# Patient Record
Sex: MALE | Race: WHITE | Employment: UNEMPLOYED | URBAN - METROPOLITAN AREA
[De-identification: names, ages, dates, MRNs, and addresses within clinical notes are randomized per-mention and may not be internally consistent; named-entity substitution may affect disease eponyms.]

---

## 2022-04-24 ENCOUNTER — HOSPITAL ENCOUNTER (EMERGENCY)
Age: 42
Discharge: HOME | End: 2022-04-25
Attending: STUDENT IN AN ORGANIZED HEALTH CARE EDUCATION/TRAINING PROGRAM
Payer: MEDICAID

## 2022-04-24 DIAGNOSIS — F32.A DEPRESSION WITH SUICIDAL IDEATION: Primary | ICD-10-CM

## 2022-04-24 DIAGNOSIS — R45.851 DEPRESSION WITH SUICIDAL IDEATION: Primary | ICD-10-CM

## 2022-04-24 PROCEDURE — 99285 EMERGENCY DEPT VISIT HI MDM: CPT

## 2022-04-24 RX ORDER — BUPROPION HYDROCHLORIDE 150 MG/1
150 TABLET, EXTENDED RELEASE ORAL 2 TIMES DAILY
COMMUNITY
End: 2022-04-25

## 2022-04-24 RX ORDER — HYDROXYZINE PAMOATE 25 MG/1
25 CAPSULE ORAL 3 TIMES DAILY PRN
COMMUNITY
End: 2022-04-25

## 2022-04-24 RX ORDER — MIRTAZAPINE 15 MG/1
15 TABLET, FILM COATED ORAL NIGHTLY
COMMUNITY
End: 2022-04-25

## 2022-04-24 ASSESSMENT — PAIN - FUNCTIONAL ASSESSMENT: PAIN_FUNCTIONAL_ASSESSMENT: NONE - DENIES PAIN

## 2022-04-24 ASSESSMENT — LIFESTYLE VARIABLES
HOW MANY STANDARD DRINKS CONTAINING ALCOHOL DO YOU HAVE ON A TYPICAL DAY: 5 OR 6
HOW OFTEN DO YOU HAVE A DRINK CONTAINING ALCOHOL: 4 OR MORE TIMES A WEEK

## 2022-04-25 VITALS
RESPIRATION RATE: 16 BRPM | SYSTOLIC BLOOD PRESSURE: 98 MMHG | BODY MASS INDEX: 25.34 KG/M2 | HEIGHT: 71 IN | DIASTOLIC BLOOD PRESSURE: 57 MMHG | OXYGEN SATURATION: 96 % | HEART RATE: 75 BPM | WEIGHT: 181 LBS | TEMPERATURE: 97.6 F

## 2022-04-25 LAB
ABSOLUTE EOS #: 0.2 K/UL (ref 0–0.4)
ABSOLUTE LYMPH #: 1.9 K/UL (ref 1–4.8)
ABSOLUTE MONO #: 0.8 K/UL (ref 0.1–1.3)
ALBUMIN SERPL-MCNC: 4.7 G/DL (ref 3.5–5.2)
ALP BLD-CCNC: 72 U/L (ref 40–129)
ALT SERPL-CCNC: 26 U/L (ref 5–41)
AMPHETAMINE SCREEN URINE: NEGATIVE
ANION GAP SERPL CALCULATED.3IONS-SCNC: 14 MMOL/L (ref 9–17)
AST SERPL-CCNC: 24 U/L
BARBITURATE SCREEN URINE: NEGATIVE
BASOPHILS # BLD: 1 % (ref 0–2)
BASOPHILS ABSOLUTE: 0.1 K/UL (ref 0–0.2)
BENZODIAZEPINE SCREEN, URINE: NEGATIVE
BILIRUB SERPL-MCNC: 0.81 MG/DL (ref 0.3–1.2)
BUN BLDV-MCNC: 14 MG/DL (ref 6–20)
CALCIUM SERPL-MCNC: 9.5 MG/DL (ref 8.6–10.4)
CANNABINOID SCREEN URINE: POSITIVE
CHLORIDE BLD-SCNC: 96 MMOL/L (ref 98–107)
CO2: 24 MMOL/L (ref 20–31)
COCAINE METABOLITE, URINE: POSITIVE
CREAT SERPL-MCNC: 0.77 MG/DL (ref 0.7–1.2)
EOSINOPHILS RELATIVE PERCENT: 2 % (ref 0–4)
ETHANOL PERCENT: <0.01 %
ETHANOL: <10 MG/DL
GFR AFRICAN AMERICAN: >60 ML/MIN
GFR NON-AFRICAN AMERICAN: >60 ML/MIN
GFR SERPL CREATININE-BSD FRML MDRD: ABNORMAL ML/MIN/{1.73_M2}
GLUCOSE BLD-MCNC: 118 MG/DL (ref 70–99)
HCT VFR BLD CALC: 42.2 % (ref 41–53)
HEMOGLOBIN: 14.6 G/DL (ref 13.5–17.5)
LYMPHOCYTES # BLD: 17 % (ref 24–44)
MCH RBC QN AUTO: 31.7 PG (ref 26–34)
MCHC RBC AUTO-ENTMCNC: 34.6 G/DL (ref 31–37)
MCV RBC AUTO: 91.9 FL (ref 80–100)
METHADONE SCREEN, URINE: NEGATIVE
MONOCYTES # BLD: 7 % (ref 1–7)
OPIATES, URINE: NEGATIVE
OXYCODONE SCREEN URINE: NEGATIVE
PDW BLD-RTO: 12.8 % (ref 11.5–14.9)
PHENCYCLIDINE, URINE: NEGATIVE
PLATELET # BLD: 204 K/UL (ref 150–450)
PMV BLD AUTO: 8 FL (ref 6–12)
POTASSIUM SERPL-SCNC: 3.7 MMOL/L (ref 3.7–5.3)
RBC # BLD: 4.6 M/UL (ref 4.5–5.9)
SARS-COV-2, RAPID: NOT DETECTED
SEG NEUTROPHILS: 73 % (ref 36–66)
SEGMENTED NEUTROPHILS ABSOLUTE COUNT: 8.1 K/UL (ref 1.3–9.1)
SODIUM BLD-SCNC: 134 MMOL/L (ref 135–144)
SPECIMEN DESCRIPTION: NORMAL
TEST INFORMATION: ABNORMAL
TOTAL PROTEIN: 7.7 G/DL (ref 6.4–8.3)
WBC # BLD: 11 K/UL (ref 3.5–11)

## 2022-04-25 PROCEDURE — 87635 SARS-COV-2 COVID-19 AMP PRB: CPT

## 2022-04-25 PROCEDURE — 85025 COMPLETE CBC W/AUTO DIFF WBC: CPT

## 2022-04-25 PROCEDURE — 80307 DRUG TEST PRSMV CHEM ANLYZR: CPT

## 2022-04-25 PROCEDURE — 36415 COLL VENOUS BLD VENIPUNCTURE: CPT

## 2022-04-25 PROCEDURE — 80053 COMPREHEN METABOLIC PANEL: CPT

## 2022-04-25 PROCEDURE — G0480 DRUG TEST DEF 1-7 CLASSES: HCPCS

## 2022-04-25 PROCEDURE — 99285 EMERGENCY DEPT VISIT HI MDM: CPT | Performed by: PSYCHIATRY & NEUROLOGY

## 2022-04-25 ASSESSMENT — ENCOUNTER SYMPTOMS
RHINORRHEA: 0
VOMITING: 0
ABDOMINAL PAIN: 0
COUGH: 0
SHORTNESS OF BREATH: 0
NAUSEA: 0

## 2022-04-25 NOTE — ED NOTES
Spoke with Access center, these are the current statuses:    · At [facility] of [address]  [address]      · [facility]     I asked access to also reach out to [address] MANUEL Marie RN  04/25/22 8390

## 2022-04-25 NOTE — ED NOTES
Spoke with access center, these are the current statues:     At Capacity:   [facility]   [address] [address] [address]   [address]  [facility] will continue to attempt placement and/or waitlist.             Froy Toro, RN  04/25/22 1112

## 2022-04-25 NOTE — ED NOTES
[facility] called and states they are declining pt per Dr. Kortney Bello.      Katy Can, RN  04/25/22 5637

## 2022-04-25 NOTE — ED NOTES
Patient discharged by Dr Fadia Edwards and Dr Tova Lockett due to not meeting criteria for inpatient hospitalization. Patient also denied admission to [facility] and [facility]. Patient agreeable to be cabbed to a shelter that is not in [place]. This writer was able to find emergency shelter services in [place] and called for a cab to their facility. ETA for transportation is \"roughly 1 hour\".

## 2022-04-25 NOTE — ED NOTES
Dr. Carissa Hoskins spoke with this RN and Dr. Winston Sousa about in person consult. Patient doesn't meet admission criteria. Patient will speak with  about shelter placement.       Candi Schumacher RN  04/25/22 3642

## 2022-04-25 NOTE — ED PROVIDER NOTES
[facility]  Emergency Department Encounter  Emergency Medicine Physician     Pt Name: Chandrika Cheng  MRN: [id number]  [place] 1980  Date of evaluation: 4/25/22  PCP:  No primary care provider on file. CHIEF COMPLAINT       Chief Complaint   Patient presents with    Suicidal       HISTORY OF PRESENT ILLNESS  (Location/Symptom, Timing/Onset, Context/Setting, Quality, Duration, Modifying Factors, Severity.)    Chandrika Cheng is a 39 y.o. male who presents brought in by EMS. Patient states that he was going from [place] and traveling down to [place]. States that he came to [place] to bury his grandmother. He states that his friend left him at a gas station and he tried to find his way to a shelter. My patient was walking and he states that he became more depressed, more anxious, and eventually suicidal.  Patient states that he had a sudden thought that he should walk into traffic. After he had that thought, he called 911. On my examination, patient is calm and cooperative. He states that he still feels suicidal and that if he were released he would simply walk into traffic to kill himself. He denies any homicidal thoughts. States that he has been out of his Wellbutrin, Vistaril, and Remeron for at least a month. PAST MEDICAL / SURGICAL / SOCIAL / FAMILY HISTORY    has a past medical history of Anxiety, Depression, and Insomnia. has no past surgical history on file.     Social History     Socioeconomic History    Marital status: Single     Spouse name: Not on file    Number of children: Not on file    Years of education: Not on file    Highest education level: Not on file   Occupational History    Not on file   Tobacco Use    Smoking status: Current Every Day Smoker    Smokeless tobacco: Never Used   Substance and Sexual Activity    Alcohol use: Yes     Comment: daily drinker >6 beers    Drug use: Yes     Types: Cocaine     Comment: last used 1:30 pm on 4/24/22    Sexual activity: Not on file   Other Topics Concern    Not on file   Social History Narrative    Not on file     Social Determinants of Health     Financial Resource Strain:     Difficulty of Paying Living Expenses: Not on file   Food Insecurity:     Worried About Running Out of Food in the Last Year: Not on file    Ai of Food in the Last Year: Not on file   Transportation Needs:     Lack of Transportation (Medical): Not on file    Lack of Transportation (Non-Medical): Not on file   Physical Activity:     Days of Exercise per Week: Not on file    Minutes of Exercise per Session: Not on file   Stress:     Feeling of Stress : Not on file   Social Connections:     Frequency of Communication with Friends and Family: Not on file    Frequency of Social Gatherings with Friends and Family: Not on file    Attends Jainism Services: Not on file    Active Member of [address] [place] [address] [employer] or Organizations: Not on file    Attends Club or Organization Meetings: Not on file    Marital Status: Not on file   Intimate Partner Violence:     Fear of Current or Ex-Partner: Not on file    Emotionally Abused: Not on file    Physically Abused: Not on file    Sexually Abused: Not on file   Housing Stability:     Unable to Pay for Housing in the Last Year: Not on file    Number of [place] in the Last Year: Not on file    Unstable Housing in the Last Year: Not on file       History reviewed. No pertinent family history. Allergies:    Patient has no known allergies. Home Medications:  Prior to Admission medications    Medication Sig Start Date End Date Taking?  Authorizing Provider   buPROPion (WELLBUTRIN SR) 150 MG extended release tablet Take 150 mg by mouth 2 times daily   Yes Historical Provider, MD   mirtazapine (REMERON) 15 MG tablet Take 15 mg by mouth nightly   Yes Historical Provider, MD   hydrOXYzine (VISTARIL) 25 MG capsule Take 25 mg by mouth 3 times daily as needed for Anxiety   Yes Historical Provider, MD REVIEW OF SYSTEMS    (2-9 systems for level 4, 10 or more for level 5)    Review of Systems   Constitutional: Negative for chills, fatigue and fever. HENT: Negative for congestion and rhinorrhea. Respiratory: Negative for cough and shortness of breath. Cardiovascular: Negative for chest pain. Gastrointestinal: Negative for abdominal pain, nausea and vomiting. Neurological: Negative for light-headedness and headaches. Psychiatric/Behavioral: Positive for suicidal ideas. Negative for confusion, hallucinations, self-injury and sleep disturbance. The patient is nervous/anxious. All other systems reviewed and are negative. PHYSICAL EXAM   (up to 7 for level 4, 8 or more for level 5)    INITIAL VITALS:   ED Triage Vitals [04/24/22 5017]   BP Temp Temp Source Pulse Resp SpO2 Height Weight   108/73 98.3 °F (36.8 °C) Oral 89 16 96 % 5' 11\" (1.803 m) 181 lb (82.1 kg)       Physical Exam  Vitals and nursing note reviewed. Constitutional:       General: He is not in acute distress. Appearance: Normal appearance. He is not ill-appearing. Cardiovascular:      Rate and Rhythm: Normal rate and regular rhythm. Pulses: Normal pulses. Radial pulses are 2+ on the right side and 2+ on the left side. Heart sounds: Normal heart sounds. No murmur heard. Pulmonary:      Effort: Pulmonary effort is normal. No respiratory distress. Breath sounds: Normal breath sounds. No decreased breath sounds. Abdominal:      Palpations: Abdomen is soft. Tenderness: There is no abdominal tenderness. Skin:     General: Skin is warm and dry. Capillary Refill: Capillary refill takes less than 2 seconds. Neurological:      General: No focal deficit present. Mental Status: He is alert and oriented to person, place, and time. GCS: GCS eye subscore is 4. GCS verbal subscore is 5. GCS motor subscore is 6.    Psychiatric:         Attention and Perception: Attention and perception normal.         Mood and Affect: Mood is anxious and depressed. Affect is flat. Speech: Speech normal.         Behavior: Behavior is not aggressive, withdrawn, hyperactive or combative. Behavior is cooperative. Thought Content: Thought content includes suicidal ideation. Thought content does not include homicidal ideation. Thought content includes suicidal plan. Thought content does not include homicidal plan. DIFFERENTIAL  DIAGNOSIS   PLAN (LABS / IMAGING / EKG):  Orders Placed This Encounter   Procedures    COVID-19, Rapid    Comprehensive Metabolic Panel    CBC with Auto Differential    Urine Drug Screen    Ethanol    Suicide precautions       MEDICATIONS ORDERED:  No orders of the defined types were placed in this encounter. DIAGNOSTIC RESULTS / EMERGENCYDEPARTMENT COURSE / MDM   LABS:  Labs Reviewed   COMPREHENSIVE METABOLIC PANEL - Abnormal; Notable for the following components:       Result Value    Glucose 118 (*)     Sodium 134 (*)     Chloride 96 (*)     All other components within normal limits   CBC WITH AUTO DIFFERENTIAL - Abnormal; Notable for the following components:    Seg Neutrophils 73 (*)     Lymphocytes 17 (*)     All other components within normal limits   URINE DRUG SCREEN - Abnormal; Notable for the following components:    Cocaine Metabolite, Urine POSITIVE (*)     Cannabinoid Scrn, Ur POSITIVE (*)     All other components within normal limits   COVID-19, RAPID   ETHANOL       RADIOLOGY:  No results found. Impression:  Patient acutely suicidal.  Has a plan to hurt himself and kill himself. Has been out of his medications for at least a month. Patient's abandonment by his friend plus the lack of medications are all likely contributory. For now we will plan on [facility] labs, COVID swab, and admission.       EMERGENCY DEPARTMENT COURSE:  ED Course as of 04/25/22 0341   Mon Apr 25, 2022   1277 Patient is medically cleared for admission to the [facility] [AP]   5169 SARS-CoV-2, Rapid: Not Detected [AP]      ED Course User Index  [AP] Roland Gonzalez DO         PROCEDURES:  none    CONSULTS:  None    CRITICAL CARE:  There was a high probability of clinically significant/life threatening deterioration in this patient's condition which required my urgent intervention. Total critical care time was 5 minutes. This excludes any time for separately reportable procedures. FINAL IMPRESSION     1. Depression with suicidal ideation          DISPOSITION / PLAN   DISPOSITION Decision To Admit 04/25/2022 02:31:55 AM      PATIENT REFERRED TO:  No follow-up provider specified.     DISCHARGE MEDICATIONS:  New Prescriptions    No medications on file       Roland Gonzalez DO  Emergency Medicine Attending    (Please note that portions of this note were completed with a voice recognition program.  Efforts were made to edit the dictations but occasionally words are mis-transcribed.)         Roland Gonzalez DO  04/25/22 7073

## 2022-04-25 NOTE — ED NOTES
Mode of arrival (squad #, walk in, police, etc) : ems        Chief complaint(s): Suicidal        Arrival Note (brief scenario, treatment PTA, etc). : States he is depressed and suicidal, plan is to walk into traffic. Patient is homeless. Hx of alcohol abuse, drinks > 6 beers a day, last drink was on Friday. Hx of drug abuse and last smoked Cocaine at 0130 am. Patient is not connected to any mental health center. A/O X 3         C= \"Have you ever felt that you should Cut down on your drinking? \"  Yes  A= \"Have people Annoyed you by criticizing your drinking? \"  No  G= \"Have you ever felt bad or Guilty about your drinking? \"  Yes  E= \"Have you ever had a drink as an Eye-opener first thing in the morning to steady your nerves or to help a hangover? \"  No      Deferred []      Reason for deferring: N/A    *If yes to two or more: probable alcohol abuse. Matt Neil RN  04/25/22 0326

## 2022-04-25 NOTE — ED NOTES
This writer spoke to employee at [facility] and discussed situation with her, she indicated she will \"do some research\" and get back with us in regards to a potential admission. Informed them to call ED Charge for updates.

## 2022-04-25 NOTE — CONSULTS
Department of Psychiatry   Psychiatric Assessment      Thank you very much for allowing us to participate in the care of this patient. Reason for Consult: Depression with suicidal ideation    HISTORY OF PRESENT ILLNESS:          The patient is a 39 y.o. male with no significant medical history who presented to the ER with chief complaint of depression and suicidal ideation. Patient reports to this Brooke Núñez that he was on his way to [place] and somebody abandoned him at a [employer]. He states he was walking to find a shelter and when it started to rain he states he came to the hospital saying he was suicidal.  He is really denying any problem with severe depression over the past several weeks. He clearly states this is a situational episode. States very clearly if he had a bus ticket to [place] that he would not be suicidal.  States that really all he needs is a shelter, but he is hesitant to go to any shelter in [facility]. He reports a bad incident at [employer] but will not elaborate, but clearly states he does not feel safe in [place] [facility]. He is denying auditory or visual hallucinations now or in the past.  He denies manic episodes now or in the past though he states he has a diagnosis of milo. Upon further elaboration it appears that he may have manic-like symptoms when he uses crack cocaine but no true episodes of milo. Patient does endorse episodes of major depression, where he has been down or depressed nearly all day every day for weeks on and. He states that he has been hospitalized approximately 10 times in the [place] area over his lifetime for such episodes, denies any other hospitalizations in any other state. Denies any past suicide attempts to this author. Explored with the patient more carefully his options.   The patient is indicating that he is not imminently suicidal, that he felt that way in the moment because it started to rain when he was trying to walk to a shelter. He again indicates that if he had a way to [place] he would like to get there to be with his cousin. In that regard he is forward-looking and constructive. He also states that if he had a safe shelter to go to that he would be willing to work. He waffled somewhat on his desire to go to [place] facilities. At one point he stated that going to [place] was \"going the wrong way\" and stated he did not want to go to any [place] facility. However towards the end of our interview when I talked with him about other shelters he then stated he wanted to go to inpatient substance use rehab in [place]. Patient appears to be malingering somewhat for secondary gain of finding stability of housing and short-term shelter while he tries to figure out his plan to get to [place]. He states he has no money for a bus ticket and if he did he would just get a bus ticket and go to [place]. As such it appears he is choosing his disposition options based on his current limited resources and where he thinks he would have the best chance to get to [place]. When exploring medications he states that Remeron is the only thing that is ever helped him which was largely due to his sleep. When I asked him if he would like to get some Remeron if he were discharged to a shelter he states \"Remeron is not the issue man, I need to get to [facility]". He declines pursuit of any medication at present time    PSYCHIATRIC HISTORY:      · Outpatient psychiatric provider: States he was following up with [facility] services in [place]  · Suicide attempts: Denies  · Inpatient psychiatric admissions: Greater than 10    Past psychiatric medications includes:     He reports \"I been on them all\". I listed fluoxetine, Lexapro, Zoloft, Paxil, Effexor, Remeron, Wellbutrin and patient states that he has been on them all.   He does report that Remeron is the only one that helped him and largely because it helped with his sleep. Adverse reactions from psychotropic medications:    Denies      Lifetime Psychiatric Review of Systems completed    ·    Obsessions and Compulsions: Denies    ·    Yumiko or Hypomania: Denies in the absence of crack use  ·    Hallucinations: Denies  ·    Panic Attacks:  Denies  ·    Delusions:  Denies  ·    Phobias:  Denies  ·    Trauma: Denies    Prior to Admission medications    Not on File        Medications:    No current facility-administered medications for this encounter. Past Medical History:        Diagnosis Date    Anxiety     Depression     Insomnia        Past Surgical History:    History reviewed. No pertinent surgical history. Allergies: Patient has no known allergies. Social History:      Patient is homeless, unemployed and transient. Traveling from [place] to [place] to live with his cousin    SUBSTANCE USE HISTORY: Crack cocaine and alcohol, denies all else          Family Medical and Psychiatric History:     Noncontributory    History reviewed. No pertinent family history.       Physical  BP (!) 98/57   Pulse 75   Temp 97.6 °F (36.4 °C) (Oral)   Resp 16   Ht 5' 11\" (1.803 m)   Wt 181 lb (82.1 kg)   SpO2 96%   BMI 25.24 kg/m²         Mental Status Examination:  Level of consciousness:  Within normal limits  Appearance: hospital attire, lying in bed, fair grooming  Behavior/Motor:  no abnormalities noted  Attitude toward examiner:  cooperative, attentive and good eye contact  Speech:  Spontaneous, normal rate and volume  Mood: \"Frustrated\"  Affect: Fair  Thought processes:  Linear, goal directed, coherent  Thought content: Denied suicidal ideations at present time   Denies homicidal ideations    Denies hallucinations   Denies delusions  Cognition:  Oriented to self, situation, location, date  Concentration clinically adequate  Memory age appropriate  Insight & Judgment: Limited but likely at or near baseline    DSM-5 DIAGNOSIS:      Acute stress reaction  Polysubstance use disorder (crack cocaine and alcohol)  Major depressive disorder recurrent moderate    Stressors     Severity of stressors is prominent with being abandoned in route to [place]. Source of stressors include: Other psychosocial and environmental stressors    PLAN:      Patient appears to be manipulating for housing in trying to obtain transportation to [place]. He is able to contract for safety but is clear that he does not want to go to a [facility] secondary to some encounter that he had at [facility]. At the end he seems ambivalent about substance use rehab in [place] versus shelter outside of the [facility] area but it is clear that he can be discharged safely and does not require hospitalization for mental health nor do I think he would likely benefit from hospitalization for mental health at present time secondary to his entire motivation being to get to [place]. Patient is not interested in medication at present time    We will sign off of the patient's care for now      Thank you very much for allowing us to participate in the care of this patient.       Electronically signed by Armaan Sims MD on 4/25/22 at 3:08 PM EDT

## 2022-04-25 NOTE — PROGRESS NOTES
Medication History completed:    New medications: none    Medications discontinued: bupropion, hydroxyzine, mirtazapine    Changes to dosing: none    Stated allergies: NKDA    Other pertinent information: Medications confirmed with patient. He states that he been out of medications for a month and does not use any OTC medications. Patient endorses use of marijuana in the past week and use of crack cocaine yesterday.      Thank you,   Anderson Montero  PharmD Candidate 2022

## 2022-04-25 NOTE — ED PROVIDER NOTES
ADDENDUM:        Care of this patient was assumed from Dr. Bhavani Ocampo. The patient was seen for Suicidal  .  The patient's initial evaluation and plan have been discussed with the prior provider who initially evaluated the patient. Nursing Notes, Past Medical Hx, Past Surgical Hx, Social Hx, Allergies, and Family Hx were all reviewed. Patient was reportedly brought in because he was suicidal.  Work-up was negative. Plan: Attempt to get into a substance use program    ED Course     The patient was given the following medications:  No orders of the defined types were placed in this encounter. RECENT VITALS:  BP: (!) 98/57, Temp: 97.6 °F (36.4 °C), Pulse: 75, Resp: 16     RADIOLOGY:All plain film, CT, MRI, and formal ultrasound images (except ED bedside ultrasound) are read by the radiologist and the interpretations are directly viewed by the emergency physician. LABS: All lab results were reviewed by myself, and all abnormals are listed below. Labs Reviewed   COMPREHENSIVE METABOLIC PANEL - Abnormal; Notable for the following components:       Result Value    Glucose 118 (*)     Sodium 134 (*)     Chloride 96 (*)     All other components within normal limits   CBC WITH AUTO DIFFERENTIAL - Abnormal; Notable for the following components:    Seg Neutrophils 73 (*)     Lymphocytes 17 (*)     All other components within normal limits   URINE DRUG SCREEN - Abnormal; Notable for the following components:    Cocaine Metabolite, Urine POSITIVE (*)     Cannabinoid Scrn, Ur POSITIVE (*)     All other components within normal limits   COVID-19, RAPID   ETHANOL         2:45 PM EDT  Patient was denied at the substance abuse program and was unable to find a place in [place] where his insurance is covered.   Consult was made with our psychiatrist Dr. Miesha Alejandra who personally evaluated the patient in the ER stated that he is not suicidal and does not meet admission criteria anywhere and we will have the social worker try to find him a shelter or somewhere else that we can send him. Disposition     DISPOSITION:    DISPOSITION Decision To Admit 04/25/2022 02:31:55 AM      CLINICAL IMPRESSION:  1. Depression with suicidal ideation        PATIENT REFERRED TO:  No follow-up provider specified.     DISCHARGE MEDICATIONS:  New Prescriptions    No medications on file             (Please note that portions of this note were completed with a voice recognition program.  Efforts were made to edit the dictations but occasionally words are mis-transcribed.)       Iris Thomas MD  04/25/22 2573

## 2022-04-25 NOTE — ED NOTES
Second page out to [facility] RN to speak with psychiatrist for consult for admission.      Yudelka Diggs RN  04/25/22 5253

## 2022-04-25 NOTE — ED NOTES
Provisional Diagnosis:     Patient presented to ED via squad for a mental health evaluation. Psychosocial and Contextual Factors:     Patient not from [place] and has [place]. C-SSRS Summary:    Patient initially denies SI, but after some time has passed and he had time to think about his current SI, he reports SI due to \"being stuck\". Patient: X  Family:   Agency: X ([facility])    Substance Abuse:  Patient reports regular alcohol and cocaine use. Patient reports he last used cocaine earlier today around 1pm.    Present Suicidal Behavior:    Patient initially denies SI, but after some time has passed and he had time to think about his current SI, he reports SI due to \"being stuck\". Patient reports SI with a plan to walk into traffic. Verbal: X    Attempt:    Past Suicidal Behavior:   Patient reports 2 past suicide attempts. One in 2015 by \"drinking myself to death\" and again in 2018 by \"taking a bunch of pills\". Verbal: X    Attempt: X    Self-Injurious/Self-Mutilation:  Patient denies    Violence Current or Past:   None evident    Trauma Identified:   Patient is attempting to get to [place] from [place]. His cousin assisted him in obtaining transportation, but his ride \"left him\" at a [employer], stranding him. Protective Factors:    Patient has insurance. Patient has familial support in [place]. Risk Factors:    Patient homeless. Patient has [place]. Patient not from this area. Patient reports he has not had his anxiety medications in \"2 weeks\". Clinical Summary:    Patient is a 39year old  male who presented to ED via squad because police found him \"walking down the road\". Patient reports he came to [place] to \"bury his grandmother\" and afterwards there was a [place] blowout\" with the family so he called his cousin in [place] to see if he could stay there.   Patient stated his cousin then attempted to assist with transportation with a friend to [place]. Per patient, this person then left him at [employer], stranding him. Patient was given directions towards \"south\" so he started walking, which is when the police found him and he was transported to ED by EMS. Patient does report wanting help getting into a [facility] program.  Patient reports daily alcohol and cocaine use. Patient reports he drinks anywhere from \"4-5 pints to a couple fifths\" when he has the money to purchase alcohol. Patient also reports daily cocaine use by snorting. Patient reports he has been in a treatment facility in [place] and did well, but reports he recently relapsed. Patient is currently homeless. Patient does not have any supports locally. He had been staying at the [facility] until he was able to get a ride to [place]. Patient reports that is where he relapsed, so he \"don't want to go back there\". This writer did attempt to phone several [facility] treatment facilities. [place] and [facility] do not accept [address] [place] [address] does not have inpatient treatment, [facility] did not answer and [facility] is able to accept and assist patient on switching to [place], but the patient has to identify \"wanting to stay\" in [place]. Patient did indicate he \"can stay for a while, if someone is able to help me\". Initially upon arrival to [facility], patient denies SI, however after realizing that there are not many options locally for him in regards to [facility] treatment, patient then identifies suicidal ideation. Patient stated \"the only way I know how is to walk into traffic\". Patient does identify 2 past suicide attempts by overdosing and drinking self \"to death\". Patient does have past hospitalizations from overdosing. Patient denies HI. Patient denies hallucinations. Level of Care Disposition:     This writer consulted with Dr Baudilio Islas who recommended the patient be considered for inpatient treatment at a substance use treatment facility. This writer printed intake packet for [facility] and faxed information to them for review. If patient is denied by [facility], he can be considered for admission to [facility].

## 2022-05-10 ENCOUNTER — HOSPITAL ENCOUNTER (EMERGENCY)
Age: 42
Discharge: HOME | End: 2022-05-11
Attending: EMERGENCY MEDICINE
Payer: MEDICAID

## 2022-05-10 VITALS
OXYGEN SATURATION: 99 % | SYSTOLIC BLOOD PRESSURE: 153 MMHG | TEMPERATURE: 97.9 F | HEART RATE: 91 BPM | RESPIRATION RATE: 18 BRPM | DIASTOLIC BLOOD PRESSURE: 97 MMHG

## 2022-05-10 DIAGNOSIS — F32.A DEPRESSION, UNSPECIFIED DEPRESSION TYPE: Primary | ICD-10-CM

## 2022-05-10 PROCEDURE — 99282 EMERGENCY DEPT VISIT SF MDM: CPT

## 2022-05-11 ASSESSMENT — ENCOUNTER SYMPTOMS
SHORTNESS OF BREATH: 0
RHINORRHEA: 0
SORE THROAT: 0
NAUSEA: 0
COUGH: 0
DIARRHEA: 0
BLOOD IN STOOL: 0
VOMITING: 0
ABDOMINAL PAIN: 0
CONSTIPATION: 0

## 2022-05-11 NOTE — ED PROVIDER NOTES
[facility]  ED  Emergency Department Encounter  EmergencyMedicine Resident     Pt Mary Pierson  MRN: [id number]  Carlosgfperla 1980  Date of evaluation: 5/10/22  PCP:  No primary care provider on file. This patient was evaluated in the Emergency Department for symptoms described in the history of present illness. The patient was evaluated in the context of the global COVID-19 pandemic, which necessitated consideration that the patient might be at risk for infection with the SARS-CoV-2 virus that causes COVID-19. Institutional protocols and algorithms that pertain to the evaluation of patients at risk for COVID-19 are in a state of rapid change based on information released by regulatory bodies including the CDC and federal and state organizations. These policies and algorithms were followed during the patient's care in the ED. CHIEF COMPLAINT       Chief Complaint   Patient presents with    Other     Pt reports he is new to [place] and is currently trying to get into a shelter. Pt reports he is depressed, but currently denies HI, SI or delusions. HISTORY OF PRESENT ILLNESS  (Location/Symptom, Timing/Onset, Context/Setting, Quality, Duration, Modifying Factors, Severity.)      Annelle Baumgarten is a 39 y.o. male who presents with depression. Patient reports that he was instructed to come to the emergency department for evaluation because he was depressed. Patient has been depressed for a while, is denying any suicidal ideation, homicidal ideation. Patient denies any fevers, chills, headache, vision changes, chest pain, shortness of breath, abdominal pain, nausea, vomiting, diarrhea. Patient has history of depression, he has not taking his medications as prescribed, is homeless, cannot go to homeless shelter tonight. Patient is plugged into the [facility] center, does have an appointment tomorrow for evaluation. Patient would like to talk to a  regarding resources.     PAST MEDICAL / SURGICAL / SOCIAL / FAMILY HISTORY      has a past medical history of Anxiety, Depression, and Insomnia. has no past surgical history on file. Social History     Socioeconomic History    Marital status: Single     Spouse name: Not on file    Number of children: Not on file    Years of education: Not on file    Highest education level: Not on file   Occupational History    Not on file   Tobacco Use    Smoking status: Current Every Day Smoker    Smokeless tobacco: Never Used   Substance and Sexual Activity    Alcohol use: Yes     Comment: daily drinker >6 beers    Drug use: Yes     Types: Cocaine     Comment: last used 1:30 pm on 4/24/22    Sexual activity: Not on file   Other Topics Concern    Not on file   Social History Narrative    Not on file     Social Determinants of Health     Financial Resource Strain:     Difficulty of Paying Living Expenses: Not on file   Food Insecurity:     Worried About Running Out of Food in the Last Year: Not on file    Ai of Food in the Last Year: Not on file   Transportation Needs:     Lack of Transportation (Medical): Not on file    Lack of Transportation (Non-Medical):  Not on file   Physical Activity:     Days of Exercise per Week: Not on file    Minutes of Exercise per Session: Not on file   Stress:     Feeling of Stress : Not on file   Social Connections:     Frequency of Communication with Friends and Family: Not on file    Frequency of Social Gatherings with Friends and Family: Not on file    Attends Latter-day Services: Not on file    Active Member of Clubs or Organizations: Not on file    Attends Club or Organization Meetings: Not on file    Marital Status: Not on file   Intimate Partner Violence:     Fear of Current or Ex-Partner: Not on file    Emotionally Abused: Not on file    Physically Abused: Not on file    Sexually Abused: Not on file   Housing Stability:     Unable to Pay for Housing in the Last Year: Not on file    Number of Places Lived in the Last Year: Not on file    Unstable Housing in the Last Year: Not on file       History reviewed. No pertinent family history. Allergies:  Patient has no known allergies. Home Medications:  Prior to Admission medications    Not on File       REVIEW OF SYSTEMS    (2-9 systems for level 4, 10 or more for level 5)      Review of Systems   Constitutional: Negative for chills and fever. HENT: Negative for congestion, rhinorrhea and sore throat. Respiratory: Negative for cough and shortness of breath. Cardiovascular: Negative for chest pain, palpitations and leg swelling. Gastrointestinal: Negative for abdominal pain, blood in stool, constipation, diarrhea, nausea and vomiting. Musculoskeletal: Negative for neck pain and neck stiffness. Neurological: Negative for dizziness, syncope, weakness, light-headedness and headaches. Psychiatric/Behavioral: Positive for decreased concentration, dysphoric mood and sleep disturbance. Negative for agitation, confusion, hallucinations, self-injury and suicidal ideas. The patient is nervous/anxious. PHYSICAL EXAM   (up to 7 for level 4, 8 or more for level 5)      INITIAL VITALS:   BP (!) 153/97   Pulse 91   Temp 97.9 °F (36.6 °C) (Oral)   Resp 18   SpO2 99%     Physical Exam  Constitutional:       General: He is not in acute distress. Appearance: He is well-developed. He is not ill-appearing, toxic-appearing or diaphoretic. Comments: Patient is alert and oriented, openly communicative, no acute distress   HENT:      Head: Normocephalic and atraumatic. Right Ear: External ear normal.      Left Ear: External ear normal.   Eyes:      General:         Right eye: No discharge. Left eye: No discharge. Extraocular Movements: Extraocular movements intact. Neck:      Vascular: No JVD. Trachea: No tracheal deviation. Cardiovascular:      Rate and Rhythm: Normal rate and regular rhythm. Heart sounds: Normal heart sounds. No murmur heard. No friction rub. No gallop. Pulmonary:      Effort: Pulmonary effort is normal. No respiratory distress. Breath sounds: Normal breath sounds. No stridor. No wheezing, rhonchi or rales. Chest:      Chest wall: No tenderness. Abdominal:      General: There is no distension. Palpations: Abdomen is soft. There is no mass. Tenderness: There is no abdominal tenderness. There is no right CVA tenderness, left CVA tenderness or guarding. Musculoskeletal:         General: Normal range of motion. Cervical back: Normal range of motion and neck supple. Skin:     General: Skin is warm. Capillary Refill: Capillary refill takes less than 2 seconds. Neurological:      Mental Status: He is alert and oriented to person, place, and time. Sensory: No sensory deficit. Motor: No weakness. Psychiatric:      Comments: Patient has no psychomotor retardation or excitation, no flight of ideas, not responding to internal stimuli         DIFFERENTIAL  DIAGNOSIS     PLAN (LABS / IMAGING / EKG):  No orders of the defined types were placed in this encounter. MEDICATIONS ORDERED:  No orders of the defined types were placed in this encounter. DDX:     DIAGNOSTIC RESULTS / EMERGENCY DEPARTMENT COURSE / MDM   LAB RESULTS:  No results found for this visit on 05/10/22. IMPRESSION: 55-year-old male who presents emergency department with depression, no suicidal ideation, no homicidal ideation. Patient has no other complaints at this time. Patient is homeless, reports that he cannot check into his homeless shelter until tomorrow, can follow-up with the [facility] for further psychiatric care. Will have social work meet with patient.     RADIOLOGY:      EKG      All EKG's are interpreted by the Emergency Department Physician who either signs or Co-signs this chart in the absence of a cardiologist.    EMERGENCY DEPARTMENT COURSE:  Patient

## 2022-05-11 NOTE — ED NOTES
Patient is a 39year old male that came to the ED with an initial compliant of suicidal thoughts. SW met with the patient who states that he is not suicidal, \"I just feel alittle depressed. \" Patient explained that he is homeless and has exhausted all shelter resources in the area. Patient reports that he has been working with [address] for emergency shelters but every place is full. It does appear that the patient has tried every shelter. All available resources this SW has has been exhausted for emergency shelters. Patient states that he tried to get [facility] [place] detox today but they told him to come in for a walk in assessment on 5/11. Patient asking for a voluntary admission for psychiatry. SW explained to the patient that he currently does not meet inpatient criteria.

## 2022-05-11 NOTE — ED NOTES
Pt changed into paper scrubs and personal belongings checked/locked up per [id number] [facility] PD.       Thomas Enriquez, CHAVO  05/10/22 [id number]

## 2022-05-11 NOTE — ED PROVIDER NOTES
[facility]     Emergency Department     Faculty Attestation    I performed a history and physical examination of the patient and discussed management with the resident. I have reviewed and agree with the residents findings including all diagnostic interpretations, and treatment plans as written. Any areas of disagreement are noted on the chart. I was personally present for the key portions of any procedures. I have documented in the chart those procedures where I was not present during the key portions. I have reviewed the emergency nurses triage note. I agree with the chief complaint, past medical history, past surgical history, allergies, medications, social and family history as documented unless otherwise noted below. Documentation of the HPI, Physical Exam and Medical Decision Making performed by janethibfe is based on my personal performance of the HPI, PE and MDM. For Physician Assistant/ Nurse Practitioner cases/documentation I have personally evaluated this patient and have completed at least one if not all key elements of the E/M (history, physical exam, and MDM). Additional findings are as noted. 38 yo M c/o being depressed, denies suicidal or homicidal ideation, no injury, no fever, no cp, no sob,   pe vss gcs 15   No pressured speech, no finding of acute psychosis,     I feel pt stable for out pt tx,   social work saw pt & cleared pt for discharge home    EKG Interpretation    Interpreted by me      CRITICAL CARE: There was a high probability of clinically significant/life threatening deterioration in this patient's condition which required my urgent intervention. Total critical care time was 0 minutes. This excludes any time for separately reportable procedures.        Kevin 24, DO  05/10/22 [address], DO  05/11/22 0001

## 2023-02-06 ENCOUNTER — HOSPITAL ENCOUNTER (EMERGENCY)
Age: 43
Discharge: ANOTHER HOSPITAL | End: 2023-02-07
Attending: EMERGENCY MEDICINE
Payer: MEDICAID

## 2023-02-06 DIAGNOSIS — R45.851 SUICIDAL IDEATION: Primary | ICD-10-CM

## 2023-02-06 LAB
ABSOLUTE EOS #: 0.13 K/UL (ref 0–0.44)
ABSOLUTE IMMATURE GRANULOCYTE: 0.03 K/UL (ref 0–0.3)
ABSOLUTE LYMPH #: 1.49 K/UL (ref 1.1–3.7)
ABSOLUTE MONO #: 0.4 K/UL (ref 0.1–1.2)
ACETAMINOPHEN LEVEL: <5 UG/ML (ref 10–30)
ALBUMIN SERPL-MCNC: 4.5 G/DL (ref 3.5–5.2)
ALBUMIN/GLOBULIN RATIO: 1.4 (ref 1–2.5)
ALP SERPL-CCNC: 84 U/L (ref 40–129)
ALT SERPL-CCNC: 66 U/L (ref 5–41)
AMPHETAMINE SCREEN URINE: NEGATIVE
ANION GAP SERPL CALCULATED.3IONS-SCNC: 12 MMOL/L (ref 9–17)
AST SERPL-CCNC: 35 U/L
BARBITURATE SCREEN URINE: NEGATIVE
BASOPHILS # BLD: 1 % (ref 0–2)
BASOPHILS ABSOLUTE: 0.05 K/UL (ref 0–0.2)
BENZODIAZEPINE SCREEN, URINE: NEGATIVE
BILIRUB SERPL-MCNC: 0.4 MG/DL (ref 0.3–1.2)
BUN SERPL-MCNC: 9 MG/DL (ref 6–20)
CALCIUM SERPL-MCNC: 9.5 MG/DL (ref 8.6–10.4)
CANNABINOID SCREEN URINE: POSITIVE
CHLORIDE SERPL-SCNC: 100 MMOL/L (ref 98–107)
CO2 SERPL-SCNC: 25 MMOL/L (ref 20–31)
COCAINE METABOLITE, URINE: POSITIVE
CREAT SERPL-MCNC: 0.6 MG/DL (ref 0.7–1.2)
EOSINOPHILS RELATIVE PERCENT: 2 % (ref 1–4)
ETHANOL PERCENT: <0.01 %
ETHANOL: <10 MG/DL
FENTANYL URINE: NEGATIVE
GFR SERPL CREATININE-BSD FRML MDRD: >60 ML/MIN/1.73M2
GLUCOSE SERPL-MCNC: 83 MG/DL (ref 70–99)
HCT VFR BLD AUTO: 45.9 % (ref 40.7–50.3)
HGB BLD-MCNC: 15.6 G/DL (ref 13–17)
IMMATURE GRANULOCYTES: 0 %
LYMPHOCYTES # BLD: 18 % (ref 24–43)
MCH RBC QN AUTO: 31.8 PG (ref 25.2–33.5)
MCHC RBC AUTO-ENTMCNC: 34 G/DL (ref 28.4–34.8)
MCV RBC AUTO: 93.7 FL (ref 82.6–102.9)
METHADONE SCREEN, URINE: NEGATIVE
MONOCYTES # BLD: 5 % (ref 3–12)
NRBC AUTOMATED: 0 PER 100 WBC
OPIATES, URINE: NEGATIVE
OXYCODONE SCREEN URINE: NEGATIVE
PDW BLD-RTO: 12.8 % (ref 11.8–14.4)
PHENCYCLIDINE, URINE: NEGATIVE
PLATELET # BLD AUTO: 211 K/UL (ref 138–453)
PMV BLD AUTO: 10.7 FL (ref 8.1–13.5)
POTASSIUM SERPL-SCNC: 3.9 MMOL/L (ref 3.7–5.3)
PROT SERPL-MCNC: 7.8 G/DL (ref 6.4–8.3)
RBC # BLD: 4.9 M/UL (ref 4.21–5.77)
SALICYLATE LEVEL: <1 MG/DL (ref 3–10)
SEG NEUTROPHILS: 74 % (ref 36–65)
SEGMENTED NEUTROPHILS ABSOLUTE COUNT: 6.13 K/UL (ref 1.5–8.1)
SODIUM SERPL-SCNC: 137 MMOL/L (ref 135–144)
TEST INFORMATION: ABNORMAL
TOXIC TRICYCLIC SC,BLOOD: NEGATIVE
WBC # BLD AUTO: 8.2 K/UL (ref 3.5–11.3)

## 2023-02-06 PROCEDURE — 80307 DRUG TEST PRSMV CHEM ANLYZR: CPT

## 2023-02-06 PROCEDURE — G0480 DRUG TEST DEF 1-7 CLASSES: HCPCS

## 2023-02-06 PROCEDURE — 99285 EMERGENCY DEPT VISIT HI MDM: CPT

## 2023-02-06 PROCEDURE — 80143 DRUG ASSAY ACETAMINOPHEN: CPT

## 2023-02-06 PROCEDURE — 85025 COMPLETE CBC W/AUTO DIFF WBC: CPT

## 2023-02-06 PROCEDURE — 80053 COMPREHEN METABOLIC PANEL: CPT

## 2023-02-06 PROCEDURE — 80179 DRUG ASSAY SALICYLATE: CPT

## 2023-02-06 ASSESSMENT — ENCOUNTER SYMPTOMS
SHORTNESS OF BREATH: 0
BACK PAIN: 0
ABDOMINAL PAIN: 0
SORE THROAT: 0
COUGH: 0
VOMITING: 0

## 2023-02-06 ASSESSMENT — LIFESTYLE VARIABLES
HOW MANY STANDARD DRINKS CONTAINING ALCOHOL DO YOU HAVE ON A TYPICAL DAY: 10 OR MORE
HOW OFTEN DO YOU HAVE A DRINK CONTAINING ALCOHOL: 4 OR MORE TIMES A WEEK

## 2023-02-06 NOTE — ED TRIAGE NOTES
Pt arrives to [facility] from triage. Pt complaining of suicidal ideations and has plan. Pt states he has a history of depression and recently feels hopeless and feels there is no way around his thoughts. Pt got kicked out of [facility] for drinking alcohol and has no where to go. Pt reports not taking his depression meds for 7 months due to incarceration and no PCP. PT states his plan for suicide is to walk in front of traffic or overdose. Pt admits to using alcohol, crack and smoking weed today.

## 2023-02-06 NOTE — ED NOTES
Pt is a 43year old male presenting to the ED with suicidal ideations. Pt is currently feeling \"stuck and lonely. \" Pt stated he doesn't feel safe in [place] and is wanting to make his way back to [place]. He is presenting with suicidal ideations with a plan to walk in front of traffic or intentionally overdosing. He is currently homeless and staying wherever he can. Pt is unable to go to [facility] due to his continued drinking and tries to avoid [facility] and [address] due to issues he has had in the past. Pt has a history of inpatient admissions but doesn't appear to ever follow up anywhere outpatient for medication management. He said that he has not been on his mental health medications for 7-8 months due to being incarcerated in [facility]. He was released on December 20th. Pt is a daily drinker and usually drinks around a fifth of whiskey daily. Last use was 02/06/23. Pt said he only drank around 3 beers today. He reports that he experiences withdrawal symptoms when he doesn't drink. Pt also reports using Crack and Marijuana daily if he can. Pt last used Crack and Marijuana on 02/06/23. He used $5 worth of Crack and smoked 1 blunt. Pt is wanting to work on sobriety and his mental health. Pt is open to discharging to [address]. SW will fax over paperwork to [address] when it is available.       ROSE MARY Schofield  02/06/23 [id number]

## 2023-02-06 NOTE — ED PROVIDER NOTES
[facility]     Emergency Department     Faculty Attestation    I performed a history and physical examination of the patient and discussed management with the resident. I have reviewed and agree with the residents findings including all diagnostic interpretations, and treatment plans as written at the time of my review. Any areas of disagreement are noted on the chart. I was personally present for the key portions of any procedures. I have documented in the chart those procedures where I was not present during the key portions. For Physician Assistant/ Nurse Practitioner cases/documentation I have personally evaluated this patient and have completed at least one if not all key elements of the E/M (history, physical exam, and MDM). Additional findings are as noted. Primary Care Physician: No primary care provider on file. Note Started: 8:13 PM EST    History: This is a 43 y.o. male who presents to the Emergency Department with complaint of suicidal ideation. The patient states he has been off of his psychiatric medication for the last 6 months. He denies any auditory or visual hallucinations appear    Physical:   oral temperature is 97.5 °F (36.4 °C). His blood pressure is 113/68 and his pulse is 88. His respiration is 16 and oxygen saturation is 97%. Awake alert nontoxic-appearing no acute distress moving all extremities. No slurring of words. The patient does not appear to be clinically intoxicated. Impression: Suicidal ideation    Plan: The patient is medically stable at this time      Heart Score:         Score 0 - 3 = 2.5%  MACE over next 6 wks = Discharge home  Score 4 - 6 = 20.3%  MACE over next 6 wks = Obs admit  Score 7 - 10 = 72.7%  MACE over next 6 wks = Early invasive Rx       Medical Decision Making  Amount and/or Complexity of Data Reviewed  Labs: ordered. Decision-making details documented in ED Course.           (Please note that portions of this note were completed with a voice recognition program.  Efforts were made to edit the dictations but occasionally words are mis-transcribed.)    Marta Rich.  Chinyere Daley MD, [facility]  Attending Emergency Medicine Physician        Janusz Aguirre MD  02/06/23 [id number]

## 2023-02-06 NOTE — ED PROVIDER NOTES
[facility] ED  Emergency Department Encounter  Emergency Medicine Resident     Pt Sherine Galvan  MRN: [id number]  [place] 1980  Date of evaluation: 2/6/23  PCP:  No primary care provider on file. Note Started: 5:20 PM EST      CHIEF COMPLAINT       Chief Complaint   Patient presents with    Suicidal     Pt states he has been feeling suicidal the last few weeks, feels hopeless       HISTORY OF PRESENT ILLNESS  (Location/Symptom, Timing/Onset, Context/Setting, Quality, Duration, Modifying Factors, Severity.)      Karthikeyan Newell is a 43 y.o. male who presents with suicidal ideations for the last multiple weeks. Patient states that he just got out of long term, states he does not have the same camaraderie and support system as he does out here. Patient states that he has nowhere to go and has been feeling very low. Reports use of alcohol, marijuana and crack cocaine. Plans to walk out in front of car and OD. No homicidal ideations. No hallucinations. Patient states that he has been off his psych medications for the last 8 months due to the fact that he was in long term. No physical symptoms at this time. PAST MEDICAL / SURGICAL / SOCIAL / FAMILY HISTORY      has a past medical history of Anxiety, Depression, and Insomnia. has no past surgical history on file.     Social History     Socioeconomic History    Marital status: Single     Spouse name: Not on file    Number of children: Not on file    Years of education: Not on file    Highest education level: Not on file   Occupational History    Not on file   Tobacco Use    Smoking status: Every Day    Smokeless tobacco: Never   Substance and Sexual Activity    Alcohol use: Yes     Comment: daily drinker >6 beers    Drug use: Yes     Types: Cocaine     Comment: last used today 2/6/23    Sexual activity: Not on file   Other Topics Concern    Not on file   Social History Narrative    Not on file     Social Determinants of Health     Financial Resource Strain: Not on file   Food Insecurity: Not on file   Transportation Needs: Not on file   Physical Activity: Not on file   Stress: Not on file   Social Connections: Not on file   Intimate Partner Violence: Not on file   Housing Stability: Not on file       History reviewed. No pertinent family history. Allergies:  Patient has no known allergies. Home Medications:  Prior to Admission medications    Not on File     REVIEW OF SYSTEMS       Review of Systems   Constitutional:  Negative for chills and fever. HENT:  Negative for sore throat. Eyes:  Negative for visual disturbance. Respiratory:  Negative for cough and shortness of breath. Cardiovascular:  Negative for chest pain and palpitations. Gastrointestinal:  Negative for abdominal pain and vomiting. Endocrine: Negative for polyuria. Genitourinary:  Negative for dysuria and hematuria. Musculoskeletal:  Negative for back pain. Skin:  Negative for rash. Neurological:  Negative for light-headedness and headaches. Psychiatric/Behavioral:  Negative for confusion. PHYSICAL EXAM      INITIAL VITALS:   /68   Pulse 88   Temp 97.5 °F (36.4 °C) (Oral)   Resp 16   SpO2 97%     Physical Exam  Constitutional:       Appearance: Normal appearance. HENT:      Head: Normocephalic. Nose: Nose normal.      Mouth/Throat:      Mouth: Mucous membranes are moist.      Pharynx: Oropharynx is clear. Eyes:      Extraocular Movements: Extraocular movements intact. Pupils: Pupils are equal, round, and reactive to light. Cardiovascular:      Rate and Rhythm: Normal rate and regular rhythm. Pulses: Normal pulses. Heart sounds: Normal heart sounds. Pulmonary:      Effort: Pulmonary effort is normal.      Breath sounds: Normal breath sounds. Abdominal:      Palpations: Abdomen is soft. Tenderness: There is no abdominal tenderness. There is no right CVA tenderness or left CVA tenderness.    Musculoskeletal: Right lower leg: No edema. Left lower leg: No edema. Skin:     General: Skin is warm. Capillary Refill: Capillary refill takes less than 2 seconds. Neurological:      General: No focal deficit present. Mental Status: He is alert and oriented to person, place, and time. Mental status is at baseline. DDX/DIAGNOSTIC RESULTS / EMERGENCY DEPARTMENT COURSE / MDM     Medical Decision Making  Suicidal ideation, homicidal ideation, drug use, alcohol use, homelessness    49-year-old gentleman presents to the emergency department with multiple week history of suicidal ideation and plans to jump out in traffic to get hit by bus or OD. No homicidal ideations. Patient states that he has been off his psych meds for the last 8 months due to shelter time. Vital signs stable, physical exam grossly unremarkable. Social work notified. Patient medically cleared for transport to [facility]. Amount and/or Complexity of Data Reviewed  Labs: ordered. EMERGENCY DEPARTMENT COURSE:       PROCEDURES:  None    CONSULTS:  None    FINAL IMPRESSION      1.  Suicidal ideation          DISPOSITION / PLAN     DISPOSITION Decision To Transfer 02/06/2023 06:03:49 PM      PATIENT REFERRED TO:  [address] [id number] [phone]  Schedule an appointment as soon as possible for a visit   For follow up    [facility] ED  [address]  Go to   As needed    DISCHARGE MEDICATIONS:  New Prescriptions    No medications on file       Otilia Rey MD  Emergency Medicine Resident    (Please note that portions of thisnote were completed with a voice recognition program.  Efforts were made to edit the dictations but occasionally words are mis-transcribed.)       Otilia Rey MD  Resident  02/06/23 8144

## 2023-02-07 VITALS
OXYGEN SATURATION: 94 % | RESPIRATION RATE: 16 BRPM | TEMPERATURE: 97.2 F | HEART RATE: 74 BPM | SYSTOLIC BLOOD PRESSURE: 102 MMHG | DIASTOLIC BLOOD PRESSURE: 69 MMHG

## 2023-02-07 NOTE — ED NOTES
SW informed by RN that pt is refusing to go due to not being able to take his belongings in the ambulance. SW informed pt that his belongings would be coming behind him, but pt would not agree. SW called transport and they would not agree to take pt's belongings stating they only take two bags instead of four. Dr. Marla Warren informed and talked to pt reassuring pt that his belongings would come behind him. Pt now agreeable for transport. [facility] crew on sale to assist with transport.       Chacorta Hathaway, ROSE MARY  02/07/23 0144

## 2023-02-07 NOTE — ED NOTES
[facility] informed of new ETA  and that pt is on his way. RN to RN phone number given to Yenni .       ROSE MARY Mclaughlin  02/07/23 0119

## 2023-02-07 NOTE — ED PROVIDER NOTES
[address] ED  Emergency Department  Emergency Medicine Resident [address] [facility] [address] of Brigette Marley was assumed from Dr. Viviana Urbina and is being seen for Suicidal (Pt states he has been feeling suicidal the last few weeks, feels hopeless)  . The patient's initial evaluation and plan have been discussed with the prior provider who initially evaluated the patient. EMERGENCY DEPARTMENT COURSE / MEDICAL DECISION MAKING:       MEDICATIONS GIVEN:  No orders of the defined types were placed in this encounter. LABS / RADIOLOGY:     Labs Reviewed   CBC WITH AUTO DIFFERENTIAL - Abnormal; Notable for the following components:       Result Value    Seg Neutrophils 74 (*)     Lymphocytes 18 (*)     All other components within normal limits   COMPREHENSIVE METABOLIC PANEL - Abnormal; Notable for the following components:    Creatinine 0.60 (*)     ALT 66 (*)     All other components within normal limits   TOX SCR, BLD, ED - Abnormal; Notable for the following components:    Acetaminophen Level <5 (*)     Salicylate Lvl <1 (*)     All other components within normal limits   URINE DRUG SCREEN - Abnormal; Notable for the following components:    Cocaine Metabolite, Urine POSITIVE (*)     Cannabinoid Scrn, Ur POSITIVE (*)     All other components within normal limits       No results found. RECENT VITALS:     Temp: 97.2 °F (36.2 °C),  Heart Rate: 74, Resp: 16, BP: 102/69, SpO2: 94 %    This patient is a 43 y.o. Male with suicidal ideation with plan to walk in front of a car. Noncompliant with psychiatric medications. Medically cleared. Excepted at [facility]. ED Course as of 02/07/23 0151   Tue Feb 07, 2023   0105 Suicidal ideation with a plan excepted at [facility] awaiting transportation. Medically cleared nontoxic VSS [ZE]      ED Course User Index  [ZE] Ranjit Lerma DO       OUTSTANDING TASKS / RECOMMENDATIONS:    Follow-up transportation     FINAL IMPRESSION:     1.  Suicidal ideation DISPOSITION:         DISPOSITION:  []  Discharge   [x]  Transfer -    []  Admission -     []  Against Medical Advice   []  Eloped   FOLLOW-UP: [address]  [address] [facility] [id number] [phone]  Schedule an appointment as soon as possible for a visit   For follow up    [facility] ED  [address]  Go to   As needed     DISCHARGE MEDICATIONS: There are no discharge medications for this patient.           Danii Lane DO  Emergency Medicine Resident  [facility], [address] [place] [address]  Resident  02/07/23 4605

## 2023-02-07 NOTE — ED NOTES
Patient was accepted at [facility]. Patient is voluntary for admission. [facility] paperwork faxed.      ROSE MARY Victor  02/06/23 2021 patient, RN, anesthesia provider

## 2023-02-07 NOTE — ED PROVIDER NOTES
Faculty Sign-Out Attestation  Handoff taken on the following patient from prior Attending Physician: Veronica Hughes    I was available and discussed any additional care issues that arose and coordinated the management plans with the resident(s) caring for the patient during my duty period. Any areas of disagreement with residents documentation of care or procedures are noted on the chart. I was personally present for the key portions of any/all procedures during my duty period. I have documented in the chart those procedures where I was not present during the key portions.     Suicidal, // planning to send to Sheila skelton DO  Attending Physician       Sheila Reyes DO  02/06/23 8042

## 2023-02-07 NOTE — ED NOTES
Referral sent to [facility].  Awaiting determination of acceptance/denial.     Nohelia Jean, ROSE MARY  02/06/23 1928

## 2023-02-07 NOTE — ED NOTES
SW faxed transport paperwork to [address].  ETA [address] [facility] notified of [address], MAGDA  02/06/23 2019       Lory Sung, [facility]  02/07/23 4156

## 2023-09-18 ENCOUNTER — HOSPITAL ENCOUNTER (INPATIENT)
Age: 43
LOS: 7 days | Discharge: HOME | DRG: 751 | End: 2023-09-25
Attending: PSYCHIATRY & NEUROLOGY | Admitting: PSYCHIATRY & NEUROLOGY
Payer: MEDICAID

## 2023-09-18 ENCOUNTER — HOSPITAL ENCOUNTER (EMERGENCY)
Age: 43
Discharge: ANOTHER HOSPITAL | DRG: 751 | End: 2023-09-18
Attending: EMERGENCY MEDICINE
Payer: MEDICAID

## 2023-09-18 VITALS
TEMPERATURE: 97.1 F | WEIGHT: 171 LBS | BODY MASS INDEX: 23.94 KG/M2 | HEIGHT: 71 IN | DIASTOLIC BLOOD PRESSURE: 71 MMHG | HEART RATE: 75 BPM | OXYGEN SATURATION: 99 % | RESPIRATION RATE: 16 BRPM | SYSTOLIC BLOOD PRESSURE: 106 MMHG

## 2023-09-18 DIAGNOSIS — R45.851 SUICIDAL IDEATION: Primary | ICD-10-CM

## 2023-09-18 PROBLEM — F32.A DEPRESSION WITH SUICIDAL IDEATION: Status: ACTIVE | Noted: 2023-09-18

## 2023-09-18 LAB
ALBUMIN SERPL-MCNC: 4.6 G/DL (ref 3.5–5.2)
ALBUMIN/GLOB SERPL: 1.4 {RATIO} (ref 1–2.5)
ALP SERPL-CCNC: 88 U/L (ref 40–129)
ALT SERPL-CCNC: 96 U/L (ref 5–41)
AMPHET UR QL SCN: NEGATIVE
ANION GAP SERPL CALCULATED.3IONS-SCNC: 11 MMOL/L (ref 9–17)
APAP SERPL-MCNC: <5 UG/ML (ref 10–30)
AST SERPL-CCNC: 42 U/L
BARBITURATES UR QL SCN: NEGATIVE
BASOPHILS # BLD: 0.11 K/UL (ref 0–0.2)
BASOPHILS NFR BLD: 1 % (ref 0–2)
BENZODIAZ UR QL: NEGATIVE
BILIRUB SERPL-MCNC: 0.3 MG/DL (ref 0.3–1.2)
BUN SERPL-MCNC: 7 MG/DL (ref 6–20)
CALCIUM SERPL-MCNC: 9.2 MG/DL (ref 8.6–10.4)
CANNABINOIDS UR QL SCN: POSITIVE
CHLORIDE SERPL-SCNC: 96 MMOL/L (ref 98–107)
CO2 SERPL-SCNC: 28 MMOL/L (ref 20–31)
COCAINE UR QL SCN: POSITIVE
CREAT SERPL-MCNC: 0.6 MG/DL (ref 0.7–1.2)
EOSINOPHIL # BLD: 0.14 K/UL (ref 0–0.44)
EOSINOPHILS RELATIVE PERCENT: 1 % (ref 1–4)
ERYTHROCYTE [DISTWIDTH] IN BLOOD BY AUTOMATED COUNT: 12.8 % (ref 11.8–14.4)
ETHANOL PERCENT: 0.04 %
ETHANOLAMINE SERPL-MCNC: 40 MG/DL
FENTANYL UR QL: NEGATIVE
GFR SERPL CREATININE-BSD FRML MDRD: >60 ML/MIN/1.73M2
GLUCOSE SERPL-MCNC: 88 MG/DL (ref 70–99)
HCT VFR BLD AUTO: 44.2 % (ref 40.7–50.3)
HGB BLD-MCNC: 14.9 G/DL (ref 13–17)
IMM GRANULOCYTES # BLD AUTO: 0.06 K/UL (ref 0–0.3)
IMM GRANULOCYTES NFR BLD: 1 %
LYMPHOCYTES NFR BLD: 2.61 K/UL (ref 1.1–3.7)
LYMPHOCYTES RELATIVE PERCENT: 21 % (ref 24–43)
MCH RBC QN AUTO: 32.1 PG (ref 25.2–33.5)
MCHC RBC AUTO-ENTMCNC: 33.7 G/DL (ref 28.4–34.8)
MCV RBC AUTO: 95.3 FL (ref 82.6–102.9)
METHADONE UR QL: NEGATIVE
MONOCYTES NFR BLD: 0.77 K/UL (ref 0.1–1.2)
MONOCYTES NFR BLD: 6 % (ref 3–12)
NEUTROPHILS NFR BLD: 70 % (ref 36–65)
NEUTS SEG NFR BLD: 8.73 K/UL (ref 1.5–8.1)
NRBC BLD-RTO: 0 PER 100 WBC
OPIATES UR QL SCN: NEGATIVE
OXYCODONE UR QL SCN: NEGATIVE
PCP UR QL SCN: NEGATIVE
PLATELET # BLD AUTO: 254 K/UL (ref 138–453)
PMV BLD AUTO: 10.1 FL (ref 8.1–13.5)
POTASSIUM SERPL-SCNC: 3.8 MMOL/L (ref 3.7–5.3)
PROT SERPL-MCNC: 7.9 G/DL (ref 6.4–8.3)
RBC # BLD AUTO: 4.64 M/UL (ref 4.21–5.77)
SALICYLATES SERPL-MCNC: <1 MG/DL (ref 3–10)
SODIUM SERPL-SCNC: 135 MMOL/L (ref 135–144)
TEST INFORMATION: ABNORMAL
TOXIC TRICYCLIC SC,BLOOD: NEGATIVE
WBC OTHER # BLD: 12.4 K/UL (ref 3.5–11.3)

## 2023-09-18 PROCEDURE — 1240000000 HC EMOTIONAL WELLNESS R&B

## 2023-09-18 PROCEDURE — 85025 COMPLETE CBC W/AUTO DIFF WBC: CPT

## 2023-09-18 PROCEDURE — 80053 COMPREHEN METABOLIC PANEL: CPT

## 2023-09-18 PROCEDURE — 80307 DRUG TEST PRSMV CHEM ANLYZR: CPT

## 2023-09-18 PROCEDURE — G0480 DRUG TEST DEF 1-7 CLASSES: HCPCS

## 2023-09-18 PROCEDURE — 99285 EMERGENCY DEPT VISIT HI MDM: CPT | Performed by: EMERGENCY MEDICINE

## 2023-09-18 PROCEDURE — 80143 DRUG ASSAY ACETAMINOPHEN: CPT

## 2023-09-18 PROCEDURE — 80179 DRUG ASSAY SALICYLATE: CPT

## 2023-09-18 RX ORDER — HALOPERIDOL 5 MG/ML
5 INJECTION INTRAMUSCULAR EVERY 6 HOURS PRN
Status: DISCONTINUED | OUTPATIENT
Start: 2023-09-18 | End: 2023-09-25 | Stop reason: HOSPADM

## 2023-09-18 RX ORDER — IBUPROFEN 400 MG/1
400 TABLET ORAL EVERY 6 HOURS PRN
Status: DISCONTINUED | OUTPATIENT
Start: 2023-09-18 | End: 2023-09-25 | Stop reason: HOSPADM

## 2023-09-18 RX ORDER — POLYETHYLENE GLYCOL 3350 17 G
2 POWDER IN PACKET (EA) ORAL
Status: DISCONTINUED | OUTPATIENT
Start: 2023-09-18 | End: 2023-09-24

## 2023-09-18 RX ORDER — DIPHENHYDRAMINE HYDROCHLORIDE 50 MG/ML
50 INJECTION INTRAMUSCULAR; INTRAVENOUS EVERY 6 HOURS PRN
Status: DISCONTINUED | OUTPATIENT
Start: 2023-09-18 | End: 2023-09-25 | Stop reason: HOSPADM

## 2023-09-18 RX ORDER — HALOPERIDOL 5 MG/1
5 TABLET ORAL EVERY 6 HOURS PRN
Status: DISCONTINUED | OUTPATIENT
Start: 2023-09-18 | End: 2023-09-25 | Stop reason: HOSPADM

## 2023-09-18 RX ORDER — POLYETHYLENE GLYCOL 3350 17 G/17G
17 POWDER, FOR SOLUTION ORAL DAILY PRN
Status: DISCONTINUED | OUTPATIENT
Start: 2023-09-18 | End: 2023-09-25 | Stop reason: HOSPADM

## 2023-09-18 RX ORDER — TRAZODONE HYDROCHLORIDE 50 MG/1
50 TABLET ORAL NIGHTLY PRN
Status: DISCONTINUED | OUTPATIENT
Start: 2023-09-19 | End: 2023-09-23

## 2023-09-18 RX ORDER — ACETAMINOPHEN 325 MG/1
650 TABLET ORAL EVERY 6 HOURS PRN
Status: DISCONTINUED | OUTPATIENT
Start: 2023-09-18 | End: 2023-09-25 | Stop reason: HOSPADM

## 2023-09-18 RX ORDER — HYDROXYZINE 50 MG/1
50 TABLET, FILM COATED ORAL 3 TIMES DAILY PRN
Status: DISCONTINUED | OUTPATIENT
Start: 2023-09-18 | End: 2023-09-25 | Stop reason: HOSPADM

## 2023-09-18 RX ORDER — MAGNESIUM HYDROXIDE/ALUMINUM HYDROXICE/SIMETHICONE 120; 1200; 1200 MG/30ML; MG/30ML; MG/30ML
30 SUSPENSION ORAL EVERY 6 HOURS PRN
Status: DISCONTINUED | OUTPATIENT
Start: 2023-09-18 | End: 2023-09-25 | Stop reason: HOSPADM

## 2023-09-18 ASSESSMENT — PATIENT HEALTH QUESTIONNAIRE - PHQ9
10. IF YOU CHECKED OFF ANY PROBLEMS, HOW DIFFICULT HAVE THESE PROBLEMS MADE IT FOR YOU TO DO YOUR WORK, TAKE CARE OF THINGS AT HOME, OR GET ALONG WITH OTHER PEOPLE: 2
3. TROUBLE FALLING OR STAYING ASLEEP: 2
8. MOVING OR SPEAKING SO SLOWLY THAT OTHER PEOPLE COULD HAVE NOTICED. OR THE OPPOSITE, BEING SO FIGETY OR RESTLESS THAT YOU HAVE BEEN MOVING AROUND A LOT MORE THAN USUAL: 2
7. TROUBLE CONCENTRATING ON THINGS, SUCH AS READING THE NEWSPAPER OR WATCHING TELEVISION: 2
6. FEELING BAD ABOUT YOURSELF - OR THAT YOU ARE A FAILURE OR HAVE LET YOURSELF OR YOUR FAMILY DOWN: 2
SUM OF ALL RESPONSES TO PHQ QUESTIONS 1-9: 18
SUM OF ALL RESPONSES TO PHQ QUESTIONS 1-9: 18
1. LITTLE INTEREST OR PLEASURE IN DOING THINGS: 2
9. THOUGHTS THAT YOU WOULD BE BETTER OFF DEAD, OR OF HURTING YOURSELF: 2
SUM OF ALL RESPONSES TO PHQ9 QUESTIONS 1 & 2: 4
5. POOR APPETITE OR OVEREATING: 2
SUM OF ALL RESPONSES TO PHQ QUESTIONS 1-9: 16
2. FEELING DOWN, DEPRESSED OR HOPELESS: 2
4. FEELING TIRED OR HAVING LITTLE ENERGY: 2
SUM OF ALL RESPONSES TO PHQ QUESTIONS 1-9: 18

## 2023-09-18 ASSESSMENT — SLEEP AND FATIGUE QUESTIONNAIRES
DO YOU HAVE DIFFICULTY SLEEPING: YES
SLEEP PATTERN: DIFFICULTY FALLING ASLEEP;RESTLESSNESS
AVERAGE NUMBER OF SLEEP HOURS: 4
DO YOU USE A SLEEP AID: NO

## 2023-09-18 ASSESSMENT — LIFESTYLE VARIABLES
HOW OFTEN DO YOU HAVE A DRINK CONTAINING ALCOHOL: 4 OR MORE TIMES A WEEK
HOW MANY STANDARD DRINKS CONTAINING ALCOHOL DO YOU HAVE ON A TYPICAL DAY: 10 OR MORE

## 2023-09-18 ASSESSMENT — ENCOUNTER SYMPTOMS
SHORTNESS OF BREATH: 0
ABDOMINAL DISTENTION: 0

## 2023-09-18 ASSESSMENT — PAIN SCALES - GENERAL: PAINLEVEL_OUTOF10: 0

## 2023-09-18 NOTE — ED NOTES
Pt to ed via ambulatory to room with complaints being suicidal and having suicidal thoughts  Pt states he has been depressed lately and having a hard time getting into rehab  Pt states he is homeless  Pt states weed and crack use today and pt states he drank one beer  Pt states he has thoughts of overdosing and drinking and or running into traffic  Pt states he hears voice but no visual hallucinations  No homicidal thoughts  Sitter at bedside  [facility]  Pt alert and oriented x4, talking in complete sentences, respirations even and unlabored. Pt acting age appropriate.  White board updated, will continue to plan of care      Wellington Lord RN  09/18/23 8297

## 2023-09-18 NOTE — ED NOTES
[] [address]    [] [address]    [x]  [address] ASSESSMENT      Y  N     [] [] In the past two weeks have you had thoughts of hurting yourself in any way? [] [] In the past two weeks have you had thoughts that you would be better off dead? [] [] Have you made a suicide attempt in the past two months? [] [] Do you have a plan for hurting yourself or suicide? [] [] Presence of hallucinations/voices related to hurting himself or herself or someone else. SUICIDE/SECURITY WATCH PRECAUTION CHECKLIST     Orders    [x]  Suicide/Security Watch Precautions initiated as checked below:   9/18/23 4:46 PM EDT 47PED/47PED    [x] Notified physician:  Irma Plata MD  9/18/23 4:46 PM EDT    [x] Orders obtained as appropriate:     [] 1:1 Observer     [] Psych Consult     [] Psych Consult    Name:  Date:  Time:    [x] 1:1 Observer, Notified by:  Bonnie Stovall RN    Contact Nurse Supervisor    [] Remove all personal clothes from room and place in snap/paper gown/pants. Slipper only    [] Remove all personal belongings from room and secured away from patient. Documentation    [x] Initiate Suicide/Security Watch Precaution Flow Sheet    [x] Initiate individualized Care Plan/Problem    [x] Document why precautions initiated on flow sheet (Initiate Nursing Care Plan/Problem)    [x] 1:1 Observer in place; instructions provided. Suicide precautions require observer be within arms length. [x] Nurse-Observer Communication Hand-off initiated by RN, reviewed with Observer. Subsequently used as Hand Off between Observers. [x] Initiate every 15 minute observations per observer as delegated by the RN.     [x] Initiate RN assessment and documentation    Environmental Scan  Search Criteria and Process: OPTIONAL, see Search Policy    [] Reason for search:    [] Nursing in presence of second person to search patient    [] Patient notified of reason for body assessment and belongings implied suicidal ideation/behavior  [] Depression  [x] Suicide Ideations      [] Low self-esteem  [] Hallucinations      [] Feeling of Hopelessness  [] Substance abuse or withdrawal    [] Dysfunctional family  [] Major traumatic event, eg., divorce, etc   [] Excessive stress/anxiety    9/18/23    Expected Outcomes    Patient will:   [] Patient will remain safe for the duration of their stay   [] Patient's environment will be safe, eg. Free of potential suicide weapons   [] Verbalize Recovery from suicidal episode and improvement in self-worth   [] Discuss feeling that precipitated suicide attempt/thoughts/behavior   [] Will describe available resources for crisis prevention and management   [] Will verbalize positive coping skills     Nursing Intervention   [x] Assessment and Observations hourly   [x] Suicide Precautions implemented with patient, should be 1:1 observation   [x] Document observation [id number] and RN assessment hourly   [] Consult physician for:    [] Psychiatric consult    [] Pharmacological therapy    [] Other:    [x] Patient search completed by security   [x] Initiated appropriate safety protocols by removing from the patient's environment anything that could be used to inflict self injury, eg. Order safe tray, snap gown, etc   [x] Maintain open, warm, caring, non-judgmental attitude/manner towards patient   [] Discuss advantages and disadvantages of existing coping methods/skills   [x] Assist and educate patient with identifying present strengths and coping skills   [x] Keep patient informed regarding plan of care and provide clear concise explanations. Provide the patient/family education information as well as telephone numbers and other information about crisis centers, hot lines, and counselors.     Discharge Planning:   [] Referral  [] Groups [] Health agencies  [] Other:            Ángela Medina RN  09/18/23 [phone]

## 2023-09-18 NOTE — ED NOTES
SW met with patient who arrives to the ER with a complaint of depression/SI with a plan to walk into traffic. Patient homeless and has recently been to [facility]. Patient has had multiple recent psychiatric admissions most recently at [facility] in [facility] a week ago for 3 days. SW asked patient if he was taking his meds and he said he \"left them on the [employer] this morning\" when he came in from [facility]. Patient denies HI or legal issues and says he was previously connected to [facility] but has not followed-up. Patient was seen on 8/30/23 at [address] [place] [address],Third Floor and denied admission to [facility]. Patient was then transported to [facility]. Patient states he used crack cocaine, THC, and drank beer today. Patient states he would like inpatient treatment for his drug/alcohol abuse. When asked what happened after patient was sent from [facility] ER to [address] [place] [address],[address] on 2/6/2023 patient says he just left the program and didn't follow-up. Patient has also been kicked out of the [employer] for breaking the rules. SW will consult psych once all lab complete and patient is medically clear. Flavio Luo.  Flor Black, [address], [place]  09/18/23 6468

## 2023-09-18 NOTE — ED NOTES
Report received from [facility]    Patient resting on stretcher, NAD  RR even and non-labored  Bed locked and in lowest position  Call light within reach  No needs expressed at this tme  Pt has 1:1 jose Huff RN  09/18/23 1929

## 2023-09-18 NOTE — ED NOTES
SUSAN attempted to contact [facility] to present case to the psychiatrist.  Cuba Johnson left, await reply. Mary Esquivel.  Porter Howell     [place], [place]  09/18/23 9717

## 2023-09-18 NOTE — ED NOTES
SW relayed patient information to [facility] and they state they will call back when [facility] staff are available. Await call.   Mamadou Dominguez, [address], [place]  09/18/23 9383

## 2023-09-18 NOTE — ED PROVIDER NOTES
[facility] ED  Emergency Department Encounter  Emergency Medicine Resident     Pt Collin Oneill  MRN: [id number]  [address] [facility] [address] 1980  Date of evaluation: 9/18/23  PCP:  No primary care provider on file. Note Started: 4:43 PM EDT      CHIEF COMPLAINT       Chief Complaint   Patient presents with    Suicidal       HISTORY OF PRESENT ILLNESS  (Location/Symptom, Timing/Onset, Context/Setting, Quality, Duration, Modifying Factors, Severity.)      Piter Davenport is a 37 y.o. male who presents with suicidal ideations. Patient states he is having anxiety and depression, has been progressively worse over the past few days. Patient does have past history of suicide attempts, states he has attempted suicide by overdose in the past.  States that his plan today will be to overdose on his medications or use for now 2. Patient does not have any homicidal ideations. Patient states he smoked crack, smoked weed and drink alcohol today prior to arrival.    PAST MEDICAL / SURGICAL / SOCIAL / FAMILY HISTORY      has a past medical history of Anxiety, Depression, and Insomnia. has no past surgical history on file.       Social History     Socioeconomic History    Marital status: Single     Spouse name: Not on file    Number of children: Not on file    Years of education: Not on file    Highest education level: Not on file   Occupational History    Not on file   Tobacco Use    Smoking status: Every Day    Smokeless tobacco: Never   Substance and Sexual Activity    Alcohol use: Yes     Comment: daily drinker >6 beers    Drug use: Yes     Types: Cocaine     Comment: last used today 2/6/23    Sexual activity: Not on file   Other Topics Concern    Not on file   Social History Narrative    Not on file     Social Determinants of Health     Financial Resource Strain: Not on file   Food Insecurity: Not on file   Transportation Needs: Not on file   Physical Activity: Not on file   Stress: Not on file   Social

## 2023-09-19 PROCEDURE — 6370000000 HC RX 637 (ALT 250 FOR IP): Performed by: PSYCHIATRY & NEUROLOGY

## 2023-09-19 PROCEDURE — 90792 PSYCH DIAG EVAL W/MED SRVCS: CPT | Performed by: PSYCHIATRY & NEUROLOGY

## 2023-09-19 PROCEDURE — 99222 1ST HOSP IP/OBS MODERATE 55: CPT | Performed by: INTERNAL MEDICINE

## 2023-09-19 PROCEDURE — 1240000000 HC EMOTIONAL WELLNESS R&B

## 2023-09-19 PROCEDURE — APPSS60 APP SPLIT SHARED TIME 46-60 MINUTES

## 2023-09-19 RX ORDER — VENLAFAXINE HYDROCHLORIDE 37.5 MG/1
37.5 CAPSULE, EXTENDED RELEASE ORAL
Status: DISCONTINUED | OUTPATIENT
Start: 2023-09-20 | End: 2023-09-25 | Stop reason: HOSPADM

## 2023-09-19 RX ADMIN — HYDROXYZINE HYDROCHLORIDE 50 MG: 50 TABLET, FILM COATED ORAL at 12:25

## 2023-09-19 RX ADMIN — HYDROXYZINE HYDROCHLORIDE 50 MG: 50 TABLET, FILM COATED ORAL at 20:34

## 2023-09-19 RX ADMIN — TRAZODONE HYDROCHLORIDE 50 MG: 50 TABLET ORAL at 20:34

## 2023-09-19 RX ADMIN — NICOTINE POLACRILEX 2 MG: 2 LOZENGE ORAL at 12:25

## 2023-09-19 NOTE — ED NOTES
Patient accepted to the [facility] under the care of Dr Charisma Caro.       [facility]  09/18/23 2009

## 2023-09-19 NOTE — GROUP NOTE
Group Therapy Note    Date: 9/19/2023    Group Start Time: 1430  Group End Time: 2109  Group Topic: Cognitive Skills    Jaswinder Aguirre        Group Therapy Note    Attendees: 8/17     Topic: To increase social interaction and to practice self expression, explore positive coping and stress management r/t the senses, and communication skills. Patient did not participate in Cognitive Skills Group at 14:30, despite staff encouragement and explanation of benefits. Pt is in room and seclusive to self during group time. Q15 minute safety checks maintained for patient safety and will continue to encourage patient to attend unit programming.         Discipline Responsible: Psychoeducational Specialist      Signature:  Josh Pulido

## 2023-09-19 NOTE — ED NOTES
Transport here to  patient and take to [address] () contacted them to make sure that they would take patient prior to 2300 and they said they were ok with this     Donte Woodard, CHAVO  09/18/23 4710

## 2023-09-19 NOTE — H&P
[address] [facility] Medicine  Sis Banegas MD; Davey Navarrete MD; Adelia Mcdowell MD; MD Arsen Allen MD; Arsen Echeverria MD    [facility]   [id number] [place] [address] / HISTORY AND PHYSICAL EXAMINATION            Date:   9/19/2023  Patient name:  Kathryn Sweeney  Date of admission:  9/18/2023  9:44 PM  MRN:   [id number]  Account:  [de-identified]  YOB: 1980  PCP:    No primary care provider on file. Room:   [address] [place] [address]  Code Status:    Full Code    Physician Requesting Consult: Marlena Chambers MD    Reason for Consult:   Medical assessment and comorbidity management     Chief Complaint:     No chief complaint on file. History Obtained From:     patient, electronic medical record    History of Present Illness:     15-year-old male was admitted to [facility] with suicidal ideation  Patient presented to the emergency department  He does have a history of attempted overdose and suicide in the past  Patient was positive for cocaine and cannabinoids ethanol level was 40  No other significant past history noted    Past Medical History:     Past Medical History:   Diagnosis Date    Anxiety     Depression     Insomnia         Past Surgical History:     History reviewed. No pertinent surgical history. Medications Prior to Admission:     Prior to Admission medications    Not on File        Allergies:     Patient has no known allergies. Social History:     Tobacco:    reports that he has been smoking. He has never used smokeless tobacco.  Alcohol:      reports current alcohol use. Drug Use:  reports current drug use. Drug: Cocaine. Family History:     History reviewed. No pertinent family history. Review of Systems:     Positive and Negative as described in HPI.     CONSTITUTIONAL: Depressed with suicidal ideation   MARIANA Cook     See H P I          Respiratory ROS:            Negative for cough,
self, location, time, situation  Concentration: Clinically adequate  Memory: Intact  Insight &Judgment: Poor         DSM-5 Diagnosis    Principal Problem: Depression with suicidal ideation    Alcohol use disorder  Cocaine use disorder  Major depressive disorder, recurrent, severe, without psychosis     Psychosocial and Contextual factors:  Financial x  Occupational x   Relationship   Legal   Living situation x  Educational     Past Medical History:   Diagnosis Date    Anxiety     Depression     Insomnia         PATIENT HANDOFF:  Home medications reviewed. Not currently on any psychotropic medications for past few days. Patient is open to starting substance abuse rehab upon discharge  Medication management per attending  Monitor need and frequency of PRN medications. CONSULT:  [x] Yes [] No  Internal medicine for medical management/medical H&P      Risk Management: close watch per standard protocol      Psychotherapy: participation in milieu and group and individual sessions with Attending Physician,  and Physician Assistant/CNP      Estimated length of stay:  2-14 days      GENERAL PATIENT/FAMILY EDUCATION  Patient will understand basic signs and symptoms, patient will understand benefits/risks and potential side effects from proposed medications, and patient will understand their role in recovery. Family is not active in patient's care. Patient assets that may be helpful during treatment include: Intent to participate and engage in treatment, sufficient fund of knowledge and intellect to understand and utilize treatments. Goals:    1) Remission of suicidal ideation. 2) Stabilization of symptoms prior to discharge. 3) Establish efficacy and tolerability of medications. Leonel Porter is a 37 y.o. male being evaluated face to face    --CRISTOPHER Fiore CNP on 9/19/2023 at 12:41 PM    An electronic signature was used to authenticate this note.      Please note that this chart

## 2023-09-19 NOTE — GROUP NOTE
Group Therapy Note    Date: 9/19/2023    Group Start Time: 1100  Group End Time: 1150  Group Topic: Cognitive Skills    ROSELYN Obando        Group Therapy Note    Attendees: 7/18     Topic: To increase social interaction, explore perception, and decision making, and communication skills . Patient did not participate in Cognitive Skills Group at 11:00, despite staff encouragement and explanation of benefits. Pt is in room and seclusive to self during group time. Q15 minute safety checks maintained for patient safety and will continue to encourage patient to attend unit programming.         Discipline Responsible: Psychoeducational Specialist      Signature:  Tessie Zambrano

## 2023-09-19 NOTE — GROUP NOTE
Group Therapy Note    Date: 9/19/2023    Group Start Time: 1330  Group End Time: 9345  Group Topic: Cognitive Skills    JOHNNY BHIRA Victor RN    Cognitive Skills Group Note        Date: September 19, 2023 Start Time: 1:30pm  End Time:  2:15pm      Number of Participants in Group & Unit Census:  7/17    Topic: Cognitive skills    Goal of Group:Work on interpersonal communication skills. Comments:     Patient did not participate in Cognitive Skills group, despite staff encouragement and explanation of benefits. Patient remain seclusive to self. Q15 minute safety checks maintained for patient safety and will continue to encourage patient to attend unit programming.

## 2023-09-19 NOTE — CARE COORDINATION
BHI Biopsychosocial Assessment    Current Level of Psychosocial Functioning     Independent xx  Dependent    Minimal Assist     Comments:    Psychosocial High Risk Factors (check all that apply)    Unable to obtain meds   Chronic illness/pain    Substance abuse   Lack of Family Support   Financial stress   Isolation   Inadequate Community Resources  Suicide attempt(s)  Not taking medications   Victim of crime   Developmental Delay  Unable to manage personal needs    Age 72 or older   Homeless  No transportation   Readmission within 30 days  Unemployment  Traumatic Event    Comments:   Psychiatric Advanced Directives: none reported     Family to Involve in Treatment: pt reports his cousin is supportive     Sexual Orientation:  n/a    Patient Strengths: pt considering AOD treatment, has supportive cousin     Patient Barriers: not linked with outpt services, has history of alcohol abuse, is estranged from most family       Opiate Education Provided:  pt denies opiate abuse       CMHC/mental health history: pt is not linked with outpatient services     Plan of Care   medication management, group/individual therapies, family meetings, psycho -education, treatment team meetings to assist with stabilization    Initial Discharge Plan:  pt considering AOD recovery program.       Clinical Summary:  Levi Campbell is a 37year old single male who has been admitted to [address] [place] [address] with report of suicidal ideation with plan to overdose on medication. Pt reports he has been homeless on/off for years, states he was last in recovery treatment for alchol abuse one year ago at [facility] program in [place], states his plan is to return there ultimately when he is able to get transportation to [place]. Pt reports his cousin lives in this area and this is the only family member from whom he is not estranged. Pt currently has no income.  SW/pt discussed recovery programs, pt reports he is considering recovery programming in [place], but

## 2023-09-19 NOTE — ED NOTES
DAVIN to accommodate S transportation.  JESSICA @ [address] [facility]  09/18/23 2033

## 2023-09-19 NOTE — ED NOTES
.All Physician EMTALA paperwork  completed.        Sourav Altman [facility]  09/18/23 2010

## 2023-09-19 NOTE — GROUP NOTE
Group Therapy Note    Date: 9/19/2023    Group Start Time: 1030  Group End Time: 1100  Group Topic: Psychoeducation    [facility] D    Laquita Corey        Group Therapy Note    Attendees 5/18     Patient declined to attend psychotherapy group after encouragement from staff. 1:1 talk time offered but refused. Signature:   Laquita Corey

## 2023-09-20 LAB
CHOLEST SERPL-MCNC: 194 MG/DL
CHOLESTEROL/HDL RATIO: 3.7
EST. AVERAGE GLUCOSE BLD GHB EST-MCNC: 97 MG/DL
HBA1C MFR BLD: 5 % (ref 4–6)
HDLC SERPL-MCNC: 52 MG/DL
LDLC SERPL CALC-MCNC: 130 MG/DL (ref 0–130)
TRIGL SERPL-MCNC: 59 MG/DL

## 2023-09-20 PROCEDURE — 6370000000 HC RX 637 (ALT 250 FOR IP): Performed by: PSYCHIATRY & NEUROLOGY

## 2023-09-20 PROCEDURE — 83036 HEMOGLOBIN GLYCOSYLATED A1C: CPT

## 2023-09-20 PROCEDURE — 99232 SBSQ HOSP IP/OBS MODERATE 35: CPT | Performed by: PSYCHIATRY & NEUROLOGY

## 2023-09-20 PROCEDURE — 80061 LIPID PANEL: CPT

## 2023-09-20 PROCEDURE — 36415 COLL VENOUS BLD VENIPUNCTURE: CPT

## 2023-09-20 PROCEDURE — 1240000000 HC EMOTIONAL WELLNESS R&B

## 2023-09-20 PROCEDURE — 99231 SBSQ HOSP IP/OBS SF/LOW 25: CPT | Performed by: INTERNAL MEDICINE

## 2023-09-20 PROCEDURE — APPSS30 APP SPLIT SHARED TIME 16-30 MINUTES

## 2023-09-20 RX ADMIN — NICOTINE POLACRILEX 2 MG: 2 LOZENGE ORAL at 14:11

## 2023-09-20 RX ADMIN — TRAZODONE HYDROCHLORIDE 50 MG: 50 TABLET ORAL at 20:42

## 2023-09-20 RX ADMIN — NICOTINE POLACRILEX 2 MG: 2 LOZENGE ORAL at 11:40

## 2023-09-20 RX ADMIN — NICOTINE POLACRILEX 2 MG: 2 LOZENGE ORAL at 19:57

## 2023-09-20 RX ADMIN — VENLAFAXINE HYDROCHLORIDE 37.5 MG: 37.5 CAPSULE, EXTENDED RELEASE ORAL at 08:21

## 2023-09-20 ASSESSMENT — LIFESTYLE VARIABLES
HOW OFTEN DO YOU HAVE A DRINK CONTAINING ALCOHOL: 4 OR MORE TIMES A WEEK
HOW MANY STANDARD DRINKS CONTAINING ALCOHOL DO YOU HAVE ON A TYPICAL DAY: 10 OR MORE
HOW MANY STANDARD DRINKS CONTAINING ALCOHOL DO YOU HAVE ON A TYPICAL DAY: 10 OR MORE
HOW OFTEN DO YOU HAVE A DRINK CONTAINING ALCOHOL: 4 OR MORE TIMES A WEEK

## 2023-09-20 NOTE — GROUP NOTE
Group Therapy Note    Date: 9/20/2023    Group Start Time: 1330  Group End Time: 8469  Group Topic: Psychoeducation    JOHNNY BHROSELYN Peres    Group Therapy Note    Attendees: 6/14       Patient's Goal:  Patient will identify healthy coping skills and identify ways to improve social and clinical support    Notes:  Patient attended group and participated    Status After Intervention:  Improved    Participation Level:  Active Listener and Interactive    Participation Quality: Appropriate, Attentive, Sharing, and Supportive      Speech:  normal      Thought Process/Content: Logical  Linear      Affective Functioning: Congruent      Mood: euthymic      Level of consciousness:  Alert, Oriented x4, and Attentive      Response to Learning: Able to verbalize current knowledge/experience, Able to verbalize/acknowledge new learning, Able to retain information, Capable of insight, Able to change behavior, and Progressing to goal      Endings: None Reported    Modes of Intervention: Education, Support, Socialization, and Exploration      Discipline Responsible: Psychoeducational Specialist      Signature:  Papo Pritchard

## 2023-09-20 NOTE — GROUP NOTE
Group Therapy Note    Date: 9/20/2023    Group Start Time: 1000  Group End Time: 4560  Group Topic: Group Therapy    [address]ina [address], RN        Group Therapy Note    Attendees: 6/10           Notes:  Patient encouraged but did not participate in group at this time.        Signature:  Sola Telles RN

## 2023-09-20 NOTE — GROUP NOTE
Psych-Ed/Relapse Prevention Group Note        Date: September 20, 2023 Start Time: 11am  End Time: 11:45am      Number of Participants in Group & Unit Census:  9/16    Topic: Socialization    Goal of Group:Patient will demonstrate improved interpersonal skills      Comments:     Patient did not participate in Psych-Ed/Relapse Prevention group, despite staff encouragement and explanation of benefits. Patient remain seclusive to self. Q15 minute safety checks maintained for patient safety and will continue to encourage patient to attend unit programming.          Signature:  Jaswinder Orr

## 2023-09-21 PROCEDURE — APPSS30 APP SPLIT SHARED TIME 16-30 MINUTES: Performed by: NURSE PRACTITIONER

## 2023-09-21 PROCEDURE — 6370000000 HC RX 637 (ALT 250 FOR IP): Performed by: PSYCHIATRY & NEUROLOGY

## 2023-09-21 PROCEDURE — 90833 PSYTX W PT W E/M 30 MIN: CPT | Performed by: PSYCHIATRY & NEUROLOGY

## 2023-09-21 PROCEDURE — 1240000000 HC EMOTIONAL WELLNESS R&B

## 2023-09-21 PROCEDURE — 99232 SBSQ HOSP IP/OBS MODERATE 35: CPT | Performed by: PSYCHIATRY & NEUROLOGY

## 2023-09-21 RX ADMIN — VENLAFAXINE HYDROCHLORIDE 37.5 MG: 37.5 CAPSULE, EXTENDED RELEASE ORAL at 08:11

## 2023-09-21 RX ADMIN — NICOTINE POLACRILEX 2 MG: 2 LOZENGE ORAL at 19:10

## 2023-09-21 RX ADMIN — ACETAMINOPHEN 650 MG: 325 TABLET ORAL at 16:27

## 2023-09-21 RX ADMIN — NICOTINE POLACRILEX 2 MG: 2 LOZENGE ORAL at 15:05

## 2023-09-21 RX ADMIN — HYDROXYZINE HYDROCHLORIDE 50 MG: 50 TABLET, FILM COATED ORAL at 08:15

## 2023-09-21 RX ADMIN — TRAZODONE HYDROCHLORIDE 50 MG: 50 TABLET ORAL at 20:57

## 2023-09-21 ASSESSMENT — PAIN SCALES - GENERAL
PAINLEVEL_OUTOF10: 3
PAINLEVEL_OUTOF10: 0
PAINLEVEL_OUTOF10: 0

## 2023-09-21 ASSESSMENT — PAIN - FUNCTIONAL ASSESSMENT: PAIN_FUNCTIONAL_ASSESSMENT: ACTIVITIES ARE NOT PREVENTED

## 2023-09-21 ASSESSMENT — PAIN DESCRIPTION - LOCATION: LOCATION: BACK

## 2023-09-21 NOTE — GROUP NOTE
Psych-Ed/Relapse Prevention Group Note        Date: September 21, 2023 Start Time: 1:30pm  End Time:  2:00pm      Number of Participants in Group & Unit Census:  7/13    Topic: Community Resources    Goal of Group:Patient will verbalize understanding of community resources and MARIELY information      Comments:     Patient did not participate in  [employer]  group, despite staff encouragement and explanation of benefits. Patient remain seclusive to self. Q15 minute safety checks maintained for patient safety and will continue to encourage patient to attend unit programming.          Signature:  Papo Lagos

## 2023-09-21 NOTE — GROUP NOTE
Psych-Ed/Relapse Prevention Group Note        Date: September 21, 2023 Start Time: 11am  End Time: 11:45am      Number of Participants in Group & Unit Census:  7/14    Topic: Coping and stress management    Goal of Group:Patient will participate in creative expression and identify benefits of creative expression and music for coping and stress management      Comments:     Patient did not participate in Psych-Ed/Relapse Prevention group, despite staff encouragement and explanation of benefits. Patient remain seclusive to self. Q15 minute safety checks maintained for patient safety and will continue to encourage patient to attend unit programming.          Signature:  Jaswinder Orr

## 2023-09-21 NOTE — GROUP NOTE
sixto Note    9/21/23           Start time: 10:34 AM      Number of participants in Group & unit census: 5/15    Topic:Dealing with Disappointment     Goal of Group:coping skills and education     Comments:  Patient did not participate in  group, despite staff encouragement and explanation of benefits. Patient remains seclusive to self. Every 15 minute safety checks maintained for patient safety and will continue to encourage patient to attend unit programming.

## 2023-09-22 PROCEDURE — APPSS30 APP SPLIT SHARED TIME 16-30 MINUTES: Performed by: NURSE PRACTITIONER

## 2023-09-22 PROCEDURE — 6370000000 HC RX 637 (ALT 250 FOR IP): Performed by: PSYCHIATRY & NEUROLOGY

## 2023-09-22 PROCEDURE — 1240000000 HC EMOTIONAL WELLNESS R&B

## 2023-09-22 PROCEDURE — 99232 SBSQ HOSP IP/OBS MODERATE 35: CPT | Performed by: PSYCHIATRY & NEUROLOGY

## 2023-09-22 RX ADMIN — NICOTINE POLACRILEX 2 MG: 2 LOZENGE ORAL at 12:20

## 2023-09-22 RX ADMIN — TRAZODONE HYDROCHLORIDE 50 MG: 50 TABLET ORAL at 21:04

## 2023-09-22 RX ADMIN — HYDROXYZINE HYDROCHLORIDE 50 MG: 50 TABLET, FILM COATED ORAL at 10:11

## 2023-09-22 RX ADMIN — VENLAFAXINE HYDROCHLORIDE 37.5 MG: 37.5 CAPSULE, EXTENDED RELEASE ORAL at 08:19

## 2023-09-22 NOTE — GROUP NOTE
Psych-Ed/Relapse Prevention Group Note        Date: September 22, 2023 Start Time: 1:30pm  End Time:  2:00pm      Number of Participants in Group & Unit Census:  4/14    Topic: Leisure skills    Goal of Group:Patient will identify benefits of leisure for coping       Comments:     Patient did not participate in Psych-Ed/Relapse Prevention group, despite staff encouragement and explanation of benefits. Patient remain seclusive to self. Q15 minute safety checks maintained for patient safety and will continue to encourage patient to attend unit programming.          Signature:  Papo Riggs

## 2023-09-22 NOTE — CARE COORDINATION
SUSAN faxed updated information to [place], spoke with Nick, information to be processed and she will return call.

## 2023-09-22 NOTE — CARE COORDINATION
SUSAN spoke with Suzette at [facility] recovery states they can accept patient for admission tomorrow at 2pm; pt will have assessment at the detox center and then go to residential program, will need to go to [address] [place] [address]. Suzette requested medications be filled at [facility], MD updated.

## 2023-09-22 NOTE — GROUP NOTE
Group Therapy Note    Date: 9/22/2023    Group Start Time: 1030  Group End Time: 1110  Group Topic: Psychoeducation    [facility]    ROSELYN Squires    Group Therapy Note    Attendees: 7/15     Patient's Goal:  Patient will identify benefits of music for coping and stress management    Notes:  Patient attended group and participated    Status After Intervention:  Improved    Participation Level:  Active Listener and Interactive    Participation Quality: Appropriate, Attentive, and Sharing      Speech:  normal      Thought Process/Content: Logical  Linear      Affective Functioning: Constricted      Mood: euthymic      Level of consciousness:  Alert, Oriented x4, and Attentive      Response to Learning: Able to verbalize current knowledge/experience, Able to verbalize/acknowledge new learning, Able to retain information, Able to change behavior, and Progressing to goal      Endings: None Reported    Modes of Intervention: Education, Support, Socialization, and Exploration      Discipline Responsible: Psychoeducational Specialist      Signature:  Jaswinder Squires

## 2023-09-22 NOTE — CARE COORDINATION
Angie received voice mail from Brandt at [facility], states they Triapthi not have the staff to accommodate\" patient for admission on Sat, will need to admit pt to program on Monday.  ANGIE updated unit staff and MD.

## 2023-09-23 PROCEDURE — 6370000000 HC RX 637 (ALT 250 FOR IP): Performed by: PSYCHIATRY & NEUROLOGY

## 2023-09-23 PROCEDURE — 1240000000 HC EMOTIONAL WELLNESS R&B

## 2023-09-23 PROCEDURE — 99232 SBSQ HOSP IP/OBS MODERATE 35: CPT

## 2023-09-23 RX ORDER — TRAZODONE HYDROCHLORIDE 100 MG/1
100 TABLET ORAL NIGHTLY PRN
Status: DISCONTINUED | OUTPATIENT
Start: 2023-09-23 | End: 2023-09-25 | Stop reason: HOSPADM

## 2023-09-23 RX ADMIN — HYDROXYZINE HYDROCHLORIDE 50 MG: 50 TABLET, FILM COATED ORAL at 14:53

## 2023-09-23 RX ADMIN — NICOTINE POLACRILEX 2 MG: 2 LOZENGE ORAL at 17:38

## 2023-09-23 RX ADMIN — NICOTINE POLACRILEX 2 MG: 2 LOZENGE ORAL at 12:33

## 2023-09-23 RX ADMIN — VENLAFAXINE HYDROCHLORIDE 37.5 MG: 37.5 CAPSULE, EXTENDED RELEASE ORAL at 08:20

## 2023-09-23 NOTE — GROUP NOTE
Group Therapy Note    Date: 9/23/2023    Group Start Time: 1400  Group End Time: 9504  Group Topic: Psychoeducation    [facility] ROSELYN Hernandez        Group Therapy Note    Attendees: 9/13     Patient's Goal:  To improve interpersonal skills and coping skills awareness through collaborating with peers and demonstrating self-expression. Notes:  Patient attended and participated in group. Patient was able to collaborate with peers and demonstrate self-expression. Patient was pleasant and cooperative. Status After Intervention:  Improved    Participation Level:  Active Listener and Interactive    Participation Quality: Appropriate, Attentive, and Sharing      Speech:  normal      Thought Process/Content: Logical and Linear      Affective Functioning: Constricted      Mood: euthymic      Level of consciousness:  Alert and Attentive      Response to Learning: Able to verbalize current knowledge/experience, Able to retain information, Capable of insight, and Progressing to goal      Endings: None Reported    Modes of Intervention: Support, Socialization, and Exploration      Discipline Responsible: Psychoeducational Specialist      Signature:  Papo Cheema

## 2023-09-23 NOTE — GROUP NOTE
Group Therapy Note    Date: 9/23/2023    Group Start Time: 1030  Group End Time: 1100  Group Topic: Healthy Living/Wellness    JOHNNY Lees RN    Health/Wellness Group Note        Date: September 23, 2023 Start Time: 1030  End Time:1100      Number of Participants in Group & Unit Census:  5/15    Topic: Positive Self Talk    Goal of Group:Engage with peers and encourage positive self talk. Comments:     Patient did not participate in Health/Wellness group, despite staff encouragement and explanation of benefits. Patient remain seclusive to self. Q15 minute safety checks maintained for patient safety and will continue to encourage patient to attend unit programming.

## 2023-09-24 PROCEDURE — 99232 SBSQ HOSP IP/OBS MODERATE 35: CPT | Performed by: NURSE PRACTITIONER

## 2023-09-24 PROCEDURE — 6370000000 HC RX 637 (ALT 250 FOR IP): Performed by: NURSE PRACTITIONER

## 2023-09-24 PROCEDURE — 1240000000 HC EMOTIONAL WELLNESS R&B

## 2023-09-24 PROCEDURE — 6370000000 HC RX 637 (ALT 250 FOR IP)

## 2023-09-24 PROCEDURE — 6370000000 HC RX 637 (ALT 250 FOR IP): Performed by: PSYCHIATRY & NEUROLOGY

## 2023-09-24 RX ORDER — VENLAFAXINE HYDROCHLORIDE 37.5 MG/1
37.5 CAPSULE, EXTENDED RELEASE ORAL
Qty: 30 CAPSULE | Refills: 0 | Status: SHIPPED | OUTPATIENT
Start: 2023-09-25

## 2023-09-24 RX ORDER — HYDROXYZINE 50 MG/1
50 TABLET, FILM COATED ORAL 3 TIMES DAILY PRN
Qty: 30 TABLET | Refills: 0 | Status: SHIPPED | OUTPATIENT
Start: 2023-09-24 | End: 2023-10-04

## 2023-09-24 RX ADMIN — HYDROXYZINE HYDROCHLORIDE 50 MG: 50 TABLET, FILM COATED ORAL at 19:36

## 2023-09-24 RX ADMIN — TRAZODONE HYDROCHLORIDE 100 MG: 100 TABLET ORAL at 23:56

## 2023-09-24 RX ADMIN — NICOTINE POLACRILEX 4 MG: 4 LOZENGE ORAL at 15:32

## 2023-09-24 RX ADMIN — NICOTINE POLACRILEX 2 MG: 2 LOZENGE ORAL at 09:06

## 2023-09-24 RX ADMIN — VENLAFAXINE HYDROCHLORIDE 37.5 MG: 37.5 CAPSULE, EXTENDED RELEASE ORAL at 08:29

## 2023-09-24 RX ADMIN — NICOTINE POLACRILEX 4 MG: 4 LOZENGE ORAL at 18:47

## 2023-09-24 ASSESSMENT — PAIN SCALES - GENERAL: PAINLEVEL_OUTOF10: 0

## 2023-09-24 NOTE — GROUP NOTE
Group Therapy Note    Date: 9/24/2023    Group Start Time: 1400  Group End Time: 5034  Group Topic: Cognitive Skills    [id number] [place] [address]        Group Therapy Note    Attendees: 8/16     Patient's Goal:  To improve interpersonal skills and creative expression through collaborating with peers and concentrating on a presented task. Notes:  Patient attended and participated in group. Patient was able to collaborate with peers and concentrate on a presented task. Patient was pleasant and cooperative. Status After Intervention:  Improved    Participation Level:  Active Listener and Interactive    Participation Quality: Appropriate, Attentive, Sharing, and Supportive      Speech:  normal      Thought Process/Content: Logical and Linear      Affective Functioning: Congruent      Mood: euthymic      Level of consciousness:  Alert and Attentive      Response to Learning: Able to verbalize current knowledge/experience, Able to retain information, and Progressing to goal      Endings: None Reported    Modes of Intervention: Socialization, Exploration, and Activity      Discipline Responsible: Psychoeducational Specialist      Signature:  Papo Chapin

## 2023-09-24 NOTE — GROUP NOTE
Group Therapy Note    Date: 9/24/2023    Group Start Time: 1000  Group End Time: 1729  Group Topic: Psychoeducation    JOHNNY ALLEN    CHRIS Elliott LSW        Group Therapy Note    Attendees: 6/17       Patient's Goal:  ncrease interpersonal relationship skills with open discussion utilizing mental health prompts. Status After Intervention:  Improved    Participation Level:  Active Listener and Interactive    Participation Quality: Appropriate, Attentive, and Sharing      Speech:  normal      Thought Process/Content: Logical      Affective Functioning: Congruent      Mood: euthymic      Level of consciousness:  Alert, Oriented x4, and Attentive      Response to Learning: Able to verbalize current knowledge/experience and Able to verbalize/acknowledge new learning      Endings: None Reported    Modes of Intervention: Socialization and Exploration      Discipline Responsible: /Counselor      Signature:  CHRIS Elliott LSW

## 2023-09-24 NOTE — GROUP NOTE
Group Therapy Note    Date: 9/23/2023    Group Start Time: 2000  Group End Time: 2030  Group Topic: Recreational    [facility]    Tao Burdick RN        Group Therapy Note    Attendees: 9       Patient's Goal:  to relax and get ready for bed    Notes:  Patient was an active participant    Status After Intervention:  Unchanged    Participation Level:  Active Listener and Interactive    Participation Quality: Appropriate, Attentive, Sharing, and Supportive      Speech:  normal      Thought Process/Content: Logical      Affective Functioning: Congruent      Mood: anxious      Level of consciousness:  Alert, Oriented x4, and Attentive      Response to Learning: Able to verbalize current knowledge/experience, Able to verbalize/acknowledge new learning, Able to retain information, Capable of insight, Able to change behavior, and Progressing to goal      Endings: None Reported    Modes of Intervention: Support, Socialization, Activity, and Media      Discipline Responsible: Registered Nurse      Signature:  Tao Burdick RN

## 2023-09-24 NOTE — DISCHARGE INSTRUCTIONS
Help line- [phone]      To obtain results of pending studies call Medical Records at: [phone]     For emergencies and 24 hour/7 days a week contact information:  [phone]

## 2023-09-25 VITALS
DIASTOLIC BLOOD PRESSURE: 58 MMHG | HEART RATE: 65 BPM | TEMPERATURE: 97.5 F | RESPIRATION RATE: 16 BRPM | WEIGHT: 171 LBS | HEIGHT: 71 IN | OXYGEN SATURATION: 99 % | SYSTOLIC BLOOD PRESSURE: 107 MMHG | BODY MASS INDEX: 23.94 KG/M2

## 2023-09-25 PROCEDURE — 6370000000 HC RX 637 (ALT 250 FOR IP): Performed by: NURSE PRACTITIONER

## 2023-09-25 PROCEDURE — 6370000000 HC RX 637 (ALT 250 FOR IP): Performed by: PSYCHIATRY & NEUROLOGY

## 2023-09-25 RX ADMIN — VENLAFAXINE HYDROCHLORIDE 37.5 MG: 37.5 CAPSULE, EXTENDED RELEASE ORAL at 08:14

## 2023-09-25 RX ADMIN — NICOTINE POLACRILEX 4 MG: 4 LOZENGE ORAL at 08:31

## 2023-09-25 NOTE — PLAN OF CARE
[facility]  Day 3 Interdisciplinary Treatment Plan NOTE    Review Date & Time: 9/21/21 1300    Admission Type:   Admission Type: Voluntary    Reason for admission:  Reason for Admission: Suicidal ideation to overdose on drugs due to impending homelessness/ life stressors  Estimated Length of Stay:  5-7 days  Estimated Discharge Date Update:  to be determined by physician    PATIENT STRENGTHS:  Patient Strengths:   Patient Strengths and Limitations:Limitations: Difficult relationships / poor social skills, Multiple barriers to leisure interests, Inappropriate/potentially harmful leisure interests, Difficulty problem solving/relies on others to help solve problems, Tendency to isolate self, Lacks leisure interests  Addictive Behavior:Addictive Behavior  In the Past 3 Months, Have You Felt or Has Someone Told You That You Have a Problem With  : None (Simultaneous filing. User may not have seen previous data.)  Medical Problems:  Past Medical History:   Diagnosis Date    Anxiety     Depression     Insomnia        Risk:  Fall Risk   Cezar Scale Cezar Scale Score: 22  BVC    Change in scores:  No Changes to plan of Care:  No    Status EXAM:   Mental Status and Behavioral Exam  Normal: No  Level of Assistance: Independent/Self  Facial Expression: Flat  Affect: Congruent  Level of Consciousness: Alert  Frequency of Checks: 4 times per hour, close  Mood:Normal: No  Mood: Depressed, Anxious  Motor Activity:Normal: Yes  Eye Contact: Good  Observed Behavior: Guarded, Cooperative, Withdrawn  Sexual Misconduct History: Current - no  Preception: [place] to person, [place] to time, [place] to place, [place] to situation  Attention:Normal: No  Attention: Distractible  Thought Processes: Unremarkable  Thought Content:Normal: No  Thought Content: Preoccupations  Depression Symptoms: Loss of interest  Anxiety Symptoms: Generalized  Yumiko Symptoms: No problems reported or observed.   Hallucinations: None  Delusions:
Problem: Self Harm/Suicidality  Goal: Will have no self-injury during hospital stay  Description: INTERVENTIONS:  1. Ensure constant observer at bedside with Q15M safety checks  2. Maintain a safe environment  3. Secure patient belongings  4. Ensure family/visitors adhere to safety recommendations  5. Ensure safety tray has been added to patient's diet order  6. Every shift and PRN: Re-assess suicidal risk via Frequent Screener    9/19/2023 1340 by Gustavo Wallace LPN  Note: Patient reports fleeting suicidal/homicidal ideations with no plan but contracts. Patient verbalizes feeling depressed and anxious as he isolates to room except when addressing his needs. Patient communicates stressors related to substance use and is hoping to seek inpatient treatment services. Patient is educated on the benefits of programming but is encouraged to rest intermittently throughout shift as Q15 minute safety checks continue. Problem: Pain  Goal: Verbalizes/displays adequate comfort level or baseline comfort level  9/19/2023 1340 by Gustavo Wallace LPN  Note: Patient denies pain and voices no complaints.
Problem: Self Harm/Suicidality  Goal: Will have no self-injury during hospital stay  Description: INTERVENTIONS:  1. Ensure constant observer at bedside with Q15M safety checks  2. Maintain a safe environment  3. Secure patient belongings  4. Ensure family/visitors adhere to safety recommendations  5. Ensure safety tray has been added to patient's diet order  6. Every shift and PRN: Re-assess suicidal risk via Frequent Screener    9/20/2023 0039 by Nicholas Torres LPN  Outcome: Progressing  Patient reports fleeting suicidal thoughts, denies plan. Q15M checks continue per policy and safety maintained. Problem: Depression  Goal: Will be euthymic at discharge  Description: INTERVENTIONS:  1. Administer medication as ordered  2. Provide emotional support via 1:1 interaction with staff  3. Encourage involvement in milieu/groups/activities  4. Monitor for social isolation  9/20/2023 0039 by Nicholas Torres LPN  Outcome: Progressing  Patient reports depression and anxiety. 1:1 emotional support provided. Patient is cooperative and compliant with medications.      Problem: Pain  Goal: Verbalizes/displays adequate comfort level or baseline comfort level  9/20/2023 0039 by Nicholas Torres LPN  Outcome: Progressing
Problem: Self Harm/Suicidality  Goal: Will have no self-injury during hospital stay  Description: INTERVENTIONS:  1. Ensure constant observer at bedside with Q15M safety checks  2. Maintain a safe environment  3. Secure patient belongings  4. Ensure family/visitors adhere to safety recommendations  5. Ensure safety tray has been added to patient's diet order  6. Every shift and PRN: Re-assess suicidal risk via Frequent Screener    9/20/2023 1235 by Blanquita Manuel RN  Outcome: Progressing  Note: Mr. Shala De Oliveira reports fleeting thoughts of self harm, denies plan and is able to contract for safety at time of assessment. Nurse encourages patient to participate in groups on the unit and provides patient with 1:1 support. 9/20/2023 0039 by Pati Go LPN  Outcome: Progressing     Problem: Depression  Goal: Will be euthymic at discharge  Description: INTERVENTIONS:  1. Administer medication as ordered  2. Provide emotional support via 1:1 interaction with staff  3. Encourage involvement in milieu/groups/activities  4. Monitor for social isolation  9/20/2023 1235 by Blanquita Manuel RN  Outcome: Progressing  Note: Mr. Shala De Oliveira reports his depression is 4 out of 10 with 10 being the most severe. Patient educated on medications and therapeutic compliance. 9/20/2023 0039 by Pati Go LPN  Outcome: Progressing     Problem: Pain  Goal: Verbalizes/displays adequate comfort level or baseline comfort level  9/20/2023 1235 by Blanquita Manuel RN  Outcome: Progressing  Note: Mr. Shala De Oliveira denies pain at time of assessment. Patient educated on 0-10 pain scale and as needed pain m,medications. Patient verbalizes understanding.    9/20/2023 0039 by Pati Go LPN  Outcome: Progressing
Problem: Self Harm/Suicidality  Goal: Will have no self-injury during hospital stay  Description: INTERVENTIONS:  1. Ensure constant observer at bedside with Q15M safety checks  2. Maintain a safe environment  3. Secure patient belongings  4. Ensure family/visitors adhere to safety recommendations  5. Ensure safety tray has been added to patient's diet order  6. Every shift and PRN: Re-assess suicidal risk via Frequent Screener    9/20/2023 2122 by José Hercules RN  Outcome: Progressing  Flowsheets (Taken 9/20/2023 2122)  Will have no self-injury during hospital stay:   Ensure constant observer at bedside with Q15M safety checks   Maintain a safe environment     Problem: Depression  Goal: Will be euthymic at discharge  Description: INTERVENTIONS:  1. Administer medication as ordered  2. Provide emotional support via 1:1 interaction with staff  3. Encourage involvement in milieu/groups/activities  4. Monitor for social isolation  9/20/2023 2122 by José Hercules RN  Outcome: Progressing     The patient is in his bed resting. He denies thoughts of self harm. He also denies experiencing anxiety, depression or hallucinations. He is cooperative but guarded. Writer encouraged the patient to notify writer should anything change and he is in need of assistance. He voiced an understanding. Writer will continue to offer support. Q15 minute checks to continue for safety.
Problem: Self Harm/Suicidality  Goal: Will have no self-injury during hospital stay  Description: INTERVENTIONS:  1. Ensure constant observer at bedside with Q15M safety checks  2. Maintain a safe environment  3. Secure patient belongings  4. Ensure family/visitors adhere to safety recommendations  5. Ensure safety tray has been added to patient's diet order  6. Every shift and PRN: Re-assess suicidal risk via Frequent Screener    9/22/2023 1943 by Wells Marker  Outcome: Progressing     Problem: Depression  Goal: Will be euthymic at discharge  Description: INTERVENTIONS:  1. Administer medication as ordered  2. Provide emotional support via 1:1 interaction with staff  3. Encourage involvement in milieu/groups/activities  4. Monitor for social isolation  9/22/2023 1943 by Wells Marker  Outcome: Progressing   Patient is free of self harm at this time. Patient agrees to seek out staff if thoughts to harm self arise. Staff will provide support and reassurance as needed. Patient provided safe environment within [facility] milieu. Will continue to monitor and provide q15 min safety checks.
Problem: Self Harm/Suicidality  Goal: Will have no self-injury during hospital stay  Description: INTERVENTIONS:  1. Ensure constant observer at bedside with Q15M safety checks  2. Maintain a safe environment  3. Secure patient belongings  4. Ensure family/visitors adhere to safety recommendations  5. Ensure safety tray has been added to patient's diet order  6. Every shift and PRN: Re-assess suicidal risk via Frequent Screener    9/23/2023 2351 by Marcus Mcelroy LPN  Outcome: Progressing  Note: Patient free of injuries and denies any ideas of harm to self or others at this time. 15 minute safety checks continued per unit protocol. Problem: Depression  Goal: Will be euthymic at discharge  Description: INTERVENTIONS:  1. Administer medication as ordered  2. Provide emotional support via 1:1 interaction with staff  3. Encourage involvement in milieu/groups/activities  4. Monitor for social isolation  9/23/2023 2351 by Marcus Mcelroy LPN  Note: Patient denies depression at this time. Patient has been out social with peers. Patient reports sleep and appetite are adequate. Patient reports being ready to discharge to [facility] recovery center. Patient denies hallucinations and does appear to be responding to internal stimuli. Problem: Pain  Goal: Verbalizes/displays adequate comfort level or baseline comfort level  9/23/2023 2351 by Marcus Mcelroy LPN  Note: Patient denies pain and will notify staff if pain intervention is needed.
Problem: Self Harm/Suicidality  Goal: Will have no self-injury during hospital stay  Description: INTERVENTIONS:  1. Ensure constant observer at bedside with Q15M safety checks  2. Maintain a safe environment  3. Secure patient belongings  4. Ensure family/visitors adhere to safety recommendations  5. Ensure safety tray has been added to patient's diet order  6. Every shift and PRN: Re-assess suicidal risk via Frequent Screener    9/24/2023 1226 by Bryan Wise LPN  Outcome: Progressing     Problem: Pain  Goal: Verbalizes/displays adequate comfort level or baseline comfort level  9/24/2023 1226 by Bryan Wise LPN  Outcome: Progressing   Patient denies intent to harm self, remains free from self harm.  Support and encouragement provided
Problem: Self Harm/Suicidality  Goal: Will have no self-injury during hospital stay  Description: INTERVENTIONS:  1. Ensure constant observer at bedside with Q15M safety checks  2. Maintain a safe environment  3. Secure patient belongings  4. Ensure family/visitors adhere to safety recommendations  5. Ensure safety tray has been added to patient's diet order  6. Every shift and PRN: Re-assess suicidal risk via Frequent Screener    9/24/2023 2050 by Juan Manuel Vicente RN  Outcome: Progressing     Problem: Depression  Goal: Will be euthymic at discharge  Description: INTERVENTIONS:  1. Administer medication as ordered  2. Provide emotional support via 1:1 interaction with staff  3. Encourage involvement in milieu/groups/activities  4. Monitor for social isolation  Outcome: Progressing  Note: Patient is out in day room social with peers, brightened in appearance. He is polite and cooperative. Patient shaved faced and addressed hygiene needs. Patient reports readiness for discharge to continue working on his sobriety. He denies suicidal ideation and thoughts of self harm. Staff maintains Q15 minute safety checks.
Problem: Self Harm/Suicidality  Goal: Will have no self-injury during hospital stay  Description: INTERVENTIONS:  1. Ensure constant observer at bedside with Q15M safety checks  2. Maintain a safe environment  3. Secure patient belongings  4. Ensure family/visitors adhere to safety recommendations  5. Ensure safety tray has been added to patient's diet order  6. Every shift and PRN: Re-assess suicidal risk via Frequent Screener    Outcome: Progressing     Problem: Depression  Goal: Will be euthymic at discharge  Description: INTERVENTIONS:  1. Administer medication as ordered  2. Provide emotional support via 1:1 interaction with staff  3. Encourage involvement in milieu/groups/activities  4. Monitor for social isolation  Outcome: Progressing     Problem: Pain  Goal: Verbalizes/displays adequate comfort level or baseline comfort level  Outcome: Progressing     Patient remains safe on unit, free from injury and self-harm. Patient denies suicidal ideations or thoughts of self-harm. 15 minute safety checks in place and will continue. Patient reports readiness and eagerness for discharge to treatment facility. Patient currently only endorses anxiety.
Problem: Self Harm/Suicidality  Goal: Will have no self-injury during hospital stay  Description: INTERVENTIONS:  1. Ensure constant observer at bedside with Q15M safety checks  2. Maintain a safe environment  3. Secure patient belongings  4. Ensure family/visitors adhere to safety recommendations  5. Ensure safety tray has been added to patient's diet order  6. Every shift and PRN: Re-assess suicidal risk via Frequent Screener    Outcome: Progressing     Problem: Depression  Goal: Will be euthymic at discharge  Description: INTERVENTIONS:  1. Administer medication as ordered  2. Provide emotional support via 1:1 interaction with staff  3. Encourage involvement in milieu/groups/activities  4. Monitor for social isolation  Outcome: Progressing     Problem: Pain  Goal: Verbalizes/displays adequate comfort level or baseline comfort level  Outcome: Progressing     Patient remains safe on unit, free from injury and self-harm. Patient denies suicidal ideations, or thoughts of self-harm at this time. 15 minute safety checks in place and will continue. Patient endorses anxiety and depression, but reports improvement since admission. Patient has not reported any pain so far this shift.
Problem: Self Harm/Suicidality  Goal: Will have no self-injury during hospital stay  Description: INTERVENTIONS:  1. Ensure constant observer at bedside with Q15M safety checks  2. Maintain a safe environment  3. Secure patient belongings  4. Ensure family/visitors adhere to safety recommendations  5. Ensure safety tray has been added to patient's diet order  6. Every shift and PRN: Re-assess suicidal risk via Frequent Screener    Outcome: Progressing     Problem: Depression  Goal: Will be euthymic at discharge  Description: INTERVENTIONS:  1. Administer medication as ordered  2. Provide emotional support via 1:1 interaction with staff  3. Encourage involvement in milieu/groups/activities  4. Monitor for social isolation  Outcome: Progressing     Problem: Pain  Goal: Verbalizes/displays adequate comfort level or baseline comfort level  Outcome: Progressing, Patient cooperative,flat affect with a sad mood. Patient disappointed he wasn't able to discharge yesterday however explanation given to patient. Patient denied depression,suicidal and homicidal ideations. Patient has been medication compliant and eager for discharge. 15 MIN safety checks maintained.
Poor insight    EDUCATION:   Learner Progress Toward Treatment Goals: reviewed group plans and strategies for care    Method:group therapy, medication compliance, individualized assessments and care planning    Outcome: needs reinforcement    PATIENT GOALS: to be discussed with patient within 72 hours  Short term: Pt unable to attend  Long term:    PLAN/TREATMENT RECOMMENDATIONS:     continue group therapy , medications compliance, goal setting, individualized assessments and care, continue to monitor pt on unit      SHORT-TERM GOALS:   Time frame for Short-Term Goals: 5-7 days    LONG-TERM GOALS:  Time frame for Long-Term Goals: 6 months  Members Present in Team Meeting: See Signature Sheet    Ariana Quinn RN

## 2023-09-25 NOTE — BH NOTE
[facility]  Discharge Note    Pt discharged with followings belongings:   Dental Appliances: None  Vision - Corrective Lenses: None  Hearing Aid: None  Jewelry: None  Body Piercings Removed: N/A  Clothing: Socks, Footwear, Shirt, Belt, Shorts  Other Valuables: Wallet, Money, Lighter/Matches   Valuables sent home with patient or returned to patient. Patient educated on aftercare instructions: yes  Information faxed to [facility] by nurse  at 12:16 PM .Patient verbalize understanding of AVS:  Yes. Status EXAM upon discharge:  Mental Status and Behavioral Exam  Normal: No  Level of Assistance: Independent/Self  Facial Expression: Brightened  Affect: Appropriate  Level of Consciousness: Alert  Frequency of Checks: 4 times per hour, close  Mood:Normal: No  Mood: Anxious  Motor Activity:Normal: Yes  Motor Activity: Decreased  Eye Contact: Good  Observed Behavior: Cooperative  Sexual Misconduct History: Current - no  Preception: [place] to person, [place] to time, [place] to place, [place] to situation  Attention:Normal: Yes  Attention: Distractible  Thought Processes: Unremarkable  Thought Content:Normal: No  Thought Content: Preoccupations  Depression Symptoms: No problems reported or observed. Anxiety Symptoms: Generalized  Yumiko Symptoms: No problems reported or observed.   Hallucinations: None  Delusions: No  Memory:Normal: Yes  Insight and Judgment: No  Insight and Judgment: Poor judgment    Tobacco Screening:  Practical Counseling, on admission, juan X, if applicable and completed (first 3 are required if patient doesn't refuse) X:            ( ) Recognizing danger situations (included triggers and roadblocks)                    ( ) Coping skills (new ways to manage stress,relaxation techniques, changing routine, distraction)                                                           ( ) Basic information about quitting (benefits of quitting, techniques in how to quit, available resources  ( ) Referral
Community Meeting Group Note        Date: September 20, 2023 Start Time: 9am  End Time: 930am      Number of Participants in Group & Unit Census:  5/16    Topic: Community meeting/goals     Goal of Group:Morning shoutout//goals       Comments:     Patient did not participate in [employer] group, despite staff encouragement and explanation of benefits. Patient remain seclusive to self. Q15 minute safety checks maintained for patient safety and will continue to encourage patient to attend unit programming.
Patient didn't participate the 9 am group despite staff invite to attend. 15 MIN safety checks.
Patient didn't participate the the morning orientation group despite staff invite to attend.
Patient given tobacco quitline number 07989280345 at this time, refusing to call at this time, patient given information as to the dangers of long term tobacco use. Continue to reinforce the importance of tobacco cessation.
Patient given tobacco quitline number 67228677862 at this time, refusing to call at this time, states \" I just dont want to quit now\"- patient given information as to the dangers of long term tobacco use. Continue to reinforce the importance of tobacco cessation.
Suicide precautions discontinued per psychiatric doctor on call.
patient at discharge.      Estela Guzman RN

## 2023-09-25 NOTE — GROUP NOTE
Group Therapy Note    Date: 9/25/2023    Group Start Time: 5521  Group End Time: 0163  Group Topic: Group Documentation    [facility]    Aylin Ross LPN        Group Therapy Note    Attendees: 9/19       Patient's Goal:  inspire    Notes:  educate    Status After Intervention:  Improved    Participation Level:  Active Listener    Participation Quality: Appropriate      Speech:  normal      Thought Process/Content: Logical      Affective Functioning: Congruent      Mood: anxious      Level of consciousness:  Alert      Response to Learning: Progressing to goal      Endings: None Reported    Modes of Intervention: Education      Discipline Responsible: Licensed Practical Nurse      Signature:  Aylin Ross LPN

## 2023-09-25 NOTE — GROUP NOTE
Group Therapy Note    Date: 9/24/2023    Group Start Time: 2000  Group End Time: 2030  Group Topic: Relaxation    [facility]    Joanne Roy RN        Group Therapy Note    Attendees: 9       Patient's Goal:  to relax and get ready for bed    Notes:  patient was an active participant    Status After Intervention:  Unchanged    Participation Level:  Active Listener and Interactive    Participation Quality: Appropriate, Attentive, Sharing, and Supportive      Speech:  normal      Thought Process/Content: Logical      Affective Functioning: Congruent      Mood: anxious      Level of consciousness:  Alert, Oriented x4, and Attentive      Response to Learning: Able to verbalize current knowledge/experience, Able to verbalize/acknowledge new learning, Able to retain information, Capable of insight, Able to change behavior, and Progressing to goal      Endings: None Reported    Modes of Intervention: Socialization, Activity, and Media      Discipline Responsible: Registered Nurse      Signature:  Joanne Roy RN

## 2023-09-25 NOTE — DISCHARGE INSTR - DIET

## 2023-09-25 NOTE — GROUP NOTE
Group Therapy Note    Date: 9/25/2023    Group Start Time: 1100  Group End Time: 1130  Group Topic: Psychoeducation    ROSELYN Espinoza    Group Therapy Note    Attendees: 11/19     Patient's Goal:  Patient will identify benefits of mental health advocacy and education for decreasing stigma. Notes:  Patient attended group but refused activity. Patient expressed frustration with group topic but remained in control and was interactive with peers and writer. Status After Intervention:  Unchanged    Participation Level:  Active Listener and Interactive    Participation Quality: Appropriate and Resistant      Speech:  normal      Thought Process/Content: Logical      Affective Functioning: Constricted/Restricted      Mood: dysphoric      Level of consciousness:  Alert and Preoccupied      Response to Learning: Able to verbalize current knowledge/experience, Able to verbalize/acknowledge new learning, and Resistant      Endings: None Reported    Modes of Intervention: Education, Support, Socialization, and Exploration      Discipline Responsible: Psychoeducational Specialist      Signature:  Papo Wood

## 2023-09-25 NOTE — CARE COORDINATION
Name: Tisha Reed    : 1980    Discharge Date: 23    Primary Auth/Cert #: [id number]    Destination: Patient discharged to [facility] Recovery     Discharge Medications:      Medication List        START taking these medications      hydrOXYzine HCl 50 MG tablet  Commonly known as: ATARAX  Take 1 tablet by mouth 3 times daily as needed for Anxiety  Notes to patient: anxiety     venlafaxine 37.5 MG extended release capsule  Commonly known as: EFFEXOR XR  Take 1 capsule by mouth daily (with breakfast)  Notes to patient: antidepressant               Where to Get Your Medications        These medications were sent to [address] [address]      Phone: [phone]   hydrOXYzine HCl 50 MG tablet  venlafaxine 37.5 MG extended release capsule         Follow Up Appointment: [facility]  [address] [facility].    [facility], [address] [place] [address]  Follow up on 2023  Needs to arrive at 2:00pm.

## 2023-09-27 ENCOUNTER — HOSPITAL ENCOUNTER (EMERGENCY)
Age: 43
Discharge: HOME | End: 2023-09-27
Payer: MEDICAID

## 2023-09-27 VITALS
OXYGEN SATURATION: 100 % | TEMPERATURE: 98.3 F | SYSTOLIC BLOOD PRESSURE: 132 MMHG | HEART RATE: 85 BPM | RESPIRATION RATE: 17 BRPM | WEIGHT: 187 LBS | DIASTOLIC BLOOD PRESSURE: 83 MMHG | BODY MASS INDEX: 26.08 KG/M2

## 2023-09-27 DIAGNOSIS — F10.920 ACUTE ALCOHOLIC INTOXICATION WITHOUT COMPLICATION (HCC): ICD-10-CM

## 2023-09-27 DIAGNOSIS — Z59.00 HOMELESS: Primary | ICD-10-CM

## 2023-09-27 LAB
ALBUMIN SERPL BCG-MCNC: 4.7 G/DL (ref 3.5–5.1)
ALP SERPL-CCNC: 93 U/L (ref 38–126)
ALT SERPL W/O P-5'-P-CCNC: 149 U/L (ref 11–66)
AMPHETAMINES UR QL SCN: NEGATIVE
ANION GAP SERPL CALC-SCNC: 12 MEQ/L (ref 8–16)
APAP SERPL-MCNC: < 5 UG/ML (ref 0–20)
AST SERPL-CCNC: 71 U/L (ref 5–40)
BARBITURATES UR QL SCN: NEGATIVE
BASOPHILS ABSOLUTE: 0.1 THOU/MM3 (ref 0–0.1)
BASOPHILS NFR BLD AUTO: 0.7 %
BENZODIAZ UR QL SCN: NEGATIVE
BILIRUB CONJ SERPL-MCNC: < 0.2 MG/DL (ref 0–0.3)
BILIRUB SERPL-MCNC: 0.3 MG/DL (ref 0.3–1.2)
BILIRUB UR QL STRIP.AUTO: NEGATIVE
BUN SERPL-MCNC: 11 MG/DL (ref 7–22)
BZE UR QL SCN: NEGATIVE
CALCIUM SERPL-MCNC: 9.9 MG/DL (ref 8.5–10.5)
CANNABINOIDS UR QL SCN: POSITIVE
CHARACTER UR: CLEAR
CHLORIDE SERPL-SCNC: 101 MEQ/L (ref 98–111)
CO2 SERPL-SCNC: 30 MEQ/L (ref 23–33)
COLOR: YELLOW
CREAT SERPL-MCNC: 0.8 MG/DL (ref 0.4–1.2)
DEPRECATED RDW RBC AUTO: 46.8 FL (ref 35–45)
EOSINOPHIL NFR BLD AUTO: 1.7 %
EOSINOPHILS ABSOLUTE: 0.2 THOU/MM3 (ref 0–0.4)
ERYTHROCYTE [DISTWIDTH] IN BLOOD BY AUTOMATED COUNT: 13.1 % (ref 11.5–14.5)
ETHANOL SERPL-MCNC: 0.16 %
FENTANYL: NEGATIVE
GFR SERPL CREATININE-BSD FRML MDRD: > 60 ML/MIN/1.73M2
GLUCOSE SERPL-MCNC: 100 MG/DL (ref 70–108)
GLUCOSE UR QL STRIP.AUTO: NEGATIVE MG/DL
HCT VFR BLD AUTO: 48.5 % (ref 42–52)
HGB BLD-MCNC: 16 GM/DL (ref 14–18)
HGB UR QL STRIP.AUTO: NEGATIVE
IMM GRANULOCYTES # BLD AUTO: 0.07 THOU/MM3 (ref 0–0.07)
IMM GRANULOCYTES NFR BLD AUTO: 0.6 %
KETONES UR QL STRIP.AUTO: NEGATIVE
LYMPHOCYTES ABSOLUTE: 3.7 THOU/MM3 (ref 1–4.8)
LYMPHOCYTES NFR BLD AUTO: 32.8 %
MCH RBC QN AUTO: 32.2 PG (ref 26–33)
MCHC RBC AUTO-ENTMCNC: 33 GM/DL (ref 32.2–35.5)
MCV RBC AUTO: 97.6 FL (ref 80–94)
MONOCYTES ABSOLUTE: 1 THOU/MM3 (ref 0.4–1.3)
MONOCYTES NFR BLD AUTO: 8.9 %
NEUTROPHILS NFR BLD AUTO: 55.3 %
NITRITE UR QL STRIP: NEGATIVE
NRBC BLD AUTO-RTO: 0 /100 WBC
OPIATES UR QL SCN: NEGATIVE
OSMOLALITY SERPL CALC.SUM OF ELEC: 284.5 MOSMOL/KG (ref 275–300)
OXYCODONE: NEGATIVE
PCP UR QL SCN: NEGATIVE
PH UR STRIP.AUTO: 5.5 [PH] (ref 5–9)
PLATELET # BLD AUTO: 266 THOU/MM3 (ref 130–400)
PMV BLD AUTO: 10.4 FL (ref 9.4–12.4)
POTASSIUM SERPL-SCNC: 3.9 MEQ/L (ref 3.5–5.2)
PROT SERPL-MCNC: 8.1 G/DL (ref 6.1–8)
PROT UR STRIP.AUTO-MCNC: NEGATIVE MG/DL
RBC # BLD AUTO: 4.97 MILL/MM3 (ref 4.7–6.1)
SALICYLATES SERPL-MCNC: < 0.3 MG/DL (ref 2–10)
SEGMENTED NEUTROPHILS ABSOLUTE COUNT: 6.2 THOU/MM3 (ref 1.8–7.7)
SODIUM SERPL-SCNC: 143 MEQ/L (ref 135–145)
SP GR UR REFRACT.AUTO: 1.02 (ref 1–1.03)
TSH SERPL DL<=0.005 MIU/L-ACNC: 2.65 UIU/ML (ref 0.4–4.2)
UROBILINOGEN, URINE: 1 EU/DL (ref 0–1)
WBC # BLD AUTO: 11.2 THOU/MM3 (ref 4.8–10.8)
WBC #/AREA URNS HPF: NEGATIVE /[HPF]

## 2023-09-27 PROCEDURE — 81003 URINALYSIS AUTO W/O SCOPE: CPT

## 2023-09-27 PROCEDURE — 36415 COLL VENOUS BLD VENIPUNCTURE: CPT

## 2023-09-27 PROCEDURE — 80053 COMPREHEN METABOLIC PANEL: CPT

## 2023-09-27 PROCEDURE — 80307 DRUG TEST PRSMV CHEM ANLYZR: CPT

## 2023-09-27 PROCEDURE — 99285 EMERGENCY DEPT VISIT HI MDM: CPT

## 2023-09-27 PROCEDURE — 80143 DRUG ASSAY ACETAMINOPHEN: CPT

## 2023-09-27 PROCEDURE — 80179 DRUG ASSAY SALICYLATE: CPT

## 2023-09-27 PROCEDURE — 82077 ASSAY SPEC XCP UR&BREATH IA: CPT

## 2023-09-27 PROCEDURE — 84443 ASSAY THYROID STIM HORMONE: CPT

## 2023-09-27 PROCEDURE — 85025 COMPLETE CBC W/AUTO DIFF WBC: CPT

## 2023-09-27 SDOH — ECONOMIC STABILITY - HOUSING INSECURITY: HOMELESSNESS UNSPECIFIED: Z59.00

## 2023-09-27 ASSESSMENT — PATIENT HEALTH QUESTIONNAIRE - PHQ9
2. FEELING DOWN, DEPRESSED OR HOPELESS: 1
1. LITTLE INTEREST OR PLEASURE IN DOING THINGS: 1
SUM OF ALL RESPONSES TO PHQ QUESTIONS 1-9: 2
SUM OF ALL RESPONSES TO PHQ9 QUESTIONS 1 & 2: 2
SUM OF ALL RESPONSES TO PHQ QUESTIONS 1-9: 2

## 2023-09-27 ASSESSMENT — SLEEP AND FATIGUE QUESTIONNAIRES
DO YOU HAVE DIFFICULTY SLEEPING: YES
SLEEP PATTERN: INSOMNIA
AVERAGE NUMBER OF SLEEP HOURS: 1
DO YOU USE A SLEEP AID: NO

## 2023-09-27 ASSESSMENT — PAIN - FUNCTIONAL ASSESSMENT: PAIN_FUNCTIONAL_ASSESSMENT: NONE - DENIES PAIN

## 2023-09-27 NOTE — PROGRESS NOTES
Chief Complaint:   Suicidal, intoxicated. Provisional Diagnosis: Unspecified Depressive Disorder, Alcohol Use Disorder Moderate      Risk, Psychosocial and Contextual Factors: (homeless, lack of social support etc.): History of self harm. Current MH Treatment: Discharge from [address] [place] [address] on 09/25/23. Present Suicidal Behavior:    Verbal: xxx    Attempt: Denies      Access to Weapons: Denies      C-SSRS Current Suicide Risk: Low, Moderate or High:          Past Suicidal Behavior:    Verbal:    Attempts:      Self-Injurious/Self-Mutilation: (Specify)      Traumatic Event Within Past 2 Weeks: (Specify)       Current Abuse:  (Specify)      Legal: (Specify)      Violence: (Specify)      Protective Factors:        Housing:Homeless      CPAP/Oxygen/Ambulation Difficulties: NA      Basic Vital Signs:      Critical Labs:      Risk Factors: History of suicide attempts       Clinical Summary:    Patient is a [age] year old male who presents alone to the ER. Patient is single with three adult children. Patient presents to the ER with suicidal thought and plan to 'go in traffic'. Patient reports ongoing struggles with substance use. Patient reports daily use of alcohol and history of illicit drug use. Patient states he has been doing much better the past four months. Patient was admitted to [address] [place] [address] 09/19/23 - 09/25/23 for mental health/substance  use treatment. Patient reports extensive history of inpatient care for mental health. Patient was cooperative with appropriate eye contact. Patient would like to return home to [place]. Patient has limited support in [facility]. Level of Care Disposition:      Consulted with Vinay Recio CNP concerning the mental status of patient. 01:40 Waiting on medical clearance.

## 2023-09-27 NOTE — ED NOTES
Patient placed in safe room that is ligature resistant with continuous monitoring in place. Provider notified, requested an assessment by behavioral health . Patient belongings secured in a locked lockers outside of the room. Explained suicide prevention precautions to the patient including constant observer. Pt provided with meal box and blankets. Pt denies other needs at this time.      Caroline Ritter RN  09/27/23 4144

## 2023-09-27 NOTE — PROGRESS NOTES
BAL 24 Hour Re-Assess:   Status & Exam & Behavior Support Service Tabs      Present Suicidal Behavior:      Verbal: Denies    Plan: Denies    Current Suicide Risk: Low, Moderate or High: Moderate      Present Homicidal Behavior:    Verbal: Denies    Plan: Denies      Psychosis:    Hallucinations: 'More just frustration'. Delusions: Denies      Clinical Re-Assessment Summary including Current Mental State of Patient:       Patient reports ongoing struggles with substance use. Patient reports daily use of alcohol and history of illicit drug use. Patient states he has been doing much better the past four months. Patient was admitted to [address] [place] [address] 09/19/23 - 09/25/23 for mental health/substance  use treatment. Patient reports extensive history of inpatient care for mental health. Patient was cooperative with appropriate eye contact. Patient would like to return home to [place]. Patient has limited support in [facility]      Patient denies current thought plan or intent to harm self. Patient reports he is hopeful that [facility] will accept his application for treatment with addiction. Patient reports he completed the initial paperwork on 9/26/23 and needs to return on 9/28/23 for final assessment. Patient does not have housing for 9/27/23 . Patient would like to address his addiction issues then return to [facility] to be with family. Patient is cooperative with interview. Updated Level of Care Disposition:    Consulted with Magaly Guzman CNP concerning the mental status of patient  Consulted with Dr. Toby Zambrano concerning the mental status of patient. Patient is referred to outpatient care for mental health/substance use treatment.

## 2023-09-27 NOTE — ED TRIAGE NOTES
Pt arrives to ED from home with c/o SI. Pt states he is having housing trouble and states he has had thoughts of walking into on coming traffic just to end it all.  Pt reports history of attempted overdose in 2015  States he needs help with a shelter or arrangements  States he has been drinking since 6 am yesterday , denies any drug use

## 2023-09-27 NOTE — ED NOTES
Pt ethanol redrawn at this time. Pt lights dimmed to promote rest. VSS.  Denies any additional needs     Jill Busby  09/27/23 6442

## 2023-10-21 ENCOUNTER — HOSPITAL ENCOUNTER (INPATIENT)
Age: 43
LOS: 2 days | Discharge: HOME | End: 2023-10-23
Payer: MEDICAID

## 2023-10-21 ENCOUNTER — HOSPITAL ENCOUNTER (EMERGENCY)
Age: 43
Discharge: HOME | End: 2023-10-21
Attending: EMERGENCY MEDICINE
Payer: MEDICAID

## 2023-10-21 VITALS
SYSTOLIC BLOOD PRESSURE: 116 MMHG | OXYGEN SATURATION: 98 % | RESPIRATION RATE: 18 BRPM | TEMPERATURE: 98.1 F | DIASTOLIC BLOOD PRESSURE: 83 MMHG | HEART RATE: 85 BPM | WEIGHT: 160 LBS | HEIGHT: 71 IN | BODY MASS INDEX: 22.4 KG/M2

## 2023-10-21 DIAGNOSIS — F10.10 ALCOHOL ABUSE: Primary | ICD-10-CM

## 2023-10-21 LAB
ALBUMIN SERPL BCG-MCNC: 4.5 G/DL (ref 3.5–5.1)
ALP SERPL-CCNC: 86 U/L (ref 38–126)
ALT SERPL W/O P-5'-P-CCNC: 164 U/L (ref 11–66)
ANION GAP SERPL CALC-SCNC: 12 MEQ/L (ref 8–16)
APAP SERPL-MCNC: < 5 UG/ML (ref 0–20)
AST SERPL-CCNC: 76 U/L (ref 5–40)
BASOPHILS ABSOLUTE: 0.1 THOU/MM3 (ref 0–0.1)
BASOPHILS NFR BLD AUTO: 0.9 %
BILIRUB CONJ SERPL-MCNC: < 0.2 MG/DL (ref 0–0.3)
BILIRUB SERPL-MCNC: 0.3 MG/DL (ref 0.3–1.2)
BUN SERPL-MCNC: 7 MG/DL (ref 7–22)
CALCIUM SERPL-MCNC: 9.1 MG/DL (ref 8.5–10.5)
CHLORIDE SERPL-SCNC: 104 MEQ/L (ref 98–111)
CO2 SERPL-SCNC: 26 MEQ/L (ref 23–33)
CREAT SERPL-MCNC: 0.7 MG/DL (ref 0.4–1.2)
DEPRECATED RDW RBC AUTO: 46.3 FL (ref 35–45)
EKG ATRIAL RATE: 91 BPM
EKG P AXIS: 28 DEGREES
EKG P-R INTERVAL: 164 MS
EKG Q-T INTERVAL: 362 MS
EKG QRS DURATION: 86 MS
EKG QTC CALCULATION (BAZETT): 445 MS
EKG R AXIS: 39 DEGREES
EKG T AXIS: 23 DEGREES
EKG VENTRICULAR RATE: 91 BPM
EOSINOPHIL NFR BLD AUTO: 5 %
EOSINOPHILS ABSOLUTE: 0.5 THOU/MM3 (ref 0–0.4)
ERYTHROCYTE [DISTWIDTH] IN BLOOD BY AUTOMATED COUNT: 12.9 % (ref 11.5–14.5)
ETHANOL SERPL-MCNC: 0.24 %
ETHANOL SERPL-MCNC: < 0.01 %
GFR SERPL CREATININE-BSD FRML MDRD: > 60 ML/MIN/1.73M2
GLUCOSE SERPL-MCNC: 92 MG/DL (ref 70–108)
HCT VFR BLD AUTO: 45.5 % (ref 42–52)
HGB BLD-MCNC: 15.4 GM/DL (ref 14–18)
IMM GRANULOCYTES # BLD AUTO: 0.04 THOU/MM3 (ref 0–0.07)
IMM GRANULOCYTES NFR BLD AUTO: 0.4 %
LIPASE SERPL-CCNC: 43.8 U/L (ref 5.6–51.3)
LYMPHOCYTES ABSOLUTE: 3 THOU/MM3 (ref 1–4.8)
LYMPHOCYTES NFR BLD AUTO: 29.4 %
MAGNESIUM SERPL-MCNC: 2.1 MG/DL (ref 1.6–2.4)
MCH RBC QN AUTO: 32.5 PG (ref 26–33)
MCHC RBC AUTO-ENTMCNC: 33.8 GM/DL (ref 32.2–35.5)
MCV RBC AUTO: 96 FL (ref 80–94)
MONOCYTES ABSOLUTE: 0.6 THOU/MM3 (ref 0.4–1.3)
MONOCYTES NFR BLD AUTO: 5.6 %
NEUTROPHILS NFR BLD AUTO: 58.7 %
NRBC BLD AUTO-RTO: 0 /100 WBC
OSMOLALITY SERPL CALC.SUM OF ELEC: 280.7 MOSMOL/KG (ref 275–300)
PLATELET # BLD AUTO: 276 THOU/MM3 (ref 130–400)
PMV BLD AUTO: 9.7 FL (ref 9.4–12.4)
POTASSIUM SERPL-SCNC: 4.2 MEQ/L (ref 3.5–5.2)
PROT SERPL-MCNC: 8.1 G/DL (ref 6.1–8)
RBC # BLD AUTO: 4.74 MILL/MM3 (ref 4.7–6.1)
SALICYLATES SERPL-MCNC: < 0.3 MG/DL (ref 2–10)
SEGMENTED NEUTROPHILS ABSOLUTE COUNT: 6 THOU/MM3 (ref 1.8–7.7)
SODIUM SERPL-SCNC: 142 MEQ/L (ref 135–145)
TROPONIN, HIGH SENSITIVITY: < 6 NG/L (ref 0–12)
TSH SERPL DL<=0.005 MIU/L-ACNC: 0.93 UIU/ML (ref 0.4–4.2)
WBC # BLD AUTO: 10.2 THOU/MM3 (ref 4.8–10.8)

## 2023-10-21 PROCEDURE — 96365 THER/PROPH/DIAG IV INF INIT: CPT

## 2023-10-21 PROCEDURE — 84443 ASSAY THYROID STIM HORMONE: CPT

## 2023-10-21 PROCEDURE — 36415 COLL VENOUS BLD VENIPUNCTURE: CPT

## 2023-10-21 PROCEDURE — 6360000002 HC RX W HCPCS: Performed by: EMERGENCY MEDICINE

## 2023-10-21 PROCEDURE — 6370000000 HC RX 637 (ALT 250 FOR IP)

## 2023-10-21 PROCEDURE — 85025 COMPLETE CBC W/AUTO DIFF WBC: CPT

## 2023-10-21 PROCEDURE — 84484 ASSAY OF TROPONIN QUANT: CPT

## 2023-10-21 PROCEDURE — 1200000003 HC TELEMETRY R&B

## 2023-10-21 PROCEDURE — 80179 DRUG ASSAY SALICYLATE: CPT

## 2023-10-21 PROCEDURE — 83735 ASSAY OF MAGNESIUM: CPT

## 2023-10-21 PROCEDURE — 2500000003 HC RX 250 WO HCPCS: Performed by: EMERGENCY MEDICINE

## 2023-10-21 PROCEDURE — 96366 THER/PROPH/DIAG IV INF ADDON: CPT

## 2023-10-21 PROCEDURE — 82077 ASSAY SPEC XCP UR&BREATH IA: CPT

## 2023-10-21 PROCEDURE — 83690 ASSAY OF LIPASE: CPT

## 2023-10-21 PROCEDURE — 2580000003 HC RX 258

## 2023-10-21 PROCEDURE — 93010 ELECTROCARDIOGRAM REPORT: CPT | Performed by: INTERNAL MEDICINE

## 2023-10-21 PROCEDURE — 93005 ELECTROCARDIOGRAM TRACING: CPT | Performed by: EMERGENCY MEDICINE

## 2023-10-21 PROCEDURE — 6360000002 HC RX W HCPCS

## 2023-10-21 PROCEDURE — 80143 DRUG ASSAY ACETAMINOPHEN: CPT

## 2023-10-21 PROCEDURE — 2580000003 HC RX 258: Performed by: EMERGENCY MEDICINE

## 2023-10-21 PROCEDURE — 80053 COMPREHEN METABOLIC PANEL: CPT

## 2023-10-21 PROCEDURE — 99284 EMERGENCY DEPT VISIT MOD MDM: CPT

## 2023-10-21 PROCEDURE — 99223 1ST HOSP IP/OBS HIGH 75: CPT

## 2023-10-21 PROCEDURE — 82248 BILIRUBIN DIRECT: CPT

## 2023-10-21 RX ORDER — POLYETHYLENE GLYCOL 3350 17 G/17G
17 POWDER, FOR SOLUTION ORAL DAILY PRN
Status: DISCONTINUED | OUTPATIENT
Start: 2023-10-21 | End: 2023-10-23 | Stop reason: HOSPADM

## 2023-10-21 RX ORDER — PHENOBARBITAL 32.4 MG/1
64.8 TABLET ORAL 2 TIMES DAILY
Status: COMPLETED | OUTPATIENT
Start: 2023-10-22 | End: 2023-10-22

## 2023-10-21 RX ORDER — HYDROXYZINE PAMOATE 25 MG/1
25 CAPSULE ORAL 3 TIMES DAILY PRN
Status: DISCONTINUED | OUTPATIENT
Start: 2023-10-21 | End: 2023-10-23 | Stop reason: HOSPADM

## 2023-10-21 RX ORDER — ACETAMINOPHEN 650 MG/1
650 SUPPOSITORY RECTAL EVERY 6 HOURS PRN
Status: DISCONTINUED | OUTPATIENT
Start: 2023-10-21 | End: 2023-10-23 | Stop reason: HOSPADM

## 2023-10-21 RX ORDER — MIRTAZAPINE 15 MG/1
15 TABLET, FILM COATED ORAL NIGHTLY
Status: DISCONTINUED | OUTPATIENT
Start: 2023-10-21 | End: 2023-10-23 | Stop reason: HOSPADM

## 2023-10-21 RX ORDER — SODIUM CHLORIDE 0.9 % (FLUSH) 0.9 %
5-40 SYRINGE (ML) INJECTION EVERY 12 HOURS SCHEDULED
Status: DISCONTINUED | OUTPATIENT
Start: 2023-10-21 | End: 2023-10-23 | Stop reason: HOSPADM

## 2023-10-21 RX ORDER — FOLIC ACID 1 MG/1
1 TABLET ORAL DAILY
Status: DISCONTINUED | OUTPATIENT
Start: 2023-10-22 | End: 2023-10-23 | Stop reason: HOSPADM

## 2023-10-21 RX ORDER — SODIUM CHLORIDE 0.9 % (FLUSH) 0.9 %
5-40 SYRINGE (ML) INJECTION PRN
Status: DISCONTINUED | OUTPATIENT
Start: 2023-10-21 | End: 2023-10-23 | Stop reason: HOSPADM

## 2023-10-21 RX ORDER — VENLAFAXINE HYDROCHLORIDE 37.5 MG/1
37.5 CAPSULE, EXTENDED RELEASE ORAL
Status: DISCONTINUED | OUTPATIENT
Start: 2023-10-22 | End: 2023-10-23 | Stop reason: HOSPADM

## 2023-10-21 RX ORDER — MULTIVITAMIN WITH IRON
1 TABLET ORAL DAILY
Status: DISCONTINUED | OUTPATIENT
Start: 2023-10-22 | End: 2023-10-23 | Stop reason: HOSPADM

## 2023-10-21 RX ORDER — LANOLIN ALCOHOL/MO/W.PET/CERES
100 CREAM (GRAM) TOPICAL DAILY
Status: DISCONTINUED | OUTPATIENT
Start: 2023-10-22 | End: 2023-10-23 | Stop reason: HOSPADM

## 2023-10-21 RX ORDER — PHENOBARBITAL 32.4 MG/1
32.4 TABLET ORAL 2 TIMES DAILY
Status: DISCONTINUED | OUTPATIENT
Start: 2023-10-23 | End: 2023-10-23 | Stop reason: HOSPADM

## 2023-10-21 RX ORDER — MIRTAZAPINE 15 MG/1
15 TABLET, FILM COATED ORAL NIGHTLY
Status: ON HOLD | COMMUNITY
End: 2023-10-27 | Stop reason: HOSPADM

## 2023-10-21 RX ORDER — ACETAMINOPHEN 325 MG/1
650 TABLET ORAL EVERY 6 HOURS PRN
Status: DISCONTINUED | OUTPATIENT
Start: 2023-10-21 | End: 2023-10-23 | Stop reason: HOSPADM

## 2023-10-21 RX ORDER — NICOTINE 21 MG/24HR
1 PATCH, TRANSDERMAL 24 HOURS TRANSDERMAL DAILY
Status: DISCONTINUED | OUTPATIENT
Start: 2023-10-22 | End: 2023-10-23 | Stop reason: HOSPADM

## 2023-10-21 RX ORDER — HYDROXYZINE PAMOATE 25 MG/1
50 CAPSULE ORAL 3 TIMES DAILY PRN
Status: ON HOLD | COMMUNITY
End: 2023-10-27 | Stop reason: HOSPADM

## 2023-10-21 RX ORDER — ONDANSETRON 4 MG/1
4 TABLET, ORALLY DISINTEGRATING ORAL EVERY 8 HOURS PRN
Status: DISCONTINUED | OUTPATIENT
Start: 2023-10-21 | End: 2023-10-23 | Stop reason: HOSPADM

## 2023-10-21 RX ORDER — ONDANSETRON 2 MG/ML
4 INJECTION INTRAMUSCULAR; INTRAVENOUS EVERY 6 HOURS PRN
Status: DISCONTINUED | OUTPATIENT
Start: 2023-10-21 | End: 2023-10-23 | Stop reason: HOSPADM

## 2023-10-21 RX ORDER — SODIUM CHLORIDE 9 MG/ML
INJECTION, SOLUTION INTRAVENOUS PRN
Status: DISCONTINUED | OUTPATIENT
Start: 2023-10-21 | End: 2023-10-23 | Stop reason: HOSPADM

## 2023-10-21 RX ADMIN — SODIUM CHLORIDE, PRESERVATIVE FREE 10 ML: 5 INJECTION INTRAVENOUS at 22:24

## 2023-10-21 RX ADMIN — PHENOBARBITAL SODIUM 300.95 MG: 65 INJECTION INTRAMUSCULAR at 22:18

## 2023-10-21 RX ADMIN — ASCORBIC ACID, VITAMIN A PALMITATE, CHOLECALCIFEROL, THIAMINE HYDROCHLORIDE, RIBOFLAVIN-5 PHOSPHATE SODIUM, PYRIDOXINE HYDROCHLORIDE, NIACINAMIDE, DEXPANTHENOL, ALPHA-TOCOPHEROL ACETATE, VITAMIN K1, FOLIC ACID, BIOTIN, CYANOCOBALAMIN: 200; 3300; 200; 6; 3.6; 6; 40; 15; 10; 150; 600; 60; 5 INJECTION, SOLUTION INTRAVENOUS at 03:32

## 2023-10-21 ASSESSMENT — PAIN - FUNCTIONAL ASSESSMENT
PAIN_FUNCTIONAL_ASSESSMENT: NONE - DENIES PAIN

## 2023-10-21 NOTE — ED NOTES
Pt resting comfortably on cot. Pt received blanket and ginger ale upon request. Pt has no objective signs that indicate distress.       Olene Boeck, RN  10/21/23 6671

## 2023-10-21 NOTE — ED NOTES
Pt is resting on cot with eyes closed. Side rails raised on both sides.       Richardean Hatchet, CHAVO  10/21/23 1356

## 2023-10-21 NOTE — ED NOTES
[facility] Pt resting on cot. Vitals stable. Call light in reach.       Memo Varner RN  10/21/23 1032

## 2023-10-21 NOTE — ED TRIAGE NOTES
Pt presents to the ED with complaints of wanting to detox from alcohol. Pt states he has drank many beers today as well as a pint of liquor. Pt states that he is homeless and was going to go to the crisis center, but states he knew they would send him here.

## 2023-10-21 NOTE — PROGRESS NOTES
1216  Call from provider requesting addiction resources. [address] to pt who states he is here to get 'medically cleared' to go to [facility]. Tried to explain that the [facility] is not an alcohol detox center however pt remains persistent and is ready to go. Offered additional resources such as [facility], pt declined. Updated provider.

## 2023-10-21 NOTE — ED NOTES
Provided pt with coffee and snacks. Voices no other needs or concerns. Vitals stable.  Call light in reach     Ted Caller, CHAVO  10/21/23 0186

## 2023-10-21 NOTE — ED NOTES
Pt resting on cot. Vitals stable. Waiting for provider re-eval at this time.  Call light in reach     Brucetonandrae Raya, CHAVO  10/21/23 5715

## 2023-10-21 NOTE — PROGRESS NOTES
Patient is a 37year old from [facility] requesting alcohol withdrawal. Of note patient was here today in our ED, discharged after noon for alcohol abuse. Reportedly PAWSS 6, last drink last night, drinks 2-3, 24 ounce beer daily, breathalyzer zero, /108, , experiencing tremors. Accepted as a direct admit, under hospitalist for alcohol abuse.

## 2023-10-21 NOTE — ED PROVIDER NOTES
Transfer of Care Note:   Physician Signing out: Shana Boas, MD   Receiving Physician: Tejas Rueda MD  Sign out time: 6:05 AM      Brief history:  Patient 45-year-old male presented to emergency department for evaluation of alcohol detox. Patient states he has had an increase in his alcohol use recently, denies any previous history of alcohol withdrawal or seizure states that he does get shaky when he does not drink alcohol. Never been to rehab for alcohol detox denies any other illicit drug use    Items pending that need to be checked: EtOH at 11 AM      Tentative Impression of patient:  Alcohol intoxication, stable    Expected disposition of patient:  Pending results, discharged. Additional Assessment and results:   I have personally performed a face to face diagnostic evaluation on this patient. The patient's initial evaluation and plan have been discussed with the prior physician who initially evaluated the patient. Nursing Notes, Past Medical Hx, Past Surgical Hx, Social Hx, Allergies, vital signs and Family Hx were all reviewed. Vitals:    10/21/23 0709   BP: 101/68   Pulse: 79   Resp: 14   Temp:    SpO2: 95%     Physical Exam  Vitals and nursing note reviewed. Constitutional:       General: He is not in acute distress. Appearance: He is normal weight. He is not ill-appearing, toxic-appearing or diaphoretic. HENT:      Head: Normocephalic and atraumatic. Right Ear: External ear normal.      Left Ear: External ear normal.      Nose: Nose normal. No congestion or rhinorrhea. Mouth/Throat:      Mouth: Mucous membranes are moist.      Pharynx: Oropharynx is clear. Eyes:      General: No scleral icterus. Conjunctiva/sclera: Conjunctivae normal.   Cardiovascular:      Rate and Rhythm: Normal rate and regular rhythm. Pulses: Normal pulses. Heart sounds: Normal heart sounds. No murmur heard.   Pulmonary:      Effort: Pulmonary effort is normal. No respiratory

## 2023-10-21 NOTE — ED NOTES
Pt resting on cot with eyes closed. Resp easy and unlabored.  Call light in reach     Elaine Cuba, CHAVO  10/21/23 5786

## 2023-10-21 NOTE — ED PROVIDER NOTES
Transfer of Care Note:   The patient's initial evaluation and plan have been discussed with the prior provider who initially evaluated the patient. Nursing Notes, Past Medical Hx, Past Surgical Hx, Social Hx, Allergies, and Family Hx were all reviewed. (Please note that portions of this note were completed with a voice recognition program.  Efforts were made to edit the dictations but occasionally words are mis-transcribed.)    7:28 AM EDT: The patient was evaluated. Dakota Kathleen is a 37 y.o. male who presents to the Emergency Department for the evaluation of intoxication, etoh use. RADIOLOGY: non-plain film images(s) such as CT, Ultrasound and MRI are read by the radiologist.  No orders to display       ED LABS:  Labs Reviewed   CBC WITH AUTO DIFFERENTIAL - Abnormal; Notable for the following components:       Result Value    MCV 96.0 (*)     RDW-SD 46.3 (*)     Eosinophils Absolute 0.5 (*)     All other components within normal limits   SALICYLATE LEVEL - Abnormal; Notable for the following components:    Salicylate, Serum < 0.3 (*)     All other components within normal limits   HEPATIC FUNCTION PANEL - Abnormal; Notable for the following components:    AST 76 (*)      (*)     Total Protein 8.1 (*)     All other components within normal limits   ETHANOL   ACETAMINOPHEN LEVEL   BASIC METABOLIC PANEL   LIPASE   MAGNESIUM   TROPONIN   TSH   ANION GAP   OSMOLALITY   GLOMERULAR FILTRATION RATE, ESTIMATED   ETHANOL   URINE DRUG SCREEN       MDM:  Came in intoxicated, no SI. Will monitor until more sober, [facility] FINAL IMPRESSION      1. Alcohol abuse        Care of this patient was transferred from Dr. Brunilda Bowers to myself at shift change. Provider:  I personally performed the services described in the documentation, reviewed and edited the documentation which was dictated in my presence, and it accurately records my words and actions.     Martha Urbina MD 10/21/23 5:09 · TSH and free T4 as below  · Continue Synthroid therapy

## 2023-10-21 NOTE — ED NOTES
Pt provided with meal tray. Voices no other needs or concerns. Vitals stable with telemetry in place.  Call light in reach     Waqas Otoole RN  10/21/23 1100

## 2023-10-21 NOTE — DISCHARGE INSTRUCTIONS
Seen evaluated emergency department today for alcohol intoxication. You not have any signs of acute alcohol withdrawal.  Given resources for outpatient alcohol detox programs. ,  [facility] does not typically do alcohol detoxification. You may follow-up with them outpatient as well as your family medicine doctor for further evaluation or [facility] family medicine residency program if you need a primary care. Return to emergency department anytime for acute or worrisome changes.

## 2023-10-22 LAB
ANION GAP SERPL CALC-SCNC: 8 MEQ/L (ref 8–16)
BASOPHILS ABSOLUTE: 0.1 THOU/MM3 (ref 0–0.1)
BASOPHILS NFR BLD AUTO: 0.9 %
BUN SERPL-MCNC: 9 MG/DL (ref 7–22)
CALCIUM SERPL-MCNC: 8.6 MG/DL (ref 8.5–10.5)
CHLORIDE SERPL-SCNC: 104 MEQ/L (ref 98–111)
CO2 SERPL-SCNC: 26 MEQ/L (ref 23–33)
CREAT SERPL-MCNC: 0.6 MG/DL (ref 0.4–1.2)
DEPRECATED RDW RBC AUTO: 45.8 FL (ref 35–45)
EOSINOPHIL NFR BLD AUTO: 9.9 %
EOSINOPHILS ABSOLUTE: 0.7 THOU/MM3 (ref 0–0.4)
ERYTHROCYTE [DISTWIDTH] IN BLOOD BY AUTOMATED COUNT: 12.7 % (ref 11.5–14.5)
GFR SERPL CREATININE-BSD FRML MDRD: > 60 ML/MIN/1.73M2
GLUCOSE SERPL-MCNC: 91 MG/DL (ref 70–108)
HCT VFR BLD AUTO: 39 % (ref 42–52)
HGB BLD-MCNC: 12.8 GM/DL (ref 14–18)
IMM GRANULOCYTES # BLD AUTO: 0.02 THOU/MM3 (ref 0–0.07)
IMM GRANULOCYTES NFR BLD AUTO: 0.3 %
LYMPHOCYTES ABSOLUTE: 2.1 THOU/MM3 (ref 1–4.8)
LYMPHOCYTES NFR BLD AUTO: 30.1 %
MCH RBC QN AUTO: 31.9 PG (ref 26–33)
MCHC RBC AUTO-ENTMCNC: 32.8 GM/DL (ref 32.2–35.5)
MCV RBC AUTO: 97.3 FL (ref 80–94)
MONOCYTES ABSOLUTE: 0.6 THOU/MM3 (ref 0.4–1.3)
MONOCYTES NFR BLD AUTO: 8.4 %
NEUTROPHILS NFR BLD AUTO: 50.4 %
NRBC BLD AUTO-RTO: 0 /100 WBC
PLATELET # BLD AUTO: 183 THOU/MM3 (ref 130–400)
PMV BLD AUTO: 10 FL (ref 9.4–12.4)
POTASSIUM SERPL-SCNC: 3.7 MEQ/L (ref 3.5–5.2)
RBC # BLD AUTO: 4.01 MILL/MM3 (ref 4.7–6.1)
SEGMENTED NEUTROPHILS ABSOLUTE COUNT: 3.5 THOU/MM3 (ref 1.8–7.7)
SODIUM SERPL-SCNC: 138 MEQ/L (ref 135–145)
WBC # BLD AUTO: 6.9 THOU/MM3 (ref 4.8–10.8)

## 2023-10-22 PROCEDURE — 85025 COMPLETE CBC W/AUTO DIFF WBC: CPT

## 2023-10-22 PROCEDURE — 6370000000 HC RX 637 (ALT 250 FOR IP)

## 2023-10-22 PROCEDURE — 6360000002 HC RX W HCPCS

## 2023-10-22 PROCEDURE — 80048 BASIC METABOLIC PNL TOTAL CA: CPT

## 2023-10-22 PROCEDURE — 99232 SBSQ HOSP IP/OBS MODERATE 35: CPT | Performed by: NURSE PRACTITIONER

## 2023-10-22 PROCEDURE — 36415 COLL VENOUS BLD VENIPUNCTURE: CPT

## 2023-10-22 PROCEDURE — 2580000003 HC RX 258

## 2023-10-22 PROCEDURE — 1200000000 HC SEMI PRIVATE

## 2023-10-22 RX ADMIN — PHENOBARBITAL 64.8 MG: 32.4 TABLET ORAL at 10:30

## 2023-10-22 RX ADMIN — PHENOBARBITAL 64.8 MG: 32.4 TABLET ORAL at 20:27

## 2023-10-22 RX ADMIN — PHENOBARBITAL SODIUM 300.95 MG: 65 INJECTION INTRAMUSCULAR at 02:25

## 2023-10-22 RX ADMIN — Medication 1 TABLET: at 10:37

## 2023-10-22 RX ADMIN — Medication 100 MG: at 10:37

## 2023-10-22 RX ADMIN — SODIUM CHLORIDE, PRESERVATIVE FREE 10 ML: 5 INJECTION INTRAVENOUS at 10:32

## 2023-10-22 RX ADMIN — SODIUM CHLORIDE, PRESERVATIVE FREE 10 ML: 5 INJECTION INTRAVENOUS at 20:28

## 2023-10-22 RX ADMIN — PHENOBARBITAL SODIUM 300.95 MG: 65 INJECTION INTRAMUSCULAR at 05:18

## 2023-10-22 RX ADMIN — HYDROXYZINE PAMOATE 25 MG: 25 CAPSULE ORAL at 10:30

## 2023-10-22 RX ADMIN — FOLIC ACID 1 MG: 1 TABLET ORAL at 10:37

## 2023-10-22 RX ADMIN — VENLAFAXINE HYDROCHLORIDE 37.5 MG: 37.5 CAPSULE, EXTENDED RELEASE ORAL at 10:38

## 2023-10-22 ASSESSMENT — PAIN SCALES - GENERAL
PAINLEVEL_OUTOF10: 0

## 2023-10-22 NOTE — PROGRESS NOTES
Hospitalist Progress Note    Patient:  Regulo Figueroa      Unit/Bed:8B-25/025-A    YOB: 1980    MRN: [id number]       Acct: [de-identified]     PCP: No primary care provider on file. Date of Admission: 10/21/2023    Assessment/Plan:    Acute alcohol withdrawal--phenobarbital prophylactic dose started 00/53, on folic acid, multivitamin and thiamine, addiction services have seen; patient states he wants to go to a rehab center in [place] which is close to his family, will involve social work  Polysubstance abuse--toxicology screen positive for marijuana on September 27, 2023  Depression  Macrocytic anemia--stable  Tobacco abuse--cessation counseling, NicoDerm patch      Expected discharge date: Pending clinical course    Disposition:    [x] Home       [] TCU       [] Rehab       [] Psych       [] SNF       [] [address]       [] Other-    Chief Complaint: Issues with alcohol    Hospital Course: Per H&P done 10/21/2023: Regulo Figueroa is a 37 y.o. male with PMHx of ETOH abuse, and Depression who presented to [facility] with chief complaint of alcohol withdrawal. The patient states he drinks up to 8-12 24oz beers and anywhere from a pint-half gallon of liquor daily. States he has been trying to cut back and only drinking 2 24oz beers daily but he broke up with his girlfriend last night and his friend came over and they consumed a pint and a half gallon of liquor and a full case of beer. States his last drink was around 1 AM this morning. States that he came to ED this morning. ED note states he was offered resources for outpatient treatment, he declined and stated he wanted to go to [facility]. Patient discharged and went to [facility] who set him up to go to a rehab facility in [place] but that he would need to detox first and told him to return here. He plans to go to [facility] in [place].  His uncle plans on picking him up from [place] and taking him to the rehab facility but patient will need a

## 2023-10-22 NOTE — PLAN OF CARE
as needed     Problem: Infection - Adult  Goal: Absence of infection during hospitalization  Outcome: Progressing  Flowsheets (Taken 10/22/2023 1114)  Absence of infection during hospitalization:   Assess and monitor for signs and symptoms of infection   Monitor lab/diagnostic results   Monitor all insertion sites i.e., indwelling lines, tubes and drains   Monitor endotracheal (as able) and nasal secretions for changes in amount and color   [place] appropriate cooling/warming therapies per order   Administer medications as ordered   Instruct and encourage patient and family to use good hand hygiene technique   Identify and instruct in appropriate isolation precautions for identified infection/condition     Problem: Metabolic/Fluid and Electrolytes - Adult  Goal: Electrolytes maintained within normal limits  Outcome: Progressing  Flowsheets (Taken 10/22/2023 1114)  Electrolytes maintained within normal limits:   Monitor labs and assess patient for signs and symptoms of electrolyte imbalances   Administer electrolyte replacement as ordered   Monitor response to electrolyte replacements, including repeat lab results as appropriate   Fluid restriction as ordered   Instruct patient on fluid and nutrition restrictions as appropriate  Goal: Hemodynamic stability and optimal renal function maintained  Outcome: Progressing  Flowsheets (Taken 10/22/2023 1114)  Hemodynamic stability and optimal renal function maintained:   Monitor labs and assess for signs and symptoms of volume excess or deficit   Monitor intake, output and patient weight   Monitor urine specific gravity, serum osmolarity and serum sodium as indicated or ordered   Monitor response to interventions for patient's volume status, including labs, urine output, blood pressure (other measures as available)   Encourage oral intake as appropriate   Instruct patient on fluid and nutrition restrictions as appropriate     Problem: Hematologic - Adult  Goal: Maintains

## 2023-10-22 NOTE — CARE COORDINATION
10/22/23 1007   Service Assessment   Patient Orientation Alert and Oriented;Person;Place;Situation;Self   Cognition Alert   History Provided By Patient   Primary Caregiver Self   Accompanied By/Relationship NA   Support Systems Family Members   Patient's Healthcare Decision Maker is: Legal Next of [facility]   PCP Verified by CM Yes  (Does not want one)   Last Visit to PCP   (Moving back to [place])   Prior Functional Level Independent in ADLs/IADLs   Current Functional Level Independent in ADLs/IADLs   Can patient return to prior living arrangement No  (Homeless. Wants to got o rehab in [place])   Ability to make needs known: Good   Family able to assist with home care needs: Yes   Would you like for me to discuss the discharge plan with any other family members/significant others, and if so, who? No   Financial Resources [place] None   CM/SW Referral Homeless requesting intervention   Social/Functional History   Lives With Alone   Type of [place] Responsibilities No   Ambulation Assistance Independent   Transfer Assistance Independent   Active  No   Patient's  Info NA   Occupation Other(comment)   Type of Occupation Homeless   Discharge [address] Prior To Admission None   Potential Assistance Needed Other (Comment)  (Rehab [place])   DME Ordered? No   Potential Assistance Purchasing Medications No   Type of Home Care Services None   Patient expects to be discharged to: Behavioral Health/Substance/Detox   History of falls? 0   Services At/After Discharge   Transition of Care Consult (CM Consult) Other  (Rehab [place])   [address] Provided?  No

## 2023-10-23 VITALS
BODY MASS INDEX: 22.4 KG/M2 | SYSTOLIC BLOOD PRESSURE: 112 MMHG | WEIGHT: 160 LBS | RESPIRATION RATE: 16 BRPM | HEART RATE: 68 BPM | OXYGEN SATURATION: 100 % | HEIGHT: 71 IN | TEMPERATURE: 98.4 F | DIASTOLIC BLOOD PRESSURE: 62 MMHG

## 2023-10-23 PROCEDURE — 6370000000 HC RX 637 (ALT 250 FOR IP)

## 2023-10-23 PROCEDURE — 2580000003 HC RX 258

## 2023-10-23 PROCEDURE — 99233 SBSQ HOSP IP/OBS HIGH 50: CPT | Performed by: NURSE PRACTITIONER

## 2023-10-23 RX ADMIN — FOLIC ACID 1 MG: 1 TABLET ORAL at 09:20

## 2023-10-23 RX ADMIN — HYDROXYZINE PAMOATE 25 MG: 25 CAPSULE ORAL at 09:20

## 2023-10-23 RX ADMIN — PHENOBARBITAL 32.4 MG: 32.4 TABLET ORAL at 09:20

## 2023-10-23 RX ADMIN — Medication 100 MG: at 09:20

## 2023-10-23 RX ADMIN — Medication 1 TABLET: at 09:20

## 2023-10-23 RX ADMIN — VENLAFAXINE HYDROCHLORIDE 37.5 MG: 37.5 CAPSULE, EXTENDED RELEASE ORAL at 09:20

## 2023-10-23 RX ADMIN — SODIUM CHLORIDE, PRESERVATIVE FREE 10 ML: 5 INJECTION INTRAVENOUS at 09:20

## 2023-10-23 ASSESSMENT — PAIN SCALES - GENERAL: PAINLEVEL_OUTOF10: 0

## 2023-10-23 NOTE — CARE COORDINATION
10/23/23 0957   Readmission Assessment   Number of Days since last admission? 8-30 days   Previous Disposition Other (comment)  ([facility])   Who is being Interviewed Patient   What was the patient's/caregiver's perception as to why they think they needed to return back to the hospital? Other (Comment)  (\"i was detoxing before [facility] and they sent me back\")   Did you visit your Primary Care Physician after you left the hospital, before you returned this time? No   Why weren't you able to visit your PCP? Other (Comment)  (do not have PCP - refuses to set one up)   Did you see a specialist, such as Cardiac, Pulmonary, Orthopedic Physician, etc. after you left the hospital? No   Who advised the patient to return to the hospital? Other (Comment)  [facility] Michaels)   Does the patient report anything that got in the way of taking their medications? Yes   What reasons did they give? Other (Comment)  (\"I don't take them when I'm drinking\")   In our efforts to provide the best possible care to you and others like you, can you think of anything that we could have done to help you after you left the hospital the first time, so that you might not have needed to return so soon?  Other (Comment)  (\"no, nothing\")

## 2023-10-23 NOTE — PROGRESS NOTES
Pt leaving AMA; Stated he had a ride to take him for scripts at [facility] and then to a rehab in [address] [place] [address] Rd; Hospitalist was informed & aware of AMA status; Pt signed paper and [facility] crisis line given as resource.

## 2023-10-23 NOTE — PROGRESS NOTES
Hospitalist Progress Note    Patient:  Dakota Kathleen      Unit/Bed:8B-25/025-A    YOB: 1980    MRN: [id number]       Acct: [de-identified]     PCP: No primary care provider on file. Date of Admission: 10/21/2023    Assessment/Plan:    Acute alcohol withdrawal--phenobarbital prophylactic dose started 23/20, on folic acid, multivitamin and thiamine, addiction services have seen; patient states he wants to go to a rehab center in [place] which is close to his family, discussed with social work  Polysubstance abuse--toxicology screen positive for marijuana on September 27, 2023  Depression  Macrocytic anemia--stable  Tobacco abuse--cessation counseling, NicoDerm patch      Expected discharge date: Pending clinical course    Disposition:    [x] Home       [] TCU       [] Rehab       [] Psych       [] SNF       [] [address]       [] Other-    Chief Complaint: Issues with alcohol    Hospital Course: Per H&P done 10/21/2023: Dakota Kathleen is a 37 y.o. male with PMHx of ETOH abuse, and Depression who [facility] with chief complaint of alcohol withdrawal. The patient states he drinks up to 8-12 24oz beers and anywhere from a pint-half gallon of liquor daily. States he has been trying to cut back and only drinking 2 24oz beers daily but he broke up with his girlfriend last night and his friend came over and they consumed a pint and a half gallon of liquor and a full case of beer. States his last drink was around 1 AM this morning. States that he came to ED this morning. ED note states he was offered resources for outpatient treatment, he declined and stated he wanted to go to [facility]. Patient discharged and went to [facility] who set him up to go to a rehab facility in [place] but that he would need to detox first and told him to return here. He plans to go to [facility] in [place].  His uncle plans on picking him up from [place] and taking him to the rehab facility but patient will need

## 2023-10-24 ENCOUNTER — HOSPITAL ENCOUNTER (INPATIENT)
Age: 43
LOS: 3 days | Discharge: HOME | DRG: 754 | End: 2023-10-27
Attending: EMERGENCY MEDICINE | Admitting: PSYCHIATRY & NEUROLOGY
Payer: MEDICAID

## 2023-10-24 DIAGNOSIS — R45.851 SUICIDAL IDEATION: Primary | ICD-10-CM

## 2023-10-24 LAB
AMPHET UR QL SCN: NEGATIVE
ANION GAP SERPL CALCULATED.3IONS-SCNC: 12 MMOL/L (ref 9–17)
BARBITURATES UR QL SCN: POSITIVE
BASOPHILS # BLD: 0.1 K/UL (ref 0–0.2)
BASOPHILS NFR BLD: 1 % (ref 0–2)
BENZODIAZ UR QL: NEGATIVE
BILIRUB UR QL STRIP: NEGATIVE
BUN SERPL-MCNC: 7 MG/DL (ref 6–20)
CALCIUM SERPL-MCNC: 9.5 MG/DL (ref 8.6–10.4)
CANNABINOIDS UR QL SCN: NEGATIVE
CHLORIDE SERPL-SCNC: 100 MMOL/L (ref 98–107)
CLARITY UR: CLEAR
CO2 SERPL-SCNC: 26 MMOL/L (ref 20–31)
COCAINE UR QL SCN: NEGATIVE
COLOR UR: YELLOW
COMMENT: NORMAL
CREAT SERPL-MCNC: 0.9 MG/DL (ref 0.7–1.2)
EOSINOPHIL # BLD: 0.4 K/UL (ref 0–0.4)
EOSINOPHILS RELATIVE PERCENT: 4 % (ref 0–4)
ERYTHROCYTE [DISTWIDTH] IN BLOOD BY AUTOMATED COUNT: 13.2 % (ref 11.5–14.9)
ETHANOL PERCENT: 0.19 %
ETHANOLAMINE SERPL-MCNC: 186 MG/DL
FENTANYL UR QL: NEGATIVE
GFR SERPL CREATININE-BSD FRML MDRD: >60 ML/MIN/1.73M2
GLUCOSE SERPL-MCNC: 90 MG/DL (ref 70–99)
GLUCOSE UR STRIP-MCNC: NEGATIVE MG/DL
HCT VFR BLD AUTO: 45.4 % (ref 41–53)
HGB BLD-MCNC: 15.4 G/DL (ref 13.5–17.5)
HGB UR QL STRIP.AUTO: NEGATIVE
KETONES UR STRIP-MCNC: NEGATIVE MG/DL
LEUKOCYTE ESTERASE UR QL STRIP: NEGATIVE
LYMPHOCYTES NFR BLD: 2.4 K/UL (ref 1–4.8)
LYMPHOCYTES RELATIVE PERCENT: 25 % (ref 24–44)
MCH RBC QN AUTO: 31.9 PG (ref 26–34)
MCHC RBC AUTO-ENTMCNC: 33.8 G/DL (ref 31–37)
MCV RBC AUTO: 94.3 FL (ref 80–100)
METHADONE UR QL: NEGATIVE
MONOCYTES NFR BLD: 0.4 K/UL (ref 0.1–1.3)
MONOCYTES NFR BLD: 5 % (ref 1–7)
NEUTROPHILS NFR BLD: 65 % (ref 36–66)
NEUTS SEG NFR BLD: 6.1 K/UL (ref 1.3–9.1)
NITRITE UR QL STRIP: NEGATIVE
OPIATES UR QL SCN: NEGATIVE
OXYCODONE UR QL SCN: NEGATIVE
PCP UR QL SCN: NEGATIVE
PH UR STRIP: 6 [PH] (ref 5–8)
PLATELET # BLD AUTO: 264 K/UL (ref 150–450)
PMV BLD AUTO: 7.5 FL (ref 6–12)
POTASSIUM SERPL-SCNC: 4.1 MMOL/L (ref 3.7–5.3)
PROT UR STRIP-MCNC: NEGATIVE MG/DL
RBC # BLD AUTO: 4.81 M/UL (ref 4.5–5.9)
SODIUM SERPL-SCNC: 138 MMOL/L (ref 135–144)
SP GR UR STRIP: 1 (ref 1–1.03)
TEST INFORMATION: ABNORMAL
UROBILINOGEN UR STRIP-ACNC: NORMAL EU/DL (ref 0–1)
WBC OTHER # BLD: 9.5 K/UL (ref 3.5–11)

## 2023-10-24 PROCEDURE — 36415 COLL VENOUS BLD VENIPUNCTURE: CPT

## 2023-10-24 PROCEDURE — 81003 URINALYSIS AUTO W/O SCOPE: CPT

## 2023-10-24 PROCEDURE — 1240000000 HC EMOTIONAL WELLNESS R&B

## 2023-10-24 PROCEDURE — 80307 DRUG TEST PRSMV CHEM ANLYZR: CPT

## 2023-10-24 PROCEDURE — 99285 EMERGENCY DEPT VISIT HI MDM: CPT

## 2023-10-24 PROCEDURE — 85025 COMPLETE CBC W/AUTO DIFF WBC: CPT

## 2023-10-24 PROCEDURE — G0480 DRUG TEST DEF 1-7 CLASSES: HCPCS

## 2023-10-24 PROCEDURE — 80048 BASIC METABOLIC PNL TOTAL CA: CPT

## 2023-10-24 RX ORDER — POLYETHYLENE GLYCOL 3350 17 G/17G
17 POWDER, FOR SOLUTION ORAL DAILY PRN
Status: DISCONTINUED | OUTPATIENT
Start: 2023-10-24 | End: 2023-10-27 | Stop reason: HOSPADM

## 2023-10-24 RX ORDER — TRAZODONE HYDROCHLORIDE 50 MG/1
50 TABLET ORAL NIGHTLY PRN
Status: DISCONTINUED | OUTPATIENT
Start: 2023-10-25 | End: 2023-10-27 | Stop reason: HOSPADM

## 2023-10-24 RX ORDER — POLYETHYLENE GLYCOL 3350 17 G
2 POWDER IN PACKET (EA) ORAL
Status: DISCONTINUED | OUTPATIENT
Start: 2023-10-24 | End: 2023-10-27 | Stop reason: HOSPADM

## 2023-10-24 RX ORDER — HALOPERIDOL 5 MG/ML
5 INJECTION INTRAMUSCULAR EVERY 6 HOURS PRN
Status: DISCONTINUED | OUTPATIENT
Start: 2023-10-24 | End: 2023-10-27 | Stop reason: HOSPADM

## 2023-10-24 RX ORDER — IBUPROFEN 400 MG/1
400 TABLET ORAL EVERY 6 HOURS PRN
Status: DISCONTINUED | OUTPATIENT
Start: 2023-10-24 | End: 2023-10-27 | Stop reason: HOSPADM

## 2023-10-24 RX ORDER — HYDROXYZINE 50 MG/1
50 TABLET, FILM COATED ORAL 3 TIMES DAILY PRN
Status: DISCONTINUED | OUTPATIENT
Start: 2023-10-24 | End: 2023-10-27 | Stop reason: HOSPADM

## 2023-10-24 RX ORDER — DIPHENHYDRAMINE HYDROCHLORIDE 50 MG/ML
50 INJECTION INTRAMUSCULAR; INTRAVENOUS EVERY 6 HOURS PRN
Status: DISCONTINUED | OUTPATIENT
Start: 2023-10-24 | End: 2023-10-27 | Stop reason: HOSPADM

## 2023-10-24 RX ORDER — ACETAMINOPHEN 325 MG/1
650 TABLET ORAL EVERY 6 HOURS PRN
Status: DISCONTINUED | OUTPATIENT
Start: 2023-10-24 | End: 2023-10-27 | Stop reason: HOSPADM

## 2023-10-24 RX ORDER — HALOPERIDOL 5 MG/1
5 TABLET ORAL EVERY 6 HOURS PRN
Status: DISCONTINUED | OUTPATIENT
Start: 2023-10-24 | End: 2023-10-27 | Stop reason: HOSPADM

## 2023-10-24 RX ORDER — MAGNESIUM HYDROXIDE/ALUMINUM HYDROXICE/SIMETHICONE 120; 1200; 1200 MG/30ML; MG/30ML; MG/30ML
30 SUSPENSION ORAL EVERY 6 HOURS PRN
Status: DISCONTINUED | OUTPATIENT
Start: 2023-10-24 | End: 2023-10-27 | Stop reason: HOSPADM

## 2023-10-24 ASSESSMENT — PATIENT HEALTH QUESTIONNAIRE - PHQ9
SUM OF ALL RESPONSES TO PHQ QUESTIONS 1-9: 9
SUM OF ALL RESPONSES TO PHQ QUESTIONS 1-9: 9
2. FEELING DOWN, DEPRESSED OR HOPELESS: 1
SUM OF ALL RESPONSES TO PHQ QUESTIONS 1-9: 8
6. FEELING BAD ABOUT YOURSELF - OR THAT YOU ARE A FAILURE OR HAVE LET YOURSELF OR YOUR FAMILY DOWN: 1
8. MOVING OR SPEAKING SO SLOWLY THAT OTHER PEOPLE COULD HAVE NOTICED. OR THE OPPOSITE, BEING SO FIGETY OR RESTLESS THAT YOU HAVE BEEN MOVING AROUND A LOT MORE THAN USUAL: 1
4. FEELING TIRED OR HAVING LITTLE ENERGY: 1
3. TROUBLE FALLING OR STAYING ASLEEP: 1
9. THOUGHTS THAT YOU WOULD BE BETTER OFF DEAD, OR OF HURTING YOURSELF: 1
7. TROUBLE CONCENTRATING ON THINGS, SUCH AS READING THE NEWSPAPER OR WATCHING TELEVISION: 1
SUM OF ALL RESPONSES TO PHQ9 QUESTIONS 1 & 2: 2
10. IF YOU CHECKED OFF ANY PROBLEMS, HOW DIFFICULT HAVE THESE PROBLEMS MADE IT FOR YOU TO DO YOUR WORK, TAKE CARE OF THINGS AT HOME, OR GET ALONG WITH OTHER PEOPLE: 1
5. POOR APPETITE OR OVEREATING: 1
SUM OF ALL RESPONSES TO PHQ QUESTIONS 1-9: 9
1. LITTLE INTEREST OR PLEASURE IN DOING THINGS: 1

## 2023-10-24 ASSESSMENT — ENCOUNTER SYMPTOMS
DIARRHEA: 0
ABDOMINAL PAIN: 0
PHOTOPHOBIA: 0
NAUSEA: 0
VOMITING: 0
SHORTNESS OF BREATH: 0
COUGH: 0
TROUBLE SWALLOWING: 0
COLOR CHANGE: 0

## 2023-10-24 ASSESSMENT — SLEEP AND FATIGUE QUESTIONNAIRES
SLEEP PATTERN: DIFFICULTY FALLING ASLEEP;DISTURBED/INTERRUPTED SLEEP
AVERAGE NUMBER OF SLEEP HOURS: 5
DO YOU HAVE DIFFICULTY SLEEPING: YES
DO YOU USE A SLEEP AID: NO

## 2023-10-24 ASSESSMENT — PAIN - FUNCTIONAL ASSESSMENT: PAIN_FUNCTIONAL_ASSESSMENT: NONE - DENIES PAIN

## 2023-10-24 NOTE — ED NOTES
The following labs labeled with pt sticker and tubed to lab:     [x] Urine Sample  [] Stool Sample   [] Occult Sample      Laura Rosenbaum RN  10/24/23 6459

## 2023-10-24 NOTE — ED NOTES
Pt brought in volentary by [employer]. Pt states called [employer] due to being Suicidal and wanting to walk in traffic. Pt states his dad is dying and he cant get to [facility] to see him and there is a lot of stress in his life. Pt states is homeless and not currently working. Pt last admission was in September and pt said it helped but he didn't follow up out patient.  Pt denies NADIYA SANTOS Ilona Deck, AMBERN  42/68/69 2005

## 2023-10-24 NOTE — ED PROVIDER NOTES
EMERGENCY DEPARTMENT ENCOUNTER    Pt Name: Silvina Mesa  MRN: [id number]  [address] 1980  Date of evaluation: 10/24/23  CHIEF COMPLAINT       Chief Complaint   Patient presents with    Mental Health Problem     HISTORY OF PRESENT ILLNESS   71-year-old male presenting to the ER complaining of suicidal ideations. Patient states he does not have any pills at the moment so he would walk in front of traffic. Patient states he is supposed to be medicated for depression but he has been drinking so he does not utilize it as prescribed. The history is provided by the patient. Mental Health Problem  Presenting symptoms: suicidal thoughts    Presenting symptoms: no agitation    Associated symptoms: no abdominal pain, no chest pain and no fatigue            REVIEW OF SYSTEMS     Review of Systems   Constitutional:  Negative for activity change, fatigue and fever. HENT:  Negative for congestion, ear pain and trouble swallowing. Eyes:  Negative for photophobia and visual disturbance. Respiratory:  Negative for cough and shortness of breath. Cardiovascular:  Negative for chest pain and palpitations. Gastrointestinal:  Negative for abdominal pain, diarrhea, nausea and vomiting. Genitourinary:  Negative for dysuria, flank pain and urgency. Musculoskeletal:  Negative for arthralgias and myalgias. Skin:  Negative for color change and rash. Neurological:  Negative for dizziness and facial asymmetry. Psychiatric/Behavioral:  Positive for suicidal ideas. Negative for agitation and behavioral problems. PASTMEDICAL HISTORY     Past Medical History:   Diagnosis Date    Anxiety     Depression     Insomnia      Past Problem List  Patient Active Problem List   Diagnosis Code    Depression with suicidal ideation F32. A, R45.851    Alcohol abuse F10.10     SURGICAL HISTORY     History reviewed. No pertinent surgical history.   CURRENT MEDICATIONS       Current Discharge Medication List        CONTINUE these

## 2023-10-25 PROBLEM — R45.851 SUICIDAL IDEATION: Status: ACTIVE | Noted: 2023-10-25

## 2023-10-25 PROCEDURE — 99222 1ST HOSP IP/OBS MODERATE 55: CPT | Performed by: INTERNAL MEDICINE

## 2023-10-25 PROCEDURE — 99223 1ST HOSP IP/OBS HIGH 75: CPT | Performed by: PSYCHIATRY & NEUROLOGY

## 2023-10-25 PROCEDURE — APPSS60 APP SPLIT SHARED TIME 46-60 MINUTES: Performed by: NURSE PRACTITIONER

## 2023-10-25 PROCEDURE — 1240000000 HC EMOTIONAL WELLNESS R&B

## 2023-10-25 PROCEDURE — 6370000000 HC RX 637 (ALT 250 FOR IP): Performed by: PSYCHIATRY & NEUROLOGY

## 2023-10-25 RX ORDER — VENLAFAXINE HYDROCHLORIDE 37.5 MG/1
37.5 CAPSULE, EXTENDED RELEASE ORAL
Status: DISCONTINUED | OUTPATIENT
Start: 2023-10-26 | End: 2023-10-27 | Stop reason: HOSPADM

## 2023-10-25 RX ADMIN — TRAZODONE HYDROCHLORIDE 50 MG: 50 TABLET ORAL at 21:29

## 2023-10-25 RX ADMIN — ACETAMINOPHEN 650 MG: 325 TABLET ORAL at 16:15

## 2023-10-25 RX ADMIN — NICOTINE POLACRILEX 2 MG: 2 LOZENGE ORAL at 16:17

## 2023-10-25 RX ADMIN — NICOTINE POLACRILEX 2 MG: 2 LOZENGE ORAL at 19:14

## 2023-10-25 RX ADMIN — HYDROXYZINE HYDROCHLORIDE 50 MG: 50 TABLET, FILM COATED ORAL at 21:29

## 2023-10-25 ASSESSMENT — PAIN SCALES - GENERAL: PAINLEVEL_OUTOF10: 5

## 2023-10-25 NOTE — BH NOTE
[address] [facility], [place]  Admission Note     Admission Type:   Admission Type: Voluntary    Reason for admission:  Reason for Admission: Brought in voluntary by TPD. Patient states suicidal ideation with plan to walk into traffic due to increase drug/ alcohol usage and homelessness. Addictive Behavior:   Addictive Behavior  In the Past 3 Months, Have You Felt or Has Someone Told You That You Have a Problem With  : None    Medical Problems:   Past Medical History:   Diagnosis Date    Anxiety     Depression     Insomnia        Status EXAM:  Mental Status and Behavioral Exam  Normal: No  Level of Assistance: Independent/Self  Facial Expression: Flat  Affect: Appropriate  Level of Consciousness: Alert  Frequency of Checks: 4 times per hour, close  Mood:Normal: No  Mood: Depressed  Motor Activity:Normal: Yes  Eye Contact: Fair  Observed Behavior: Guarded, Preoccupied  Sexual Misconduct History: Current - no  Preception: [place] to person, [place] to time, [place] to place, [place] to situation  Attention:Normal: No  Attention: Unable to concentrate  Thought Processes: Unremarkable  Thought Content:Normal: No  Thought Content: Poverty of content  Depression Symptoms: Feelings of helplessness, Feelings of hopelessess, Impaired concentration  Anxiety Symptoms: Generalized  Yumiko Symptoms: No problems reported or observed. Hallucinations:  Auditory (comment) (voices occasionally)  Delusions: No  Memory:Normal: Yes  Insight and Judgment: No  Insight and Judgment: Poor judgment, Poor insight    Tobacco Screening:  Practical Counseling, on admission, juan X, if applicable and completed (first 3 are required if patient doesn't refuse):            ( ) Recognizing danger situations (included triggers and roadblocks)                    ( ) Coping skills (new ways to manage stress,relaxation techniques, changing routine, distraction)                                                           ( ) Basic information about quitting

## 2023-10-25 NOTE — BH NOTE
Patient given tobacco quitline number 27887771396 at this time, refusing to call at this time, states \" I just dont want to quit now\"- patient given information as to the dangers of long term tobacco use. Continue to reinforce the importance of tobacco cessation.

## 2023-10-25 NOTE — H&P
Department of Psychiatry  Attending Physician Psychiatric Assessment     Reason for Admission to Psychiatric Unit:  Threat to self requiring 24 hour professional observation  A mental disorder causing major disability in social, interpersonal, occupational, and/or educational functioning that is leading to dangerous or life-threatening functioning, and that can only be addressed in an acute inpatient setting   Concerns about patient's safety in the community    CHIEF COMPLAINT: Depression with suicidal ideation    History obtained from: Patient, electronic medical record          HISTORY OF PRESENT ILLNESS:    Patsy Schaffer is a 37 y.o. male who has a past medical history of alcohol use disorder, polysubstance abuse, and depression. Patient presented to the ED, per [facility] documentation, Patsy Schaffer is a 37year old male who presents to the ED via [facility]. Pt was intoxicated upon arrival with a BAL of 186. Pt is now legally sober. Pt is suicidal with a plan to overdose on pills or walk into traffic. Pt has had a previous suicide attempt  on pills. Pt denies HI. Pt Is hearing voices that are telling pt to harm self. Pt's father is currently dying due to an illness and pt is unable to get to [place] to see him. Pt identifies this as the trigger to pt's SI. Pt has a previous diagnosis of depression. Pt has not had pt's medications since yesterday. Pt is currently not linked. Pt was last admitted tot [facility] 9/21/23. Pt was recently admitted to the medical floor for alcohol withdrawal at [facility] and was released 10/23/23. Pt was sent to the [facility] center and was discharged the same day. Pt admits to drinking alcohol today to help cope with pt's depression. Pt has along history of alcohol and cocaine abuse. Pt reports poor sleep and a good appetite. Interview conducted in [employer].   Patient was discharged from [facility] on October 23rd, was admitted for medical management of alcohol withdrawal.
file.    Please note that this chart was generated using voice recognition Dragon dictation software. Although every effort was made to ensure the accuracy of this automated transcription, some errors in transcription may have occurred.

## 2023-10-25 NOTE — ED NOTES
Provisional Diagnosis:   Depression with suicidal ideation     Psychosocial and Contextual Factors: Pt has issues with social enviroment. Pt has issues with relationships. Pt is homeless. Pt has substance abuse. C-SSRS Summary:    Patient: X    Family:     Agency: X (EPIC)    Present Suicidal Behavior:     Verbal: X    Attempt:       Past Suicidal Behavior:     Verbal: X    Attempt: X    Self- Injurious/ Self-Mutilation:  Pt denies    Trauma History: Pt denies      Protective Factors: Pt has insurance. Risk Factors: Pt has poor judgement, coping skills, and non compliance. Substance Abuse: alcohol and hx of cocaine. Clinical Summary:  Vitaly Barboza is a 37year old male who presents to the ED via [facility]. Pt was intoxicated upon arrival with a BAL of 186. Pt is now legally sober. Pt is suicidal with a plan to overdose on pills or walk into traffic. Pt has had a previous suicide attempt  on pills. Pt denies HI. Pt Is hearing voices that are telling pt to harm self. Pt's father is currently dying due to an illness and pt is unable to get to [place] to see him. Pt identifies this as the trigger to pt's SI. Pt has a previous diagnosis of depression. Pt has not had pt's medications since yesterday. Pt is currently not linked. Pt was last admitted tot [facility] 9/21/23. Pt was recently admitted to the medical floor for alcohol withdrawal at [facility] and was released 10/23/23. Pt was sent to the [facility] center and was discharged the same day. Pt admits to drinking alcohol today to help cope with pt's depression. Pt has along history of alcohol and cocaine abuse. Pt reports poor sleep and a good appetite. Level of Care Disposition:.SUSAN consulted with  from psychiatry. Psychiatry recommends [facility] for  alcohol treatment as pt has had multiple hospitalizations however, if pt is unable to contract for safety pt will be admitted to the [facility].

## 2023-10-25 NOTE — GROUP NOTE
Group Therapy Note    Date: 10/25/2023    Group Start Time: 1100  Group End Time: 7521  Group Topic: Cognitive Skills    [facility]    Maranda Mendoza [place]        Group Therapy Note    Attendees: 8/14     Patient's Goal: To increase social interaction, practice decision making, explore perception, and communication skills. Notes: Pt was attentive to task but did not engage in decision making. Pt brightens with humor in task. Pt is also watchful of Nursing students in group. Status After Intervention:  Unchanged     Participation Level: Active Listener,  polite    Participation Quality:  Attentive, polite     Speech:  Minimal     Thought Process/Content: Attentive to group task -able to understand humor in group. Affective Functioning:  Blunted, brightened      Mood: Passive r/t task, polite and in behavioral control.      Level of consciousness:  Alert, and Attentive        Response to Learning:  Attentive to new learning, and Progressing to goal        Endings: None Reported     Modes of Intervention: Education, Support, Socialization, Exploration, Clarifying and Problem-solving        Discipline Responsible: Psychoeducational Specialist        Signature:  Riki Chávez

## 2023-10-25 NOTE — ED NOTES
SW discuassed alcohol treatment options with pt specifically [facility].  Pt stating pt is willing to go to alcohol treatment however, pt is does not feel safe leaving the hospital.

## 2023-10-25 NOTE — GROUP NOTE
Group Therapy Note    Date: 10/25/2023    Group Start Time: 1000  Group End Time: 2860  Group Topic: Psychoeducation    [facility] CHRIS Martini, [facility]        Group Therapy Note    Attendees: 5/14    patient refused to attend psychoeducation group at 1000 after encouragement from staff.   1:1 talk time offered as alternative to group session            Signature:  CHRIS Sharp, [place]

## 2023-10-26 PROCEDURE — APPSS30 APP SPLIT SHARED TIME 16-30 MINUTES: Performed by: PSYCHIATRY & NEUROLOGY

## 2023-10-26 PROCEDURE — 6370000000 HC RX 637 (ALT 250 FOR IP): Performed by: PSYCHIATRY & NEUROLOGY

## 2023-10-26 PROCEDURE — 1240000000 HC EMOTIONAL WELLNESS R&B

## 2023-10-26 PROCEDURE — 99232 SBSQ HOSP IP/OBS MODERATE 35: CPT | Performed by: PSYCHIATRY & NEUROLOGY

## 2023-10-26 RX ORDER — LORAZEPAM 2 MG/ML
1 INJECTION INTRAMUSCULAR
Status: DISCONTINUED | OUTPATIENT
Start: 2023-10-26 | End: 2023-10-27 | Stop reason: HOSPADM

## 2023-10-26 RX ORDER — LORAZEPAM 1 MG/1
2 TABLET ORAL
Status: DISCONTINUED | OUTPATIENT
Start: 2023-10-26 | End: 2023-10-27 | Stop reason: HOSPADM

## 2023-10-26 RX ORDER — LORAZEPAM 1 MG/1
1 TABLET ORAL
Status: DISCONTINUED | OUTPATIENT
Start: 2023-10-26 | End: 2023-10-27 | Stop reason: HOSPADM

## 2023-10-26 RX ORDER — LORAZEPAM 1 MG/1
3 TABLET ORAL
Status: DISCONTINUED | OUTPATIENT
Start: 2023-10-26 | End: 2023-10-27 | Stop reason: HOSPADM

## 2023-10-26 RX ORDER — GAUZE BANDAGE 2" X 2"
100 BANDAGE TOPICAL DAILY
Status: DISCONTINUED | OUTPATIENT
Start: 2023-10-26 | End: 2023-10-27 | Stop reason: HOSPADM

## 2023-10-26 RX ORDER — M-VIT,TX,IRON,MINS/CALC/FOLIC 27MG-0.4MG
1 TABLET ORAL DAILY
Status: DISCONTINUED | OUTPATIENT
Start: 2023-10-26 | End: 2023-10-27 | Stop reason: HOSPADM

## 2023-10-26 RX ORDER — LORAZEPAM 2 MG/ML
4 INJECTION INTRAMUSCULAR
Status: DISCONTINUED | OUTPATIENT
Start: 2023-10-26 | End: 2023-10-27 | Stop reason: HOSPADM

## 2023-10-26 RX ORDER — LORAZEPAM 2 MG/ML
3 INJECTION INTRAMUSCULAR
Status: DISCONTINUED | OUTPATIENT
Start: 2023-10-26 | End: 2023-10-27 | Stop reason: HOSPADM

## 2023-10-26 RX ORDER — LORAZEPAM 1 MG/1
4 TABLET ORAL
Status: DISCONTINUED | OUTPATIENT
Start: 2023-10-26 | End: 2023-10-27 | Stop reason: HOSPADM

## 2023-10-26 RX ORDER — LORAZEPAM 2 MG/ML
2 INJECTION INTRAMUSCULAR
Status: DISCONTINUED | OUTPATIENT
Start: 2023-10-26 | End: 2023-10-27 | Stop reason: HOSPADM

## 2023-10-26 RX ADMIN — HYDROXYZINE HYDROCHLORIDE 50 MG: 50 TABLET, FILM COATED ORAL at 19:22

## 2023-10-26 RX ADMIN — NICOTINE POLACRILEX 2 MG: 2 LOZENGE ORAL at 14:24

## 2023-10-26 RX ADMIN — TRAZODONE HYDROCHLORIDE 50 MG: 50 TABLET ORAL at 19:22

## 2023-10-26 RX ADMIN — NICOTINE POLACRILEX 2 MG: 2 LOZENGE ORAL at 17:43

## 2023-10-26 RX ADMIN — NICOTINE POLACRILEX 2 MG: 2 LOZENGE ORAL at 12:02

## 2023-10-26 RX ADMIN — VENLAFAXINE HYDROCHLORIDE 37.5 MG: 37.5 CAPSULE, EXTENDED RELEASE ORAL at 08:13

## 2023-10-26 RX ADMIN — MULTIPLE VITAMINS W/ MINERALS TAB 1 TABLET: TAB at 12:02

## 2023-10-26 RX ADMIN — THIAMINE HCL TAB 100 MG 100 MG: 100 TAB at 12:02

## 2023-10-26 NOTE — CARE COORDINATION
BHI Biopsychosocial Assessment    Current Level of Psychosocial Functioning     Independent   Dependent  X   Minimal Assist      Psychosocial High Risk Factors (check all that apply)    Unable to obtain meds   Chronic illness/pain    Substance abuse X Alcohol, Cocaine   Lack of Family Support X  Financial stress X  Isolation X  Inadequate Community Resources  Suicide attempt(s)  Not taking medications X  Victim of crime   Developmental Delay  Unable to manage personal needs  X  Age 72 or older   Homeless X  No transportation X  Readmission within 30 days   Unemployment  Traumatic Event    Psychiatric Advanced Directives: none reported     Family to Involve in Treatment: Lack of family support     Sexual Orientation:  ADDY    Patient Strengths: insurance     Patient Barriers: Lack of family support, substance abuse, not linked with [facility], not taking any Psychiatric medications, homeless     Opiate Education Provided: N/A Patient denies any current use of Opiate or Heroin use/abuse. [facility]/mental health history:  Not linked, not taking Medications, Last [facility] hospitalization was from 9/18-9/25/2023, he was discharged to [address] [place] [address], patient reports that he didn't stay there, is currently homeless, substance abuse    Plan of Care medication management, group/individual therapies, family meetings, psycho -education, treatment team meetings to assist with stabilization    Initial Discharge Plan: To Be Determined, Patient will be provided with Community Resource information       Clinical Summary:     Litzy Kaiser is a 37year old male who presents on admission with suicidal ideation with a plan to overdose on pills or to walk into traffic. It was documented in patient chart that he was brought in by [facility]. Pt reports that he has had a previous suicide attempt  on pills. Pt  reports that he Is hearing voices that are telling him to harm himself.  Pt's reports that his father is currently dying due to
and lab work. Admissions reports that clinical team would need to review the paperwork and they would let us know if he is accepted or not. SUSAN asks if there is a possibility that he would be able to be accepted for admission tomoroow Friday October 27th and she reported possible, but it depends on when they receive the paperwork. SUSAN explains this to patient and let him know this information and that [facility] was unaware that he was currently in an inpatient hospital when he talked to them. SUSAN aks patient if he would like her to fax his clinical paperwork to [facility] as requested for review, he states, \"forget it, I will just go to a shelter, you can forget about it. \" SUSAN will continue to monitor the situation.

## 2023-10-26 NOTE — BH NOTE
At this time patient refused EKG testing after writer provided education. Hx pituitary tumor several years ago.   s/p surgery   then recurrence   now s/p brain radiation   - MRI 2022 without residual tumor      - did find potential abnormality on the right side of the clivus near ICA, stable from 2018. Recommend pt f/u with PCP, can consider CT neck for further investigation there   - evaluate/treat pituitary hormones per below

## 2023-10-26 NOTE — GROUP NOTE
Group Therapy Note    Date: 10/26/2023    Group Start Time: 0900  Group End Time: 1512  Group Topic: Community Meeting    JOHNNY Oliveros        Group Therapy Note    Attendees: 7/15     Community Meeting Group Note        Date: October 26, 2023 Start Time: 9am  End Time:  0386      Number of Participants in Group & Unit Census:  7/15    Topic: goal setting    Goal of Group: set short term goal for the day      Comments:     Patient did not participate in [employer] group, despite staff encouragement and explanation of benefits. Patient remain seclusive to self. Q15 minute safety checks maintained for patient safety and will continue to encourage patient to attend unit programming.

## 2023-10-26 NOTE — GROUP NOTE
Group Therapy Note    Date: 10/26/2023    Group Start Time: 1330  Group End Time: 6549  Group Topic: Healthy Living/Wellness    [facility]    Belen Handy        Group Therapy Note    Attendees: 10/14       Patient's Goal:  Share something positive    Notes:  controlled    Status After Intervention:  Improved    Participation Level: Active Listener    Participation Quality: Appropriate      Speech:  normal      Thought Process/Content: Logical      Affective Functioning: Congruent      Mood: depressed      Level of consciousness:  Oriented x4      Response to Learning: Able to verbalize current knowledge/experience and Progressing to goal      Endings: None Reported    Modes of Intervention: Education, Support, Socialization, and Activity      Discipline Responsible: Behavorial Health Tech      Signature:   Belen Handy

## 2023-10-26 NOTE — GROUP NOTE
Group Therapy Note    Date: 10/26/2023    Group Start Time: 5294  Group End Time: 1414  Group Topic: Nursing    [facility]    Sadaf Light        Group Therapy Note    Attendees: 7/15       Patient's Goal:  watch video and discuss    Notes:  MIGUEL ANGEL talk    Status After Intervention:  Unchanged    Participation Level: Active Listener    Participation Quality: Appropriate      Speech:  normal      Thought Process/Content: Logical      Affective Functioning: Congruent      Mood: depressed      Level of consciousness:  Oriented x4      Response to Learning: Able to verbalize current knowledge/experience and Progressing to goal      Endings: None Reported    Modes of Intervention: Education and Media      Discipline Responsible: Behavorial Health Tech      Signature:   Sadaf Light

## 2023-10-27 ENCOUNTER — APPOINTMENT (OUTPATIENT)
Dept: GENERAL RADIOLOGY | Age: 43
End: 2023-10-27
Payer: MEDICAID

## 2023-10-27 ENCOUNTER — HOSPITAL ENCOUNTER (EMERGENCY)
Age: 43
Discharge: LEFT BEFORE DISCHARGE | End: 2023-10-28
Attending: EMERGENCY MEDICINE
Payer: MEDICAID

## 2023-10-27 VITALS
HEART RATE: 63 BPM | TEMPERATURE: 97.4 F | RESPIRATION RATE: 16 BRPM | SYSTOLIC BLOOD PRESSURE: 108 MMHG | OXYGEN SATURATION: 98 % | HEIGHT: 71 IN | DIASTOLIC BLOOD PRESSURE: 73 MMHG | BODY MASS INDEX: 22.41 KG/M2 | WEIGHT: 160.05 LBS

## 2023-10-27 DIAGNOSIS — Z53.21 ELOPED FROM EMERGENCY DEPARTMENT: ICD-10-CM

## 2023-10-27 DIAGNOSIS — R07.9 CHEST PAIN, UNSPECIFIED TYPE: Primary | ICD-10-CM

## 2023-10-27 DIAGNOSIS — Z78.9 ADMITTED TO SUBSTANCE MISUSE DETOXIFICATION CENTER: ICD-10-CM

## 2023-10-27 LAB
BASOPHILS # BLD: 0.07 K/UL (ref 0–0.2)
BASOPHILS NFR BLD: 1 % (ref 0–2)
EOSINOPHIL # BLD: 0.42 K/UL (ref 0–0.44)
EOSINOPHILS RELATIVE PERCENT: 5 % (ref 1–4)
ERYTHROCYTE [DISTWIDTH] IN BLOOD BY AUTOMATED COUNT: 12.7 % (ref 11.8–14.4)
HCT VFR BLD AUTO: 40.9 % (ref 40.7–50.3)
HCV AB SERPL QL IA: REACTIVE
HGB BLD-MCNC: 13.8 G/DL (ref 13–17)
IMM GRANULOCYTES # BLD AUTO: 0.04 K/UL (ref 0–0.3)
IMM GRANULOCYTES NFR BLD: 0 %
LYMPHOCYTES NFR BLD: 2.15 K/UL (ref 1.1–3.7)
LYMPHOCYTES RELATIVE PERCENT: 23 % (ref 24–43)
MCH RBC QN AUTO: 32.3 PG (ref 25.2–33.5)
MCHC RBC AUTO-ENTMCNC: 33.7 G/DL (ref 28.4–34.8)
MCV RBC AUTO: 95.8 FL (ref 82.6–102.9)
MONOCYTES NFR BLD: 0.53 K/UL (ref 0.1–1.2)
MONOCYTES NFR BLD: 6 % (ref 3–12)
NEUTROPHILS NFR BLD: 65 % (ref 36–65)
NEUTS SEG NFR BLD: 6.05 K/UL (ref 1.5–8.1)
NRBC BLD-RTO: 0 PER 100 WBC
PLATELET # BLD AUTO: 195 K/UL (ref 138–453)
PMV BLD AUTO: 10.1 FL (ref 8.1–13.5)
RBC # BLD AUTO: 4.27 M/UL (ref 4.21–5.77)
WBC OTHER # BLD: 9.3 K/UL (ref 3.5–11.3)

## 2023-10-27 PROCEDURE — 36415 COLL VENOUS BLD VENIPUNCTURE: CPT

## 2023-10-27 PROCEDURE — 80179 DRUG ASSAY SALICYLATE: CPT

## 2023-10-27 PROCEDURE — 99232 SBSQ HOSP IP/OBS MODERATE 35: CPT | Performed by: INTERNAL MEDICINE

## 2023-10-27 PROCEDURE — 80143 DRUG ASSAY ACETAMINOPHEN: CPT

## 2023-10-27 PROCEDURE — 6370000000 HC RX 637 (ALT 250 FOR IP): Performed by: PSYCHIATRY & NEUROLOGY

## 2023-10-27 PROCEDURE — 84484 ASSAY OF TROPONIN QUANT: CPT

## 2023-10-27 PROCEDURE — 86803 HEPATITIS C AB TEST: CPT

## 2023-10-27 PROCEDURE — 71045 X-RAY EXAM CHEST 1 VIEW: CPT

## 2023-10-27 PROCEDURE — 99239 HOSP IP/OBS DSCHRG MGMT >30: CPT | Performed by: PSYCHIATRY & NEUROLOGY

## 2023-10-27 PROCEDURE — 93005 ELECTROCARDIOGRAM TRACING: CPT

## 2023-10-27 PROCEDURE — 80048 BASIC METABOLIC PNL TOTAL CA: CPT

## 2023-10-27 PROCEDURE — 99285 EMERGENCY DEPT VISIT HI MDM: CPT

## 2023-10-27 PROCEDURE — 85025 COMPLETE CBC W/AUTO DIFF WBC: CPT

## 2023-10-27 PROCEDURE — 80307 DRUG TEST PRSMV CHEM ANLYZR: CPT

## 2023-10-27 PROCEDURE — 80076 HEPATIC FUNCTION PANEL: CPT

## 2023-10-27 PROCEDURE — G0480 DRUG TEST DEF 1-7 CLASSES: HCPCS

## 2023-10-27 RX ORDER — THIAMINE MONONITRATE (VIT B1) 100 MG
100 TABLET ORAL DAILY
Qty: 30 TABLET | Refills: 0 | Status: SHIPPED | OUTPATIENT
Start: 2023-10-28

## 2023-10-27 RX ORDER — VENLAFAXINE HYDROCHLORIDE 37.5 MG/1
37.5 CAPSULE, EXTENDED RELEASE ORAL
Qty: 30 CAPSULE | Refills: 3 | Status: SHIPPED | OUTPATIENT
Start: 2023-10-28

## 2023-10-27 RX ORDER — M-VIT,TX,IRON,MINS/CALC/FOLIC 27MG-0.4MG
1 TABLET ORAL DAILY
Qty: 30 TABLET | Refills: 0 | Status: SHIPPED | OUTPATIENT
Start: 2023-10-28

## 2023-10-27 RX ORDER — TRAZODONE HYDROCHLORIDE 50 MG/1
50 TABLET ORAL NIGHTLY PRN
Qty: 30 TABLET | Refills: 0 | Status: SHIPPED | OUTPATIENT
Start: 2023-10-27

## 2023-10-27 RX ADMIN — HYDROXYZINE HYDROCHLORIDE 50 MG: 50 TABLET, FILM COATED ORAL at 15:53

## 2023-10-27 RX ADMIN — THIAMINE HCL TAB 100 MG 100 MG: 100 TAB at 08:15

## 2023-10-27 RX ADMIN — VENLAFAXINE HYDROCHLORIDE 37.5 MG: 37.5 CAPSULE, EXTENDED RELEASE ORAL at 08:15

## 2023-10-27 RX ADMIN — NICOTINE POLACRILEX 2 MG: 2 LOZENGE ORAL at 15:11

## 2023-10-27 RX ADMIN — NICOTINE POLACRILEX 2 MG: 2 LOZENGE ORAL at 10:27

## 2023-10-27 RX ADMIN — NICOTINE POLACRILEX 2 MG: 2 LOZENGE ORAL at 13:07

## 2023-10-27 RX ADMIN — NICOTINE POLACRILEX 2 MG: 2 LOZENGE ORAL at 08:30

## 2023-10-27 RX ADMIN — MULTIPLE VITAMINS W/ MINERALS TAB 1 TABLET: TAB at 08:15

## 2023-10-27 ASSESSMENT — ENCOUNTER SYMPTOMS
NAUSEA: 0
ABDOMINAL PAIN: 0
VOMITING: 0
SHORTNESS OF BREATH: 0
DIARRHEA: 0

## 2023-10-27 ASSESSMENT — PAIN SCALES - GENERAL
PAINLEVEL_OUTOF10: 6
PAINLEVEL_OUTOF10: 0

## 2023-10-27 ASSESSMENT — PAIN - FUNCTIONAL ASSESSMENT: PAIN_FUNCTIONAL_ASSESSMENT: 0-10

## 2023-10-27 NOTE — BH NOTE
.Patient given tobacco quitline number 62807738288 at this time, refusing to call at this time, states \" I just dont want to quit now\"- patient given information as to the dangers of long term tobacco use. Continue to reinforce the importance of tobacco cessation.

## 2023-10-27 NOTE — BH NOTE
[facility]  Discharge Note    Pt discharged with followings belongings:   Dental Appliances: None  Vision - Corrective Lenses: None  Hearing Aid: None  Jewelry: None  Body Piercings Removed: N/A  Clothing: Jacket/Coat, Shirt, Pants, Footwear  Other Valuables: Wallet, Lighter/Matches Patient discharged to shelter per cab. Valuables and all belongings returned to patient upon discharge. Patient educated on aftercare instructions. Ordered prescriptions sent with patient. Information faxed to [facility] [phone] by nurse at 36. Patient verbalize understanding of AVS:      Status EXAM upon discharge:  Mental Status and Behavioral Exam  Normal: No  Level of Assistance: Independent/Self  Facial Expression: Flat  Affect: Blunt  Level of Consciousness: Alert  Frequency of Checks: 4 times per hour, close  Mood:Normal: No  Mood: Anxious  Motor Activity:Normal: Yes  Motor Activity: Decreased  Eye Contact: Good  Observed Behavior: Preoccupied  Sexual Misconduct History: Current - no  Involved In Any Sexual Misconduct With Others? : No  History of Sexually Inappropriate Behavior When Previously Hospitalized?: No  Uncontrollable/Compulsive Masturbation?: No  Difficulty Controlling Sexual Impulses?: No  Preception: [place] to person, [place] to time, [place] to place, [place] to situation  Attention:Normal: Yes  Attention: Distractible  Thought Processes: Unremarkable  Thought Content:Normal: No  Thought Content: Preoccupations  Depression Symptoms: No problems reported or observed. Anxiety Symptoms: Generalized  Yumiko Symptoms: No problems reported or observed.   Hallucinations: None  Delusions: No  Memory:Normal: Yes  Insight and Judgment: No  Insight and Judgment: Poor judgment, Poor insight    Tobacco Screening:  Practical Counseling, on admission, juan X, if applicable and completed (first 3 are required if patient doesn't refuse)declined:            ( ) Recognizing danger situations (included triggers and

## 2023-10-27 NOTE — DISCHARGE INSTRUCTIONS
Information:  Medications:   Medication summary provided   I understand that I should take only the medications on my list.     -why and when I need to take each medicine.     -which side effects to watch for.     -that I should carry my medication information at all times in case of     Emergency situations. I will take all of my medicines to follow up appointments.     -check with my physician or pharmacist before taking any new    Medication, over the counter product or drink alcohol.    -Ask about food, drug or dietary supplement interactions.    -discard old lists and update records with medication providers. Notify Physician:  Notify physician if you notice:   Always call 911 if you feel your life is in danger  In case of an emergency call 911 immediately! If 911 is not available call your local emergency medical system for help    Behavioral Health Follow Up:  Original Referral Source: [facility]   Discharge Diagnosis: Suicidal ideation [R45.851]  Depression with suicidal ideation [F32. A, R45.851]  Recommendations for Level of Care: outpatient   Patient status at discharge: stable  My hospital  was: [facility]  Aftercare plan faxed: [place] Madrigal    -faxed by: nurse   -date: 10.27.23   -time: 1400    Prescriptions: [facility]    Smoking: Quit Smoking. Call the [employer]'s smoking quitline at 2-953-68E-QUIT  Know the signs of a heart attack   If you have any of the following symptoms call 911 immediately, do not wait more    Than five minutes. 1. Pressure, fullness and/ or squeezing in the center of the chest spreading to    The jaw, neck or shoulder. 2. Chest discomfort with light headedness, fainting, sweating, nausea or    Shortness of breath. 3. Upper abdominal pressure or discomfort. 4. Lower chest pain, back pain, unusual fatigue, shortness of breath, nausea   Or dizziness.      General Information:   Questions regarding your bill: Call HELP program [phone]     Suicide Hotline [facility]

## 2023-10-27 NOTE — GROUP NOTE
Group Therapy Note    Date: 10/27/2023    Group Start Time: 1100  Group End Time: 4664  Group Topic: Cognitive Skills    [facility] Papo Elliott        Group Therapy Note    Attendees: 11/12     Patient's Goal: To increase social interaction, practice decision making, problem solving, and communication skills       Notes: Pt was attentive to group task/topic for 25 mins and then left group early. Pt was pleasant but chose not to engage in task. Status After Intervention:  Unchanged     Participation Level: Active Listener     Participation Quality:  Attentive      Speech:  Minimal     Thought Process/Content: Attentive but did not engage in task or group discussion.         Affective Functioning:  Congruent, brightened      Mood: Euthymic on approach but passive r/t group task and discussion     Level of consciousness:  Alert, and Attentive        Response to Learning:  Able to verbalize current knowledge, able to acknowledge/verbalize new learning, and Progressing to goal        Endings: None Reported     Modes of Intervention: Education, Support, Socialization, Exploration, Clarifying and Problem-solving        Discipline Responsible: Psychoeducational Specialist        Signature:  ROSELYN Mcgill

## 2023-10-27 NOTE — GROUP NOTE
Group Therapy Note    Date: 10/27/2023    Group Start Time: 1430  Group End Time: 3840  Group Topic: Cognitive Skills    Papo Obando        Group Therapy Note    Attendees: 9/10     Topic: To increase social interaction and to express feelings, explore stress management techniques/activities/positive resources, and communication skills. Patient did not participate in Cognitive Skill Group at 14:30, despite staff encouragement and explanation of benefits. Patient was waiting to be discharged. Q15 minute safety checks maintained for patient safety and will continue to encourage patient to   attend unit programming.         Discipline Responsible: Psychoeducational Specialist      Signature:  Tessie Zambrano

## 2023-10-27 NOTE — PLAN OF CARE
[facility]  Initial Interdisciplinary Treatment Plan NO      Original treatment plan Date & Time: 10/25/2023   1300    Admission Type:  Admission Type: Voluntary    Reason for admission:   Reason for Admission: Brought in voluntary by TPD. Patient states suicidal ideation with plan to walk into traffic due to increase drug/ alcohol usage and homelessness. Estimated Length of Stay:  5-7days  Estimated Discharge Date: to be determined by physician    PATIENT STRENGTHS:  Patient Strengths:   Patient Strengths and Limitations:Limitations: Multiple barriers to leisure interests, Inappropriate/potentially harmful leisure interests, Difficulty problem solving/relies on others to help solve problems, Tendency to isolate self, External locus of control, Perceives need for assistance with self-care  Addictive Behavior: Addictive Behavior  In the Past 3 Months, Have You Felt or Has Someone Told You That You Have a Problem With  : None  Medical Problems:  Past Medical History:   Diagnosis Date    Anxiety     Depression     Insomnia      Status EXAM:Mental Status and Behavioral Exam  Normal: No  Level of Assistance: Independent/Self  Facial Expression: Flat  Affect: Appropriate  Level of Consciousness: Alert  Frequency of Checks: 4 times per hour, close  Mood:Normal: No  Mood: Depressed, Anxious  Motor Activity:Normal: Yes  Motor Activity: Decreased  Eye Contact: Fair  Observed Behavior: Guarded, Cooperative, Preoccupied  Sexual Misconduct History: Current - no  Preception: Faison to person, Faison to time, Fa[place] to place, Faison to situation  Attention:Normal: No  Attention: Unable to concentrate  Thought Processes: Unremarkable  Thought Content:Normal: No  Thought Content: Poverty of content  Depression Symptoms: Feelings of hopelessess, Feelings of helplessness, Impaired concentration  Anxiety Symptoms: Generalized  Yumiko Symptoms: No problems reported or observed. Hallucinations:  Auditory
Problem: Self Harm/Suicidality  Goal: Will have no self-injury during hospital stay  Description: INTERVENTIONS:  1. Ensure constant observer at bedside with Q15M safety checks  2. Maintain a safe environment  3. Secure patient belongings  4. Ensure family/visitors adhere to safety recommendations  5. Ensure safety tray has been added to patient's diet order  6. Every shift and PRN: Re-assess suicidal risk via Frequent Screener    10/25/2023 1322 by Betty Ann LPN  Outcome: Progressing  10/25/2023 1305 by Betty Ann LPN  Outcome: Progressing  10/25/2023 1301 by Jazlyn Zambrano RN  Outcome: Progressing     Problem: Depression  Goal: Will be euthymic at discharge  Description: INTERVENTIONS:  1. Administer medication as ordered  2. Provide emotional support via 1:1 interaction with staff  3. Encourage involvement in milieu/groups/activities  4. Monitor for social isolation  10/25/2023 1322 by Betty Ann LPN  Outcome: Progressing  10/25/2023 1305 by Betty Ann LPN  Outcome: Progressing  10/25/2023 1301 by Jazlyn Zambrano RN  Outcome: Progressing     Problem: Behavior  Goal: Pt/Family maintain appropriate behavior and adhere to behavioral management agreement, if implemented  Description: INTERVENTIONS:  1. Assess patient/family's coping skills and  non-compliant behavior (including use of illegal substances)  2. Notify security of behavior or suspected illegal substances which indicate the need for search of the family and/or belongings  3. Encourage verbalization of thoughts and concerns in a socially appropriate manner  4. Utilize positive, consistent limit setting strategies supporting safety of patient, staff and others  5. Encourage participation in the decision making process about the behavioral management agreement  6. If a visitor's behavior poses a threat to safety call refer to organization policy.   7. Initiate consult with , Psychosocial CNS, Spiritual Care as
Problem: Self Harm/Suicidality  Goal: Will have no self-injury during hospital stay  Description: INTERVENTIONS:  1. Ensure constant observer at bedside with Q15M safety checks  2. Maintain a safe environment  3. Secure patient belongings  4. Ensure family/visitors adhere to safety recommendations  5. Ensure safety tray has been added to patient's diet order  6. Every shift and PRN: Re-assess suicidal risk via Frequent Screener    10/26/2023 0951 by Arben Duvall LPN  Outcome: Progressing  10/25/2023 2247 by Kye Fitch RN  Outcome: Progressing  Note: Patient remains free from self harm and injury during shift. Problem: Depression  Goal: Will be euthymic at discharge  Description: INTERVENTIONS:  1. Administer medication as ordered  2. Provide emotional support via 1:1 interaction with staff  3. Encourage involvement in milieu/groups/activities  4. Monitor for social isolation  10/26/2023 0951 by Arben Duvall LPN  Outcome: Progressing  10/25/2023 2247 by Kye Fitch RN  Outcome: Progressing  Note: Patient denied all feelings of depression and anxiety during assessment. He stated he is doing \"great\" today. He appears flat but is social in the dayroom. Problem: Behavior  Goal: Pt/Family maintain appropriate behavior and adhere to behavioral management agreement, if implemented  Description: INTERVENTIONS:  1. Assess patient/family's coping skills and  non-compliant behavior (including use of illegal substances)  2. Notify security of behavior or suspected illegal substances which indicate the need for search of the family and/or belongings  3. Encourage verbalization of thoughts and concerns in a socially appropriate manner  4. Utilize positive, consistent limit setting strategies supporting safety of patient, staff and others  5. Encourage participation in the decision making process about the behavioral management agreement  6.  If a visitor's behavior poses a threat to safety
Problem: Self Harm/Suicidality  Goal: Will have no self-injury during hospital stay  Description: INTERVENTIONS:  1. Ensure constant observer at bedside with Q15M safety checks  2. Maintain a safe environment  3. Secure patient belongings  4. Ensure family/visitors adhere to safety recommendations  5. Ensure safety tray has been added to patient's diet order  6. Every shift and PRN: Re-assess suicidal risk via Frequent Screener    10/26/2023 2234 by Rodriguez Landry RN  Outcome: Progressing  Patient has made no attempt to harm self at this time. Problem: Depression  Goal: Will be euthymic at discharge  Description: INTERVENTIONS:  1. Administer medication as ordered  2. Provide emotional support via 1:1 interaction with staff  3. Encourage involvement in milieu/groups/activities  4. Monitor for social isolation  10/26/2023 2234 by Rodriguez Landry RN  Outcome: Progressing  Patient denies depression at this time but does feel somewhat anxious related to his upcoming discharge. Problem: Behavior  Goal: Pt/Family maintain appropriate behavior and adhere to behavioral management agreement, if implemented  Description: INTERVENTIONS:  1. Assess patient/family's coping skills and  non-compliant behavior (including use of illegal substances)  2. Notify security of behavior or suspected illegal substances which indicate the need for search of the family and/or belongings  3. Encourage verbalization of thoughts and concerns in a socially appropriate manner  4. Utilize positive, consistent limit setting strategies supporting safety of patient, staff and others  5. Encourage participation in the decision making process about the behavioral management agreement  6. If a visitor's behavior poses a threat to safety call refer to organization policy.   7. Initiate consult with , Psychosocial CNS, Spiritual Care as appropriate  10/26/2023 2234 by Rodriguez Landry RN  Outcome: Progressing  Safety plan reviewed
appropriate  10/25/2023 2247 by Vernell Simeon, RN  Outcome: Progressing

## 2023-10-28 VITALS
RESPIRATION RATE: 18 BRPM | HEART RATE: 79 BPM | DIASTOLIC BLOOD PRESSURE: 67 MMHG | SYSTOLIC BLOOD PRESSURE: 100 MMHG | OXYGEN SATURATION: 97 % | TEMPERATURE: 97.3 F

## 2023-10-28 LAB
ALBUMIN SERPL-MCNC: 4.2 G/DL (ref 3.5–5.2)
ALBUMIN/GLOB SERPL: 1.4 {RATIO} (ref 1–2.5)
ALP SERPL-CCNC: 81 U/L (ref 40–129)
ALT SERPL-CCNC: 127 U/L (ref 5–41)
AMPHET UR QL SCN: NEGATIVE
ANION GAP SERPL CALCULATED.3IONS-SCNC: 11 MMOL/L (ref 9–17)
APAP SERPL-MCNC: <5 UG/ML (ref 10–30)
AST SERPL-CCNC: 70 U/L
BARBITURATES UR QL SCN: POSITIVE
BENZODIAZ UR QL: NEGATIVE
BILIRUB DIRECT SERPL-MCNC: <0.1 MG/DL
BILIRUB INDIRECT SERPL-MCNC: ABNORMAL MG/DL (ref 0–1)
BILIRUB SERPL-MCNC: <0.1 MG/DL (ref 0.3–1.2)
BUN SERPL-MCNC: 10 MG/DL (ref 6–20)
CALCIUM SERPL-MCNC: 9.1 MG/DL (ref 8.6–10.4)
CANNABINOIDS UR QL SCN: POSITIVE
CHLORIDE SERPL-SCNC: 100 MMOL/L (ref 98–107)
CO2 SERPL-SCNC: 27 MMOL/L (ref 20–31)
COCAINE UR QL SCN: POSITIVE
CREAT SERPL-MCNC: 0.6 MG/DL (ref 0.7–1.2)
ETHANOL PERCENT: 0.01 %
ETHANOLAMINE SERPL-MCNC: 13 MG/DL
FENTANYL UR QL: NEGATIVE
GFR SERPL CREATININE-BSD FRML MDRD: >60 ML/MIN/1.73M2
GLUCOSE SERPL-MCNC: 85 MG/DL (ref 70–99)
METHADONE UR QL: NEGATIVE
OPIATES UR QL SCN: NEGATIVE
OXYCODONE UR QL SCN: NEGATIVE
PCP UR QL SCN: NEGATIVE
POTASSIUM SERPL-SCNC: 4.1 MMOL/L (ref 3.7–5.3)
PROT SERPL-MCNC: 7.1 G/DL (ref 6.4–8.3)
SALICYLATES SERPL-MCNC: <1 MG/DL (ref 3–10)
SODIUM SERPL-SCNC: 138 MMOL/L (ref 135–144)
TEST INFORMATION: ABNORMAL
TOXIC TRICYCLIC SC,BLOOD: NEGATIVE
TROPONIN I SERPL HS-MCNC: <6 NG/L (ref 0–22)
TROPONIN I SERPL HS-MCNC: <6 NG/L (ref 0–22)

## 2023-10-28 PROCEDURE — 84484 ASSAY OF TROPONIN QUANT: CPT

## 2023-10-28 NOTE — ED NOTES
Per Nydia at [facility] pt accepted by Dr. Checo Escobar at [facility]. All necessary forms faxed to [address] [place] [address] [facility]. Per [address] [place] [address] they will call around to see if a different ambulance company can transport sooner. ETA pending [facility] will need to be informed.        Susanna Gomes [place]  10/28/23 6118

## 2023-10-28 NOTE — ED NOTES
Sw consulted for pt requesting AOD treatment. Pt reports daily drinking of approximately \"Six beers to 1/2 Gallon of Whiskey\" daily. Pt reports previous periods of sobriety and current homelessness due to his alcohol addiction. 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 Pt denies SI, HI, and hallucinations. Pt reports recent admission into [facility] where he was prescribed Effexor that he says does seem to help him, however he has not been able to stop drinking alcohol. Pt reports he is originally from [place] and that his only family support lives in [place]. Pt in agreement for packet to be sent to [facility] for admission review.       Shirlee Cooks, [place]  10/28/23 0013

## 2023-10-28 NOTE — ED NOTES
The writer went into the room to fill out EMTALA form and patient was cooperative to sign it. Pt was asking the writer about the method of transportation and the writer answered it would be an ambulance. All of a sudden, pt is refusing to be transported by ambulance and wants to leave here without explaining any rationale. Pt was about to pull IV out on his own, so the writer pulled it out in a proper way. The writer had conversation with , and SW also talked with the patient that [facility] wouldn't accept him if he is not transported by EMS. Pt neglects SW's saying and left his room.      Harlee Goldberg, [place]  10/28/23 9355

## 2023-10-28 NOTE — ED NOTES
SW called to see if fax was received. SW was asked to fax to new fax  [phone]. Packet was faxed to new number.       Jeremías Sportsman, [address] [place] [address]  10/28/23 3861

## 2023-10-28 NOTE — ED NOTES
Sw received phone call from [facility] with additional questions that were answered pertaining to pt's substance abuse.       Sejal Overall, [facility]  10/28/23 0109

## 2023-10-28 NOTE — ED NOTES
Sw was informed by RN that pt is wanting to leave because he is upset and does not want to go to [facility] by ambulance. Per Nydia at [facility] pt has to come by ambulance or ambulant. Pt was not able to provide a reason on why he doesn't want to go by ambulance and has decided to leave. [facility]       Rehan Santana, [facility]  10/28/23 8901

## 2023-10-30 LAB
EKG ATRIAL RATE: 84 BPM
EKG P AXIS: 49 DEGREES
EKG P-R INTERVAL: 176 MS
EKG Q-T INTERVAL: 368 MS
EKG QRS DURATION: 84 MS
EKG QTC CALCULATION (BAZETT): 434 MS
EKG R AXIS: 39 DEGREES
EKG T AXIS: 63 DEGREES
EKG VENTRICULAR RATE: 84 BPM

## 2023-10-30 PROCEDURE — 93010 ELECTROCARDIOGRAM REPORT: CPT | Performed by: INTERNAL MEDICINE

## 2023-12-20 ENCOUNTER — HOSPITAL ENCOUNTER (EMERGENCY)
Age: 43
Discharge: PSYCHIATRIC OR SUBSTANCE USE FACILITY | DRG: 751 | End: 2023-12-21
Attending: EMERGENCY MEDICINE
Payer: MEDICAID

## 2023-12-20 DIAGNOSIS — R45.851 SUICIDAL IDEATION: Primary | ICD-10-CM

## 2023-12-20 LAB
ALBUMIN SERPL-MCNC: 4 G/DL (ref 3.5–5.2)
ALBUMIN/GLOB SERPL: 1.2 {RATIO} (ref 1–2.5)
ALP SERPL-CCNC: 70 U/L (ref 40–129)
ALT SERPL-CCNC: 42 U/L (ref 5–41)
AMPHET UR QL SCN: POSITIVE
ANION GAP SERPL CALCULATED.3IONS-SCNC: 16 MMOL/L (ref 9–17)
AST SERPL-CCNC: 29 U/L
BARBITURATES UR QL SCN: POSITIVE
BASOPHILS # BLD: 0.06 K/UL (ref 0–0.2)
BASOPHILS NFR BLD: 1 % (ref 0–2)
BENZODIAZ UR QL: POSITIVE
BILIRUB SERPL-MCNC: 0.2 MG/DL (ref 0.3–1.2)
BUN SERPL-MCNC: 8 MG/DL (ref 6–20)
CALCIUM SERPL-MCNC: 9.4 MG/DL (ref 8.6–10.4)
CANNABINOIDS UR QL SCN: POSITIVE
CHLORIDE SERPL-SCNC: 100 MMOL/L (ref 98–107)
CO2 SERPL-SCNC: 23 MMOL/L (ref 20–31)
COCAINE UR QL SCN: POSITIVE
CREAT SERPL-MCNC: 0.9 MG/DL (ref 0.7–1.2)
EOSINOPHIL # BLD: 0.34 K/UL (ref 0–0.44)
EOSINOPHILS RELATIVE PERCENT: 3 % (ref 1–4)
ERYTHROCYTE [DISTWIDTH] IN BLOOD BY AUTOMATED COUNT: 11.6 % (ref 11.8–14.4)
FENTANYL UR QL: NEGATIVE
GFR SERPL CREATININE-BSD FRML MDRD: >60 ML/MIN/1.73M2
GLUCOSE SERPL-MCNC: 105 MG/DL (ref 70–99)
HCT VFR BLD AUTO: 46 % (ref 40.7–50.3)
HGB BLD-MCNC: 15.4 G/DL (ref 13–17)
IMM GRANULOCYTES # BLD AUTO: <0.03 K/UL (ref 0–0.3)
IMM GRANULOCYTES NFR BLD: 0 %
LYMPHOCYTES NFR BLD: 2.33 K/UL (ref 1.1–3.7)
LYMPHOCYTES RELATIVE PERCENT: 22 % (ref 24–43)
MCH RBC QN AUTO: 31.8 PG (ref 25.2–33.5)
MCHC RBC AUTO-ENTMCNC: 33.5 G/DL (ref 28.4–34.8)
MCV RBC AUTO: 94.8 FL (ref 82.6–102.9)
METHADONE UR QL: NEGATIVE
MONOCYTES NFR BLD: 0.58 K/UL (ref 0.1–1.2)
MONOCYTES NFR BLD: 6 % (ref 3–12)
NEUTROPHILS NFR BLD: 68 % (ref 36–65)
NEUTS SEG NFR BLD: 7.2 K/UL (ref 1.5–8.1)
NRBC BLD-RTO: 0 PER 100 WBC
OPIATES UR QL SCN: NEGATIVE
OXYCODONE UR QL SCN: NEGATIVE
PCP UR QL SCN: NEGATIVE
PLATELET # BLD AUTO: 234 K/UL (ref 138–453)
PMV BLD AUTO: 10.3 FL (ref 8.1–13.5)
POTASSIUM SERPL-SCNC: 3.6 MMOL/L (ref 3.7–5.3)
PROT SERPL-MCNC: 7.3 G/DL (ref 6.4–8.3)
RBC # BLD AUTO: 4.85 M/UL (ref 4.21–5.77)
SODIUM SERPL-SCNC: 139 MMOL/L (ref 135–144)
TEST INFORMATION: ABNORMAL
WBC OTHER # BLD: 10.5 K/UL (ref 3.5–11.3)

## 2023-12-20 PROCEDURE — G0480 DRUG TEST DEF 1-7 CLASSES: HCPCS

## 2023-12-20 PROCEDURE — 99285 EMERGENCY DEPT VISIT HI MDM: CPT

## 2023-12-20 PROCEDURE — 80053 COMPREHEN METABOLIC PANEL: CPT

## 2023-12-20 PROCEDURE — 80143 DRUG ASSAY ACETAMINOPHEN: CPT

## 2023-12-20 PROCEDURE — 85025 COMPLETE CBC W/AUTO DIFF WBC: CPT

## 2023-12-20 PROCEDURE — 80307 DRUG TEST PRSMV CHEM ANLYZR: CPT

## 2023-12-20 PROCEDURE — 80179 DRUG ASSAY SALICYLATE: CPT

## 2023-12-21 ENCOUNTER — HOSPITAL ENCOUNTER (INPATIENT)
Age: 43
LOS: 5 days | Discharge: HOME | DRG: 751 | End: 2023-12-26
Attending: PSYCHIATRY & NEUROLOGY | Admitting: PSYCHIATRY & NEUROLOGY
Payer: MEDICAID

## 2023-12-21 VITALS
HEIGHT: 70 IN | SYSTOLIC BLOOD PRESSURE: 98 MMHG | BODY MASS INDEX: 27.92 KG/M2 | RESPIRATION RATE: 16 BRPM | OXYGEN SATURATION: 98 % | DIASTOLIC BLOOD PRESSURE: 62 MMHG | HEART RATE: 84 BPM | TEMPERATURE: 98.1 F | WEIGHT: 195 LBS

## 2023-12-21 PROBLEM — F33.2 MAJOR DEPRESSIVE DISORDER, RECURRENT SEVERE WITHOUT PSYCHOTIC FEATURES (HCC): Status: ACTIVE | Noted: 2023-12-21

## 2023-12-21 LAB
APAP SERPL-MCNC: <5 UG/ML (ref 10–30)
ETHANOL PERCENT: 0.03 %
ETHANOLAMINE SERPL-MCNC: 34 MG/DL
SALICYLATES SERPL-MCNC: <1 MG/DL (ref 3–10)
TOXIC TRICYCLIC SC,BLOOD: NEGATIVE

## 2023-12-21 PROCEDURE — 99222 1ST HOSP IP/OBS MODERATE 55: CPT | Performed by: INTERNAL MEDICINE

## 2023-12-21 PROCEDURE — 99222 1ST HOSP IP/OBS MODERATE 55: CPT | Performed by: PSYCHIATRY & NEUROLOGY

## 2023-12-21 PROCEDURE — APPSS60 APP SPLIT SHARED TIME 46-60 MINUTES: Performed by: NURSE PRACTITIONER

## 2023-12-21 PROCEDURE — 1240000000 HC EMOTIONAL WELLNESS R&B

## 2023-12-21 RX ORDER — M-VIT,TX,IRON,MINS/CALC/FOLIC 27MG-0.4MG
1 TABLET ORAL DAILY
Status: DISCONTINUED | OUTPATIENT
Start: 2023-12-21 | End: 2023-12-26 | Stop reason: HOSPADM

## 2023-12-21 RX ORDER — HYDROXYZINE 50 MG/1
50 TABLET, FILM COATED ORAL 3 TIMES DAILY PRN
Status: DISCONTINUED | OUTPATIENT
Start: 2023-12-21 | End: 2023-12-26 | Stop reason: HOSPADM

## 2023-12-21 RX ORDER — NICOTINE 21 MG/24HR
1 PATCH, TRANSDERMAL 24 HOURS TRANSDERMAL DAILY
Status: DISCONTINUED | OUTPATIENT
Start: 2023-12-21 | End: 2023-12-22

## 2023-12-21 RX ORDER — MAGNESIUM HYDROXIDE/ALUMINUM HYDROXICE/SIMETHICONE 120; 1200; 1200 MG/30ML; MG/30ML; MG/30ML
30 SUSPENSION ORAL EVERY 6 HOURS PRN
Status: DISCONTINUED | OUTPATIENT
Start: 2023-12-21 | End: 2023-12-26 | Stop reason: HOSPADM

## 2023-12-21 RX ORDER — ACETAMINOPHEN 325 MG/1
650 TABLET ORAL EVERY 4 HOURS PRN
Status: DISCONTINUED | OUTPATIENT
Start: 2023-12-21 | End: 2023-12-26 | Stop reason: HOSPADM

## 2023-12-21 RX ORDER — POLYETHYLENE GLYCOL 3350 17 G/17G
17 POWDER, FOR SOLUTION ORAL DAILY PRN
Status: DISCONTINUED | OUTPATIENT
Start: 2023-12-21 | End: 2023-12-26 | Stop reason: HOSPADM

## 2023-12-21 RX ORDER — TRAZODONE HYDROCHLORIDE 50 MG/1
50 TABLET ORAL NIGHTLY PRN
Status: DISCONTINUED | OUTPATIENT
Start: 2023-12-21 | End: 2023-12-26 | Stop reason: HOSPADM

## 2023-12-21 RX ORDER — GAUZE BANDAGE 2" X 2"
100 BANDAGE TOPICAL DAILY
Status: DISCONTINUED | OUTPATIENT
Start: 2023-12-21 | End: 2023-12-26 | Stop reason: HOSPADM

## 2023-12-21 RX ORDER — IBUPROFEN 400 MG/1
400 TABLET ORAL EVERY 6 HOURS PRN
Status: DISCONTINUED | OUTPATIENT
Start: 2023-12-21 | End: 2023-12-26 | Stop reason: HOSPADM

## 2023-12-21 RX ORDER — POTASSIUM CHLORIDE 20 MEQ/1
40 TABLET, EXTENDED RELEASE ORAL ONCE
Status: DISCONTINUED | OUTPATIENT
Start: 2023-12-21 | End: 2023-12-26 | Stop reason: HOSPADM

## 2023-12-21 RX ORDER — VENLAFAXINE HYDROCHLORIDE 37.5 MG/1
37.5 CAPSULE, EXTENDED RELEASE ORAL
Status: DISCONTINUED | OUTPATIENT
Start: 2023-12-22 | End: 2023-12-23

## 2023-12-21 NOTE — GROUP NOTE
Group Therapy Note    Attendees: 4/12       Patient's Goal:  Goal setting    Notes: The importance of having goals. Patient didn't participate in the morning orientation group despite staff invite to attend.

## 2023-12-21 NOTE — ED NOTES
Security at bedside to lock up patient belongings. Spoke to father.  Spoke to the patient.  Reviewed signs and symptoms of dehydration  Advised the following  1.  Measure intake and urine output and keep chart for 24 hours  2.  Take patient to the lab and get lab test done urgently  3.  If patient is noted to have signs of dehydration which were reviewed to contact office/come to the emergency room  4.  Advised to contact PMD

## 2023-12-21 NOTE — ED NOTES
[facility], [place]. Report #[id number].     [facility] ETA: 3:15-3:30am

## 2023-12-21 NOTE — ED NOTES
[] [address]    [] [address]    [x]  [address] ASSESSMENT      Y  N     [x] [] In the past two weeks have you had thoughts of hurting yourself in any way? [x] [] In the past two weeks have you had thoughts that you would be better off dead? [] [x] Have you made a suicide attempt in the past two months? [x] [] Do you have a plan for hurting yourself or suicide? [] [x] Presence of hallucinations/voices related to hurting himself or herself or someone else. SUICIDE/SECURITY WATCH PRECAUTION CHECKLIST     Orders    [x]  Suicide/Security Watch Precautions initiated as checked below:   12/20/23 10:51 PM EST 10/10    [x] Notified physician:  Silvino Maravilla MD  12/20/23 10:51 PM EST    [x] Orders obtained as appropriate:     [x] 1:1 Observer     [] Psych Consult     [] Psych Consult    Name:  Date:  Time:    [x] 1:1 Observer, Notified by:  Michelle Pavon RN    Contact Nurse Supervisor    [x] Remove all personal clothes from room and place in snap/paper gown/pants. Slipper only    [x] Remove all personal belongings from room and secured away from patient. Documentation    [x] Initiate Suicide/Security Watch Precaution Flow Sheet    [x] Initiate individualized Care Plan/Problem    [x] Document why precautions initiated on flow sheet (Initiate Nursing Care Plan/Problem)    [x] 1:1 Observer in place; instructions provided. Suicide precautions require observer be within arms length. [x] Nurse-Observer Communication Hand-off initiated by RN, reviewed with Observer. Subsequently used as Hand Off between Observers. [x] Initiate every 15 minute observations per observer as delegated by the RN.     [x] Initiate RN assessment and documentation    Environmental Scan  Search Criteria and Process: OPTIONAL, see Search Policy    [] Reason for search:    [] Nursing in presence of second person to search patient    [] Patient notified of reason for body assessment and belongings ideation/behavior  [x] Depression  [] Suicide attempt      [] Low self-esteem  [] Hallucinations      [] Feeling of Hopelessness  [] Substance abuse or withdrawal    [] Dysfunctional family  [] Major traumatic event, eg., divorce, etc   [] Excessive stress/anxiety    12/20/23    Expected Outcomes    Patient will:   [x] Patient will remain safe for the duration of their stay   [x] Patient's environment will be safe, eg. Free of potential suicide weapons   [x] Verbalize Recovery from suicidal episode and improvement in self-worth   [x] Discuss feeling that precipitated suicide attempt/thoughts/behavior   [x] Will describe available resources for crisis prevention and management   [x] Will verbalize positive coping skills     Nursing Intervention   [x] Assessment and Observations hourly   [x] Suicide Precautions implemented with patient, should be 1:1 observation   [x] Document observation x52eriv and RN assessment hourly   [] Consult physician for:    [] Psychiatric consult    [] Pharmacological therapy    [] Other:    [x] Patient search completed by security   [x] Initiated appropriate safety protocols by removing from the patient's environment anything that could be used to inflict self injury, eg. Order safe tray, snap gown, etc   [x] Maintain open, warm, caring, non-judgmental attitude/manner towards patient   [] Discuss advantages and disadvantages of existing coping methods/skills   [x] Assist and educate patient with identifying present strengths and coping skills   [x] Keep patient informed regarding plan of care and provide clear concise explanations. Provide the patient/family education information as well as telephone numbers and other information about crisis centers, hot lines, and counselors.     Discharge Planning:   [] Referral  [] Groups [] Health agencies  [] Other:

## 2023-12-21 NOTE — ED NOTES
Pt presents as a 37 yr old male to the ED with c/o suicidal ideation. Pt reports he has been struggling with the upcoming death anniversary of his grandmother, whom was his only familial support. Pt reports reports \"I've been depressed and don't have anyone to talk to\". Pt reports suicidal ideation of \"take pills if I had them\", does not have them and does not have access to them at this time. Pt reports he is also currently homeless. Pt reports a few months ago he went to [place] to rebuild a relationship with his biological father, but reports it did not work out and he had to leave emergently, leaving his medications and some other belongings behind. Pt reports past mental health diagnoses of depression, bipolar disorder, anxiety, and insomnia. Pt reports he is prescribed effexor, abilify, and remron, reports he had been being compliant up until 4 days ago when he left his medication in [place]. Pt reports he had been attending [facility] outpatient prior to moving to [place]. Pt reports previous suicide attempt in 2014 of intentional overdose. Per pt chart, pt most recently admitted to [address] [place] [address] from 9/18-10/3/23 and 10/24-10/27/23. Pt reports substance use induced hallucinations but does not experience them when sober. Pt denies homicidal ideations. Pt reports substance use of alcohol, marijuana, and cocaine. Pt reports he last used around 9pm on 12/20/23. Pt reports he ingested 1.5 beers, $10 worth of cocaine, and \"a couple hits of a adair\" for marijuana. Per pt chart, pt tested positive for amphetamines, barbituates, cocaine, benzodiazepines, and marijuana. Pt EToH level of .034. Pt reports he has been to [facility] treatment center in the past for substance use. Pt repots he is currently homeless. Pt denies legal concerns. Pt denies family history of suicide. Pt is his own guardian. Pt denies disease history. Pt denies seizure history.      Pt reports inability to contract for safety at this time, stating \"there is nothing\". Pt to be voluntary. SW recommends inpatient psychiatric admission. SW to contact [facility] ([place], [place]) to begin transfer process upon all labs resulted and medical clearance documented in chart.

## 2023-12-21 NOTE — ED PROVIDER NOTES
Extraocular movements intact. Pupils: Pupils are equal, round, and reactive to light. Cardiovascular:      Rate and Rhythm: Normal rate and regular rhythm. Pulses: Normal pulses. Heart sounds: Normal heart sounds. No murmur heard. Pulmonary:      Effort: Pulmonary effort is normal. No respiratory distress. Breath sounds: Normal breath sounds. No wheezing. Abdominal:      General: There is no distension. Palpations: Abdomen is soft. Tenderness: There is no abdominal tenderness. There is no guarding or rebound. Musculoskeletal:         General: Normal range of motion. Cervical back: Normal range of motion. Right lower leg: No edema. Left lower leg: No edema. Neurological:      General: No focal deficit present. Mental Status: He is alert and oriented to person, place, and time. Psychiatric:         Attention and Perception: Attention normal.         Mood and Affect: Mood is depressed. Speech: Speech normal.         Behavior: Behavior is cooperative. Thought Content: Thought content includes suicidal ideation. Thought content does not include homicidal ideation. Thought content includes suicidal plan. Thought content does not include homicidal plan. DDX/DIAGNOSTIC RESULTS / EMERGENCY DEPARTMENT COURSE / MDM     Medical Decision Making  51-year-old male presenting with suicidal ideation with plan to overdose. Patient has suicide attempts via overdose in the past.  Has been off of his medications. Will obtain I lab work, discussed with social work. No medical complaint at this time. Patient is medically clear for psychiatric evaluation. Amount and/or Complexity of Data Reviewed  Labs: ordered.       EKG  None    All EKG's are interpreted by the Emergency Department Physician who either signs or Co-signs this chart in the absence of a cardiologist.    EMERGENCY DEPARTMENT COURSE:    ED Course as of 12/20/23 2314   Wed Dec 20, 2023 2313 Patient without any medical complaint at this time. Patient is medically clear for psychiatric evaluation. Will obtain [facility] lab work as requested. [CP]      ED Course User Index  [CP] Kwasi Trujillo MD       PROCEDURES:  None    CONSULTS:  None    CRITICAL CARE:  There was significant risk of life threatening deterioration of patient's condition requiring my direct management. Critical care time 0 minutes, excluding any documented procedures. FINAL IMPRESSION      1. Suicidal ideation        DISPOSITION / PLAN     DISPOSITION Decision To Transfer 12/20/2023 11:13:58 PM      PATIENT REFERRED TO:  No follow-up provider specified.     DISCHARGE MEDICATIONS:  New Prescriptions    No medications on file       Ana Maria Nascimento MD  Emergency Medicine Resident    (Please note that portions of thisnote were completed with a voice recognition program.  Efforts were made to edit the dictations but occasionally words are mis-transcribed.)

## 2023-12-21 NOTE — ED NOTES
SUSAN reported case to [facility]. Accepted with diagnosis of depression with suicidal ideation under Sirena Timmons NP, Dr. Nolasco Brought accepting provider. SUSAN faxed voluntary form to [facility].

## 2023-12-21 NOTE — ED NOTES
SW contacted [facility] to begin transfer process, awaiting call back to report case.

## 2023-12-21 NOTE — ED PROVIDER NOTES
This patient was signed out to me by  at the completion of his shift. Patient apparently presented to the emergency department complaining of suicidal ideation and depression. His urine tox screen is positive for several substances. Patient is being evaluated by the  to determine disposition. Kurtis Gallegos MD  12/21/23 0021      I have been advised that the patient is awaiting transportation to an inpatient psychiatric facility. Patient was signed out to Dr. Austin Falk at the completion my shift.      Kurtis Gallegos MD  12/21/23 5121

## 2023-12-21 NOTE — ED NOTES
Patient presents to ED via triage with suicidal ideation. Patient states he has been feeling depressed due to the anniversary of his grandmother's death coming up. Patient states he does not have support from other family. Patient states he has a plan to take pills but does not currently have any at home. Patient states he had a previous suicide attempt back in 2014. Patient denies hallucinations or HI. Patient denies any pain. Patient is A&O x4 and sitter is at bedside.

## 2023-12-21 NOTE — GROUP NOTE
Psych-Ed/Relapse Prevention Group Note        Date: December 21, 2023 Start Time:  10:30am   End Time: 11:30am      Number of Participants in Group & Unit Census:  8/12    Topic: Coping skills    Goal of Group:Patient will identify benefits of music for coping and stress reduction. Comments:     Patient did not participate in Psych-Ed/Relapse Prevention group, despite staff encouragement and explanation of benefits. Patient remain seclusive to self. Q15 minute safety checks maintained for patient safety and will continue to encourage patient to attend unit programming.          Signature:  Papo Piper

## 2023-12-21 NOTE — GROUP NOTE
Psych-Ed/Relapse Prevention Group Note        Date: December 21, 2023 Start Time: 1:30pm  End Time: 2:30pm      Number of Participants in Group & Unit Census:  6/9    Topic: Socialization skills    Goal of Group:Patient will demonstrate improved interpersonal skills and offer peer support      Comments:     Patient did not participate in Psych-Ed/Relapse Prevention group, despite staff encouragement and explanation of benefits. Patient remain seclusive to self. Q15 minute safety checks maintained for patient safety and will continue to encourage patient to attend unit programming.          Signature:  Papo Lozano

## 2023-12-22 PROCEDURE — 1240000000 HC EMOTIONAL WELLNESS R&B

## 2023-12-22 PROCEDURE — 6370000000 HC RX 637 (ALT 250 FOR IP): Performed by: PSYCHIATRY & NEUROLOGY

## 2023-12-22 PROCEDURE — 99222 1ST HOSP IP/OBS MODERATE 55: CPT | Performed by: INTERNAL MEDICINE

## 2023-12-22 PROCEDURE — 6370000000 HC RX 637 (ALT 250 FOR IP): Performed by: NURSE PRACTITIONER

## 2023-12-22 PROCEDURE — 99232 SBSQ HOSP IP/OBS MODERATE 35: CPT | Performed by: PSYCHIATRY & NEUROLOGY

## 2023-12-22 RX ORDER — NALTREXONE HYDROCHLORIDE 50 MG/1
50 TABLET, FILM COATED ORAL
Status: DISCONTINUED | OUTPATIENT
Start: 2023-12-23 | End: 2023-12-24

## 2023-12-22 RX ORDER — POLYETHYLENE GLYCOL 3350 17 G
2 POWDER IN PACKET (EA) ORAL
Status: DISCONTINUED | OUTPATIENT
Start: 2023-12-22 | End: 2023-12-26 | Stop reason: HOSPADM

## 2023-12-22 RX ADMIN — NICOTINE POLACRILEX 2 MG: 2 LOZENGE ORAL at 17:41

## 2023-12-22 RX ADMIN — NICOTINE POLACRILEX 2 MG: 2 LOZENGE ORAL at 14:47

## 2023-12-22 RX ADMIN — VENLAFAXINE HYDROCHLORIDE 37.5 MG: 37.5 CAPSULE, EXTENDED RELEASE ORAL at 11:54

## 2023-12-22 RX ADMIN — THIAMINE HCL TAB 100 MG 100 MG: 100 TAB at 11:54

## 2023-12-22 RX ADMIN — NICOTINE POLACRILEX 2 MG: 2 LOZENGE ORAL at 12:42

## 2023-12-22 RX ADMIN — Medication 1 TABLET: at 11:54

## 2023-12-22 NOTE — GROUP NOTE
Group Therapy Note    Date: 12/22/2023    Group Start Time: 4676  Group End Time: 1120  Group Topic: Cognitive Skills    JOHNNY Pierre, CTRS    Cognitive Skills Group Note        Date: December 22, 2023 Start Time:  1045am   End Time:  1120am      Number of Participants in Group & Unit Census:  6/12    Topic: cognitive skills     Goal of Group: pt will demonstrate improved coping skills and improved decision making skills       Comments:     Patient did not participate in Cognitive Skills group, despite staff encouragement and explanation of benefits. Patient remain seclusive to self. Q15 minute safety checks maintained for patient safety and will continue to encourage patient to attend unit programming.               Signature:  Irene Barry

## 2023-12-22 NOTE — H&P
[facility] Internal Medicine    CONSULTATION / HISTORY AND PHYSICAL EXAMINATION            Date:   12/22/2023  Patient name:  Shanel Stewart  Date of admission:  12/21/2023  3:43 AM  MRN:   [id number]  Account:  [de-identified]  YOB: 1980  PCP:    No primary care provider on file. Room:   [address] [place] [address]  Code Status:    Full Code    Physician Requesting Consult: Clari Sawyer MD    Reason for Consult:  medical management    Chief Complaint:     No chief complaint on file. History Obtained From:     Patient medical record nursing staff    History of Present Illness:       Patient is a 72-year-old male with past medical history of anxiety depression polysubstance abuse, alcohol use presented to the ER with depression and suicidal ideation with plan to overdose  Currently denies any withdrawal symptoms, last drink was before he came to ER  Did has any chest pain shortness of breath    Past Medical History:     Past Medical History:   Diagnosis Date    Anxiety     Depression     Insomnia         Past Surgical History:     No past surgical history on file. Medications Prior to Admission:     Prior to Admission medications    Medication Sig Start Date End Date Taking? Authorizing Provider   venlafaxine (EFFEXOR XR) 37.5 MG extended release capsule Take 1 capsule by mouth daily (with breakfast) 10/28/23   Giuseppe Olmedo MD   thiamine mononitrate (THIAMINE) 100 MG tablet Take 1 tablet by mouth daily 10/28/23   Giuseppe Olmedo MD   Multiple Vitamins-Minerals (THERAPEUTIC MULTIVITAMIN-MINERALS) tablet Take 1 tablet by mouth daily 10/28/23   Giuseppe Olmedo MD   traZODone (DESYREL) 50 MG tablet Take 1 tablet by mouth nightly as needed for Sleep  Patient not taking: Reported on 12/21/2023 10/27/23   Giuseppe Olmedo MD        Allergies:     Fish-derived products    Social History:     Tobacco:    reports that he has been smoking.  He has never used smokeless tobacco.  Alcohol:
Department of Psychiatry  Attending Physician Psychiatric Assessment     Reason for Admission to Psychiatric Unit:  Threat to self requiring 24 hour professional observation  A mental disorder causing major disability in social, interpersonal, occupational, and/or educational functioning that is leading to dangerous or life-threatening functioning, and that can only be addressed in an acute inpatient setting   Concerns about patient's safety in the community    CHIEF COMPLAINT: Depression with suicidal ideation    History obtained from: Patient, electronic medical record          HISTORY OF PRESENT ILLNESS:    Seymour Peterson is a 37 y.o. male who has a past medical history of alcohol use disorder, polysubstance abuse, and depression. He presented to ED endorsing suicidal ideation with a plan to overdose. Noted that patient urine toxicology positive for benzodiazepines, barbiturates, amphetamines, cocaine and cannabinoid. Patient not able to contract for safety in the community and  is admitted to [facility] voluntarily. On assessment, patient is resting in bed. He is arousable to verbal stimuli, but is somnolent and falls asleep frequently throughout attempted assessment. Patient is oriented x4. endorses active suicidal thoughts. States that he feels hopeless and helpless. Notes that he [place] [facility] [place] and [place] left and relapsed on multiple recreational substances including cocaine) inhalation method) cannabis and amphetamines. Notes that he was treated with benzodiazepines and barbiturates. Denies experiencing any withdrawal symptoms at this time. Patient states that he did drink 3 beers prior to admission to hospital and EtOH was 0.034%. Liver enzymes not concerning. Patient does express desire to be monitored with inpatient alcohol use program and plans to call [place].    We reviewed patient's psychiatric symptoms.   He endorses multiple depressive episodes lasting
ideation admitted for same noted to have hypokalemia potassium 3.6 patient denies any nausea vomiting diarrhea no cathartic or diuretic abuse  No paresthesia  Patient also noted to have elevated liver function ALT is at 42 with a normal bilirubin and alkaline phosphatase  Patient admits to be drinking alcohol and admits to be doing street drug patient is positive for barbiturates benzodiazepine and cannabis and cocaine  Hypokalemia 40 mEq potassium oral 1  Counseled against 3 drug  Use minimally elevated liver function in the setting of alcohol  With positive hepatitis C advised to follow-up with gastroenterologist outpatient for [facility] treatment  Out pt consult placed   Advised to avoid alcohol      Jessica Crane MD  12/21/2023  8:59 AM    Copy sent to Dr. Dean Fonseca primary care provider on file. Please note that this chart was generated using voice recognition Dragon dictation software. Although every effort was made to ensure the accuracy of this automated transcription, some errors in transcription may have occurred.

## 2023-12-22 NOTE — GROUP NOTE
Group Therapy Note    Date: 12/22/2023    Group Start Time: 1330  Group End Time: 9175  Group Topic: recreation group     [address] [place] [address]    Attendees 6/10       Patient's Goal:  pt will demonstrate improved coping skills and improved leisure awareness    Notes:   pt was pleasant and participated well. Status After Intervention:  Improved    Participation Level:  Active Listener and Interactive    Participation Quality: Appropriate, Sharing, and Supportive      Speech:  normal      Thought Process/Content: Logical      Affective Functioning: Congruent      Mood: euthymic      Level of consciousness:  Alert      Response to Learning: Able to verbalize current knowledge/experience, Capable of insight, and Progressing to goal      Endings: None Reported    Modes of Intervention: Support, Socialization, and Activity      Discipline Responsible: Psychoeducational Specialist

## 2023-12-23 PROCEDURE — 1240000000 HC EMOTIONAL WELLNESS R&B

## 2023-12-23 PROCEDURE — 6370000000 HC RX 637 (ALT 250 FOR IP): Performed by: PSYCHIATRY & NEUROLOGY

## 2023-12-23 PROCEDURE — 6370000000 HC RX 637 (ALT 250 FOR IP): Performed by: NURSE PRACTITIONER

## 2023-12-23 PROCEDURE — 99231 SBSQ HOSP IP/OBS SF/LOW 25: CPT | Performed by: INTERNAL MEDICINE

## 2023-12-23 PROCEDURE — 99232 SBSQ HOSP IP/OBS MODERATE 35: CPT

## 2023-12-23 RX ORDER — VENLAFAXINE HYDROCHLORIDE 75 MG/1
75 CAPSULE, EXTENDED RELEASE ORAL
Status: DISCONTINUED | OUTPATIENT
Start: 2023-12-24 | End: 2023-12-26 | Stop reason: HOSPADM

## 2023-12-23 RX ADMIN — NALTREXONE HYDROCHLORIDE 50 MG: 50 TABLET, FILM COATED ORAL at 12:09

## 2023-12-23 RX ADMIN — Medication 1 TABLET: at 12:07

## 2023-12-23 RX ADMIN — NICOTINE POLACRILEX 2 MG: 2 LOZENGE ORAL at 12:07

## 2023-12-23 RX ADMIN — VENLAFAXINE HYDROCHLORIDE 37.5 MG: 37.5 CAPSULE, EXTENDED RELEASE ORAL at 12:07

## 2023-12-23 RX ADMIN — THIAMINE HCL TAB 100 MG 100 MG: 100 TAB at 12:07

## 2023-12-23 NOTE — GROUP NOTE
Group Therapy Note    Date: 12/23/2023    Group Start Time: 9724  Group End Time: 0415  Group Topic: Recreational    STCZ GERRI Sotelo RN        Group Therapy Note    Attendees: 5/10       Patient's Goal:  Improve interpersonal communication skills    Notes:  What do you dez game     Status After Intervention:  Improved    Participation Level:  Active Listener and Interactive    Participation Quality: Appropriate, Attentive, Sharing, and Supportive      Speech:  normal      Thought Process/Content: Logical  Linear      Affective Functioning: Congruent      Mood: euthymic      Level of consciousness:  Alert, Oriented x4, and Attentive      Response to Learning: Able to verbalize current knowledge/experience      Endings: None Reported    Modes of Intervention: Support, Socialization, and Activity      Discipline Responsible: Registered Nurse      Signature:  Marsha Baptiste RN

## 2023-12-24 PROCEDURE — 6370000000 HC RX 637 (ALT 250 FOR IP)

## 2023-12-24 PROCEDURE — 99232 SBSQ HOSP IP/OBS MODERATE 35: CPT

## 2023-12-24 PROCEDURE — 6370000000 HC RX 637 (ALT 250 FOR IP): Performed by: PSYCHIATRY & NEUROLOGY

## 2023-12-24 PROCEDURE — 1240000000 HC EMOTIONAL WELLNESS R&B

## 2023-12-24 PROCEDURE — 6370000000 HC RX 637 (ALT 250 FOR IP): Performed by: NURSE PRACTITIONER

## 2023-12-24 PROCEDURE — 6360000002 HC RX W HCPCS

## 2023-12-24 RX ADMIN — NALTREXONE 380 MG: KIT at 14:28

## 2023-12-24 RX ADMIN — NICOTINE POLACRILEX 2 MG: 2 LOZENGE ORAL at 09:36

## 2023-12-24 RX ADMIN — NICOTINE POLACRILEX 2 MG: 2 LOZENGE ORAL at 14:56

## 2023-12-24 RX ADMIN — VENLAFAXINE HYDROCHLORIDE 75 MG: 75 CAPSULE, EXTENDED RELEASE ORAL at 09:39

## 2023-12-24 RX ADMIN — THIAMINE HCL TAB 100 MG 100 MG: 100 TAB at 09:36

## 2023-12-24 RX ADMIN — Medication 1 TABLET: at 09:36

## 2023-12-24 RX ADMIN — NALTREXONE HYDROCHLORIDE 50 MG: 50 TABLET, FILM COATED ORAL at 09:36

## 2023-12-24 NOTE — GROUP NOTE
Group Therapy Note    Date: 12/24/2023    Group Start Time: 1300  Group End Time: 4805  Group Topic: Recreational    STCZ GERRI Batres RN        Group Therapy Note    Attendees: 5/10       Patient's Goal:  Work on interpersonal communication skills     Notes:  Patient talked about favorite andres memories while coloring andres coloring pages and listening to [employer]. Status After Intervention:  Improved    Participation Level:  Active Listener and Interactive    Participation Quality: Appropriate, Attentive, Sharing, and Supportive      Speech:  normal      Thought Process/Content: Logical  Linear      Affective Functioning: Congruent      Mood: euthymic      Level of consciousness:  Alert, Oriented x4, and Attentive      Response to Learning: Progressing to goal      Endings: None reported    Modes of Intervention: Support and Socialization      Discipline Responsible: Registered Nurse      Signature:  Joanie Paz RN

## 2023-12-24 NOTE — GROUP NOTE
Group Therapy Note    Date: 12/24/2023    Group Start Time: 1306  Group End Time: 1140  Group Topic: Music Therapy    JOHNNY Grayson        Group Therapy Note    Attendees: 5/10     Patient's Goal:  Patients were asked to share a song to dedicate to an important person in their life. Were asked questions about these people and relationships as it related to their music and sharing about these people. Goals to reflect on relationships; Increase sense of community; Increase socialization; Normalization of the environment; Demonstrate positive use of time; Increase self-expression    Notes:  Patient attended and participated in group having positive interactions with peers and staff throughout. Patient was pleasant and engaging,  and supportive of peers sharing and music selections. Laila shared music and dedicated it to his children. Talked about being an addict when he found out he was going to be a father, and lessons he learned in being a father, as well as fears and preparation for discharge knowing his past habits with sobriety. Worried he will Orpha Soledad make it through the weekend\" with his sobriety, which is a reflection of lyrics from his song selection. Status After Intervention:  Improved    Participation Level:  Active Listener and Interactive    Participation Quality: Appropriate, Attentive, Sharing, and Supportive      Speech:  normal      Thought Process/Content: Logical  Linear      Affective Functioning: Congruent      Mood: euthymic      Level of consciousness:  Alert and Attentive      Response to Learning: Able to verbalize current knowledge/experience and Progressing to goal      Endings: None Reported    Modes of Intervention: Support, Socialization, Exploration, Activity, Media, and Reality-testing      Discipline Responsible: Psychoeducational Specialist      Signature:  Renee Grayson

## 2023-12-24 NOTE — GROUP NOTE
Group Therapy Note    Date: 12/24/2023    Group Start Time: 1500  Group End Time: 6630  Group Topic: Nursing    JOHNNY Myers, RN        Group Therapy Note    Attendees: 13/13    Safety checks completed. No contraband found.

## 2023-12-25 PROCEDURE — 1240000000 HC EMOTIONAL WELLNESS R&B

## 2023-12-25 PROCEDURE — 99231 SBSQ HOSP IP/OBS SF/LOW 25: CPT | Performed by: INTERNAL MEDICINE

## 2023-12-25 PROCEDURE — 6370000000 HC RX 637 (ALT 250 FOR IP): Performed by: NURSE PRACTITIONER

## 2023-12-25 PROCEDURE — 6370000000 HC RX 637 (ALT 250 FOR IP): Performed by: PSYCHIATRY & NEUROLOGY

## 2023-12-25 PROCEDURE — 99232 SBSQ HOSP IP/OBS MODERATE 35: CPT | Performed by: NURSE PRACTITIONER

## 2023-12-25 PROCEDURE — 6370000000 HC RX 637 (ALT 250 FOR IP)

## 2023-12-25 RX ADMIN — Medication 1 TABLET: at 10:02

## 2023-12-25 RX ADMIN — ACETAMINOPHEN 650 MG: 325 TABLET ORAL at 19:49

## 2023-12-25 RX ADMIN — THIAMINE HCL TAB 100 MG 100 MG: 100 TAB at 10:01

## 2023-12-25 RX ADMIN — VENLAFAXINE HYDROCHLORIDE 75 MG: 75 CAPSULE, EXTENDED RELEASE ORAL at 09:00

## 2023-12-25 NOTE — GROUP NOTE
Group Therapy Note    Date: 12/25/2023    Group Start Time: 1100  Group End Time: 7721  Group Topic: Activity    ROSELYN Boggs        Group Therapy Note    Attendees: 1/14     Patient's GoalTo increase social interaction, creative expression, coping skills, reminiscence, and   communication skills. Notes:  Pt was pleasant and cooperative,and participated fully in task. Pt was able to engage in creative   expression briefly, but more so talked at length with RT about coping skills, recovery treatment, and   discharge plans with RT. Pt was appreciative of Holiday Snack provided by RT but declined, stating that due to a number of losses at   this time of year he preferred not to think about the Holidays. Pt shared with RT about losses and sober    methids of coping. Status After Intervention:  Improved     Participation Level: Active Listener and Interactive     Participation Quality: Appropriate, Attentive, Sharing     Speech:  normal       Thought Process/Content: Logical, linear r/t task and topic.        Affective Functioning: Blunted, brightened       Mood: Cooperative, Euthymic      Level of consciousness:  Alert, Oriented x4, and Attentive      Response to Learning: Able to verbalize current knowledge/experience, Able to verbalize/acknowledge   new learning, and Progressing to goal      Endings: None Reported      Modes of Intervention: Education, Support, Socialization, and Problem-solving    Discipline Responsible: Psychoeducational Specialist      Signature:  Leighann Khoury

## 2023-12-25 NOTE — PROGRESS NOTES
[address] [facility]    CONSULTATION / HISTORY AND PHYSICAL EXAMINATION            Date:   12/25/2023  Patient name:  Janell Parada  Date of admission:  12/21/2023  3:43 AM  MRN:   [id number]  Account:  [de-identified]  YOB: 1980  PCP:    No primary care provider on file. Room:   [address] [place] [address]  Code Status:    Full Code    Physician Requesting Consult: Oliva Dillon MD    Reason for Consult:  medical management    Chief Complaint:     No chief complaint on file. History Obtained From:     Patient medical record nursing staff    History of Present Illness:       Patient is a 45-year-old male with past medical history of anxiety depression polysubstance abuse, alcohol use presented to the ER with depression and suicidal ideation with plan to overdose  Currently denies any withdrawal symptoms, last drink was before he came to ER  Did has any chest pain shortness of breath    Past Medical History:     Past Medical History:   Diagnosis Date    Anxiety     Depression     Insomnia         Past Surgical History:     No past surgical history on file. Medications Prior to Admission:     Prior to Admission medications    Medication Sig Start Date End Date Taking? Authorizing Provider   venlafaxine (EFFEXOR XR) 37.5 MG extended release capsule Take 1 capsule by mouth daily (with breakfast) 10/28/23   Yakelin Mabry MD   thiamine mononitrate (THIAMINE) 100 MG tablet Take 1 tablet by mouth daily 10/28/23   Yakelin Mabry MD   Multiple Vitamins-Minerals (THERAPEUTIC MULTIVITAMIN-MINERALS) tablet Take 1 tablet by mouth daily 10/28/23   Yakelin Mbary MD   traZODone (DESYREL) 50 MG tablet Take 1 tablet by mouth nightly as needed for Sleep  Patient not taking: Reported on 12/21/2023 10/27/23   Yakelin Mabry MD        Allergies:     Fish-derived products    Social History:     Tobacco:    reports that he has been smoking.  He has never used smokeless tobacco.  Alcohol:
Behavioral Services  Medicare Certification Upon Admission    I certify that this patient's inpatient psychiatric hospital admission is medically necessary for:    [x] (1) Treatment which could reasonably be expected to improve this patient's condition,       [x] (2) Or for diagnostic study;     AND     [x](2) The inpatient psychiatric services are provided while the individual is under the care of a physician and are included in the individualized plan of care.     Estimated length of stay/service 4 to 7 days    Plan for post-hospital care home with outpatient community mental health follow-up    Electronically signed by Ruben Rodarte MD on 12/21/2023 at 5:31 PM
Daily Progress Note  12/22/2023    Patient Name: Pieter Resendez: Depression with suicidal ideation         SUBJECTIVE:      Patient is seen today for a follow up assessment. Staff reports that patient has been attending group activity. They report that he was disappointed [facility] would not have a bed available soon, he was active and reaching out to other programs for disposition planning. He was pulled from group today to engage with this author. He endorses an improvement in his mood largely because he had a good conversation and plans to follow-up for an intake this afternoon with the [facility]. He reports that he has been there before. He reports that programming that is set up to assist him for 2 weeks will not be effective enough for maintaining long-term recovery. He states \"their program is longer than I like it to be but I know it is the best plan\". He identifies that the [facility] has a 6-month plan and he will actively have to participate in work to remain there. He reports that it will assist him in focusing on recovery and functioning. He does express interest in naltrexone/Vivitrol though reports that he does not want it to impact his ability to discharge. He reports that he previously discussed this however never got started on the medication regimen. He reports that alcohol and cocaine are his biggest concern. He was advised to follow-up with social work after his intake this afternoon as part of the process for treatment planning.     Appetite:  [x] Adequate/Unchanged  [] Increased  [] Decreased      Sleep:       [x] Adequate/Unchanged  [] Fair  [] Poor      Group Attendance on Unit:   [x] Yes   [] Selectively    [] No    Compliant with scheduled medications: [x] Yes  [] No    Received emergency medications in past 24 hrs: [] Yes   [x] No    Medication Side Effects: Denies         Mental Status Exam  Level of consciousness: Alert and awake   Appearance:
Daily Progress Note  12/24/2023    Patient Name: Jude Loss: Depression with suicidal ideation         SUBJECTIVE:      Patient is seen today for a follow up assessment. When approached, patient accepted need for privacy. Interview occurred privately in the milieu. Patient reports no side effects from hormone Traxam, or discontinued and Vivitrol injection ordered. Patient continues to endorse hope about sobriety going forward and reports planning continues to discharge to [facility] on Tuesday. Patient is currently reporting improvement in depression and suicidal ideation. Patient is reporting no perceptual disturbances. Patient denies medication side effects and reports he is tolerating titration of Effexor well. Improvement is noted however patient is unable to contract for safety outside the hospital at this time. Plan for discharge to stay pending affirmation of placement at [facility]. Appetite:  [x] Adequate/Unchanged  [] Increased  [] Decreased      Sleep:       [x] Adequate/Unchanged[]   Fair  [] Poor      Group Attendance on Unit:   [x] Yes   [] Selectively    [] No    Compliant with scheduled medications: [x] Yes  [] No    Received emergency medications in past 24 hrs: [] Yes   [x] No    Medication Side Effects: Denies         Mental Status Exam  Level of consciousness: Alert and awake   Appearance: Appropriate attire for setting, seated in chair, with fair  grooming and hygiene   Behavior/Motor: Approachable, engages with interviewer, no psychomotor abnormalities   Attitude toward examiner: Cooperative, attentive, good eye contact  Speech: Normal rate, volume and tone  Mood: \"better\"  Affect: Congruent  Thought processes: Linear and coherent, goal directed  Thought content: Denies homicidal ideation  Suicidal Ideation: Reports improvement in suicidal ideations, contracts for safety on the unit. Delusions: No evidence of delusions.    Perceptual Disturbance:
Daily Progress Note  12/25/2023    Patient Name: Lorena Mosqueda: Depression with suicidal ideation         SUBJECTIVE:      Patient seen face-to-face for follow-up assessment. He is resting in bed but is awake and alert. Thought process is linear and goal-directed. He is oriented x 4. Patient feeling more forward-looking and hopeful. States that he has been accepted to A[facility] inpatient treatment programming at [facility] and believes that there should be a bed available for him tomorrow. Patient has been compliant with his psychotropic medications. Venlafaxine titrated to 75 mg. He also received Vivitrol long-acting injectable yesterday without complication. We reviewed for any potential side effects and patient denies all. He does express desire for sobriety and wants to start this next year out differently. Patient denying any perceptual disturbances. Does not appear to be internally preoccupied or responding to internal stimuli.   Will observe for stability overnight with likely discharge tomorrow pending placement at AOD treatment program.    Appetite:  [x] Adequate/Unchanged  [] Increased  [] Decreased      Sleep:       [x] Adequate/Unchanged[]   Fair  [] Poor      Group Attendance on Unit:   [x] Yes   [] Selectively    [] No    Compliant with scheduled medications: [x] Yes  [] No    Received emergency medications in past 24 hrs: [] Yes   [x] No    Medication Side Effects: Denies         Mental Status Exam  Level of consciousness: Alert and awake   Appearance: Appropriate attire for setting, resting in bed, with fair  grooming and hygiene   Behavior/Motor: Approachable, engages with interviewer, no psychomotor abnormalities   Attitude toward examiner: Cooperative, attentive, good eye contact  Speech: Normal rate, volume and tone  Mood: \"better\"  Affect: Congruent  Thought processes: Linear and coherent, goal directed  Thought content: Denies homicidal ideation  Suicidal Ideation:
RT ASSESSMENT TREATMENT GOALS    [x]Pt Goal:  Pt will identify 1-2 positive coping skills by time of discharge. []Pt Goal:  Pt will identify 1-2 positive aspects of self by time of discharge. []Pt Goal:  Pt will remain on task/topic for 15-30 minutes during group by time of discharge. [x]Pt Goal:  Pt will identify 1-2 aspects of relapse prevention plan by time of discharge. []Pt Goal:  Pt will join in conversation with peers 1-2 times per group by time of discharge. []Pt Goal:  Pt will identify 1-2 new leisure interests by time of discharge.     []Pt Goal: Pt will maintain behavorial control until the time of discharge. '
bruits, thyroid not palpable  Lungs: Bilateral equal air entry, clear to ausculation, no wheezing, rales or rhonchi, normal effort  Cardiovascular: normal rate, regular rhythm, no murmur, gallop, rub. Abdomen: Soft, nontender, nondistended, normal bowel sounds, no hepatomegaly or splenomegaly  Neurologic: There are no new focal motor or sensory deficits, normal muscle tone and bulk, no abnormal sensation, normal speech, cranial nerves II through XII grossly intact  Skin: No gross lesions, rashes, bruising or bleeding on exposed skin area  Extremities:  peripheral pulses palpable, no pedal edema or calf pain with palpation  Psych: Investigations:      Laboratory Testing:  No results found for this or any previous visit (from the past 24 hour(s)). Consultations:   IP CONSULT TO INTERNAL MEDICINE    Assessment :      Primary Problem  Depression with suicidal ideation    Active Hospital Problems    Diagnosis Date Noted    Major depressive disorder, recurrent severe without psychotic features ([address]) [F33.2] 12/21/2023    Depression with suicidal ideation [F32. A, R45.851] 09/18/2023       Plan:     Depression with suicidal ideation to be managed by psychiatry  Alcohol abuse, monitor for withdrawal, on thiamine multivitamin  Polysubstance Patient positive for amphetamines barbiturates benzodiazepines cannabis and cocaine , currently denies any chest pain shortness of breath, watch closely for withdrawal, strongly recommend cessation  DVT prophylaxis patient ambulatory  Hypokalemia potassium replaced      Monitor for withdrawal  Vitals have been stable,  Sue Macias MD  12/23/2023  2:43 PM    Copy sent to Dr. Jyoti Matthew primary care provider on file. Please note that this chart was generated using voice recognition Dragon dictation software. Although every effort was made to ensure the accuracy of this automated transcription, some errors in transcription may have occurred.
tablet 50 mg, 50 mg, Oral, TID PRN  ibuprofen (ADVIL;MOTRIN) tablet 400 mg, 400 mg, Oral, Q6H PRN  polyethylene glycol (GLYCOLAX) packet 17 g, 17 g, Oral, Daily PRN  traZODone (DESYREL) tablet 50 mg, 50 mg, Oral, Nightly PRN  potassium chloride (KLOR-CON M) extended release tablet 40 mEq, 40 mEq, Oral, Once  thiamine mononitrate tablet 100 mg, 100 mg, Oral, Daily  therapeutic multivitamin-minerals 1 tablet, 1 tablet, Oral, Daily    ASSESSMENT  Depression with suicidal ideation    Alcohol abuse         PLAN  Patient symptoms: Showing modest improvement  Medications Changed Today. A discussion of risks, benefits, and alternatives was held with the patient and this provider with regards to medication changes. After this discussion we mutually agreed to proceed with the medication changes. Modify order: Titrate Effexor to 75 mg daily starting tomorrow  Plan to order Vivitrol injection tomorrow pending continued tolerance with oral naltrexone. Monitor need and frequency of PRN medications. Encourage participation in groups and milieu. Attempt to develop insight. Psycho-education conducted. Probable discharge is next week. Follow-up daily while inpatient. Patient continues to be monitored in the inpatient psychiatric facility at [facility] for safety and stabilization. Patient continues to need, on a daily basis, active treatment furnished directly by or requiring the supervision of inpatient psychiatric personnel. Electronically signed by CRISTOPHER Mtz CNP on 12/23/2023 at 4:02 PM    **This report has been created using voice recognition software. It may contain minor errors which are inherent in voice recognition technology. **

## 2023-12-26 VITALS
HEIGHT: 70 IN | WEIGHT: 195 LBS | TEMPERATURE: 97.6 F | SYSTOLIC BLOOD PRESSURE: 132 MMHG | DIASTOLIC BLOOD PRESSURE: 72 MMHG | HEART RATE: 66 BPM | RESPIRATION RATE: 14 BRPM | BODY MASS INDEX: 27.92 KG/M2 | OXYGEN SATURATION: 100 %

## 2023-12-26 PROCEDURE — 99239 HOSP IP/OBS DSCHRG MGMT >30: CPT | Performed by: PSYCHIATRY & NEUROLOGY

## 2023-12-26 PROCEDURE — 6370000000 HC RX 637 (ALT 250 FOR IP): Performed by: PSYCHIATRY & NEUROLOGY

## 2023-12-26 PROCEDURE — 6370000000 HC RX 637 (ALT 250 FOR IP): Performed by: NURSE PRACTITIONER

## 2023-12-26 PROCEDURE — 6370000000 HC RX 637 (ALT 250 FOR IP)

## 2023-12-26 RX ORDER — THIAMINE MONONITRATE (VIT B1) 100 MG
100 TABLET ORAL DAILY
Qty: 30 TABLET | Refills: 0 | Status: SHIPPED | OUTPATIENT
Start: 2023-12-26

## 2023-12-26 RX ORDER — VENLAFAXINE HYDROCHLORIDE 75 MG/1
75 CAPSULE, EXTENDED RELEASE ORAL
Qty: 30 CAPSULE | Refills: 0 | Status: SHIPPED | OUTPATIENT
Start: 2023-12-27

## 2023-12-26 RX ORDER — POLYETHYLENE GLYCOL 3350 17 G
2 POWDER IN PACKET (EA) ORAL
Qty: 100 EACH | Refills: 0 | Status: SHIPPED | OUTPATIENT
Start: 2023-12-26

## 2023-12-26 RX ORDER — HYDROXYZINE 50 MG/1
50 TABLET, FILM COATED ORAL 3 TIMES DAILY PRN
Qty: 30 TABLET | Refills: 0 | Status: SHIPPED | OUTPATIENT
Start: 2023-12-26 | End: 2024-01-05

## 2023-12-26 RX ORDER — M-VIT,TX,IRON,MINS/CALC/FOLIC 27MG-0.4MG
1 TABLET ORAL DAILY
Qty: 30 TABLET | Refills: 0 | Status: SHIPPED | OUTPATIENT
Start: 2023-12-26

## 2023-12-26 RX ADMIN — Medication 1 TABLET: at 07:59

## 2023-12-26 RX ADMIN — THIAMINE HCL TAB 100 MG 100 MG: 100 TAB at 07:59

## 2023-12-26 RX ADMIN — ACETAMINOPHEN 650 MG: 325 TABLET ORAL at 08:59

## 2023-12-26 RX ADMIN — NICOTINE POLACRILEX 2 MG: 2 LOZENGE ORAL at 09:53

## 2023-12-26 RX ADMIN — VENLAFAXINE HYDROCHLORIDE 75 MG: 75 CAPSULE, EXTENDED RELEASE ORAL at 07:59

## 2023-12-26 NOTE — PLAN OF CARE
[facility]  Initial Interdisciplinary Treatment Plan NO      Original treatment plan Date & Time: 12/21/2023   1330    Admission Type:  Admission Type: Voluntary    Reason for admission:   Reason for Admission: Suicidal ideations to overdose on pills, increased depression due to anniversary of grandmother passing away. Lack of support from family and homelessness. Polysubstance abuse. Estimated Length of Stay:  5-7days  Estimated Discharge Date: to be determined by physician    PATIENT STRENGTHS:  Patient Strengths:   Patient Strengths and Limitations:   Addictive Behavior: Addictive Behavior  In the Past 3 Months, Have You Felt or Has Someone Told You That You Have a Problem With  : None  Medical Problems:  Past Medical History:   Diagnosis Date    Anxiety     Depression     Insomnia      Status EXAM:Mental Status and Behavioral Exam  Normal: No  Level of Assistance: Independent/Self  Facial Expression: Avoids Gaze, Flat  Affect: Blunt  Level of Consciousness: Alert  Frequency of Checks: 4 times per hour, close  Mood:Normal: No  Mood: Depressed, Anxious, Irritable  Motor Activity:Normal: No  Motor Activity: Decreased  Eye Contact: Poor  Observed Behavior: Preoccupied, Guarded  Sexual Misconduct History: Current - no  Preception: [place] to person, [place] to time, [place] to place, [place] to situation  Attention:Normal: No  Attention: Distractible  Thought Processes: Blocking  Thought Content:Normal: No  Thought Content: Preoccupations  Depression Symptoms: Impaired concentration, Feelings of hopelessess, Feelings of helplessness, Feelings of worthlessness  Anxiety Symptoms: Generalized, Unexplained fears  Yumiko Symptoms: No problems reported or observed.   Hallucinations: Unable to assess  Delusions:  (unable to assess)  Memory:Normal:  (unable to assess)  Insight and Judgment: No  Insight and Judgment: Poor judgment, Poor insight, Unmotivated    EDUCATION:   Learner Progress Toward Treatment
Problem: Coping  Goal: Pt/Family able to verbalize concerns and demonstrate effective coping strategies  Description: INTERVENTIONS:  1. Assist patient/family to identify coping skills, available support systems and cultural and spiritual values  2. Provide emotional support, including active listening and acknowledgement of concerns of patient and caregivers  3. Reduce environmental stimuli, as able  4. Instruct patient/family in relaxation techniques, as appropriate  5. Assess for spiritual pain/suffering and initiate Spiritual Care, Psychosocial Clinical Specialist consults as needed  12/23/2023 1256 by Fany Galloway LPN  Note: Patient is progressing towards goal.     Problem: Self Harm/Suicidality  Goal: Will have no self-injury during hospital stay  Description: INTERVENTIONS:  1. Ensure constant observer at bedside with Q15M safety checks  2. Maintain a safe environment  3. Secure patient belongings  4. Ensure family/visitors adhere to safety recommendations  5. Ensure safety tray has been added to patient's diet order  6. Every shift and PRN: Re-assess suicidal risk via Frequent Screener    Note: Patient denies suicidal/homicidal ideations but reports having some anxiety and depression related to a after care facility and maintaining his sobriety. Patient is flat but compliant with his medication regime. Also, he is cooperative and positive for attending group as Q15 minute safety checks continue to be conducted continuously and as needed.
Problem: Coping  Goal: Pt/Family able to verbalize concerns and demonstrate effective coping strategies  Description: INTERVENTIONS:  1. Assist patient/family to identify coping skills, available support systems and cultural and spiritual values  2. Provide emotional support, including active listening and acknowledgement of concerns of patient and caregivers  3. Reduce environmental stimuli, as able  4. Instruct patient/family in relaxation techniques, as appropriate  5. Assess for spiritual pain/suffering and initiate Spiritual Care, Psychosocial Clinical Specialist consults as needed  12/24/2023 1141 by Francisco J Hannon LPN  Outcome: Progressing     Problem: Depression/Self Harm  Goal: Effect of psychiatric condition will be minimized and patient will be protected from self harm  Description: INTERVENTIONS:  1. Assess impact of patient's symptoms on level of functioning, self care needs and offer support as indicated  2. Assess patient/family knowledge of depression, impact on illness and need for teaching  3. Provide emotional support, presence and reassurance  4. Assess for possible suicidal thoughts or ideation. If patient expresses suicidal thoughts or statements do not leave alone, initiate Suicide Precautions, move to a room close to the nursing station and obtain sitter  5. Initiate consults as appropriate with Mental Health Professional, Spiritual Care, Psychosocial CNS, and consider a recommendation to the LIP for a Psychiatric Consultation  12/24/2023 1141 by Francisco J Hannon LPN  Note: Patient denies any intent to inflict self harm as F46 minute checks continue. Problem: Self Harm/Suicidality  Goal: Will have no self-injury during hospital stay  Description: INTERVENTIONS:  1. Ensure constant observer at bedside with Q15M safety checks  2. Maintain a safe environment  3. Secure patient belongings  4. Ensure family/visitors adhere to safety recommendations  5.   Ensure safety tray has been
Problem: Coping  Goal: Pt/Family able to verbalize concerns and demonstrate effective coping strategies  Description: INTERVENTIONS:  1. Assist patient/family to identify coping skills, available support systems and cultural and spiritual values  2. Provide emotional support, including active listening and acknowledgement of concerns of patient and caregivers  3. Reduce environmental stimuli, as able  4. Instruct patient/family in relaxation techniques, as appropriate  5. Assess for spiritual pain/suffering and initiate Spiritual Care, Psychosocial Clinical Specialist consults as needed  Outcome: Progressing     Problem: Depression/Self Harm  Goal: Effect of psychiatric condition will be minimized and patient will be protected from self harm  Description: INTERVENTIONS:  1. Assess impact of patient's symptoms on level of functioning, self care needs and offer support as indicated  2. Assess patient/family knowledge of depression, impact on illness and need for teaching  3. Provide emotional support, presence and reassurance  4. Assess for possible suicidal thoughts or ideation. If patient expresses suicidal thoughts or statements do not leave alone, initiate Suicide Precautions, move to a room close to the nursing station and obtain sitter  5. Initiate consults as appropriate with Mental Health Professional, Spiritual Care, Psychosocial CNS, and consider a recommendation to the LIP for a Psychiatric Consultation  Outcome: Progressing    The patient has been selectively social and watching movies. He voiced frustrations about his current medications, he was able to self calm. He was easily redirected. He denies current thoughts of self harm. He denies experiencing anxiousness and depression. Writer will continue to offer emotional support. Q15 minute checks to continue for safety.
Problem: Depression/Self Harm  Goal: Effect of psychiatric condition will be minimized and patient will be protected from self harm  Description: INTERVENTIONS:  1. Assess impact of patient's symptoms on level of functioning, self care needs and offer support as indicated  2. Assess patient/family knowledge of depression, impact on illness and need for teaching  3. Provide emotional support, presence and reassurance  4. Assess for possible suicidal thoughts or ideation. If patient expresses suicidal thoughts or statements do not leave alone, initiate Suicide Precautions, move to a room close to the nursing station and obtain sitter  5. Initiate consults as appropriate with Mental Health Professional, Spiritual Care, Psychosocial CNS, and consider a recommendation to the LIP for a Psychiatric Consultation  12/21/2023 2017 by Delaney Layne RN  Outcome: Progressing    Pt denies thoughts of self-harm, safety checks maintained Q15 minutes. Pt was irritable upon  approach and refused vital signs and selectively answered assessment questions. Isolative to room and often sleeping. Pt educated on importance of compliance with treatment. Hygiene encouraged.
Problem: Depression/Self Harm  Goal: Effect of psychiatric condition will be minimized and patient will be protected from self harm  Description: INTERVENTIONS:  1. Assess impact of patient's symptoms on level of functioning, self care needs and offer support as indicated  2. Assess patient/family knowledge of depression, impact on illness and need for teaching  3. Provide emotional support, presence and reassurance  4. Assess for possible suicidal thoughts or ideation. If patient expresses suicidal thoughts or statements do not leave alone, initiate Suicide Precautions, move to a room close to the nursing station and obtain sitter  5. Initiate consults as appropriate with Mental Health Professional, Spiritual Care, Psychosocial CNS, and consider a recommendation to the LIP for a Psychiatric Consultation  12/26/2023 0959 by Yuni Garcia RN  Outcome: Progressing     Problem: Self Harm/Suicidality  Goal: Will have no self-injury during hospital stay  Description: INTERVENTIONS:  1. Ensure constant observer at bedside with Q15M safety checks  2. Maintain a safe environment  3. Secure patient belongings  4. Ensure family/visitors adhere to safety recommendations  5. Ensure safety tray has been added to patient's diet order  6. Every shift and PRN: Re-assess suicidal risk via Frequent Screener    12/26/2023 0959 by Yuni Garcia RN  Outcome: Progressing       Pt denies any SI/HI. Verbally contracts for safety. 15 minute checks and safe environment maintained. Patient verbalized readiness to discharge with a plan to go to [facility]. Pt reports some anxiety but denies any depression. Writer discussed outpatient medications and follow up plans with patient.
Problem: Depression/Self Harm  Goal: Effect of psychiatric condition will be minimized and patient will be protected from self harm  Description: INTERVENTIONS:  1. Assess impact of patient's symptoms on level of functioning, self care needs and offer support as indicated  2. Assess patient/family knowledge of depression, impact on illness and need for teaching  3. Provide emotional support, presence and reassurance  4. Assess for possible suicidal thoughts or ideation. If patient expresses suicidal thoughts or statements do not leave alone, initiate Suicide Precautions, move to a room close to the nursing station and obtain sitter  5. Initiate consults as appropriate with Mental Health Professional, Spiritual Care, Psychosocial CNS, and consider a recommendation to the LIP for a Psychiatric Consultation  Outcome: Progressing     Problem: Self Harm/Suicidality  Goal: Will have no self-injury during hospital stay  Description: INTERVENTIONS:  1. Ensure constant observer at bedside with Q15M safety checks  2. Maintain a safe environment  3. Secure patient belongings  4. Ensure family/visitors adhere to safety recommendations  5. Ensure safety tray has been added to patient's diet order  6. Every shift and PRN: Re-assess suicidal risk via Frequent Screener    Outcome: Progressing     Patient is alert and oriented. Patient denies symptoms of anxiety and depression at this time. Patient denies suicidal or homicidal ideation at this time. Patient is discharge focused. Patient states he wishes to go to [facility] upon discharge and states there is a bed open for him there. Patient remains free from self harm throughout the shift and agrees to seek out staff if thoughts of self harm arise. Safe environment provided. Q15 minute checks maintained.
Problem: Self Harm/Suicidality  Goal: Will have no self-injury during hospital stay  Description: INTERVENTIONS:  1. Ensure constant observer at bedside with Q15M safety checks  2. Maintain a safe environment  3. Secure patient belongings  4. Ensure family/visitors adhere to safety recommendations  5. Ensure safety tray has been added to patient's diet order  6. Every shift and PRN: Re-assess suicidal risk via Frequent Screener    12/24/2023 2149 by Claudia Sanon LPN  Outcome: Progressing  Note: Pt denies harm to self or others. Pt +anxiety denies all others. Pt denies pain or discomfort. Safety maintained. Pt will notify staff of any changes.
Problem: Self Harm/Suicidality  Goal: Will have no self-injury during hospital stay  Description: INTERVENTIONS:  1. Ensure constant observer at bedside with Q15M safety checks  2. Maintain a safe environment  3. Secure patient belongings  4. Ensure family/visitors adhere to safety recommendations  5. Ensure safety tray has been added to patient's diet order  6. Every shift and PRN: Re-assess suicidal risk via Frequent Screener  Outcome: Not Progressing  Patient is lethargic, observed in room. Patient is irritable on approach, avoids eye contact. Patient allowed vitals in  AM, refused physical assessment, medications, assessment questions. Patient has been isolative to room, came out to eat lunch, refused breakfast. Patient hygiene is poor, encouraged to shower. Patient would like to talk to  with regards to finding sober living. No further concerns voiced. Will continue to monitor.
Screener    12/24/2023 0031 by Alie Gastelum, RN  Outcome: Progressing     Patient remains free from self harm. Isolative to room during most of the shift except for snack and brief socialization. Denies suicidal/homicidal ideations, denies hallucinations. Denies depression and anxiety but signs are observed. Reports adequate sleep and appetite. Denies any acute physical concerns. Will continue to monitor for safety and changes in mental status while admitted.
belongings  4. Ensure family/visitors adhere to safety recommendations  5. Ensure safety tray has been added to patient's diet order  6. Every shift and PRN: Re-assess suicidal risk via Frequent Screener    12/22/2023 0843 by Nena Chance RN  Outcome: Progressing  Patient has remained free from self-harm during admission. Patient has had thoughts of harming self before, but denied self-harm ideations today. If these types of thoughts arise, patient will notify a staff member. Patient currently feels safe while on the unit. Patient is able to contract for safety Q15 Minute checks in place for patient safety.

## 2023-12-26 NOTE — CARE COORDINATION
[facility] Biopsychosocial Assessment    Current Level of Psychosocial Functioning     Independent xxx  Dependent    Minimal Assist     Comments:    Psychosocial High Risk Factors (check all that apply)    Unable to obtain meds   Chronic illness/pain    Substance abuse xxx  Lack of Family Support  xx  Financial stress xx  Isolation   Inadequate Community Resources xx  Suicide attempt(s)  Not taking medications xx  Victim of crime   Developmental Delay  Unable to manage personal needs    Age 72 or older   Homeless  No transportation   Readmission within 30 days  Unemployment  Traumatic Event    Comments:   Psychiatric Advanced Directives: none reported     Family to Involve in Treatment: pt reports his cousin is supportive     Sexual Orientation:  n/a    Patient Strengths: pt reports some supportive family     Patient Barriers: pt has history of polysubstance abuse       Opiate Education Provided:  none reported       CMHC/mental health history: pt has been referred to [address] [place] [address] in 9/2023, history of previous psychiatric hospitalizations, is not currently linked with outpatient provider     Plan of Care   medication management, group/individual therapies, family meetings, psycho -education, treatment team meetings to assist with stabilization    Initial Discharge Plan:  pt states his plan is to admit to [facility] at discharge       Clinical Summary:  Nikkie Daly is a 37year old single male who has been admitted to [address] [place] [address] with report of suicidal ideation. During assessment pt is focused exclusively on discussing discharge plan, he states he wishes to admit [facility] at discharge, SW provided AOD recovery folder for his use. Pt reports he is not currently linked with an outpatient mental health provider. SW offered ongoing support and encouragement; assist with ongoing AOD/dual recovery needs as identified.
Pt approached social work regarding placement at [facility]. Pt provided social work with the name Lakesha Benton, his phone number [phone] and an email address of Lakesha [email or web address]. [email or web address] [facility]. Pt is requesting the clinicals be emailed. Social work spoke with Lakesha Benton who confirmed the email address and that he will have a bed available on Tuesday 12/26/23.
Pt is requesting clinicals faxed to [place] at [phone]. Social work encouraged pt to place calls to other facilities as a back up plan. Pt stated \"I refuse to go anywhere else so if [facility] doesn't accept I'll just discharge to the shelter or something. \" Social work faxed clinicals.
injection     Pharmacy Instructions:    Sent to [facility]           Follow Up Appointment: Josse Hernández MD  [address]  [address]  [facility]  [phone]    Follow up in 1 week(s)  hep c    [employer]    Follow up on 12/26/2023  You will admit to [facility] today.     [facility]  [address], [address]  Follow up on 12/26/2023  You may walk-in to this program anytime Mon-Thurs 9a-1pm.

## 2023-12-26 NOTE — DISCHARGE INSTR - DIET

## 2023-12-26 NOTE — DISCHARGE INSTRUCTIONS
report side effects to FDA at 9-690-FDA-6826. What other drugs will affect hydroxyzine? Taking this medicine with other drugs that make you sleepy can worsen this effect. Ask your doctor before taking hydroxyzine with a sleeping pill, narcotic pain medicine, muscle relaxer, or medicine for anxiety, depression, or seizures. Hydroxyzine can cause a serious heart problem, especially if you use certain medicines at the same time, including antibiotics, antidepressants, heart rhythm medicine, antipsychotic medicines, and medicines to treat cancer, malaria, HIV or AIDS. Tell your doctor about all medicines you use, and those you start or stop using during your treatment with hydroxyzine. Other drugs may interact with hydroxyzine, including prescription and over-the-counter medicines, vitamins, and herbal products. Not all possible interactions are listed here. Tell each of your health care providers about all medicines you use now and any medicine you start or stop using. Where can I get more information? Your pharmacist can provide more information about hydroxyzine. Remember, keep this and all other medicines out of the reach of children, never share your medicines with others, and use this medication only for the indication prescribed. Every effort has been made to ensure that the information provided by [address] [place] [address] is accurate, up-to-date, and complete, but no guarantee is made to that effect. Drug information contained herein may be time sensitive. [employer] information has been compiled for use by healthcare practitioners and consumers in the [facility] and therefore [employer] does not warrant that uses outside of the [facility] are appropriate, unless specifically indicated otherwise. [employer]'s drug information does not endorse drugs, diagnose patients or recommend therapy.  [employer] drug information is an informational resource designed to assist licensed healthcare practitioners in

## 2023-12-26 NOTE — BH NOTE
[facility]  Admission Note     Admission Type:   Admission Type: Voluntary    Reason for admission:  Reason for Admission: Suicidal ideations to overdose on pills, increased depression due to anniversary of grandmother passing away. Lack of support from family and homelessness. Polysubstance abuse. Addictive Behavior:   Addictive Behavior  In the Past 3 Months, Have You Felt or Has Someone Told You That You Have a Problem With  : None    Medical Problems:   Past Medical History:   Diagnosis Date    Anxiety     Depression     Insomnia      Status EXAM:  Mental Status and Behavioral Exam  Normal: No  Level of Assistance: Independent/Self  Facial Expression: Flat  Affect: Blunt  Level of Consciousness: Alert  Frequency of Checks: 4 times per hour, close  Mood:Normal: No  Mood: Depressed, Anxious, Irritable  Motor Activity:Normal: No  Motor Activity: Decreased  Eye Contact: Fair  Observed Behavior: Cooperative, Guarded, Withdrawn, Agitated, Preoccupied  Sexual Misconduct History: Current - no  Preception: [place] to person, [place] to time, [place] to place, [place] to situation  Attention:Normal: No  Attention: Distractible  Thought Processes: Circumstantial  Thought Content:Normal: No  Thought Content: Preoccupations  Depression Symptoms: Feelings of helplessness, Feelings of hopelessess, Feelings of worthlessness, Impaired concentration, Increased irritability, Loss of interest, Sleep disturbance  Anxiety Symptoms: Generalized  Yumiko Symptoms: No problems reported or observed.   Hallucinations: None, Other (comment) (Denies)  Delusions: No  Memory:Normal: Yes  Insight and Judgment: No  Insight and Judgment: Poor judgment, Poor insight    Tobacco Screening:  Practical Counseling, on admission, juan X, if applicable and completed (first 3 are required if patient doesn't refuse):            ( ) Recognizing danger situations (included triggers and roadblocks)                    ( ) Coping skills (new ways
[facility]  Discharge Note    Pt discharged with followings belongings:   Dental Appliances: None  Vision - Corrective Lenses: None  Hearing Aid: None  Jewelry: None  Body Piercings Removed: N/A  Clothing: Socks, Shirt, Pants, Shorts, Sweater, Ai, Footwear  Other Valuables: Wallet, Other (Comment) (ID, birth certificate, insurance card, SS card, Visa/MC)   Valuables returned to patient. Patient educated on aftercare instructions: yes  Information faxed to [facility] by staff  at 11:28 AM .Patient verbalize understanding of AVS:  yes. Status EXAM upon discharge:  Mental Status and Behavioral Exam  Normal: No  Level of Assistance: Independent/Self  Facial Expression: Worried, Flat  Affect: Blunt, Congruent  Level of Consciousness: Alert  Frequency of Checks: 4 times per hour, close  Mood:Normal: No  Mood: Anxious (appears anxious, worried about losing his bed at [facility])  Motor Activity:Normal: Yes  Motor Activity: Decreased  Eye Contact: Good  Observed Behavior: Cooperative, Preoccupied  Sexual Misconduct History: Current - no  Preception: [place] to person, [place] to time, [place] to place, [place] to situation  Attention:Normal: Yes  Attention: Others (comment) (preoccupied)  Thought Processes: Unremarkable  Thought Content:Normal: No  Thought Content: Poverty of content  Depression Symptoms: No problems reported or observed. Anxiety Symptoms: Generalized, Other (comment) (anxious, worried about seeing the doctor to discharge so he doesnt lose his bed at [facility].)  Yumiko Symptoms: No problems reported or observed.   Hallucinations: None  Delusions: No  Memory:Normal: Yes  Insight and Judgment: Yes  Insight and Judgment: Poor insight    Tobacco Screening:  Practical Counseling, on admission, juan X, if applicable and completed (first 3 are required if patient doesn't refuse):            (X ) Recognizing danger situations (included triggers and roadblocks)                    ( X) Coping
[facility]  Day 3 Interdisciplinary Treatment Plan NOTE    Review Date & Time: 12/23/23  13:00    Patient was in treatment team    Estimated Length of Stay Update:  to be determined by the physician   Estimated Discharge Date Update: to be determined by the physician    EDUCATION:   Learner Progress Toward Treatment Goals: Reviewed goals and plan of care    Method: Individual    Outcome: Verbalized understanding    PATIENT GOALS: to find an outpatient substance abuse treatment center and an outpatient therapist.     PLAN/TREATMENT RECOMMENDATIONS UPDATE:follow doctor's orders and staff direction    GOALS UPDATE:   Time frame for Short-Term Goals: 5-7 days      Shira Cordon RN
On call provider notified of best practice advisory suggesting to place patient on suicide precautions. Provider to discontinue the order as patient does not meet criteria for suicide precautions at this time.
Patient given tobacco quitline number 29613605826 at this time. Patient given information as to the dangers of long term tobacco use. Continue to reinforce the importance of tobacco cessation.
Patient given tobacco quitline number 67423009954 at this time, refusing to call at this time, states \" I just dont want to quit now\"- patient given information as to the dangers of long term tobacco use. Continue to reinforce the importance of tobacco cessation.
[facility]

## 2023-12-27 NOTE — DISCHARGE SUMMARY
tolerated  1. Patient instructed to take medications regularly and follow up with outpatient appointments. Follow-up as scheduled with outpatient [facility] mental [facility]      Signed:    Electronically signed by Albina Vargas MD on 12/26/23 at 9:59 PM EST    Time Spent on discharge is more than 30 minutes in the examination, evaluation, counseling and review of medications and discharge plan.

## 2023-12-28 ENCOUNTER — HOSPITAL ENCOUNTER (EMERGENCY)
Age: 43
Discharge: HOME | End: 2023-12-28
Attending: EMERGENCY MEDICINE
Payer: MEDICAID

## 2023-12-28 VITALS
OXYGEN SATURATION: 98 % | RESPIRATION RATE: 12 BRPM | HEART RATE: 59 BPM | DIASTOLIC BLOOD PRESSURE: 71 MMHG | TEMPERATURE: 97.5 F | SYSTOLIC BLOOD PRESSURE: 110 MMHG

## 2023-12-28 DIAGNOSIS — F19.90 POLYSUBSTANCE USE DISORDER: Primary | ICD-10-CM

## 2023-12-28 LAB
ALBUMIN SERPL-MCNC: 4.3 G/DL (ref 3.5–5.2)
ALBUMIN/GLOB SERPL: 1.3 {RATIO} (ref 1–2.5)
ALP SERPL-CCNC: 91 U/L (ref 40–129)
ALT SERPL-CCNC: 120 U/L (ref 5–41)
AMPHET UR QL SCN: NEGATIVE
ANION GAP SERPL CALCULATED.3IONS-SCNC: 13 MMOL/L (ref 9–17)
APAP SERPL-MCNC: <5 UG/ML (ref 10–30)
AST SERPL-CCNC: 67 U/L
BACTERIA URNS QL MICRO: NORMAL
BARBITURATES UR QL SCN: POSITIVE
BASOPHILS # BLD: 0.08 K/UL (ref 0–0.2)
BASOPHILS NFR BLD: 1 % (ref 0–2)
BENZODIAZ UR QL: NEGATIVE
BILIRUB SERPL-MCNC: 0.3 MG/DL (ref 0.3–1.2)
BILIRUB UR QL STRIP: NEGATIVE
BUN SERPL-MCNC: 15 MG/DL (ref 6–20)
CALCIUM SERPL-MCNC: 9.5 MG/DL (ref 8.6–10.4)
CANNABINOIDS UR QL SCN: POSITIVE
CASTS #/AREA URNS LPF: NORMAL /LPF (ref 0–8)
CHLORIDE SERPL-SCNC: 99 MMOL/L (ref 98–107)
CLARITY UR: CLEAR
CO2 SERPL-SCNC: 27 MMOL/L (ref 20–31)
COCAINE UR QL SCN: POSITIVE
COLOR UR: YELLOW
CREAT SERPL-MCNC: 0.8 MG/DL (ref 0.7–1.2)
EOSINOPHIL # BLD: 0.31 K/UL (ref 0–0.44)
EOSINOPHILS RELATIVE PERCENT: 3 % (ref 1–4)
EPI CELLS #/AREA URNS HPF: NORMAL /HPF (ref 0–5)
ERYTHROCYTE [DISTWIDTH] IN BLOOD BY AUTOMATED COUNT: 11.9 % (ref 11.8–14.4)
ETHANOL PERCENT: <0.01 %
ETHANOLAMINE SERPL-MCNC: <10 MG/DL
FENTANYL UR QL: NEGATIVE
GFR SERPL CREATININE-BSD FRML MDRD: >60 ML/MIN/1.73M2
GLUCOSE SERPL-MCNC: 81 MG/DL (ref 70–99)
GLUCOSE UR STRIP-MCNC: NEGATIVE MG/DL
HCT VFR BLD AUTO: 45.9 % (ref 40.7–50.3)
HGB BLD-MCNC: 15.4 G/DL (ref 13–17)
HGB UR QL STRIP.AUTO: NEGATIVE
IMM GRANULOCYTES # BLD AUTO: 0.04 K/UL (ref 0–0.3)
IMM GRANULOCYTES NFR BLD: 0 %
KETONES UR STRIP-MCNC: NEGATIVE MG/DL
LEUKOCYTE ESTERASE UR QL STRIP: NEGATIVE
LYMPHOCYTES NFR BLD: 2.9 K/UL (ref 1.1–3.7)
LYMPHOCYTES RELATIVE PERCENT: 30 % (ref 24–43)
MCH RBC QN AUTO: 32.2 PG (ref 25.2–33.5)
MCHC RBC AUTO-ENTMCNC: 33.6 G/DL (ref 28.4–34.8)
MCV RBC AUTO: 95.8 FL (ref 82.6–102.9)
METHADONE UR QL: NEGATIVE
MONOCYTES NFR BLD: 0.78 K/UL (ref 0.1–1.2)
MONOCYTES NFR BLD: 8 % (ref 3–12)
NEUTROPHILS NFR BLD: 58 % (ref 36–65)
NEUTS SEG NFR BLD: 5.73 K/UL (ref 1.5–8.1)
NITRITE UR QL STRIP: NEGATIVE
NRBC BLD-RTO: 0 PER 100 WBC
OPIATES UR QL SCN: NEGATIVE
OXYCODONE UR QL SCN: NEGATIVE
PCP UR QL SCN: NEGATIVE
PH UR STRIP: 6.5 [PH] (ref 5–8)
PLATELET # BLD AUTO: 232 K/UL (ref 138–453)
PMV BLD AUTO: 11 FL (ref 8.1–13.5)
POTASSIUM SERPL-SCNC: 3.9 MMOL/L (ref 3.7–5.3)
PROT SERPL-MCNC: 7.7 G/DL (ref 6.4–8.3)
PROT UR STRIP-MCNC: NEGATIVE MG/DL
RBC # BLD AUTO: 4.79 M/UL (ref 4.21–5.77)
RBC #/AREA URNS HPF: NORMAL /HPF (ref 0–4)
SALICYLATES SERPL-MCNC: <1 MG/DL (ref 3–10)
SODIUM SERPL-SCNC: 139 MMOL/L (ref 135–144)
SP GR UR STRIP: 1.02 (ref 1–1.03)
TEST INFORMATION: ABNORMAL
TOXIC TRICYCLIC SC,BLOOD: NEGATIVE
UROBILINOGEN UR STRIP-ACNC: NORMAL EU/DL (ref 0–1)
WBC #/AREA URNS HPF: NORMAL /HPF (ref 0–5)
WBC OTHER # BLD: 9.8 K/UL (ref 3.5–11.3)

## 2023-12-28 PROCEDURE — 99284 EMERGENCY DEPT VISIT MOD MDM: CPT

## 2023-12-28 PROCEDURE — 80053 COMPREHEN METABOLIC PANEL: CPT

## 2023-12-28 PROCEDURE — 80143 DRUG ASSAY ACETAMINOPHEN: CPT

## 2023-12-28 PROCEDURE — 93005 ELECTROCARDIOGRAM TRACING: CPT | Performed by: STUDENT IN AN ORGANIZED HEALTH CARE EDUCATION/TRAINING PROGRAM

## 2023-12-28 PROCEDURE — 80307 DRUG TEST PRSMV CHEM ANLYZR: CPT

## 2023-12-28 PROCEDURE — 85025 COMPLETE CBC W/AUTO DIFF WBC: CPT

## 2023-12-28 PROCEDURE — G0480 DRUG TEST DEF 1-7 CLASSES: HCPCS

## 2023-12-28 PROCEDURE — 80179 DRUG ASSAY SALICYLATE: CPT

## 2023-12-28 PROCEDURE — 81001 URINALYSIS AUTO W/SCOPE: CPT

## 2023-12-28 NOTE — ED NOTES
Pt A&Ox4, rr even and nonlabored, nad. Pt laying on cot with spo2 monitoring and bp cuff on, call light in reach. Pt received crackers per request, denies other needs.

## 2023-12-28 NOTE — ED NOTES
SW received consult from Dr. Jarad Peterson regarding pt request to go to detox. Pt reports use of alcohol, marijuana, cocaine, barbituates, and opiates since 12/26/23. Pt was recently admitted to [address] [place] [address] from 12/21-12/26/23 and reports he was discharged to [facility], but was denied admittance to their program due to having a positive urine drug screen. Pt reports this led him to getting upset and not know what to do, so he utilized substances to cope with the stress. SW provided pt with options of [facility] or [facility] for detox. Pt reports he would like to start with [facility]. Pt denies SI/HI. Pt reports he was prescribed multivitamins, vitamin b-1, effexor, hydroxyzine, and vivitrol injection on 12/26/23. SW to fax labs and documentation to [facility] upon medical clearance and all labs resulted.

## 2023-12-28 NOTE — ED PROVIDER NOTES
Negative for chills, fatigue and fever.   HENT:  Negative for congestion, sinus pressure, sneezing and sore throat.    Eyes:  Negative for photophobia and visual disturbance.   Respiratory:  Negative for cough, shortness of breath and wheezing.    Cardiovascular:  Negative for chest pain.   Gastrointestinal:  Negative for abdominal pain, diarrhea, nausea and vomiting.   Musculoskeletal:  Negative for back pain, neck pain and neck stiffness.   Skin:  Negative for rash and wound.   Neurological:  Negative for dizziness, tremors, light-headedness, numbness and headaches.   Psychiatric/Behavioral:  Negative for agitation, behavioral problems and confusion. The patient is not nervous/anxious.        PHYSICAL EXAM      INITIAL VITALS:   BP (!) 130/91   Pulse 82   Temp 97.5 °F (36.4 °C) (Oral)   Resp 17   SpO2 100%     Physical Exam  Constitutional:       General: He is not in acute distress.     Appearance: He is normal weight. He is not ill-appearing or toxic-appearing.   HENT:      Head: Normocephalic and atraumatic.      Right Ear: Ear canal and external ear normal.      Left Ear: Ear canal and external ear normal.      Nose: Nose normal. No congestion or rhinorrhea.      Mouth/Throat:      Mouth: Mucous membranes are moist.      Pharynx: Oropharynx is clear. No posterior oropharyngeal erythema.   Eyes:      General:         Right eye: No discharge.         Left eye: No discharge.      Extraocular Movements: Extraocular movements intact.      Conjunctiva/sclera: Conjunctivae normal.      Pupils: Pupils are equal, round, and reactive to light.   Cardiovascular:      Rate and Rhythm: Normal rate and regular rhythm.      Pulses: Normal pulses.      Heart sounds: Normal heart sounds.   Pulmonary:      Effort: Pulmonary effort is normal. No respiratory distress.      Breath sounds: Normal breath sounds. No stridor. No wheezing.   Abdominal:      General: Abdomen is flat. There is no distension.      Palpations: Abdomen  PROCEDURES:  N/a    CONSULTS:  None    CRITICAL CARE:  There was significant risk of life threatening deterioration of patient's condition requiring my direct management. Critical care time 00 minutes, excluding any documented procedures. FINAL IMPRESSION      1.  Polysubstance use disorder          DISPOSITION / PLAN     DISPOSITION        PATIENT REFERRED TO:  [address] AT [facility]  [address] [address] [phone]  Call in 2 days  for ED follow-up      DISCHARGE MEDICATIONS:  New Prescriptions    No medications on file       Amy Richard DO  Emergency Medicine Resident    (Please note that portions of this note were completed with a voice recognition program.  Efforts were made to edit the dictations but occasionally words are mis-transcribed.)

## 2023-12-28 NOTE — ED NOTES
Pt and Dr. Dionna Andrade aware of [facility] acceptance. Handed off to Rico Israel, [address]. Continue to await update from [address] regarding transport.

## 2023-12-28 NOTE — ED NOTES
Pt arrived to ED for alcohol and opioid detox. Pt states he had 1 beer today, denies drug use today but reports doing cocaine and opioids. Pt came into ED voluntarily, is cooperative with staff. Pt reports trying to get treatment at [facility] but getting denied due to pos urine drug test.  Pt denies any physical complaints at this time. Pt denies SI/HI. Pt A&Ox4, rr even and nonlabored, nad.

## 2023-12-28 NOTE — ED NOTES
SW contacted [facility] to ask for update. Per nurse on shift, they are awaiting response from their admitting doctor for packet review and/or approval. Will continue to update.

## 2023-12-28 NOTE — ED NOTES
SUSAN faxed lab results and documentation to [facility]. Awaiting call on acceptance or denial. Will continue to update.

## 2023-12-28 NOTE — ED NOTES
SW contacted [facility] to see if they have detox beds available. [facility] reports they do and will have their  review the paperwork and await doctor acceptance upon [facility] medical clearance and all labs resulted documented in chart.

## 2023-12-28 NOTE — ED NOTES
Pt A&Ox4, rr even and nonlabored, nad. Pt laying on cot with spo2 and bp cuff on, call light in reach. Pt denies needs at this time.

## 2023-12-28 NOTE — ED NOTES
SW received call from [facility], reporting pt was accepted to the facility for detox/rehabilitation services. SW working on transport. [facility] and they report to send the paperwork over and they will search for a transport provider to take pt to [place].

## 2023-12-28 NOTE — ED NOTES
The following labs were labeled with appropriate pt sticker and tubed to lab:     [] Blue     [x] Lavender   [] on ice  [x] Green/yellow  [] Green/black [] on ice  [] Kasie Hands  [] on ice  [] Yellow  [x] Red  [] Pink  [] Type/ Screen  [] ABG  [] VBG    [] COVID-19 swab    [] Rapid  [] PCR  [] Flu swab  [] Peds Viral Panel     [x] Urine Sample  [] Fecal Sample  [] Pelvic Cultures  [] Blood Cultures  [] X 2  [] STREP Cultures  [] Wound Cultures

## 2023-12-28 NOTE — ED NOTES
Spoke to [facility] via phone call. States they will begin preparing for transport to [facility].

## 2023-12-28 NOTE — DISCHARGE INSTRUCTIONS
Seen in the emergency department for detox. Working with social work closely for placement. Plan for placement at [facility]. Please continue to do your best to refrain from polysubstance abuse going forward in the future. Please return the emergency department if you develop chest pain, shortness of breath, episodes of passing out, significant fevers, or any other concerning symptoms. Please follow-up with the provided PCP as needed.   Please follow-up with your new facilities healthcare providers within the next 1 to 2 days

## 2023-12-30 LAB
EKG ATRIAL RATE: 76 BPM
EKG P AXIS: 42 DEGREES
EKG P-R INTERVAL: 158 MS
EKG Q-T INTERVAL: 400 MS
EKG QRS DURATION: 86 MS
EKG QTC CALCULATION (BAZETT): 450 MS
EKG R AXIS: 0 DEGREES
EKG T AXIS: 44 DEGREES
EKG VENTRICULAR RATE: 76 BPM

## 2024-01-25 ENCOUNTER — HOSPITAL ENCOUNTER (EMERGENCY)
Age: 44
Discharge: HOME | End: 2024-01-25
Attending: EMERGENCY MEDICINE
Payer: MEDICAID

## 2024-01-25 VITALS
RESPIRATION RATE: 18 BRPM | HEART RATE: 101 BPM | TEMPERATURE: 97.2 F | SYSTOLIC BLOOD PRESSURE: 136 MMHG | OXYGEN SATURATION: 98 % | DIASTOLIC BLOOD PRESSURE: 94 MMHG

## 2024-01-25 DIAGNOSIS — Z78.9 ADMITTED TO SUBSTANCE MISUSE DETOXIFICATION CENTER: Primary | ICD-10-CM

## 2024-01-25 LAB
AMPHET UR QL SCN: NEGATIVE
ANION GAP SERPL CALCULATED.3IONS-SCNC: 12 MMOL/L (ref 9–17)
APAP SERPL-MCNC: <5 UG/ML (ref 10–30)
BARBITURATES UR QL SCN: NEGATIVE
BASOPHILS # BLD: 0.05 K/UL (ref 0–0.2)
BASOPHILS NFR BLD: 1 % (ref 0–2)
BENZODIAZ UR QL: NEGATIVE
BUN SERPL-MCNC: 9 MG/DL (ref 6–20)
CALCIUM SERPL-MCNC: 9.5 MG/DL (ref 8.6–10.4)
CANNABINOIDS UR QL SCN: POSITIVE
CHLORIDE SERPL-SCNC: 97 MMOL/L (ref 98–107)
CO2 SERPL-SCNC: 28 MMOL/L (ref 20–31)
COCAINE UR QL SCN: POSITIVE
CREAT SERPL-MCNC: 0.8 MG/DL (ref 0.7–1.2)
EOSINOPHIL # BLD: 0.09 K/UL (ref 0–0.44)
EOSINOPHILS RELATIVE PERCENT: 1 % (ref 1–4)
ERYTHROCYTE [DISTWIDTH] IN BLOOD BY AUTOMATED COUNT: 11.7 % (ref 11.8–14.4)
ETHANOL PERCENT: <0.01 %
ETHANOLAMINE SERPL-MCNC: <10 MG/DL
FENTANYL UR QL: NEGATIVE
GFR SERPL CREATININE-BSD FRML MDRD: >60 ML/MIN/1.73M2
GLUCOSE SERPL-MCNC: 90 MG/DL (ref 70–99)
HCT VFR BLD AUTO: 43.3 % (ref 40.7–50.3)
HGB BLD-MCNC: 14.7 G/DL (ref 13–17)
IMM GRANULOCYTES # BLD AUTO: 0.05 K/UL (ref 0–0.3)
IMM GRANULOCYTES NFR BLD: 1 %
LYMPHOCYTES NFR BLD: 1.9 K/UL (ref 1.1–3.7)
LYMPHOCYTES RELATIVE PERCENT: 17 % (ref 24–43)
MCH RBC QN AUTO: 31.5 PG (ref 25.2–33.5)
MCHC RBC AUTO-ENTMCNC: 33.9 G/DL (ref 28.4–34.8)
MCV RBC AUTO: 92.9 FL (ref 82.6–102.9)
METHADONE UR QL: NEGATIVE
MONOCYTES NFR BLD: 0.68 K/UL (ref 0.1–1.2)
MONOCYTES NFR BLD: 6 % (ref 3–12)
NEUTROPHILS NFR BLD: 74 % (ref 36–65)
NEUTS SEG NFR BLD: 8.32 K/UL (ref 1.5–8.1)
NRBC BLD-RTO: 0 PER 100 WBC
OPIATES UR QL SCN: NEGATIVE
OXYCODONE UR QL SCN: NEGATIVE
PCP UR QL SCN: NEGATIVE
PLATELET # BLD AUTO: 230 K/UL (ref 138–453)
PMV BLD AUTO: 10 FL (ref 8.1–13.5)
POTASSIUM SERPL-SCNC: 3.7 MMOL/L (ref 3.7–5.3)
RBC # BLD AUTO: 4.66 M/UL (ref 4.21–5.77)
SALICYLATES SERPL-MCNC: <1 MG/DL (ref 3–10)
SODIUM SERPL-SCNC: 137 MMOL/L (ref 135–144)
TEST INFORMATION: ABNORMAL
TOXIC TRICYCLIC SC,BLOOD: NEGATIVE
WBC OTHER # BLD: 11.1 K/UL (ref 3.5–11.3)

## 2024-01-25 PROCEDURE — G0480 DRUG TEST DEF 1-7 CLASSES: HCPCS

## 2024-01-25 PROCEDURE — 99284 EMERGENCY DEPT VISIT MOD MDM: CPT

## 2024-01-25 PROCEDURE — 85025 COMPLETE CBC W/AUTO DIFF WBC: CPT

## 2024-01-25 PROCEDURE — 80307 DRUG TEST PRSMV CHEM ANLYZR: CPT

## 2024-01-25 PROCEDURE — 80143 DRUG ASSAY ACETAMINOPHEN: CPT

## 2024-01-25 PROCEDURE — 80048 BASIC METABOLIC PNL TOTAL CA: CPT

## 2024-01-25 PROCEDURE — 80179 DRUG ASSAY SALICYLATE: CPT

## 2024-01-25 ASSESSMENT — PAIN - FUNCTIONAL ASSESSMENT: PAIN_FUNCTIONAL_ASSESSMENT: NONE - DENIES PAIN

## 2024-01-25 NOTE — ED NOTES
Pt arrives to ED 28 from ED triage  Pt complains of feeling depressed after not being able to get into [facility] or [facility] face to face assessment  Pt states he is trying to detox from cocaine, marijuana and alcohol at this time  Pt states he used cocaine about an hour ago (1700) and had a beer earlier today  Pt denies feeling suicidal or homicidal at this time  Pt states \"I wish I could end it all\" when asked if suicidal, but upon clarification, denies suicidal thoughts  RR even and unlabored.   NAD noted.   Whiteboard updated.  Will continue with plan of care.

## 2024-01-25 NOTE — ED NOTES
Patient arrives to the ER today with a complaint of depression.  Patient tells SW he could not get into [facility] today for treatment.  Patient states he called [facility] and they told him to come to walk-in for assessment, but he did not have a ride there so he came to the ER.  Patient states he used cocaine, THC, and etoh about an hour ago.  Patient denies SI/HI currently.  Patient states he is diagnosed with depression, PTSD, and anxiety but is not receiving any treatment at this time.  Patient denies any legal issues.  SW talked to [facility] and will fax referral once labs complete.    ROSE MARY Easley

## 2024-01-25 NOTE — ED PROVIDER NOTES
[facility]     Emergency Department     Faculty Attestation    I performed a history and physical examination of the patient and discussed management with the resident. I reviewed the resident´s note and agree with the documented findings and plan of care. Any areas of disagreement are noted on the chart. I was personally present for the key portions of any procedures. I have documented in the chart those procedures where I was not present during the key portions. I have reviewed the emergency nurses triage note. I agree with the chief complaint, past medical history, past surgical history, allergies, medications, social and family history as documented unless otherwise noted below. For Physician Assistant/ Nurse Practitioner cases/documentation I have personally evaluated this patient and have completed at least one if not all key elements of the E/M (history, physical exam, and MDM). Additional findings are as noted.    Awake alert and oriented, not clinically intoxicated, skin warm and dry, no ataxia or nystagmus, no muscle rigidity, cranial nerves intact, cerebellar testing normal, good airway, no meningeal signs.       Mahesh Nelson MD  01/25/24 0198

## 2024-01-25 NOTE — ED PROVIDER NOTES
Comment: 4 beers + every day    Drug use: Yes     Types: Cocaine     Comment: used today    Sexual activity: Not on file   Other Topics Concern    Not on file   Social History Narrative    Not on file     Social Determinants of Health     Financial Resource Strain: Not on file   Food Insecurity: Patient Declined (12/21/2023)    Hunger Vital Sign     Worried About Running Out of Food in the Last Year: Patient declined     Ran Out of Food in the Last Year: Patient declined   Transportation Needs: Patient Declined (12/21/2023)    PRAPARE - Transportation     Lack of Transportation (Medical): Patient declined     Lack of Transportation (Non-Medical): Patient declined   Physical Activity: Not on file   Stress: Not on file   Social Connections: Not on file   Intimate Partner Violence: Not on file   Housing Stability: Patient Declined (12/21/2023)    Housing Stability Vital Sign     Unable to Pay for Housing in the Last Year: Patient declined     Number of Places Lived in the Last Year: 1     Unstable Housing in the Last Year: Patient declined       No family history on file.    Allergies:  Fish-derived products    Home Medications:  Prior to Admission medications    Medication Sig Start Date End Date Taking? Authorizing Provider   nicotine polacrilex (COMMIT) 2 MG lozenge Take 1 lozenge by mouth every 2 hours as needed for Smoking cessation 12/26/23   Walt Rutledge MD   Multiple Vitamins-Minerals (THERAPEUTIC MULTIVITAMIN-MINERALS) tablet Take 1 tablet by mouth daily 12/26/23   Walt Rutledge MD   naltrexone (VIVITROL) 380 MG injection Inject 380 mg into the muscle every 28 days 1/21/24   Walt Rutledge MD   vitamin B-1 (THIAMINE) 100 MG tablet Take 1 tablet by mouth daily 12/26/23   Walt Rutledge MD   venlafaxine (EFFEXOR XR) 75 MG extended release capsule Take 1 capsule by mouth daily (with breakfast)  Patient not taking: Reported on 12/28/2023 12/27/23   Walt Rutledge MD         REVIEW OF SYSTEMS    threatening deterioration of patient's condition requiring my direct management. Critical care time 0 minutes, excluding any documented procedures.    FINAL IMPRESSION      1. Admitted to substance misuse detoxification center          DISPOSITION / PLAN     DISPOSITION Decision To Discharge 01/25/2024 08:40:34 PM      PATIENT REFERRED TO:  No follow-up provider specified.    DISCHARGE MEDICATIONS:  Discharge Medication List as of 1/25/2024  8:41 PM          Erika Concepcion MD  Emergency Medicine Resident    (Please note that portions of thisnote were completed with a voice recognition program.  Efforts were made to edit the dictations but occasionally words are mis-transcribed.)\

## 2024-01-26 NOTE — DISCHARGE INSTRUCTIONS
Thank you for visiting [facility] Emergency Department.    You are going to [facility] for treatment.    Should you have any questions regarding your care or further treatment, please call [facility] Emergency Department at [phone].

## 2024-01-26 NOTE — ED NOTES
Patient accepted to [facility]. SUSAN gave patient a voucher to [facility].

## 2024-01-31 ENCOUNTER — HOSPITAL ENCOUNTER (EMERGENCY)
Age: 44
Discharge: LEFT BEFORE DISCHARGE | End: 2024-01-31
Payer: MEDICAID

## 2024-01-31 VITALS
BODY MASS INDEX: 27.02 KG/M2 | SYSTOLIC BLOOD PRESSURE: 131 MMHG | TEMPERATURE: 98.3 F | HEIGHT: 71 IN | WEIGHT: 193 LBS | DIASTOLIC BLOOD PRESSURE: 94 MMHG | HEART RATE: 88 BPM | RESPIRATION RATE: 16 BRPM | OXYGEN SATURATION: 98 %

## 2024-01-31 DIAGNOSIS — F10.920 ACUTE ALCOHOLIC INTOXICATION WITHOUT COMPLICATION (HCC): Primary | ICD-10-CM

## 2024-01-31 PROCEDURE — 99281 EMR DPT VST MAYX REQ PHY/QHP: CPT

## 2024-01-31 RX ORDER — ONDANSETRON 4 MG/1
4 TABLET, ORALLY DISINTEGRATING ORAL ONCE
Status: DISCONTINUED | OUTPATIENT
Start: 2024-01-31 | End: 2024-01-31 | Stop reason: HOSPADM

## 2024-01-31 ASSESSMENT — PAIN - FUNCTIONAL ASSESSMENT
PAIN_FUNCTIONAL_ASSESSMENT: NONE - DENIES PAIN
PAIN_FUNCTIONAL_ASSESSMENT: NONE - DENIES PAIN

## 2024-01-31 NOTE — ED NOTES
Patient given bread and peanut butter at this time. Patient threw bread and peanut butter on the ground. Patient yelling \"I want crackers. And would you just turn off the light.\"

## 2024-01-31 NOTE — ED NOTES
Patient asked multiple times if this RN could get an IV on patient to obtain labs. Patient refusing to cooperate.

## 2024-01-31 NOTE — ED PROVIDER NOTES
[facility] EMERGENCY DEPT      Pt Name: Balta Sarmiento  MRN: [id number]  Birthdate 1980  Date of evaluation: 1/31/2024  Provider: Diann Lopez PA-C    CHIEF COMPLAINT       Chief Complaint   Patient presents with    Alcohol Intoxication       Nurses Notes reviewed and I agree except as noted in the HPI.      HISTORY OF PRESENT ILLNESS    Balta Sarmiento is a 43 y.o. male who presents from [facility] for detox.  Patient states that he went to [place] so they could help him get back to [place].  However he was intoxicated and they told him he had to be sober.  They sent him over for detox.  Patient has detoxed previously and has not experienced seizures or complications.  Patient drinks upwards of 3 beers a day.  Patient admits to drinking beer today but does not know how much.  Patient presented to the department with nausea and vomiting but states that has improved.  Patient wished to have food and sleep in the department.  Patient denies any drug use.  Patient denies any falls, pain, URI symptoms, or other complaints.    Location/Symptom: ***  Timing/Onset: ***  Context/Setting: ***  Quality: ***  Duration: ***  Modifying Factors: ***  Severity: ***    REVIEW OF SYSTEMS     Review of Systems     PAST MEDICAL HISTORY    has a past medical history of Anxiety, Depression, and Insomnia.    SURGICAL HISTORY      has no past surgical history on file.    CURRENT MEDICATIONS       Discharge Medication List as of 1/31/2024  3:41 AM        CONTINUE these medications which have NOT CHANGED    Details   nicotine polacrilex (COMMIT) 2 MG lozenge Take 1 lozenge by mouth every 2 hours as needed for Smoking cessation, Disp-100 each, R-0Normal      Multiple Vitamins-Minerals (THERAPEUTIC MULTIVITAMIN-MINERALS) tablet Take 1 tablet by mouth daily, Disp-30 tablet, R-0Normal      naltrexone (VIVITROL) 380 MG injection Inject 380 mg into the muscle every 28 days, Disp-1.2 each, R-0NO PRINT      vitamin B-1 (THIAMINE) 100 MG

## 2024-01-31 NOTE — ED NOTES
Patient refusing zofran at this time. Patient states \"I want some food in my stomach to soak up this alcohol and sober me up.\"

## 2024-01-31 NOTE — ED NOTES
Patient asked again if this RN could obtain IV access and get labs from patient multiple times. Patient refused to cooperate. Nurse supervisor Haley TONY to bedside.

## 2024-01-31 NOTE — ED TRIAGE NOTES
Patient presents to ED with C/O alcohol detox. Patient states he wants to go to [facility] but knows they wont take him intoxicated so he \"cut out the middle man and came here first\". Patient states he \"drank 1 and a half 24oz cans and did 6 shots right before I came in\". Patient VS stable at this time.

## 2024-01-31 NOTE — ED NOTES
Dr Jimenez to patient bedside at this time. Patient refusing treatment. [place] police called due to patient raising voice and becoming agitated at staff.

## 2024-02-10 ASSESSMENT — ENCOUNTER SYMPTOMS
SHORTNESS OF BREATH: 0
RHINORRHEA: 0
DIARRHEA: 0
NAUSEA: 1
ABDOMINAL PAIN: 0
VOMITING: 1
BACK PAIN: 0

## 2024-03-31 ENCOUNTER — HOSPITAL ENCOUNTER (INPATIENT)
Age: 44
LOS: 3 days | Discharge: NURSING, REHAB OR LONG-TERM CARE | DRG: 754 | End: 2024-04-03
Attending: EMERGENCY MEDICINE | Admitting: PSYCHIATRY & NEUROLOGY
Payer: MEDICAID

## 2024-03-31 DIAGNOSIS — R45.851 SUICIDAL IDEATION: Primary | ICD-10-CM

## 2024-03-31 LAB
AMPHET UR QL SCN: NEGATIVE
ANION GAP SERPL CALCULATED.3IONS-SCNC: 14 MMOL/L (ref 9–17)
BARBITURATES UR QL SCN: NEGATIVE
BASOPHILS # BLD: 0.1 K/UL (ref 0–0.2)
BASOPHILS NFR BLD: 1 % (ref 0–2)
BENZODIAZ UR QL: NEGATIVE
BILIRUB UR QL STRIP: NEGATIVE
BUN SERPL-MCNC: 15 MG/DL (ref 6–20)
CALCIUM SERPL-MCNC: 9.4 MG/DL (ref 8.6–10.4)
CANNABINOIDS UR QL SCN: POSITIVE
CHLORIDE SERPL-SCNC: 100 MMOL/L (ref 98–107)
CLARITY UR: CLEAR
CO2 SERPL-SCNC: 27 MMOL/L (ref 20–31)
COCAINE UR QL SCN: POSITIVE
COLOR UR: YELLOW
COMMENT: ABNORMAL
CREAT SERPL-MCNC: 0.7 MG/DL (ref 0.7–1.2)
EOSINOPHIL # BLD: 0.3 K/UL (ref 0–0.4)
EOSINOPHILS RELATIVE PERCENT: 2 % (ref 0–4)
ERYTHROCYTE [DISTWIDTH] IN BLOOD BY AUTOMATED COUNT: 13.6 % (ref 11.5–14.9)
ETHANOL PERCENT: 0.06 %
ETHANOLAMINE SERPL-MCNC: 58 MG/DL
FENTANYL UR QL: NEGATIVE
GFR SERPL CREATININE-BSD FRML MDRD: >90 ML/MIN/1.73M2
GLUCOSE SERPL-MCNC: 81 MG/DL (ref 70–99)
GLUCOSE UR STRIP-MCNC: NEGATIVE MG/DL
HCT VFR BLD AUTO: 43.9 % (ref 41–53)
HGB BLD-MCNC: 14.9 G/DL (ref 13.5–17.5)
HGB UR QL STRIP.AUTO: NEGATIVE
KETONES UR STRIP-MCNC: ABNORMAL MG/DL
LEUKOCYTE ESTERASE UR QL STRIP: NEGATIVE
LYMPHOCYTES NFR BLD: 3.3 K/UL (ref 1–4.8)
LYMPHOCYTES RELATIVE PERCENT: 29 % (ref 24–44)
MCH RBC QN AUTO: 31.5 PG (ref 26–34)
MCHC RBC AUTO-ENTMCNC: 33.9 G/DL (ref 31–37)
MCV RBC AUTO: 93.1 FL (ref 80–100)
METHADONE UR QL: NEGATIVE
MONOCYTES NFR BLD: 0.7 K/UL (ref 0.1–1.3)
MONOCYTES NFR BLD: 6 % (ref 1–7)
NEUTROPHILS NFR BLD: 62 % (ref 36–66)
NEUTS SEG NFR BLD: 7 K/UL (ref 1.3–9.1)
NITRITE UR QL STRIP: NEGATIVE
OPIATES UR QL SCN: NEGATIVE
OXYCODONE UR QL SCN: NEGATIVE
PCP UR QL SCN: NEGATIVE
PH UR STRIP: 5 [PH] (ref 5–8)
PLATELET # BLD AUTO: 229 K/UL (ref 150–450)
PMV BLD AUTO: 8 FL (ref 6–12)
POTASSIUM SERPL-SCNC: 3.7 MMOL/L (ref 3.7–5.3)
PROT UR STRIP-MCNC: NEGATIVE MG/DL
RBC # BLD AUTO: 4.72 M/UL (ref 4.5–5.9)
SODIUM SERPL-SCNC: 141 MMOL/L (ref 135–144)
SP GR UR STRIP: 1.02 (ref 1–1.03)
TEST INFORMATION: ABNORMAL
UROBILINOGEN UR STRIP-ACNC: NORMAL EU/DL (ref 0–1)
WBC OTHER # BLD: 11.4 K/UL (ref 3.5–11)

## 2024-03-31 PROCEDURE — 85025 COMPLETE CBC W/AUTO DIFF WBC: CPT

## 2024-03-31 PROCEDURE — 81003 URINALYSIS AUTO W/O SCOPE: CPT

## 2024-03-31 PROCEDURE — 99285 EMERGENCY DEPT VISIT HI MDM: CPT

## 2024-03-31 PROCEDURE — 1240000000 HC EMOTIONAL WELLNESS R&B

## 2024-03-31 PROCEDURE — 36415 COLL VENOUS BLD VENIPUNCTURE: CPT

## 2024-03-31 PROCEDURE — 80307 DRUG TEST PRSMV CHEM ANLYZR: CPT

## 2024-03-31 PROCEDURE — G0480 DRUG TEST DEF 1-7 CLASSES: HCPCS

## 2024-03-31 PROCEDURE — 80048 BASIC METABOLIC PNL TOTAL CA: CPT

## 2024-03-31 RX ORDER — HALOPERIDOL 5 MG/1
5 TABLET ORAL EVERY 6 HOURS PRN
Status: DISCONTINUED | OUTPATIENT
Start: 2024-03-31 | End: 2024-04-03 | Stop reason: HOSPADM

## 2024-03-31 RX ORDER — POLYETHYLENE GLYCOL 3350 17 G
2 POWDER IN PACKET (EA) ORAL
Status: DISCONTINUED | OUTPATIENT
Start: 2024-03-31 | End: 2024-04-03 | Stop reason: HOSPADM

## 2024-03-31 RX ORDER — IBUPROFEN 400 MG/1
400 TABLET ORAL EVERY 6 HOURS PRN
Status: DISCONTINUED | OUTPATIENT
Start: 2024-03-31 | End: 2024-04-03 | Stop reason: HOSPADM

## 2024-03-31 RX ORDER — TRAZODONE HYDROCHLORIDE 50 MG/1
50 TABLET ORAL NIGHTLY PRN
Status: DISCONTINUED | OUTPATIENT
Start: 2024-04-01 | End: 2024-04-03 | Stop reason: HOSPADM

## 2024-03-31 RX ORDER — HALOPERIDOL 5 MG/ML
5 INJECTION INTRAMUSCULAR EVERY 6 HOURS PRN
Status: DISCONTINUED | OUTPATIENT
Start: 2024-03-31 | End: 2024-04-03 | Stop reason: HOSPADM

## 2024-03-31 RX ORDER — ACETAMINOPHEN 325 MG/1
650 TABLET ORAL EVERY 6 HOURS PRN
Status: DISCONTINUED | OUTPATIENT
Start: 2024-03-31 | End: 2024-04-03 | Stop reason: HOSPADM

## 2024-03-31 RX ORDER — DIPHENHYDRAMINE HYDROCHLORIDE 50 MG/ML
50 INJECTION INTRAMUSCULAR; INTRAVENOUS EVERY 6 HOURS PRN
Status: DISCONTINUED | OUTPATIENT
Start: 2024-03-31 | End: 2024-04-03 | Stop reason: HOSPADM

## 2024-03-31 RX ORDER — POLYETHYLENE GLYCOL 3350 17 G/17G
17 POWDER, FOR SOLUTION ORAL DAILY PRN
Status: DISCONTINUED | OUTPATIENT
Start: 2024-03-31 | End: 2024-04-03 | Stop reason: HOSPADM

## 2024-03-31 RX ORDER — HYDROXYZINE 50 MG/1
50 TABLET, FILM COATED ORAL 3 TIMES DAILY PRN
Status: DISCONTINUED | OUTPATIENT
Start: 2024-03-31 | End: 2024-04-03 | Stop reason: HOSPADM

## 2024-03-31 RX ORDER — MAGNESIUM HYDROXIDE/ALUMINUM HYDROXICE/SIMETHICONE 120; 1200; 1200 MG/30ML; MG/30ML; MG/30ML
30 SUSPENSION ORAL EVERY 6 HOURS PRN
Status: DISCONTINUED | OUTPATIENT
Start: 2024-03-31 | End: 2024-04-03 | Stop reason: HOSPADM

## 2024-03-31 RX ORDER — LORAZEPAM 2 MG/ML
2 INJECTION INTRAMUSCULAR EVERY 6 HOURS PRN
Status: DISCONTINUED | OUTPATIENT
Start: 2024-03-31 | End: 2024-04-03 | Stop reason: HOSPADM

## 2024-03-31 RX ORDER — LORAZEPAM 1 MG/1
2 TABLET ORAL EVERY 6 HOURS PRN
Status: DISCONTINUED | OUTPATIENT
Start: 2024-03-31 | End: 2024-04-03 | Stop reason: HOSPADM

## 2024-03-31 ASSESSMENT — SLEEP AND FATIGUE QUESTIONNAIRES
DO YOU HAVE DIFFICULTY SLEEPING: YES
SLEEP PATTERN: DIFFICULTY FALLING ASLEEP;DISTURBED/INTERRUPTED SLEEP
DO YOU USE A SLEEP AID: NO
AVERAGE NUMBER OF SLEEP HOURS: 6

## 2024-03-31 ASSESSMENT — LIFESTYLE VARIABLES
HOW MANY STANDARD DRINKS CONTAINING ALCOHOL DO YOU HAVE ON A TYPICAL DAY: 5 OR 6
HOW OFTEN DO YOU HAVE A DRINK CONTAINING ALCOHOL: 4 OR MORE TIMES A WEEK
HOW MANY STANDARD DRINKS CONTAINING ALCOHOL DO YOU HAVE ON A TYPICAL DAY: 5 OR 6
HOW OFTEN DO YOU HAVE A DRINK CONTAINING ALCOHOL: 4 OR MORE TIMES A WEEK

## 2024-03-31 ASSESSMENT — PATIENT HEALTH QUESTIONNAIRE - PHQ9
2. FEELING DOWN, DEPRESSED OR HOPELESS: SEVERAL DAYS
SUM OF ALL RESPONSES TO PHQ QUESTIONS 1-9: 2
1. LITTLE INTEREST OR PLEASURE IN DOING THINGS: SEVERAL DAYS
SUM OF ALL RESPONSES TO PHQ QUESTIONS 1-9: 2
SUM OF ALL RESPONSES TO PHQ9 QUESTIONS 1 & 2: 2
SUM OF ALL RESPONSES TO PHQ QUESTIONS 1-9: 2
SUM OF ALL RESPONSES TO PHQ QUESTIONS 1-9: 2

## 2024-03-31 NOTE — ED NOTES
Mode of arrival (squad #, walk in, police, etc) : walk in         Chief complaint(s): mental health problem         Arrival Note (brief scenario, treatment PTA, etc).: Pt states being SI and states he would take pills if he had any. Pt denies being HI. Pt states he has been feeling depressed lately.         C= \"Have you ever felt that you should Cut down on your drinking?\"  No  A= \"Have people Annoyed you by criticizing your drinking?\"  No  G= \"Have you ever felt bad or Guilty about your drinking?\"  No  E= \"Have you ever had a drink as an Eye-opener first thing in the morning to steady your nerves or to help a hangover?\"  No      Deferred []      Reason for deferring: N/A    *If yes to two or more: probable alcohol abuse.*

## 2024-04-01 PROCEDURE — 99223 1ST HOSP IP/OBS HIGH 75: CPT | Performed by: PSYCHIATRY & NEUROLOGY

## 2024-04-01 PROCEDURE — 99222 1ST HOSP IP/OBS MODERATE 55: CPT | Performed by: INTERNAL MEDICINE

## 2024-04-01 PROCEDURE — 1240000000 HC EMOTIONAL WELLNESS R&B

## 2024-04-01 PROCEDURE — APPSS60 APP SPLIT SHARED TIME 46-60 MINUTES: Performed by: NURSE PRACTITIONER

## 2024-04-01 NOTE — H&P
Department of Psychiatry  Attending Physician Psychiatric Assessment     Reason for Admission to Psychiatric Unit:    Threat to self requiring 24 hour professional observation  A mental disorder causing major disability in social, interpersonal, occupational, and/or educational functioning that is leading to dangerous or life-threatening functioning, and that can only be addressed in an acute inpatient setting   Concerns about patient's safety in the community    CHIEF COMPLAINT: Depression with suicidal ideation    History obtained from: Patient, electronic medical record          HISTORY OF PRESENT ILLNESS:    Balta Sarmiento is a 43 y.o. male who has a past medical history of alcohol use disorder, polysubstance abuse, and depression.  He presented to ED endorsing suicidal ideation with a plan to overdose.  Noted that patient urine toxicology positive for cocaine metabolite and cannabinoid. ETOH level 0.058. Patient not able to contract for safety in the community and  is admitted to [facility] voluntarily.    On assessment, patient is resting in bed.  Patient is resting in bed.  He is easily arousable to verbal stimuli.  Minimizing events that led to admission.  Patient does not recall ever stating that he said he wanted to end his life.  Clearly documented by multiple medical providers in ED that patient reporting that he has plans to overdose.  Patient not willing to engage in constructive conversation regarding his mental health.  He is verbalizing that he would like help with his substance use.  Has been interested in [place] [facility] program.  He is requesting for social work's assistance in this.  Patient last admitted to [facility] on 12/21/2023.  He was discharged on Effexor and Vivitrol.  He however, is no longer taking psychotropic medications and did not follow-up with outpatient mental health care.  Patient reporting that he is not interested in psychotropic medications.    Per review of EMR, patient has a history of 
Chloride 100 98 - 107 mmol/L    CO2 27 20 - 31 mmol/L    Anion Gap 14 9 - 17 mmol/L    Glucose 81 70 - 99 mg/dL    BUN 15 6 - 20 mg/dL    Creatinine 0.7 0.7 - 1.2 mg/dL    Est, Glom Filt Rate >90 >60 mL/min/1.73m2    Calcium 9.4 8.6 - 10.4 mg/dL   CBC with Auto Differential    Collection Time: 03/31/24  8:30 PM   Result Value Ref Range    WBC 11.4 (H) 3.5 - 11.0 k/uL    RBC 4.72 4.5 - 5.9 m/uL    Hemoglobin 14.9 13.5 - 17.5 g/dL    Hematocrit 43.9 41 - 53 %    MCV 93.1 80 - 100 fL    MCH 31.5 26 - 34 pg    MCHC 33.9 31 - 37 g/dL    RDW 13.6 11.5 - 14.9 %    Platelets 229 150 - 450 k/uL    MPV 8.0 6.0 - 12.0 fL    Neutrophils % 62 36 - 66 %    Lymphocytes % 29 24 - 44 %    Monocytes % 6 1 - 7 %    Eosinophils % 2 0 - 4 %    Basophils % 1 0 - 2 %    Neutrophils Absolute 7.00 1.3 - 9.1 k/uL    Lymphocytes Absolute 3.30 1.0 - 4.8 k/uL    Monocytes Absolute 0.70 0.1 - 1.3 k/uL    Eosinophils Absolute 0.30 0.0 - 0.4 k/uL    Basophils Absolute 0.10 0.0 - 0.2 k/uL       Imaging/Diagonstics:  Recent data reviewed    Assessment :      Primary Problem  Depression with suicidal ideation    Active Hospital Problems    Diagnosis Date Noted    Depression with suicidal ideation [F32.A, R45.851] 09/18/2023       Plan:     Reason for consult: General medical management     Depression with suicidal ideation-management per psychiatry  Recent elevated liver enzymes, hep C positive, will repeat liver enzymes, hep C screen  Mild leukocytosis; no signs symptoms of infectionwill repeat tomorrow  Recent TSH normal    DVT prophylaxis-not required, patient is mobile    Consultations:   IP CONSULT TO INTERNAL MEDICINE      Barb Guadarrama MD  4/1/2024  2:09 PM    Copy sent to No primary care provider on file.    Please note that this chart was generated using voice recognition Dragon dictation software.  Although every effort was made to ensure the accuracy of this automated transcription, some errors in transcription may have occurred.

## 2024-04-01 NOTE — BH NOTE
Patient given tobacco quitline number 36085013659 ,patient given information as to the dangers of long term tobacco use. Continue to reinforce the importance of tobacco cessation.

## 2024-04-01 NOTE — PROGRESS NOTES
Behavioral Services  Medicare Certification Upon Admission    I certify that this patient's inpatient psychiatric hospital admission is medically necessary for:    [x] (1) Treatment which could reasonably be expected to improve this patient's condition,       [x] (2) Or for diagnostic study;     AND     [x](2) The inpatient psychiatric services are provided while the individual is under the care of a physician and are included in the individualized plan of care.    Estimated length of stay/service 2-9 days    Plan for post-hospital care -outpatient care    Electronically signed by HOUSTON POSADA MD on 3/31/2024 at 10:21 PM

## 2024-04-01 NOTE — BH NOTE
[facility]  Admission Note     Admission Type:   Voluntary     Reason for admission:  Reason for Admission: Reports increased depression with suicidal thoughts to OD on pills due to various life stressors      Addictive Behavior:   Addictive Behavior  In the Past 3 Months, Have You Felt or Has Someone Told You That You Have a Problem With  : None    Medical Problems:   Past Medical History:   Diagnosis Date    Anxiety     Depression     Insomnia        Status EXAM:  Mental Status and Behavioral Exam  Normal: No  Level of Assistance: Independent/Self  Facial Expression: Sad, Flat  Affect: Stable  Level of Consciousness: Alert  Frequency of Checks: 4 times per hour, close  Mood:Normal: No  Mood: Depressed, Anxious  Motor Activity:Normal: No  Motor Activity: Decreased  Eye Contact: Fair  Observed Behavior: Withdrawn, Cooperative  Sexual Misconduct History: Current - no  Preception: [place] to person, [place] to place, [place] to time, [place] to situation  Attention:Normal: No  Attention: Distractible  Thought Processes: Circumstantial  Thought Content:Normal: No  Thought Content: Preoccupations  Depression Symptoms: Feelings of helplessness, Feelings of hopelessess  Anxiety Symptoms: Generalized  Yumiko Symptoms: No problems reported or observed.  Hallucinations: None  Delusions: No  Memory:Normal: Yes  Insight and Judgment: No  Insight and Judgment: Poor judgment, Poor insight    Tobacco Screening:  Practical Counseling, on admission, juan X, if applicable and completed (first 3 are required if patient doesn't refuse)         ( x) Recognizing danger situations (included triggers and roadblocks)                    ( x) Coping skills (new ways to manage stress,relaxation techniques, changing routine, distraction)                                                           ( x) Basic information about quitting (benefits of quitting, techniques in how to quit, available resources  ( ) Referral for counseling

## 2024-04-01 NOTE — PROGRESS NOTES
Pharmacy Medication History Note      List of current medications patient is taking is complete.    Source of information: [facility] ([phone]) spoke with Tai Reeves; [place]; PDMP    Changes made to medication list:  Medications removed (include reason, ex. therapy complete or physician discontinued, noncompliance):  none    Medications flagged for provider review:  none    Medications added/doses adjusted:  none    Other notes (ex. Recent course of antibiotics, Coumadin dosing):  Patient did not  prescriptions that were sent to [facility] in December 2023 after discharge from the [facility].  The patient received Vivitrol 380 mg on 12/24/24 during last [facility] admission.      Current Home Medication List at Time of Admission:  Prior to Admission medications    Medication Sig   nicotine polacrilex (COMMIT) 2 MG lozenge Take 1 lozenge by mouth every 2 hours as needed for Smoking cessation  Patient not taking: Reported on 4/1/2024   Multiple Vitamins-Minerals (THERAPEUTIC MULTIVITAMIN-MINERALS) tablet Take 1 tablet by mouth daily  Patient not taking: Reported on 4/1/2024   naltrexone (VIVITROL) 380 MG injection Inject 380 mg into the muscle every 28 days  Patient not taking: Reported on 4/1/2024   vitamin B-1 (THIAMINE) 100 MG tablet Take 1 tablet by mouth daily  Patient not taking: Reported on 4/1/2024   venlafaxine (EFFEXOR XR) 75 MG extended release capsule Take 1 capsule by mouth daily (with breakfast)  Patient not taking: Reported on 12/28/2023         Please let me know if you have any questions about this encounter. Thank you!    Electronically signed by Abida Morel RPH on 4/1/2024 at 12:36 PM

## 2024-04-01 NOTE — ED PROVIDER NOTES
EMERGENCY DEPARTMENT ENCOUNTER    Pt Name: Balta Sarmiento  MRN: [id number]  Birthdate 1980  Date of evaluation: 3/31/24  CHIEF COMPLAINT       Chief Complaint   Patient presents with    Mental Health Problem    Depression     HISTORY OF PRESENT ILLNESS   43-year-old male presenting to the ER complaining of suicidal ideation with a plan.  Patient states he would overdose on pills.  Patient denies having attempted to do so today.  Patient wanted to get help before he actually does.  Patient does have a history of depression and states he has not been on his medication for months.    The history is provided by the patient.   Mental Health Problem  Presenting symptoms: suicidal thoughts    Presenting symptoms: no agitation    Associated symptoms: no abdominal pain, no chest pain and no fatigue            REVIEW OF SYSTEMS     Review of Systems   Constitutional:  Negative for activity change, fatigue and fever.   HENT:  Negative for congestion, ear pain and trouble swallowing.    Eyes:  Negative for photophobia and visual disturbance.   Respiratory:  Negative for cough and shortness of breath.    Cardiovascular:  Negative for chest pain and palpitations.   Gastrointestinal:  Negative for abdominal pain, diarrhea, nausea and vomiting.   Genitourinary:  Negative for dysuria, flank pain and urgency.   Musculoskeletal:  Negative for arthralgias and myalgias.   Skin:  Negative for color change and rash.   Neurological:  Negative for dizziness and facial asymmetry.   Psychiatric/Behavioral:  Positive for suicidal ideas. Negative for agitation and behavioral problems.      PASTMEDICAL HISTORY     Past Medical History:   Diagnosis Date    Anxiety     Depression     Insomnia      Past Problem List  Patient Active Problem List   Diagnosis Code    Depression with suicidal ideation F32.A, R45.851    Alcohol abuse F10.10    Suicidal ideation R45.851    Major depressive disorder, recurrent severe without psychotic features ([facility]) F33.2

## 2024-04-01 NOTE — ED NOTES
Safeguard in PPU for patient watch. Safeguard informed that they need to stay with the patient at all time, must be present in the room and if they need a break or relief to let the nurse know so they can be replaced. Safeguard verbalizes understanding. Belongings and patient checked by security. Belongings locked up. Pt in blue gown. Mode of arrival (squad #, walk in, police, etc) : Walk In        Chief complaint(s): Mental Health Evaluation        Arrival Note (brief scenario, treatment PTA, etc).: Pt came to the ED for having suicidal thoughts with a plan to overdose on pills. Pt has had a suicide attempt in the past. Pt states he has a substance abuse disorder with cocaine and alcohol. Pt states he doesn't trust himself at this point and would like to seek help. Pt denies visual and audio hallucinations. Pt admits to the use of cocaine and admits to the use of alcohol.        C= \"Have you ever felt that you should Cut down on your drinking?\"  Yes  A= \"Have people Annoyed you by criticizing your drinking?\"  Yes  G= \"Have you ever felt bad or Guilty about your drinking?\"  Yes  E= \"Have you ever had a drink as an Eye-opener first thing in the morning to steady your nerves or to help a hangover?\"  Yes      Deferred []      Reason for deferring: N/A    *If yes to two or more: probable alcohol abuse.*

## 2024-04-01 NOTE — GROUP NOTE
Group Therapy Note    Date: 4/1/2024    Group Start Time: 1100  Group End Time: 1140  Group Topic: Cognitive Skills    Maria E Sánchez CTRS    Cognitive Skills Group Note        Date: April 1 2024 Start Time: 11am  End Time:  1140am      Number of Participants in Group & Unit Census:  6/10    Topic: cognitive skills     Goal of Group:pt will demonstrate improved coping skills and improved decision making skills       Comments:     Patient did not participate in Cognitive Skills group, despite staff encouragement and explanation of benefits.  Patient remain seclusive to self.  Q15 minute safety checks maintained for patient safety and will continue to encourage patient to attend unit programming.            Signature:  ROSELYN WILLIS

## 2024-04-01 NOTE — GROUP NOTE
Group Therapy Note    Date: 4/1/2024    Group Start Time: 1015  Group End Time: 1045  Group Topic: Psychoeducation    Vijay Garnica        Group Therapy Note    Attendees: 5/11   Patient declined to attend psychotherapy group after encouragement from staff.  1:1 talk time offered but refused.     Signature:  Vijay Zuniga

## 2024-04-01 NOTE — GROUP NOTE
Group Therapy Note    Date: 4/1/2024    Group Start Time: 1330  Group End Time: 1410  Group Topic: Psychoeducation    [facility] Maria E Mccauley CTRS    Psych-Ed/Relapse Prevention Group Note        Date: April 1, 2024 Start Time: 1:30pm  End Time:  210pm      Number of Participants in Group & Unit Census:  6/8    Topic: psycheducation group     Goal of Group: pt will demonstrate improved communication skills and improved coping skills       Comments:     Patient did not participate in Psych-Ed/Relapse Prevention group, despite staff encouragement and explanation of benefits.  Patient remain seclusive to self.  Q15 minute safety checks maintained for patient safety and will continue to encourage patient to attend unit programming.              Signature:  ROSELYN WILLIS

## 2024-04-02 PROCEDURE — 99232 SBSQ HOSP IP/OBS MODERATE 35: CPT | Performed by: PSYCHIATRY & NEUROLOGY

## 2024-04-02 PROCEDURE — 6370000000 HC RX 637 (ALT 250 FOR IP): Performed by: PSYCHIATRY & NEUROLOGY

## 2024-04-02 PROCEDURE — 1240000000 HC EMOTIONAL WELLNESS R&B

## 2024-04-02 PROCEDURE — APPSS30 APP SPLIT SHARED TIME 16-30 MINUTES: Performed by: NURSE PRACTITIONER

## 2024-04-02 RX ADMIN — TRAZODONE HYDROCHLORIDE 50 MG: 50 TABLET ORAL at 21:17

## 2024-04-02 RX ADMIN — NICOTINE POLACRILEX 2 MG: 2 LOZENGE ORAL at 16:40

## 2024-04-02 NOTE — GROUP NOTE
Group Therapy Note    Date: 4/2/2024    Group Start Time: 1000  Group End Time: 1028  Group Topic: Psychoeducation    [facility]    Regina Zacarias MSW        Group Therapy Note    Attendees: 4/8     Patient was offered group therapy today but declined to participate despite encouragement from staff.  1:1 was offered.       Signature:  CHRIS Nicholson

## 2024-04-02 NOTE — GROUP NOTE
Group Therapy Note    Date: 4/2/2024    Group Start Time: 1050  Group End Time: 1135  Group Topic: Cognitive Skills    Maria E Sánchez CTRS    Cognitive Skills Group Note        Date: April 2, 2024 Start Time:  1050   End Time:  1135 am      Number of Participants in Group & Unit Census:  5/8    Topic: cognitive skills     Goal of Group: pt will improved social skills and improved coping skills       Comments:     Patient did not participate in Cognitive Skills group, despite staff encouragement and explanation of benefits.  Patient remain seclusive to self.  Q15 minute safety checks maintained for patient safety and will continue to encourage patient to attend unit programming.              Signature:  ROSELYN WILLIS

## 2024-04-02 NOTE — GROUP NOTE
Group Therapy Note    Date: 4/2/2024    Group Start Time: 1430  Group End Time: 1515  Group Topic: recreation group     [facility]    Maria E Castañeda CTRS        Group Therapy Note    Attendees: 5/8       Patient's Goal:  pt will demonstrate improved leiusre awareness and improved coping skills     Notes:   pt was pleasant and participated well    Status After Intervention:  Improved    Participation Level: Active Listener and Interactive    Participation Quality: Appropriate, Attentive, and Supportive      Speech:  normal      Thought Process/Content: Logical      Affective Functioning: Congruent      Mood: euthymic      Level of consciousness:  Alert      Response to Learning: Able to verbalize current knowledge/experience, Capable of insight, and Progressing to goal      Endings: None Reported    Modes of Intervention: Education, Support, and Activity      Discipline Responsible: Psychoeducational Specialist      Signature:  ROSELYN WILLIS

## 2024-04-02 NOTE — PROGRESS NOTES
Daily Progress Note  4/2/2024    Patient Name: Balta Sarmiento    CHIEF COMPLAINT:  Depression with suicidal ideation          SUBJECTIVE:      Patient is seen today for a follow up assessment.  Upon approach patient is found resting in bed, arouses to verbal stimuli, interview conducted patient's room with the door open.  He reports feeling depressed which he attributes to  lack of housing and substance relapse.  Denies feeling anxious.  He currently denies suicidal ideation.  Denies homicidal ideation.  Denies auditory, visual hallucinations or other perceptual disturbances.  Denies delusions or paranoia.  He remains uninterested in psychotropics.  Per unit nursing staff patient maintains behavior control with no need for emergency medication for the past 24 hours.  Patient expresses interest in going to [facility] upon discharge.  Blood pressure mildly elevated at 132/91, he denies new symptoms.  No new laboratory results for review.    Appetite:  [x] Adequate/Unchanged  [] Increased  [] Decreased      Sleep:       [x] Adequate/Unchanged  [] Fair  [] Poor      Group Attendance on Unit:   [] Yes   [] Selectively    [] No    Received emergency medications in past 24 hrs: [] Yes   [x] No    Medication Side Effects: Denies         Mental Status Exam  Level of consciousness: Alert and awake   Appearance: Appropriate attire for setting, seated on bed, with fair  grooming and hygiene   Behavior/Motor: Approachable, engages with interviewer, no psychomotor abnormalities   Attitude toward examiner: Cooperative, attentive, good eye contact  Speech: Normal rate, volume, and tone  Mood:  per patient \"better\"  Affect: blunted  Thought processes: linear, goal directed, and coherent   Thought content:  Denies homicidal ideation  Suicidal Ideation: Denies suicidal ideations, contracts for safety on the unit.   Delusions: No evidence of delusions.  Denies paranoia.  Perceptual Disturbance: Patient does not appear to be

## 2024-04-03 VITALS
HEIGHT: 71 IN | BODY MASS INDEX: 27.02 KG/M2 | WEIGHT: 193 LBS | SYSTOLIC BLOOD PRESSURE: 123 MMHG | TEMPERATURE: 97.7 F | RESPIRATION RATE: 16 BRPM | HEART RATE: 67 BPM | OXYGEN SATURATION: 100 % | DIASTOLIC BLOOD PRESSURE: 85 MMHG

## 2024-04-03 PROCEDURE — 6370000000 HC RX 637 (ALT 250 FOR IP): Performed by: PSYCHIATRY & NEUROLOGY

## 2024-04-03 PROCEDURE — 99239 HOSP IP/OBS DSCHRG MGMT >30: CPT | Performed by: PSYCHIATRY & NEUROLOGY

## 2024-04-03 RX ADMIN — NICOTINE POLACRILEX 2 MG: 2 LOZENGE ORAL at 08:42

## 2024-04-03 NOTE — BH NOTE
Patient given tobacco quitline number 83698841637 at this time, refusing to call at this time, states \" I just dont want to quit now\"- patient given information as to the dangers of long term tobacco use. Continue to reinforce the importance of tobacco cessation.

## 2024-04-03 NOTE — TRANSITION OF CARE
maker? No  Does the patient have a Medical Advance Directive? No  Does the patient have a Psychiatric Advance Directive? No  If the patient does not have a surrogate or Medical Advance Directive AND Psychiatric Advance Directive, the patient was offered information on these advance directives Patient declined to complete    Patient Instructions: Please continue all medications until otherwise directed by physician.  yes    Tobacco Cessation Discharge Plan:   Is the patient a tobacco user  and needs referral for tobacco cessation? Yes  Patient referred to the following for tobacco cessation with an appointment? Patient refused  Patient was offered medication to assist with tobacco cessation at discharge? Patient refused    Alcohol/Substance Abuse Discharge Plan:   Does the patient have a history of substance/alcohol abuse and requires a referral for treatment? Yes  Patient referred to the following for substance/alcohol abuse treatment with an appointment? Yes  Patient was offered medication to assist with alcohol cessation at discharge? Patient refused      Patient discharged to: Home; provided to the patient/caregiver either in hard copy or electronically. Patient has an appointment @ [facility].

## 2024-04-03 NOTE — DISCHARGE INSTRUCTIONS
Information:  Medications:   Medication summary provided   I understand that I should take only the medications on my list.     -why and when I need to take each medicine.     -which side effects to watch for.     -that I should carry my medication information at all times in case of     Emergency situations.    I will take all of my medicines to follow up appointments.     -check with my physician or pharmacist before taking any new    Medication, over the counter product or drink alcohol.    -Ask about food, drug or dietary supplement interactions.    -discard old lists and update records with medication providers.    Notify Physician:  Notify physician if you notice:   Always call 911 if you feel your life is in danger  In case of an emergency call 911 immediately!  If 911 is not available call your local emergency medical system for help    Behavioral Health Follow Up:  Original Referral Source:ED  Discharge Diagnosis: Suicidal ideation [R45.851]  Depression with suicidal ideation [F32.A, R45.851]  Recommendations for Level of Care: patient is being discharged to [facility]  Patient status at discharge: stable  My hospital  was: [facility]  Aftercare plan faxed: [facility]   -faxed by: nursing staff   -date: 4/3/24   -time: 1300  Prescriptions: N/A    Smoking: Quit Smoking.   Call the [employer]'s smoking quitline at 0-507-47M-QUIT  Know the signs of a heart attack   If you have any of the following symptoms call 911 immediately, do not wait more    Than five minutes.    1. Pressure, fullness and/ or squeezing in the center of the chest spreading to    The jaw, neck or shoulder.    2. Chest discomfort with light headedness, fainting, sweating, nausea or    Shortness of breath.   3. Upper abdominal pressure or discomfort.   4. Lower chest pain, back pain, unusual fatigue, shortness of breath, nausea   Or dizziness.     General Information:   Questions regarding your bill: Call HELP

## 2024-04-03 NOTE — GROUP NOTE
Group Therapy Note    Date: 4/3/2024    Group Start Time: 1000  Group End Time: 1025  Group Topic: Psychoeducation    [facility]    Regina Zacarias MSW        Group Therapy Note    Attendees: 3/4       Patient's Goal:  PT will demonstrate increased interpersonal interactions and gain insight into self    Notes:  PT participates in group and encourages others to participate     Status After Intervention:  Improved    Participation Level: Active Listener and Interactive    Participation Quality: Appropriate, Attentive, Sharing, and Supportive      Speech:  normal      Thought Process/Content: Logical      Affective Functioning: Congruent      Mood: anxious      Level of consciousness:  Alert      Response to Learning: Able to verbalize current knowledge/experience, Able to verbalize/acknowledge new learning, Able to retain information, and Capable of insight      Endings: None Reported    Modes of Intervention: Education and Support      Discipline Responsible: /Counselor      Signature:  CHRIS Nicholson

## 2024-04-03 NOTE — PLAN OF CARE
Problem: Self Harm/Suicidality  Goal: Will have no self-injury during hospital stay  Description: INTERVENTIONS:  1.  Ensure constant observer at bedside with Q15M safety checks  2.  Maintain a safe environment  3.  Secure patient belongings  4.  Ensure family/visitors adhere to safety recommendations  5.  Ensure safety tray has been added to patient's diet order  6.  Every shift and PRN: Re-assess suicidal risk via Frequent Screener    4/1/2024 1744 by Laura Herrera LPN  Outcome: Progressing   Patient denies thoughts to harm self  Problem: Depression  Goal: Will be euthymic at discharge  Description: INTERVENTIONS:  1. Administer medication as ordered  2. Provide emotional support via 1:1 interaction with staff  3. Encourage involvement in milieu/groups/activities  4. Monitor for social isolation  4/1/2024 1744 by Laura Herrera LPN  Outcome: Progressing   Mr. Sarmiento is seen in the dayroom affect is flat he  denies any thoughts to harm self, has some depression. Wants to go to inpatient rehab at discharge. Currently has no medications ordered. He reports fair sleep and good appetite.   Problem: Drug Abuse/Detox  Goal: Will have no detox symptoms and will verbalize plan for changing drug-related behavior  Description: INTERVENTIONS:  1. Administer medication as ordered  2. Monitor physical status  3. Provide emotional support with 1:1 interaction with staff  4. Encourage  recovery focused treatment   4/1/2024 1744 by Laura Herrera LPN  Outcome: Progressing   Patient states he was using street drugs prior to admission and wants to go to [facility]   
  Problem: Self Harm/Suicidality  Goal: Will have no self-injury during hospital stay  Description: INTERVENTIONS:  1.  Ensure constant observer at bedside with Q15M safety checks  2.  Maintain a safe environment  3.  Secure patient belongings  4.  Ensure family/visitors adhere to safety recommendations  5.  Ensure safety tray has been added to patient's diet order  6.  Every shift and PRN: Re-assess suicidal risk via Frequent Screener    4/1/2024 2050 by Yasmine Brice RN  Outcome: Progressing     Problem: Depression  Goal: Will be euthymic at discharge  Description: INTERVENTIONS:  1. Administer medication as ordered  2. Provide emotional support via 1:1 interaction with staff  3. Encourage involvement in milieu/groups/activities  4. Monitor for social isolation  4/1/2024 2050 by Yasmine Brice RN  Outcome: Progressing  Note: Patient is friendly and cooperative. He addressed hygiene needs on this shift. Patient appears brighter and states that he should be leaving tomorrow. Patient denies needing medication, states he will not be taking any psychotropic medications. Staff maintains Q15 minute safety checks.      
  Problem: Self Harm/Suicidality  Goal: Will have no self-injury during hospital stay  Description: INTERVENTIONS:  1.  Ensure constant observer at bedside with Q15M safety checks  2.  Maintain a safe environment  3.  Secure patient belongings  4.  Ensure family/visitors adhere to safety recommendations  5.  Ensure safety tray has been added to patient's diet order  6.  Every shift and PRN: Re-assess suicidal risk via Frequent Screener    4/2/2024 2153 by Quita Almaguer LPN  Outcome: Progressing  Flowsheets (Taken 4/2/2024 2150)  Will have no self-injury during hospital stay:   Ensure constant observer at bedside with Q15M safety checks   Maintain a safe environment   Secure patient belongings   Every shift and PRN: Re-assess suicidal risk via Frequent Screener  Note: Patient does not endorse thoughts of self harm or suicidal ideation at this time.  Continuing with 15 minute checks, and monitoring environment for potentially dangerous contraband.     Problem: Depression  Goal: Will be euthymic at discharge  Description: INTERVENTIONS:  1. Administer medication as ordered  2. Provide emotional support via 1:1 interaction with staff  3. Encourage involvement in milieu/groups/activities  4. Monitor for social isolation  Outcome: Progressing  Note: Patient states his behavioral health medication is increasingly effective, stating depression is 2/10.  Patient remains somewhat isolative, but was out in day room watching a movie with peers.     Problem: Drug Abuse/Detox  Goal: Will have no detox symptoms and will verbalize plan for changing drug-related behavior  Description: INTERVENTIONS:  1. Administer medication as ordered  2. Monitor physical status  3. Provide emotional support with 1:1 interaction with staff  4. Encourage  recovery focused treatment   4/2/2024 2153 by Quita Almaguer LPN  Outcome: Progressing  Flowsheets (Taken 4/2/2024 2150)  Will have no detox symptoms and will verbalize plan for changing 
  Problem: Self Harm/Suicidality  Goal: Will have no self-injury during hospital stay  Description: INTERVENTIONS:  1.  Ensure constant observer at bedside with Q15M safety checks  2.  Maintain a safe environment  3.  Secure patient belongings  4.  Ensure family/visitors adhere to safety recommendations  5.  Ensure safety tray has been added to patient's diet order  6.  Every shift and PRN: Re-assess suicidal risk via Frequent Screener    Outcome: Progressing  Flowsheets (Taken 4/2/2024 1737)  Will have no self-injury during hospital stay:   Maintain a safe environment   Secure patient belongings   Every shift and PRN: Re-assess suicidal risk via Frequent Screener  Note: Patient denies thoughts of harm to self or others and denies hallucinations. Patient is irritable and evasive during 1:1 time. Patient is attending select groups and is social in the dayroom. Patient has no scheduled meds ordered at this time. Safe environment maintained.  Q15 minute checks for safety cont per unit policy.  Will cont to monitor for safety and provides support and reassurance as needed.       Problem: Drug Abuse/Detox  Goal: Will have no detox symptoms and will verbalize plan for changing drug-related behavior  Description: INTERVENTIONS:  1. Administer medication as ordered  2. Monitor physical status  3. Provide emotional support with 1:1 interaction with staff  4. Encourage  recovery focused treatment   Outcome: Progressing  Flowsheets (Taken 4/2/2024 1737)  Will have no detox symptoms and will verbalize plan for changing drug-related behavior:   Administer medication as ordered   Provide emotional support with 1:1 interaction with staff   Monitor physical status   Encourage recovery focused treatment  Note: Denies any signs or symptoms of drug withdrawals. Patient working on getting into a treatment facility to go to upon discharge.      
group plans and strategies for care.    Method: Group therapy, Medication compliance, Individualized assessments and Care planning.    Outcome: Needs Reinforcement    PATIENT GOALS: To be discussed with patient within 72 hours    PLAN/TREATMENT RECOMMENDATIONS:     Continue group therapy , Medications compliance, Goal setting, Individualized assessments and Care planning, continue to monitor patient on unit.      SHORT-TERM GOALS:   Time frame for Short-Term Goals: 5-7 days    LONG-TERM GOALS:  Time frame for Long-Term Goals: 6 months  Members Present in Team Meeting: See Signature Sheet    Millie Elizabeth RN

## 2024-04-03 NOTE — DISCHARGE SUMMARY
DISCHARGE SUMMARY      Patient ID:  Balta Sarmiento  [id number]  43 y.o.  1980    Admit date: 3/31/2024    Discharge date and time: 4/3/2024    Disposition: Residential rehab    Admitting Physician: Aime Abarca MD     Discharge Physician: Dr CAROLINE Abarca MD    Admission Diagnoses: Suicidal ideation [R45.851]  Depression with suicidal ideation [F32.A, R45.851]    Admission Condition: poor    Discharged Condition: stable    Admission Circumstance: Balta Sarmiento is a 43 y.o. male who has a past medical history of alcohol use disorder, polysubstance abuse, and depression.  He presented to ED endorsing suicidal ideation with a plan to overdose.  Noted that patient urine toxicology positive for cocaine metabolite and cannabinoid. ETOH level 0.058. Patient not able to contract for safety in the community and  is admitted to [facility] voluntarily.     On assessment, patient is resting in bed.  Patient is resting in bed.  He is easily arousable to verbal stimuli.  Minimizing events that led to admission.  Patient does not recall ever stating that he said he wanted to end his life.  Clearly documented by multiple medical providers in ED that patient reporting that he has plans to overdose.  Patient not willing to engage in constructive conversation regarding his mental health.  He is verbalizing that he would like help with his substance use.  Has been interested in [facility] program.  He is requesting for social work's assistance in this.  Patient last admitted to [facility] on 12/21/2023.  He was discharged on Effexor and Vivitrol.  He however, is no longer taking psychotropic medications and did not follow-up with outpatient mental health care.  Patient reporting that he is not interested in psychotropic medications.     Per review of EMR, patient has a history of multiple depressive episodes lasting multiple weeks in which she experiences anhedonia, feelings of guilt, worthlessness, helplessness, little energy or motivation, reduced

## 2024-04-03 NOTE — BH NOTE
[facility]  Discharge Note    Pt discharged with followings belongings:   Dental Appliances: None  Vision - Corrective Lenses: None  Hearing Aid: None  Jewelry: None  Body Piercings Removed: N/A  Clothing: Footwear, Hat, Jacket/Coat, Pajamas, Pants, Shirt, Socks  Other Valuables: Lighter/Matches, Personal Toiletries   Valuables returned to patient. Patient educated on aftercare instructions: medication education and follow up appointment.  Information faxed to [facility] by nursing staff  at 12:19 PM .Patient verbalize understanding of AVS:  yes. Patient discharged to [facility] via insurance cab..    Status EXAM upon discharge:  Mental Status and Behavioral Exam  Normal: No  Level of Assistance: Independent/Self  Facial Expression: Flat  Affect: Blunt  Level of Consciousness: Alert  Frequency of Checks: 4 times per hour, close  Mood:Normal: No  Mood: Depressed, Anxious  Motor Activity:Normal: Yes  Motor Activity: Decreased  Eye Contact: Fair  Observed Behavior: Cooperative  Sexual Misconduct History: Current - no  Preception: [place] to person, [place] to time, [place] to place, [place] to situation  Attention:Normal: No  Attention: Distractible  Thought Processes: Circumstantial  Thought Content:Normal: No  Thought Content: Preoccupations  Depression Symptoms: No problems reported or observed.  Anxiety Symptoms: Generalized  Yumiko Symptoms: No problems reported or observed.  Hallucinations: None  Delusions: No  Memory:Normal: Yes  Insight and Judgment: No  Insight and Judgment: Poor judgment, Poor insight    Tobacco Screening:  Practical Counseling, on admission, juan X, if applicable and completed (first 3 are required if patient doesn't refuse):            ( ) Recognizing danger situations (included triggers and roadblocks)                    ( ) Coping skills (new ways to manage stress,relaxation techniques, changing routine, distraction)

## 2024-04-03 NOTE — CARE COORDINATION
met with patient regarding discharge plans to [facility]. Patient previously advised he was approved for [facility]. Patient gave  a verbal to contact [facility] to verify this.  contacted [facility] and was advised patient has not completed his intake screening.  advised patient intake screen still needed for [facility]. Patient states he no longer wishes to go there and would like to just go to a shelter.  acknowledged patient request.  
Name: Balta Sarmiento    : 1980    Auth number: [id number]     Discharge Date: 4/3/2024    Destination: [facility]    *If you have any specific discharge questions, please contact the assigned /discharge planner: Vijay [phone]      Discharge Medications:      Medication List        START taking these medications      nicotine polacrilex 2 MG lozenge  Commonly known as: COMMIT  Take 1 lozenge by mouth every 2 hours as needed for Smoking cessation  Notes to patient: For smoking cessation            STOP taking these medications      naltrexone 380 MG injection  Commonly known as: VIVITROL     therapeutic multivitamin-minerals tablet     venlafaxine 75 MG extended release capsule  Commonly known as: EFFEXOR XR     vitamin B-1 100 MG tablet  Commonly known as: THIAMINE              Follow Up Appointment: [place] [id number]  Go on 4/3/2024  Please go to the above address. Please arrive by 2pm. All services will be provided at this location.      
Patient approached  regarding a change in discharge plans. Patient reports that he would like to go back to [facility] where he was previously last week. SOLITARIO signed to send clinicals to [facility] and filed in patient's chart.  faxed clinicals to [facility] per patient request to [phone].  then followed up with phone call to [facility] and spoke with Mundo who is advising patient can return tomorrow 04/03 as long as he arrives before 2pm.   
with patient and offer support while patient receives services at the [facility].

## 2024-04-17 ENCOUNTER — HOSPITAL ENCOUNTER (INPATIENT)
Age: 44
LOS: 2 days | Discharge: HOME | DRG: 751 | End: 2024-04-19
Attending: PSYCHIATRY & NEUROLOGY | Admitting: PSYCHIATRY & NEUROLOGY
Payer: MEDICAID

## 2024-04-17 ENCOUNTER — HOSPITAL ENCOUNTER (EMERGENCY)
Age: 44
Discharge: OTHER INSTITUTION | DRG: 751 | End: 2024-04-17
Attending: EMERGENCY MEDICINE
Payer: MEDICAID

## 2024-04-17 VITALS
SYSTOLIC BLOOD PRESSURE: 109 MMHG | HEIGHT: 71 IN | RESPIRATION RATE: 18 BRPM | WEIGHT: 204 LBS | OXYGEN SATURATION: 95 % | TEMPERATURE: 98.2 F | BODY MASS INDEX: 28.56 KG/M2 | HEART RATE: 82 BPM | DIASTOLIC BLOOD PRESSURE: 74 MMHG

## 2024-04-17 DIAGNOSIS — R45.851 SUICIDAL IDEATION: Primary | ICD-10-CM

## 2024-04-17 PROBLEM — F14.10 COCAINE USE DISORDER (HCC): Status: ACTIVE | Noted: 2024-04-17

## 2024-04-17 PROBLEM — F12.10 CANNABIS ABUSE: Status: ACTIVE | Noted: 2024-04-17

## 2024-04-17 LAB
ALBUMIN SERPL-MCNC: 4.7 G/DL (ref 3.5–5.2)
ALBUMIN/GLOB SERPL: 1 {RATIO} (ref 1–2.5)
ALP SERPL-CCNC: 80 U/L (ref 40–129)
ALT SERPL-CCNC: 62 U/L (ref 10–50)
AMPHET UR QL SCN: NEGATIVE
ANION GAP SERPL CALCULATED.3IONS-SCNC: 14 MMOL/L (ref 9–16)
APAP SERPL-MCNC: <5 UG/ML (ref 10–30)
AST SERPL-CCNC: 39 U/L (ref 10–50)
BARBITURATES UR QL SCN: NEGATIVE
BENZODIAZ UR QL: NEGATIVE
BILIRUB SERPL-MCNC: 0.4 MG/DL (ref 0–1.2)
BUN SERPL-MCNC: 13 MG/DL (ref 6–20)
CALCIUM SERPL-MCNC: 9.4 MG/DL (ref 8.6–10.4)
CANNABINOIDS UR QL SCN: POSITIVE
CHLORIDE SERPL-SCNC: 101 MMOL/L (ref 98–107)
CO2 SERPL-SCNC: 23 MMOL/L (ref 20–31)
COCAINE UR QL SCN: POSITIVE
CREAT SERPL-MCNC: 0.7 MG/DL (ref 0.7–1.2)
ERYTHROCYTE [DISTWIDTH] IN BLOOD BY AUTOMATED COUNT: 12.1 % (ref 11.8–14.4)
ETHANOL PERCENT: <0.01 %
ETHANOLAMINE SERPL-MCNC: <10 MG/DL
FENTANYL UR QL: NEGATIVE
GFR SERPL CREATININE-BSD FRML MDRD: >90 ML/MIN/1.73M2
GLUCOSE SERPL-MCNC: 88 MG/DL (ref 74–99)
HCT VFR BLD AUTO: 44.3 % (ref 40.7–50.3)
HGB BLD-MCNC: 15 G/DL (ref 13–17)
MCH RBC QN AUTO: 31.6 PG (ref 25.2–33.5)
MCHC RBC AUTO-ENTMCNC: 33.9 G/DL (ref 28.4–34.8)
MCV RBC AUTO: 93.5 FL (ref 82.6–102.9)
METHADONE UR QL: NEGATIVE
NRBC BLD-RTO: 0 PER 100 WBC
OPIATES UR QL SCN: NEGATIVE
OXYCODONE UR QL SCN: NEGATIVE
PCP UR QL SCN: NEGATIVE
PLATELET # BLD AUTO: 258 K/UL (ref 138–453)
PMV BLD AUTO: 10.1 FL (ref 8.1–13.5)
POTASSIUM SERPL-SCNC: 4.2 MMOL/L (ref 3.7–5.3)
PROT SERPL-MCNC: 8 G/DL (ref 6.6–8.7)
RBC # BLD AUTO: 4.74 M/UL (ref 4.21–5.77)
SALICYLATES SERPL-MCNC: <0.5 MG/DL (ref 0–10)
SODIUM SERPL-SCNC: 138 MMOL/L (ref 136–145)
TEST INFORMATION: ABNORMAL
WBC OTHER # BLD: 11 K/UL (ref 3.5–11.3)

## 2024-04-17 PROCEDURE — APPSS60 APP SPLIT SHARED TIME 46-60 MINUTES: Performed by: NURSE PRACTITIONER

## 2024-04-17 PROCEDURE — 85027 COMPLETE CBC AUTOMATED: CPT

## 2024-04-17 PROCEDURE — 1240000000 HC EMOTIONAL WELLNESS R&B

## 2024-04-17 PROCEDURE — 80179 DRUG ASSAY SALICYLATE: CPT

## 2024-04-17 PROCEDURE — 99285 EMERGENCY DEPT VISIT HI MDM: CPT

## 2024-04-17 PROCEDURE — 80307 DRUG TEST PRSMV CHEM ANLYZR: CPT

## 2024-04-17 PROCEDURE — 99223 1ST HOSP IP/OBS HIGH 75: CPT | Performed by: PSYCHIATRY & NEUROLOGY

## 2024-04-17 PROCEDURE — 80143 DRUG ASSAY ACETAMINOPHEN: CPT

## 2024-04-17 PROCEDURE — G0480 DRUG TEST DEF 1-7 CLASSES: HCPCS

## 2024-04-17 PROCEDURE — 6370000000 HC RX 637 (ALT 250 FOR IP): Performed by: NURSE PRACTITIONER

## 2024-04-17 PROCEDURE — 80053 COMPREHEN METABOLIC PANEL: CPT

## 2024-04-17 PROCEDURE — 99222 1ST HOSP IP/OBS MODERATE 55: CPT | Performed by: INTERNAL MEDICINE

## 2024-04-17 RX ORDER — POLYETHYLENE GLYCOL 3350 17 G
2 POWDER IN PACKET (EA) ORAL
Status: DISCONTINUED | OUTPATIENT
Start: 2024-04-17 | End: 2024-04-19 | Stop reason: HOSPADM

## 2024-04-17 RX ORDER — VENLAFAXINE HYDROCHLORIDE 75 MG/1
75 CAPSULE, EXTENDED RELEASE ORAL
Status: DISCONTINUED | OUTPATIENT
Start: 2024-04-18 | End: 2024-04-19 | Stop reason: HOSPADM

## 2024-04-17 RX ORDER — TRAZODONE HYDROCHLORIDE 50 MG/1
50 TABLET ORAL NIGHTLY PRN
Status: DISCONTINUED | OUTPATIENT
Start: 2024-04-17 | End: 2024-04-19 | Stop reason: HOSPADM

## 2024-04-17 RX ORDER — MAGNESIUM HYDROXIDE/ALUMINUM HYDROXICE/SIMETHICONE 120; 1200; 1200 MG/30ML; MG/30ML; MG/30ML
30 SUSPENSION ORAL EVERY 6 HOURS PRN
Status: DISCONTINUED | OUTPATIENT
Start: 2024-04-17 | End: 2024-04-19 | Stop reason: HOSPADM

## 2024-04-17 RX ORDER — POLYETHYLENE GLYCOL 3350 17 G/17G
17 POWDER, FOR SOLUTION ORAL DAILY PRN
Status: DISCONTINUED | OUTPATIENT
Start: 2024-04-17 | End: 2024-04-19 | Stop reason: HOSPADM

## 2024-04-17 RX ORDER — IBUPROFEN 400 MG/1
400 TABLET ORAL EVERY 6 HOURS PRN
Status: DISCONTINUED | OUTPATIENT
Start: 2024-04-17 | End: 2024-04-19 | Stop reason: HOSPADM

## 2024-04-17 RX ORDER — HYDROXYZINE 50 MG/1
50 TABLET, FILM COATED ORAL 3 TIMES DAILY PRN
Status: DISCONTINUED | OUTPATIENT
Start: 2024-04-17 | End: 2024-04-19 | Stop reason: HOSPADM

## 2024-04-17 RX ORDER — ACETAMINOPHEN 325 MG/1
650 TABLET ORAL EVERY 4 HOURS PRN
Status: DISCONTINUED | OUTPATIENT
Start: 2024-04-17 | End: 2024-04-19 | Stop reason: HOSPADM

## 2024-04-17 RX ADMIN — TRAZODONE HYDROCHLORIDE 50 MG: 50 TABLET ORAL at 21:33

## 2024-04-17 ASSESSMENT — LIFESTYLE VARIABLES
HOW OFTEN DO YOU HAVE A DRINK CONTAINING ALCOHOL: 4 OR MORE TIMES A WEEK
HOW MANY STANDARD DRINKS CONTAINING ALCOHOL DO YOU HAVE ON A TYPICAL DAY: 5 OR 6
HOW OFTEN DO YOU HAVE A DRINK CONTAINING ALCOHOL: 4 OR MORE TIMES A WEEK
HOW MANY STANDARD DRINKS CONTAINING ALCOHOL DO YOU HAVE ON A TYPICAL DAY: 5 OR 6

## 2024-04-17 ASSESSMENT — SLEEP AND FATIGUE QUESTIONNAIRES
SLEEP PATTERN: DIFFICULTY FALLING ASLEEP;DISTURBED/INTERRUPTED SLEEP;RESTLESSNESS
DO YOU HAVE DIFFICULTY SLEEPING: YES
DO YOU USE A SLEEP AID: NO
AVERAGE NUMBER OF SLEEP HOURS: 4

## 2024-04-17 ASSESSMENT — PATIENT HEALTH QUESTIONNAIRE - PHQ9
SUM OF ALL RESPONSES TO PHQ QUESTIONS 1-9: 2
2. FEELING DOWN, DEPRESSED OR HOPELESS: SEVERAL DAYS
1. LITTLE INTEREST OR PLEASURE IN DOING THINGS: SEVERAL DAYS
SUM OF ALL RESPONSES TO PHQ QUESTIONS 1-9: 2
SUM OF ALL RESPONSES TO PHQ9 QUESTIONS 1 & 2: 2
SUM OF ALL RESPONSES TO PHQ QUESTIONS 1-9: 2
SUM OF ALL RESPONSES TO PHQ QUESTIONS 1-9: 2

## 2024-04-17 ASSESSMENT — ENCOUNTER SYMPTOMS
ABDOMINAL PAIN: 0
SHORTNESS OF BREATH: 0
NAUSEA: 0
VOMITING: 0

## 2024-04-17 ASSESSMENT — PAIN - FUNCTIONAL ASSESSMENT: PAIN_FUNCTIONAL_ASSESSMENT: NONE - DENIES PAIN

## 2024-04-17 NOTE — ED NOTES
Reviewed patient case with on call psychiatrist who finds that patient meets criteria for inpatient psychiatric admission. Pt to be admitted to the [facility] under the care of Dr. Meza with a diagnosis of depression with SI. Pt admitted voluntary and assigned room # is 126 Bed 2.  ETA for [facility] is [phone]. All necessary forms faxed.TREE received     Report # [id number]

## 2024-04-17 NOTE — CARE COORDINATION
BHI Biopsychosocial Assessment    Current Level of Psychosocial Functioning     Independent X  Dependent    Minimal Assist     Comments:    Psychosocial High Risk Factors (check all that apply)    Unable to obtain meds   Chronic illness/pain    Substance abuse X  Lack of Family Support   Financial stress   Isolation   Inadequate Community Resources  Suicide attempt(s)  Not taking medications   Victim of crime   Developmental Delay  Unable to manage personal needs    Age 65 or older   Homeless X  No transportation   Readmission within 30 days X  Unemployment  Traumatic Event    Comments:     Psychiatric Advanced Directives: None reported    Family to Involve in Treatment: None     Sexual Orientation:  steve    Patient Strengths: Insurance    Patient Barriers: Homelessness, lack of income, minimal support, substance use, suicidal ideations, and poor coping skills      Opiate Education Provided:  N/A      CMHC/mental health history:     Plan of Care   medication management, group/individual therapies, family meetings, psycho -education, treatment team meetings to assist with stabilization    Initial Discharge Plan:  TBD.  Patient does want sober living. provided AOD folder to patient.       Clinical Summary:  43 year old  male presents to the [facility] with increased depression symptoms and suicidal ideations to OD on pills. Patient's last admission to the [facility] was from 03/31/2024-04/03/2024. When asked by  what brought him in, patient responded, \" I need to get back on my meds.\"  Patient endorses non compliance with his psych medications.Patient reports that he did go to [facility] at his previous discharge and stayed a \"few days\" but then he got into an argument with a person in his apartment so he left. Patient denies suicidal ideations currently. Patient denies homicidal ideations currently. Patient also denies hallucinations of any kind. Patient endorses the use of cocaine, marijuana, and

## 2024-04-17 NOTE — PLAN OF CARE
Problem: Self Harm/Suicidality  Goal: Will have no self-injury during hospital stay  Description: INTERVENTIONS:  1.  Ensure constant observer at bedside with Q15M safety checks  2.  Maintain a safe environment  3.  Secure patient belongings  4.  Ensure family/visitors adhere to safety recommendations  5.  Ensure safety tray has been added to patient's diet order  6.  Every shift and PRN: Re-assess suicidal risk via Frequent Screener  Outcome: Progressing  Patient has remained free from self-harm during admission. Patient has had thoughts of harming self before, but denied self-harm ideations today. If these types of thoughts arise, patient will notify a staff member. Patient currently feels safe while on the unit. Patient is able to contract for safety Q15 Minute checks in place for patient safety.     Problem: Depression  Goal: Will be euthymic at discharge  Description: INTERVENTIONS:  1. Administer medication as ordered  2. Provide emotional support via 1:1 interaction with staff  3. Encourage involvement in milieu/groups/activities  4. Monitor for social isolation  Outcome: Progressing  Patient denies any depressive symptoms at this time. Patient is isolative but does not display any other signs of depression.      Problem: Drug Abuse/Detox  Goal: Will have no detox symptoms and will verbalize plan for changing drug-related behavior  Description: INTERVENTIONS:  1. Administer medication as ordered  2. Monitor physical status  3. Provide emotional support with 1:1 interaction with staff  4. Encourage  recovery focused treatment   Outcome: Progressing  Patient is isolative to his room and currently denies any detox symptoms. Patient seems to be anxious and restless but does not display any other symptoms.

## 2024-04-17 NOTE — ED NOTES
Provisional Diagnosis:  Depression with SI      Psychosocial and Contextual Factors:   Homeless      C-SSRS Summary:    Patient: X   Family:  Agency:    Present Suicidal Behavior:    Verbal: Yes     Attempt: Denied    Past Suicidal Behavior:    Verbal: Yes     Attempt: Yes    Self-Injurious/Self-Mutilation: Denied       Protective Factors:  Willing to go inpatient, has health insurance      Risk Factors:  Non-compliant with medications, substance abuse       Clinical Summary:  Pt reports to ED with SI and plan to overdose on pills. Pt reports he does not have any pills to overdose with as he has not been compliant with his psychotropic medications and is not currently linked with a  provider.  Pt reports he attempted suicide in 2015 by overdose with pills. Per charting pt was most recently at [facility] from March 31, 2024- April, 3, 2024.  Pt reports he left [facility] two days ago and prefers not to be sent back to [facility] as he states he does not like their program.  Pt has several  admissions over the last year.  Pt denies hallucinations and HI.     Pt reports recent use of alcohol, cocaine, and marijuana earlier today. Pt states he is originally from [place] but has been traveling back and forth from [place] to [place] the last two years and sense then he has not been able to be sober. Pt states the longest period of sobriety for him was two months.     Pt denies current legal issues and states he is his own Guardian.       Level of Care Disposition:  SW will contact [facility] once medically cleared to speak with on call psychiatrist for psychiatric disposition.

## 2024-04-17 NOTE — ED NOTES
..Transfer Center Checklist for Behavioral Health Transfers      Currently in Restraints Now or During this Encounter: Yes  (Specify if Agitation or self harm is noted in ED?)            Medical Clearance Documented and Verified in the Chart: Yes    LABS  Labs Resulted (see below, if applicable): Yes  Are Any of the Labs Abnormal: Yes, see chart     CBC:   Lab Results   Component Value Date/Time    WBC 11.0 04/17/2024 12:36 AM    RBC 4.74 04/17/2024 12:36 AM    HGB 15.0 04/17/2024 12:36 AM    HCT 44.3 04/17/2024 12:36 AM    MCV 93.5 04/17/2024 12:36 AM    MCH 31.6 04/17/2024 12:36 AM    MCHC 33.9 04/17/2024 12:36 AM    RDW 12.1 04/17/2024 12:36 AM     04/17/2024 12:36 AM    MPV 10.1 04/17/2024 12:36 AM     CMP:   Lab Results   Component Value Date/Time     04/17/2024 12:36 AM    K 4.2 04/17/2024 12:36 AM    K 3.7 10/22/2023 06:07 AM     04/17/2024 12:36 AM    CO2 23 04/17/2024 12:36 AM    BUN 13 04/17/2024 12:36 AM    CREATININE 0.7 04/17/2024 12:36 AM    GFRAA >60 04/25/2022 12:55 AM    AGRATIO 1.0 04/17/2024 12:36 AM    LABGLOM >90 04/17/2024 12:36 AM    GLUCOSE 88 04/17/2024 12:36 AM    PROT 8.0 04/17/2024 12:36 AM    CALCIUM 9.4 04/17/2024 12:36 AM    BILITOT 0.4 04/17/2024 12:36 AM    ALKPHOS 80 04/17/2024 12:36 AM    AST 39 04/17/2024 12:36 AM    ALT 62 04/17/2024 12:36 AM     Drug Panel:   Lab Results   Component Value Date/Time    OPIAU NEGATIVE 04/17/2024 12:38 AM    LABCOMM  03/31/2024 08:29 PM     Microscopic exam not performed based on chemical results unless requested in original order.     UA:  Lab Results   Component Value Date/Time    COLORU Yellow 03/31/2024 08:29 PM    PROTEINU NEGATIVE 03/31/2024 08:29 PM    GLUCOSEU NEGATIVE 03/31/2024 08:29 PM    KETUA TRACE 03/31/2024 08:29 PM    BILIRUBINUR NEGATIVE 03/31/2024 08:29 PM    BLOODU NEGATIVE 09/27/2023 01:00 AM    UROBILINOGEN Normal 03/31/2024 08:29 PM    NITRU NEGATIVE 03/31/2024 08:29 PM    LEUKOCYTESUR NEGATIVE 03/31/2024  08:29 PM    WBCUA None 12/28/2023 01:46 AM    RBCUA None 12/28/2023 01:46 AM    EPITHUA None 12/28/2023 01:46 AM    BACTERIA None 12/28/2023 01:46 AM     PREGNANCY TEST: No results found for: \"PREGTESTUR\"    Patient's Current Location: [facility] ED     Chief Complaint   Patient presents with    Suicidal       Dialysis: No    Target Destination: [facility]    Insurance: Payor: [employer] [place]ICA[place] /  /  /      Current Psychotic Symptoms  Harmful Actions Towards Others:    Orientation Level:    Speech Pattern:    General Attitude:    Emotions:    Delusions:    Hallucination:       Any Suicidal Ideations: High Risk  Plan: Yes, overdose    Homicidal Ideations/Violence Risk:   Observed Violent Behavior:    Homicidal Ideations:      Past Psychiatric History:       Diagnosis Date    Anxiety     Depression     Insomnia        Hold (TDO/[place]):      Currently on any Psychiatric Medications: No  Past Psychiatric Medications Include:  Patient is uncertain of past medications    The patient is not currently receiving care for the above psychiatric illness.   Active Therapist: None     Past Mental Health Outpatient Care Includes:  1-5 treatment centers    Past Mental Health Hospitalizations: 1-5 admissions    Isolation  Isolation:    HIV Positive: No    Home Medications  Does Patient Have Medications to Bring to the Facility: No  Medications Prior to Admission:   Prior to Admission Medications   Prescriptions Last Dose Informant Patient Reported? Taking?   nicotine polacrilex (COMMIT) 2 MG lozenge   No No   Sig: Take 1 lozenge by mouth every 2 hours as needed for Smoking cessation      Facility-Administered Medications: None        Able to Perform ADLs:  Yes: Comment: Ambulates   (Specify if able to ambulate or uses any mobility devices such as cane or walker)  Activity: Up as tolerated  Level of Assistance:    Assistive Device:    Miscellaneous Devices:      Additional Information  Oxygen Use:      Any Legal

## 2024-04-17 NOTE — ED NOTES
Pt resting in bed with bed in lowest position, locked, and rails up x2. Respirations even and unlabored. Belongings continue to be locked away, out of patients reach. Constant observer maintained and at patient's bedside.

## 2024-04-17 NOTE — ED PROVIDER NOTES
[facility]  FACULTY HANDOFF       Handoff taken on the following patient from prior Attending Physician: Dr. Allen  Pt Name: Balta Sarmiento  PCP:  No primary care provider on file.  2:41 AM EDT    Attestation  I was available and discussed any additional care issues that arose and coordinated the management plans with the resident(s) caring for the patient during my duty period. Any areas of disagreement with resident's documentation of care or procedures are noted on the chart. I was personally present for the key portions of any/all procedures during my duty period. I have documented in the chart those procedures where I was not present during the key portions.         CHIEF COMPLAINT       Chief Complaint   Patient presents with    Suicidal         CURRENT MEDICATIONS     Previous Medications  Previous Medications    NICOTINE POLACRILEX (COMMIT) 2 MG LOZENGE    Take 1 lozenge by mouth every 2 hours as needed for Smoking cessation       Encounter Medications  No orders of the defined types were placed in this encounter.      ALLERGIES     is allergic to fish-derived products.      RECENT VITALS:   Temp: 98.2 °F (36.8 °C),  Pulse: 82, Respirations: 18, BP: 109/74    RADIOLOGY:   No orders to display       LABS:  Labs Reviewed   COMPREHENSIVE METABOLIC PANEL - Abnormal; Notable for the following components:       Result Value    ALT 62 (*)     All other components within normal limits   TOX SCR, BLD, ED - Abnormal; Notable for the following components:    Acetaminophen Level <5 (*)     All other components within normal limits   URINE DRUG SCREEN - Abnormal; Notable for the following components:    Cocaine Metabolite, Urine POSITIVE (*)     Cannabinoid Scrn, Ur POSITIVE (*)     All other components within normal limits   CBC           PLAN/ TASKS OUTSTANDING       Pt suicidal. Admits to drug use. Plan for transfer to [facility].     (Please note that portions of this note were completed with a voice

## 2024-04-17 NOTE — ED NOTES
[] [facility]    [] [facility]    [x]  [facility]    SUICIDE RISK ASSESSMENT      Y  N     [x] [] In the past two weeks have you had thoughts of hurting yourself in any way?    [x] [] In the past two weeks have you had thoughts that you would be better off dead?   [] [x] Have you made a suicide attempt in the past two months?   [x] [] Do you have a plan for hurting yourself or suicide?   [] [x] Presence of hallucinations/voices related to hurting himself or herself or someone else.    SUICIDE/SECURITY WATCH PRECAUTION CHECKLIST     Orders    [x]  Suicide/Security Watch Precautions initiated as checked below:   4/17/24 12:37 AM EDT 14/14    [x] Notified physician:  Nguyễn Allen MD  4/17/24 12:37 AM EDT    [x] Orders obtained as appropriate:     [x] 1:1 Observer     [] Psych Consult     [] Psych Consult    Name:  Date:  Time:    [x] 1:1 Observer, Notified by:  Savannah Duane, RN    Contact Nurse Supervisor    [x] Remove all personal clothes from room and place in snap/paper gown/pants.  Slipper only    [x] Remove all personal belongings from room and secured away from patient.    Documentation    [x] Initiate Suicide/Security Watch Precaution Flow Sheet    [x] Initiate individualized Care Plan/Problem    [x] Document why precautions initiated on flow sheet (Initiate Nursing Care Plan/Problem)    [x] 1:1 Observer in place; instructions provided.  Suicide precautions require observer be within arms length.    [x] Nurse-Observer Communication Hand-off initiated by RN, reviewed with Observer.  Subsequently used as Hand Off between Observers.    [x] Initiate every 15 minute observations per observer as delegated by the RN.    [x] Initiate RN assessment and documentation    Environmental Scan  Search Criteria and Process: OPTIONAL, see Search Policy    [] Reason for search: SI    [] Nursing in presence of second person to search patient    [] Patient notified of reason for body assessment and

## 2024-04-17 NOTE — GROUP NOTE
Group Therapy Note    Date: 4/17/2024    Group Start Time: 0955  Group End Time: 1036  Group Topic: Psychotherapy    JOHNNY B[facility] Arlette Oneal MSW, ROSE MARY        Group Therapy Note    Attendees: 7/16       Patient refused to attend psychotherapy group at 10:00 am after encouragement from staff.  1:1 talk time provided as alternative to group session.      Discipline Responsible: /Counselor      Signature:  CHRIS Sutherland LSW

## 2024-04-17 NOTE — PROGRESS NOTES
RT ASSESSMENT TREATMENT GOALS    [x]Pt Goal:  Pt will identify 1-2 positive coping skills by time of discharge.    []Pt Goal:  Pt will identify 1-2 positive aspects of self by time of discharge.    []Pt Goal:  Pt will remain on task/topic for 15-30 minutes during group by time of discharge.    [x]Pt Goal:  Pt will identify 1-2 aspects of relapse prevention plan by time of discharge.    []Pt Goal:  Pt will join in conversation with peers 1-2 times per group by time of discharge.    [x]Pt Goal:  Pt will identify 1-2 new leisure interests by time of discharge.    []Pt Goal: Pt will maintain behavorial control until the time of discharge.

## 2024-04-17 NOTE — PLAN OF CARE
[facility]  Initial Interdisciplinary Treatment Plan Note      Original treatment plan Date & Time: 4/17/2024 1300    Admission Type:  Admission Type: Voluntary    Reason for admission:   Reason for Admission: increased depression with suicidal ideations to overdose on pills due to homelessness    Estimated Length of Stay:  5-7days  Estimated Discharge Date: To be determined by physician.    PATIENT STRENGTHS:  Patient Strengths:   Patient Strengths and Limitations:Limitations: External locus of control, General negative or hopeless attitude about future/recovery, Demonstrates discomfort with /lack of social skills, Lacks leisure interests, Multiple barriers to leisure interests, Inappropriate/potentially harmful leisure interests, Tendency to isolate self  Addictive Behavior: Addictive Behavior  In the Past 3 Months, Have You Felt or Has Someone Told You That You Have a Problem With  : None  Medical Problems:  Past Medical History:   Diagnosis Date    Anxiety     Depression     Insomnia      Status EXAM:Mental Status and Behavioral Exam  Normal: No  Level of Assistance: Independent/Self  Facial Expression: Hostile  Affect: Blunt  Level of Consciousness: Alert  Frequency of Checks: 4 times per hour, close  Mood:Normal: No  Mood: Anxious, Irritable  Motor Activity:Normal: Yes  Eye Contact: Good  Observed Behavior: Agitated, Hostile, Threatening  Sexual Misconduct History: Current - no  Preception: [place] to person, [place] to time, [place] to place, [place] to situation  Attention:Normal: No  Attention: Distractible  Thought Processes: Unremarkable  Thought Content:Normal: No  Thought Content: Preoccupations  Depression Symptoms: No problems reported or observed.  Anxiety Symptoms: Generalized  Yumiko Symptoms: No problems reported or observed.  Hallucinations: None  Delusions: No  Memory:Normal: Yes  Insight and Judgment: No  Insight and Judgment: Poor judgment, Poor insight, Unmotivated    EDUCATION:

## 2024-04-17 NOTE — GROUP NOTE
Psych-Ed/Relapse Prevention Group Note        Date: April 17, 2024 Start Time: 11am  End Time: 11:45am      Number of Participants in Group & Unit Census:  5/16    Topic: Socialization skills    Goal of Group:Patient will demonstrate improved interpersonal skills      Comments:     Patient did not participate in Psych-Ed/Relapse Prevention group, despite staff encouragement and explanation of benefits.  Patient remain seclusive to self.  Q15 minute safety checks maintained for patient safety and will continue to encourage patient to attend unit programming.         Signature:  SANTOSH ShepardS

## 2024-04-17 NOTE — H&P
IN-PATIENT SERVICE  [facility] Internal Medicine    HISTORY AND PHYSICAL EXAMINATION/ CONSULT NOTE            Date:   4/17/2024  Patient name:  aBlta Sarmiento  Date of admission:  4/17/2024  5:33 AM  MRN:   [id number]  Account:  [id number]  YOB: 1980  PCP:    No primary care provider on file.  Room:   [address] [place] [address]  Code Status:    Full Code    Physician Requesting Consult: Aime Abarca MD    Reason for Consult: History and physical, medical management    Chief Complaint:     No chief complaint on file.    Medical management    History Obtained From:     Patient, EMR, nursing staff    History of Present Illness:     43-year-old male admitted for depression polysubstance abuse    Has history of elevated liver enzymes, hep C antibody was positive in October 2023.  Patient is refusing any treatment or follow-up for this      Patient denies any chest pain palpitation cough difficulty breathing nausea vomiting diarrhea or urinary symptoms      Past Medical History:     Past Medical History:   Diagnosis Date    Anxiety     Depression     Insomnia         Past Surgical History:     No past surgical history on file.     Medications Prior to Admission:     Prior to Admission medications    Medication Sig Start Date End Date Taking? Authorizing Provider   nicotine polacrilex (COMMIT) 2 MG lozenge Take 1 lozenge by mouth every 2 hours as needed for Smoking cessation  Patient not taking: Reported on 4/17/2024 12/26/23   Walt Rutledge MD        Allergies:     Fish-derived products    Social History:     Tobacco:    reports that he has been smoking cigarettes. He has never used smokeless tobacco.  Alcohol:      reports current alcohol use.  Drug Use:  reports current drug use. Drug: Cocaine.    Family History:     No family history on file.    Review of Systems:     Positive and Negative as described in HPI.    Denies any shortness of breath or cough  Denies chest pain or palpitations  Denies  No primary care provider on file.    Please note that this chart was generated using voice recognition Dragon dictation software.  Although every effort was made to ensure the accuracy of this automated transcription, some errors in transcription may have occurred.

## 2024-04-17 NOTE — PROGRESS NOTES
Pharmacy Medication History Note      List of current medications patient is taking is complete.    Source of information: [facility], [facility]    Changes made to medication list:  Medications removed (include reason, ex. therapy complete or physician discontinued, noncompliance):  None    Medications flagged for provider review:  None    Medications added/doses adjusted:  None    Other notes (ex. Recent course of antibiotics, Coumadin dosing):  Of note from [facility] Patient was receiving an in house alcohol withdrawal protocol starting 04/06/2024. Patient did not take these home. The medications were as follows:  Chlordiazepoxide 25 mg take 1 tablet by mouth every 6 hours on days 1-2, then take 1 tablet by mouth every 8 hours on Day 3, then take 1 tablet by mouth every 12 hours on day 4 then take as needed  Lorazepam 1 mg take 1 tablet by mouth every 4 hours on days 1-2, then take 1 tablet by mouth every 6 hours on day 3, then take 1 tablet by mouth every 8 hours on day 4, then take 1 tablet by mouth every 12 hours on day 5 then as needed  Hydroxyzine 25 mg tablets three times daily as needed for anxiety  Bentyl four times daily  Vitamin B1 one tablet by mouth daily for three days  Tab-a-alfredo one tablet by mouth daily for three days  Folic acid 1 mg one tablet by mouth daily for three days  Clonidine taper per protocol  Ondansetron 4 mg ODT dissolve one tablet by mouth every 6 hours as needed for nausea and vomiting.   Trazodone 50 mg take 1-2 tablets by mouth as needed nightly for sleep      Current Home Medication List at Time of Admission:  Prior to Admission medications    Medication Sig   nicotine polacrilex (COMMIT) 2 MG lozenge Take 1 lozenge by mouth every 2 hours as needed for Smoking cessation  Patient not taking: Reported on 4/17/2024     Please let me know if you have any questions about this encounter. Thank you!    Electronically signed by Mirlande Gannon RPH on 4/17/2024 at

## 2024-04-17 NOTE — ED NOTES
Pt arrived to ED through triage with c/o of SI  Pt stated \"if I were to do it it would be with pills like in 2015\"  Pt stated he is a current user of cocaine  Pt stated he feels depressed   Pt stated he feels like staying in [facility] is making things worse but he doesn't know where else he would go  Pt is currently homeless  Pt denies any thoughts of harming others   IV access started with labs drawn and sent  Pt VS charted below

## 2024-04-17 NOTE — ED PROVIDER NOTES
Gastrointestinal:  Negative for abdominal pain, nausea and vomiting.   Skin:  Negative for wound.   Neurological:  Negative for weakness, numbness and headaches.   Psychiatric/Behavioral:  Positive for suicidal ideas.      PHYSICAL EXAM      INITIAL VITALS:   /74   Pulse 82   Temp 98.2 °F (36.8 °C) (Oral)   Resp 18   Ht 1.803 m (5' 11\")   Wt 92.5 kg (204 lb)   SpO2 95%   BMI 28.45 kg/m²     Physical Exam  Vitals reviewed.   Constitutional:       General: He is not in acute distress.     Appearance: He is not toxic-appearing.   HENT:      Mouth/Throat:      Mouth: Mucous membranes are moist.   Eyes:      Extraocular Movements: Extraocular movements intact.      Pupils: Pupils are equal, round, and reactive to light.   Cardiovascular:      Rate and Rhythm: Normal rate and regular rhythm.      Pulses:           Radial pulses are 2+ on the right side and 2+ on the left side.   Pulmonary:      Effort: Pulmonary effort is normal. No respiratory distress.   Abdominal:      Palpations: Abdomen is soft.      Tenderness: There is no abdominal tenderness. There is no guarding.   Musculoskeletal:      Cervical back: Normal range of motion. No rigidity or tenderness.      Right lower leg: No edema.      Left lower leg: No edema.   Skin:     General: Skin is warm and dry.      Capillary Refill: Capillary refill takes less than 2 seconds.   Neurological:      Mental Status: He is alert and oriented to person, place, and time.      GCS: GCS eye subscore is 4. GCS verbal subscore is 5. GCS motor subscore is 6.      Sensory: No sensory deficit.      Motor: No weakness.      Comments: 5/5 hip flexion bilaterally  5/5 flexion extension at the elbows bilaterally  5/5 shoulder abduction and abduction bilaterally  Equal bilateral palmar grasp  Sensation grossly intact over all extremities   Psychiatric:         Attention and Perception: He does not perceive auditory or visual hallucinations.         Speech: Speech normal.  Speech is not rapid and pressured or delayed.         Behavior: Behavior is not agitated, slowed, aggressive, withdrawn, hyperactive or combative. Behavior is cooperative.         Thought Content: Thought content includes suicidal ideation. Thought content does not include homicidal ideation. Thought content includes suicidal plan. Thought content does not include homicidal plan.       DDX/DIAGNOSTIC RESULTS / EMERGENCY DEPARTMENT COURSE / MDM     Medical Decision Making  43-year-old male with history of suicidal ideation presenting with suicidal ideation, states that he did use cocaine, alcohol, cannabis today.  Denies chest pain, abdominal pain, shortness of breath, palpitations, is neuro intact.  Will obtain basic labs for [facility] admission however medically, no concern at this time, patient states that he is only here because of suicidal ideation.  His plan is to overdose on pills which she does not have access to.  He denies access to firearm.  Denies harming himself or others, denies homicidal ideation.  Will obtain labs, will discuss with social work, will present case.  Patient was previously discharged from [facility] after having and being admitted voluntarily.  Was discharged on 04/03.    Amount and/or Complexity of Data Reviewed  Labs: ordered. Decision-making details documented in ED Course.      EMERGENCY DEPARTMENT COURSE:  ED Course as of 04/17/24 0251   Wed Apr 17, 2024   0030 BP: 129/82 [KJ]   0030 Pulse: 94 [KJ]   0030 Respirations: 16 [KJ]   0030 SpO2: 93 % [KJ]   0030 Temp: 98.2 °F (36.8 °C)  Patient placed in suicide precautions, change out of scrubs [KJ]   0109 WBC: 11.0 [KJ]   0109 Hemoglobin Quant: 15.0 [KJ]   0109 Platelet Count: 258 [KJ]   0118 Cocaine Metabolite, Urine(!): POSITIVE [KJ]   0118 Cannabinoid Scrn, Ur(!): POSITIVE [KJ]   0139 Acetaminophen Level(!): <5 [KJ]   0139 Ethanol percent: <0.010 [KJ]   0139 Patient medically cleared for psychiatric evaluation. [KJ]   0247 Patient accepted to

## 2024-04-17 NOTE — H&P
Department of Psychiatry  Attending Physician Psychiatric Assessment     Reason for Admission to Psychiatric Unit:  Threat to self requiring 24 hour professional observation  Failure of outpatient psychiatry treatment so that the beneficiary requires 24 hour professional observation and care  Concerns about patient's safety in the community  Past Mental Health Diagnosis: a history of  Major Depression  Triggering event or precipitating factor: hopelessness. Problems with housing and substance relapse, poor psychiatric treatment compliance  Length of time/duration of triggering event: 1 week  Legal Status: Voluntary    CHIEF COMPLAINT:  depression with suicidal ideation    History obtained from: Patient, electronic medical record          HISTORY OF PRESENT ILLNESS:    Balta Sarmiento is a 43 y.o. male who has a past medical history of major depressive disorder and polysubstance abuse. Patient presented to the ED endorsing suicidal ideation with a plan to overdose.  Patient states that he left his substance use treatment program, relapsed on cocaine, and feels hopeless.  He does not have a residence and denies a safe place to go.  Patient has not been compliant with his psychotropic medications in the community.  He is not able to contract for safety and is admitted to [facility] voluntarily.    On assessment, patient is resting in bed.  He is arousable to verbal stimuli.  Thought process is linear, but he is evasive and selectively mute at times.  He does endorse active suicidal thoughts with plan to do this.  Confirms that he feels hopeless secondary to his current circumstances.  Patient states he has made multiple attempts at sobriety, but struggles with controlling his impulses and relapses.  He fears that he will never be able to have sobriety. Worries he will  homelessness, lack of stability in his life, and no way to care for himself.  Patient notes that he struggles with follow-up in regards to his mental health care,  of symptoms prior to discharge.  3) Establish efficacy and tolerability of medications.       Behavioral Services  Medicare Certification     Admission Day 1  I certify that this patient's inpatient psychiatric hospital admission is medically necessary for:    x (1) treatment which could reasonably be expected to improve this patient's condition, or    x (2) diagnostic study or its equivalent.     Time Spent: 60 minutes      --CRISTOPHER Cormier - CNP on 4/17/2024 at 1:25 PM    An electronic signature was used to authenticate this note.     Please note that this chart was generated using voice recognition Dragon dictation software.  Although every effort was made to ensure the accuracy of this automated transcription, some errors in transcription may have occurred.   I independently saw and evaluated the patient.  I reviewed the  documentation above    .  Any additional comments or changes to the   documentation are stated below otherwise agree with assessment.      QTc Calculation (Bazett)   Date Value Ref Range Status   12/28/2023 450 ms Final       The patient was seen face-to-face.  He has relapsed into substance abuse.  He was admitted to psychiatry a few weeks ago and was discharged to a residential rehab.  The patient stated that he is unable to stay clean in the [facility] and would like to go to a facility for further out such as [facility].  He has been having suicidal ideation triggered by his relapse.  We will restart Effexor 75 mg daily.     PLAN  Medications as noted above  Attempt to develop insight  Psycho-education conducted.  Supportive Therapy conducted.  Follow-up daily while on inpatient unit    Electronically signed by HOUSTON POSADA MD on 4/17/24 at 8:58 PM EDT

## 2024-04-17 NOTE — ED PROVIDER NOTES
[facility] ED     Emergency Department     Faculty Attestation        I performed a history and physical examination of the patient and discussed management with the resident. I reviewed the resident’s note and agree with the documented findings and plan of care. Any areas of disagreement are noted on the chart. I was personally present for the key portions of any procedures. I have documented in the chart those procedures where I was not present during the key portions. I have reviewed the emergency nurses triage note. I agree with the chief complaint, past medical history, past surgical history, allergies, medications, social and family history as documented unless otherwise noted below.  For Physician Assistant/ Nurse Practitioner cases/documentation I have personally evaluated this patient and have completed at least one if not all key elements of the E/M (history, physical exam, and MDM). Additional findings are as noted.      Vital Signs: BP: 129/82  Pulse: 94  Respirations: 16  Temp: 98.2 °F (36.8 °C) SpO2: 93 %  PCP:  No primary care provider on file.  Note Started: 4/17/24, 12:25 AM EDT    Pertinent Comments:     Patient is a 43-year-old male with history of psychiatric disease including suicidal ideation and depression in the past as well as anxiety.   Currently patient is here with suicidal ideation with plan to take pills that he does not have.   Patient does admit to cocaine and alcohol earlier.   Denies any other symptoms such as chest pain or shortness of breath or abdominal pain.   Assessment/Plan: Patient with suicidal ideation with plan will medically stabilized for psychiatric social worker evaluation      Critical Care  None      (Please note that portions of this note were completed with a voice recognition program. Efforts were made to edit the dictations but occasionally words are mis-transcribed. Whenever words are used in this note

## 2024-04-17 NOTE — GROUP NOTE
Group Therapy Note    Date: 4/17/2024    Group Start Time: 0900  Group End Time: 0930  Group Topic: Orientation Group    Lin Mustafa LPN        Group Therapy Note    Attendees: 6/16       Patient's Goal:   Goal setting    Notes:   patient didn't participate in the morning goal setting group despite staff invite to attend, patient preferred to sleep,

## 2024-04-17 NOTE — PROGRESS NOTES
Behavioral Services  Medicare Certification Upon Admission    I certify that this patient's inpatient psychiatric hospital admission is medically necessary for:    [x] (1) Treatment which could reasonably be expected to improve this patient's condition,       [x] (2) Or for diagnostic study;     AND     [x](2) The inpatient psychiatric services are provided while the individual is under the care of a physician and are included in the individualized plan of care.    Estimated length of stay/service 2-9 days    Plan for post-hospital care -outpatient care    Electronically signed by HOUSTON POSADA MD on 4/17/2024 at 9:09 AM

## 2024-04-17 NOTE — ED NOTES
[employer] at bedside for change out  Pt changed into blue scrubs  Belongings taken by [employer]  Sitter at bedside

## 2024-04-18 VITALS
TEMPERATURE: 97.3 F | DIASTOLIC BLOOD PRESSURE: 72 MMHG | HEIGHT: 71 IN | BODY MASS INDEX: 27.72 KG/M2 | SYSTOLIC BLOOD PRESSURE: 134 MMHG | HEART RATE: 67 BPM | RESPIRATION RATE: 14 BRPM | OXYGEN SATURATION: 97 % | WEIGHT: 198 LBS

## 2024-04-18 PROCEDURE — 1240000000 HC EMOTIONAL WELLNESS R&B

## 2024-04-18 PROCEDURE — APPSS30 APP SPLIT SHARED TIME 16-30 MINUTES: Performed by: NURSE PRACTITIONER

## 2024-04-18 PROCEDURE — 6370000000 HC RX 637 (ALT 250 FOR IP): Performed by: PSYCHIATRY & NEUROLOGY

## 2024-04-18 PROCEDURE — 99232 SBSQ HOSP IP/OBS MODERATE 35: CPT | Performed by: PSYCHIATRY & NEUROLOGY

## 2024-04-18 PROCEDURE — 6370000000 HC RX 637 (ALT 250 FOR IP): Performed by: NURSE PRACTITIONER

## 2024-04-18 RX ADMIN — VENLAFAXINE HYDROCHLORIDE 75 MG: 75 CAPSULE, EXTENDED RELEASE ORAL at 11:00

## 2024-04-18 RX ADMIN — TRAZODONE HYDROCHLORIDE 50 MG: 50 TABLET ORAL at 23:03

## 2024-04-18 RX ADMIN — NICOTINE POLACRILEX 2 MG: 2 LOZENGE ORAL at 12:39

## 2024-04-18 ASSESSMENT — PAIN SCALES - GENERAL: PAINLEVEL_OUTOF10: 0

## 2024-04-18 NOTE — GROUP NOTE
Psych-Ed/Relapse Prevention Group Note        Date: April 18, 2024 Start Time: 11am  End Time: 11:45am      Number of Participants in Group & Unit Census:  10/17    Topic: Mental health education and advocacy    Goal of Group:Patient will identify benefits of mental health education and advocacy to decrease stigma and improve access to care      Comments:     Patient did not participate in Psych-Ed/Relapse Prevention group, despite staff encouragement and explanation of benefits.  Patient remain seclusive to self.  Q15 minute safety checks maintained for patient safety and will continue to encourage patient to attend unit programming.         Signature:  SANTOSH ShepardS

## 2024-04-18 NOTE — CARE COORDINATION
Per request of patient,  faxed patients clinicals to [place] Fax [phone]. Verbal authorization obtained.

## 2024-04-18 NOTE — GROUP NOTE
Group Therapy Note    Date: 4/18/2024    Group Start Time: 1000  Group End Time: 1048  Group Topic: Psychoeducation    [facility] Mar[facility] Barber LISW-S        Group Therapy Note    Attendees: 6/18       Patient was offered group therapy today but declined to participate despite encouragement from staff.  1:1 was offered.      Signature:  NOEMÍ Becerra

## 2024-04-18 NOTE — GROUP NOTE
Group Therapy Note    Date: 4/18/2024    Group Start Time: 1330  Group End Time: 1430  Group Topic: Psychoeducation    [facility]    Group Therapy Note    Attendees: 8/16       Patient's Goal:  Patient will identify benefits of music for coping and relaxation    Notes:  Patient attended group and participated    Status After Intervention:  Improved    Participation Level: Active Listener and Interactive    Participation Quality: Appropriate, Attentive, Sharing, and Supportive      Speech:  normal      Thought Process/Content: Logical  Linear      Affective Functioning: Constricted/Restricted      Mood: euthymic      Level of consciousness:  Alert, Oriented x4, and Attentive      Response to Learning: Able to verbalize current knowledge/experience, Able to verbalize/acknowledge new learning, Able to retain information, Able to change behavior, and Progressing to goal      Endings: None Reported    Modes of Intervention: Education, Support, Socialization, and Exploration      Discipline Responsible: Psychoeducational Specialist      Signature:  ROSELYN Shepard

## 2024-04-18 NOTE — PLAN OF CARE
Problem: Self Harm/Suicidality  Goal: Will have no self-injury during hospital stay  Description: INTERVENTIONS:  1.  Ensure constant observer at bedside with Q15M safety checks  2.  Maintain a safe environment  3.  Secure patient belongings  4.  Ensure family/visitors adhere to safety recommendations  5.  Ensure safety tray has been added to patient's diet order  6.  Every shift and PRN: Re-assess suicidal risk via Frequent Screener    Outcome: Progressing  Patient has remained free from self-harm during admission. Patient has had thoughts of harming self before, but denied self-harm ideations today. If these types of thoughts arise, patient will notify a staff member. Patient currently feels safe while on the unit. Patient is able to contract for safety Q15 Minute checks in place for patient safety.     Problem: Depression  Goal: Will be euthymic at discharge  Description: INTERVENTIONS:  1. Administer medication as ordered  2. Provide emotional support via 1:1 interaction with staff  3. Encourage involvement in milieu/groups/activities  4. Monitor for social isolation  Outcome: Progressing  Patient reports feeling depressed, but feels that their symptoms are improving at this time. Patients mood has changed and states it is improving compared to admission. Patient has been out in the dayroom at times and attempted groups.  Patient endorses a decrease in any self-harm ideations and does not currently plan to self-harm. Patient has remained free from self-harm since admission.  If any of these thoughts arise patient will notify nurse.  Writer offered any extra time needed for patient to discuss their depression.  Patient declines at this time. Q15 minute checks in place for patients' safety.       Problem: Drug Abuse/Detox  Goal: Will have no detox symptoms and will verbalize plan for changing drug-related behavior  Description: INTERVENTIONS:  1. Administer medication as ordered  2. Monitor physical status  3.

## 2024-04-18 NOTE — PROGRESS NOTES
Daily Progress Note  4/18/2024    Patient Name: Balta Sarmiento    CHIEF COMPLAINT: Depression with suicidal ideation         SUBJECTIVE:      Patient seen face-to-face for follow-up assessment.  He was observed making phone calls prior to our discussion.  Notes that he has already been in contact with [place] treatment facility and is hopeful to get excepted into a substance use treatment program.  Patient has been more active in his treatment.  Patient notes that when he was last discharged from [facility], he did not continue taking his psychotropic medications as they made him feel tired.  However, patient does feel that the Effexor has been helpful and notes improvement in energy and motivation level.  Suicidal thoughts are less severe, but he would not feel safe in the community secondary to concern for relapse.  Unable to contract for safety at this time.  He denies any perceptual disturbances or psychotic phenomena.  Does not appear to be responding to internal stimuli.  Staff note that he has been able to maintain behavioral control.  Has not required any emergency medications.  Appears that patient has been refusing assessments and no vitals overnight or this morning.  Did encourage patient to allow staff to complete necessary assessments for his safety.  Patient expresses understanding.    Patient is yet to demonstrate stability and requires inpatient hospitalization for safety.    Appetite:  [x] Adequate/Unchanged  [] Increased  [] Decreased      Sleep:       [x] Adequate/Unchanged  [] Fair  [] Poor      Group Attendance on Unit:   [] Yes   [] Selectively    [x] No    Compliant with scheduled medications: [x] Yes  [] No    Received emergency medications in past 24 hrs: [] Yes   [x] No    Medication Side Effects: Denies         Mental Status Exam  Level of consciousness: Alert and awake   Appearance: Appropriate attire for setting, standing, with poor grooming and hygiene   Behavior/Motor: Approachable, engages

## 2024-04-18 NOTE — GROUP NOTE
Group Therapy Note    Date: 4/18/2024    Group Start Time: 0900  Group End Time: 0930  Group Topic: Group Documentation    Yasmany Bernal        Group Therapy Note    Attendees: 7/18    Goal  Group Note        Date: 4/18/2024 Start Time: 0900 End Time: 0930      Number of Participants in Group & Unit Census:  7/18    Topic: goal group    Goal of Group: Morning goal group session, patient has opportunity to reflect on what they need to accomplish to achieve discharge, and decide on a step towards that that they would like to accomplish by the end of the day today.       Comments:     Patient did not participate in  Goal  group, despite staff encouragement and explanation of benefits.  Patient remain seclusive to self.  Q15 minute safety checks maintained for patient safety and will continue to encourage patient to attend unit programming.        Modes of Intervention: Support and Socialization      Discipline Responsible: Behavorial Health Tech      Signature:  Yasmany Fleming

## 2024-04-19 PROCEDURE — 6370000000 HC RX 637 (ALT 250 FOR IP): Performed by: PSYCHIATRY & NEUROLOGY

## 2024-04-19 PROCEDURE — 6370000000 HC RX 637 (ALT 250 FOR IP): Performed by: NURSE PRACTITIONER

## 2024-04-19 PROCEDURE — 99239 HOSP IP/OBS DSCHRG MGMT >30: CPT | Performed by: PSYCHIATRY & NEUROLOGY

## 2024-04-19 RX ORDER — VENLAFAXINE HYDROCHLORIDE 75 MG/1
75 CAPSULE, EXTENDED RELEASE ORAL
Qty: 30 CAPSULE | Refills: 3 | Status: SHIPPED | OUTPATIENT
Start: 2024-04-20

## 2024-04-19 RX ORDER — HYDROXYZINE 50 MG/1
50 TABLET, FILM COATED ORAL 3 TIMES DAILY PRN
Qty: 30 TABLET | Refills: 0 | Status: SHIPPED | OUTPATIENT
Start: 2024-04-19 | End: 2024-04-29

## 2024-04-19 RX ADMIN — NICOTINE POLACRILEX 2 MG: 2 LOZENGE ORAL at 11:08

## 2024-04-19 RX ADMIN — VENLAFAXINE HYDROCHLORIDE 75 MG: 75 CAPSULE, EXTENDED RELEASE ORAL at 08:21

## 2024-04-19 RX ADMIN — NICOTINE POLACRILEX 2 MG: 2 LOZENGE ORAL at 08:46

## 2024-04-19 NOTE — DISCHARGE INSTRUCTIONS
Information:  Medications:   Medication summary provided   I understand that I should take only the medications on my list.     -why and when I need to take each medicine.     -which side effects to watch for.     -that I should carry my medication information at all times in case of     Emergency situations.    I will take all of my medicines to follow up appointments.     -check with my physician or pharmacist before taking any new    Medication, over the counter product or drink alcohol.    -Ask about food, drug or dietary supplement interactions.    -discard old lists and update records with medication providers.    Notify Physician:  Notify physician if you notice:   Always call 911 if you feel your life is in danger  In case of an emergency call 911 immediately!  If 911 is not available call your local emergency medical system for help    Behavioral Health Follow Up:  Original Referral Source:[facility]  Discharge Diagnosis: Depression with suicidal ideation [F32.A, R45.851]  Recommendations for Level of Care: compliant with medications and follow up appointments   Patient status at discharge: Alert and oriented denies any thoughts to harm self [facility]  My hospital  was: [facility]  Aftercare plan faxed: [facility]   -faxed by: staff   -date: 04/19   -time: 1300  Prescriptions: Sent to [facility] [place]       COVID-19 (6m-5y) bivalent booster vaccine, Moderna  Pronunciation:  NIOCLA johnson (nicola doe) VAX een  Brand:  Moderna COVID-19 (6m-5y) Bivalent Booster Vaccine PF  What is the most important information I should know about this vaccine?  Becoming infected with COVID-19 is much more dangerous to your health than receiving this vaccine.   What is the COVID-19 vaccine?  COVID-19 is a serious disease caused by a coronavirus called SARS-CoV-2 (Severe Acute Respiratory Syndrome Coronavirus 2). COVID-19 is spread from person to person through the air.  COVID-19 can affect your lungs or

## 2024-04-19 NOTE — CARE COORDINATION
Name: Balta Sarmiento    : 1980    Auth number: [id number]     Discharge Date: 24    Destination: Patient discharged to [facility]    Discharge Medications:      Medication List        START taking these medications      hydrOXYzine HCl 50 MG tablet  Commonly known as: ATARAX  Take 1 tablet by mouth 3 times daily as needed for Anxiety  Notes to patient: anxiety     venlafaxine 75 MG extended release capsule  Commonly known as: EFFEXOR XR  Take 1 capsule by mouth daily (with breakfast)  Start taking on: 2024  Notes to patient: Mood stabilizer            STOP taking these medications      nicotine polacrilex 2 MG lozenge  Commonly known as: COMMIT               Where to Get Your Medications        These medications were sent to [facility] - [place], [place] - [address] - P [phone] - F [phone]  [facility] [address] [address]      Phone: [phone]   hydrOXYzine HCl 50 MG tablet  venlafaxine 75 MG extended release capsule         Follow Up Appointment: [place] [address]   [phone]  fax [phone]  Follow up on 2024  Please send patient by [employer] cab at [phone], he must arrive to the program by 1:30 pm    All of patient's follow up care will be handled through [facility]

## 2024-04-19 NOTE — PLAN OF CARE
Problem: Self Harm/Suicidality  Goal: Will have no self-injury during hospital stay  Description: INTERVENTIONS:  1.  Ensure constant observer at bedside with Q15M safety checks  2.  Maintain a safe environment  3.  Secure patient belongings  4.  Ensure family/visitors adhere to safety recommendations  5.  Ensure safety tray has been added to patient's diet order  6.  Every shift and PRN: Re-assess suicidal risk via Frequent Screener    Outcome: Progressing   Patient denies suicidal idealization at this time. Patient remains free from self harm during this shift. Patient encouraged to let staff know of any changes or needs.   Problem: Depression  Goal: Will be euthymic at discharge  Description: INTERVENTIONS:  1. Administer medication as ordered  2. Provide emotional support via 1:1 interaction with staff  3. Encourage involvement in milieu/groups/activities  4. Monitor for social isolation  Outcome: Progressing   Patient has been social with select peers in the day room, patient playing cards with others in the milieu. Patient remains guarded upon assessment but cooperative.   Problem: Drug Abuse/Detox  Goal: Will have no detox symptoms and will verbalize plan for changing drug-related behavior  Description: INTERVENTIONS:  1. Administer medication as ordered  2. Monitor physical status  3. Provide emotional support with 1:1 interaction with staff  4. Encourage  recovery focused treatment   Outcome: Progressing   Patient denies any symptoms of withdrawal at this time. Patient states mild anxiety but says that is normal for patient.

## 2024-04-19 NOTE — DISCHARGE SUMMARY
DISCHARGE SUMMARY      Patient ID:  Balta Sarmiento  [id number]  43 y.o.  1980    Admit date: 4/17/2024    Discharge date and time: 4/19/2024    Disposition: Home     Admitting Physician: Aime Abraca MD     Discharge Physician: Dr CAROLINE Abarca MD    Admission Diagnoses: Depression with suicidal ideation [F32.A, R45.851]    Admission Condition: poor    Discharged Condition: stable    Admission Circumstance: Balta Sarmiento is a 43 y.o. male who has a past medical history of major depressive disorder and polysubstance abuse. Patient presented to the ED endorsing suicidal ideation with a plan to overdose.  Patient states that he left his substance use treatment program, relapsed on cocaine, and feels hopeless.  He does not have a residence and denies a safe place to go.  Patient has not been compliant with his psychotropic medications in the community.  He is not able to contract for safety and is admitted to [facility] voluntarily.     On assessment, patient is resting in bed.  He is arousable to verbal stimuli.  Thought process is linear, but he is evasive and selectively mute at times.  He does endorse active suicidal thoughts with plan to do this.  Confirms that he feels hopeless secondary to his current circumstances.  Patient states he has made multiple attempts at sobriety, but struggles with controlling his impulses and relapses.  He fears that he will never be able to have sobriety. Worries he will  homelessness, lack of stability in his life, and no way to care for himself.  Patient notes that he struggles with follow-up in regards to his mental health care, as he just \"does not care about myself\".  Currently describes his depression as 10 out of 10 (with 10 indicating the most severe).      We reviewed patient's psychiatric symptoms.  Endorses multiple depressive episodes lasting multiple weeks in which [place] down, noted more than not, for majority of the pain.  He states he is currently  experiencing anhedonia, feelings  CRISTOPHER Martinez - CNP, 2 mg at 04/19/24 0846    venlafaxine (EFFEXOR XR) extended release capsule 75 mg, 75 mg, Oral, Daily with breakfast, Aime Abarca MD, 75 mg at 04/19/24 0821    Examination:  /72   Pulse 67   Temp 97.3 °F (36.3 °C) (Temporal)   Resp 14   Ht 1.803 m (5' 11\")   Wt 89.8 kg (198 lb)   SpO2 97%   BMI 27.62 kg/m²   Gait - steady    HOSPITAL COURSE::  Following admission to the hospital, patient had a complete physical exam and blood work up. The patient was referred to Internal Medicine.   Patient was monitored closely with suicide precaution  Patient was started on Effexor  Was encouraged to participate in group and other milieu activity  Patient started to feel better with this combination of treatment.  Significant progress in the symptoms since admission.    Mood is improved  The patient denies AVH or paranoid thoughts  The patient denies any hopelessness or worthlessness  No active SI/HI  Appetite:  [x] Normal  [] Increased  [] Decreased    Sleep:       [x] Normal  [] Fair       [] Poor            Energy:    [x] Normal  [] Increased  [] Decreased     SI [] Present  [x] Absent  HI  []Present  [x] Absent   Aggression:  [] yes  [] no  Patient is [x] able  [] unable to CONTRACT FOR SAFETY   Medication side effects(SE):  [x] None(Psych. Meds.) [] Other      Mental Status Examination on discharge:    Level of consciousness:  within normal limits   Appearance:  well-appearing  Behavior/Motor:  no abnormalities noted  Attitude toward examiner:  attentive and good eye contact  Speech:  spontaneous, normal rate and normal volume   Mood: euthymic  Affect:  mood congruent  Thought processes:  linear, goal directed, and coherent   Thought content:  Suicidal Ideation:  denies suicidal ideation  Delusions:  no evidence of delusions  Perceptual Disturbance:  denies any perceptual disturbance  Cognition:  oriented to person, place, and time   Concentration intact  Memory intact  Insight good

## 2024-04-19 NOTE — BH NOTE
Behavioral Health [place]  Discharge Note    Pt discharged with followings belongings:   Dental Appliances: None  Vision - Corrective Lenses: None  Hearing Aid: None  Jewelry: None  Body Piercings Removed: N/A  Clothing: Belt, Footwear, Jacket/Coat, Pants, Shirt, Socks, Undergarments  Other Valuables: Lighter/Matches, Personal Toiletries   Valuables sent home with patient. Patient educated on aftercare instructions: yes  Information faxed to [facility] by staff  at 11:43 AM .Patient verbalize understanding of AVS:  yes.    Status EXAM upon discharge:  Mental Status and Behavioral Exam  Normal: No  Level of Assistance: Independent/Self  Facial Expression: Flat  Affect: Blunt  Level of Consciousness: Alert  Frequency of Checks: 4 times per hour, close  Mood:Normal: No  Mood: Anxious  Motor Activity:Normal: Yes  Eye Contact: Fair  Observed Behavior: Friendly, Cooperative  Sexual Misconduct History: Current - no  Preception: [place] to person, [place] to time, [place] to place, [place] to situation  Attention:Normal: Yes  Attention: Distractible  Thought Processes: Unremarkable  Thought Content:Normal: No  Thought Content: Preoccupations  Depression Symptoms: No problems reported or observed.  Anxiety Symptoms: Generalized  Yumiko Symptoms: No problems reported or observed.  Hallucinations: None  Delusions: No  Memory:Normal: Yes  Insight and Judgment: No  Insight and Judgment: Poor judgment, Poor insight    Tobacco Screening:  Practical Counseling, on admission, juan X, if applicable and completed (first 3 are required if patient doesn't refuse):            ( ) Recognizing danger situations (included triggers and roadblocks)                    ( ) Coping skills (new ways to manage stress,relaxation techniques, changing routine, distraction)                                                           ( ) Basic information about quitting (benefits of quitting, techniques in how to quit, available resources  ( ) Referral for 
On call provider, notified of best practice advisory suggesting to place patient on suicide precautions. Provider to discontinue the order as patient does not meet criteria for suicide precautions at this time. Continue to observe patient on every 15 minute checks.   
Patient given tobacco quitline number 87090946251 at this time, refusing to call at this time, states \" I just dont want to quit now\"- patient given information as to the dangers of long term tobacco use. Continue to reinforce the importance of tobacco cessation.    
Patient given tobacco quitline number 90977629211 ,patient given information as to the dangers of long term tobacco use. Continue to reinforce the importance of tobacco cessation.   
Patient refused morning vitals at 0730.    Patient is comfortable approaching staff for any needs or requests. Patient agrees to notify staff if any thoughts, feelings, or concerns need to be brought to their attention. Q15 minute safety checks maintained.    
                                                                                                               (x) Patient refused counseling  ( ) Patient has not smoked in the last 30 days    Metabolic Screening:    Lab Results   Component Value Date    LABA1C 5.0 09/20/2023       Lab Results   Component Value Date    CHOL 194 09/20/2023     Lab Results   Component Value Date    TRIG 59 09/20/2023     Lab Results   Component Value Date    HDL 52 09/20/2023     No components found for: \"LDLCAL\"  No components found for: \"LABVLDL\"      Body mass index is 27.62 kg/m².    BP Readings from Last 2 Encounters:   04/17/24 (!) 140/88   04/17/24 109/74           Pt admitted with followings belongings:  Dental Appliances: None  Vision - Corrective Lenses: None  Hearing Aid: None  Jewelry: None  Body Piercings Removed: N/A  Clothing: Belt, Footwear, Jacket/Coat, Pants, Shirt, Socks, Undergarments  Other Valuables: Lighter/Matches, Personal Toiletries    Anthony Davis RN      
no

## 2024-04-19 NOTE — PLAN OF CARE
Problem: Self Harm/Suicidality  Goal: Will have no self-injury during hospital stay  Description: INTERVENTIONS:  1.  Ensure constant observer at bedside with Q15M safety checks  2.  Maintain a safe environment  3.  Secure patient belongings  4.  Ensure family/visitors adhere to safety recommendations  5.  Ensure safety tray has been added to patient's diet order  6.  Every shift and PRN: Re-assess suicidal risk via Frequent Screener    4/19/2024 0852 by Laura Herrera LPN  Outcome: Progressing   Patient denies thoughts to harm self   Problem: Depression  Goal: Will be euthymic at discharge  Description: INTERVENTIONS:  1. Administer medication as ordered  2. Provide emotional support via 1:1 interaction with staff  3. Encourage involvement in milieu/groups/activities  4. Monitor for social isolation  4/19/2024 0852 by Laura Herrera LPN  Outcome: Progressing   Mr Sarmiento is seen in the dayroom, affect is anxious. Feels ready for discharge, improvement in mood, denies any thoughts to harm self. Discharging to [place]  Problem: Drug Abuse/Detox  Goal: Will have no detox symptoms and will verbalize plan for changing drug-related behavior  Description: INTERVENTIONS:  1. Administer medication as ordered  2. Monitor physical status  3. Provide emotional support with 1:1 interaction with staff  4. Encourage  recovery focused treatment   4/19/2024 0852 by Laura Herrera LPN  Outcome: Progressing   Patient has no detox symptoms

## 2024-04-19 NOTE — TRANSITION OF CARE
Behavioral Health Transition Record to Provider    Patient Name: Balta Sarmiento  YOB: 1980   Medical Record Number: [id number]  Date of Admission: 4/17/2024  5:33 AM   Date of Discharge: 04/19    Attending Provider: Aime Abarca MD   Discharging Provider: Dr. Abarca   To contact this individual call  and ask the  to page.  If unavailable, ask to be transferred to Behavioral Health Provider on call.  A Behavioral Health Provider will be available on call 24/7 and during holidays.    Primary Care Provider: No primary care provider on file.    Allergies   Allergen Reactions    Fish-Derived Products        Reason for Admission: The patient presents to [facility], positive with SI to OD on pills. He is agitated upon admission in regards to having a room mate. Verbal redirection given to await conversing with the MD. He regained behavior control.     Admission Diagnosis: Depression with suicidal ideation [F32.A, R45.851]    * No surgery found *    No results found for this visit on 04/17/24.    Immunizations administered during this encounter:   Immunization History   Administered Date(s) Administered    Hepatitis B vaccine 04/14/1994, 06/27/1994    TDaP, ADACEL (age 10y-64y), BOOSTRIX (age 10y+), IM, 0.5mL 04/29/2018     None of the above/Not documented/Unable to determine from medical record documentation    Screening for Metabolic Disorders for Patients on Antipsychotic Medications  (Data obtained from the EMR)    Estimated Body Mass Index  Body mass index is 27.62 kg/m².      Vital Signs/Blood Pressure  /72   Pulse 67   Temp 97.3 °F (36.3 °C) (Temporal)   Resp 14   Ht 1.803 m (5' 11\")   Wt 89.8 kg (198 lb)   SpO2 97%   BMI 27.62 kg/m²      Fasting Blood Glucose or Hemoglobin A1c  No results found for: \"GLU\", \"GLUCPOC\"    Hemoglobin A1C   Date Value Ref Range Status   09/20/2023 5.0 4.0 - 6.0 % Final       Discharge Diagnosis: Major Depressive disorder     Discharge

## 2024-04-19 NOTE — GROUP NOTE
Group Therapy Note    Date: 4/19/2024    Group Start Time: 1100  Group End Time: 1145  Group Topic: Psychoeducation    [facility] ALEJANDRO    Farrah Kilgore CTRS    Group Therapy Note    Attendees: 9/16       Patient's Goal:  Patient will identify benefits of music for coping and stress management    Notes:  Patient attended group and observed.  Patient was interactive with peers and writer with prompting.  Patient's affect is brightened.      Status After Intervention:  Improved    Participation Level: Active Listener and Interactive    Participation Quality: Appropriate, Attentive, and Sharing      Speech:  normal      Thought Process/Content: Logical  Linear      Affective Functioning: Constricted/Restricted      Mood: euthymic      Level of consciousness:  Alert and Oriented x4      Response to Learning: Able to verbalize current knowledge/experience, Able to verbalize/acknowledge new learning, and Resistant      Endings: None Reported    Modes of Intervention: Education, Support, Socialization, and Exploration      Discipline Responsible: Psychoeducational Specialist      Signature:  ROSELYN Shepard

## 2024-04-24 ENCOUNTER — HOSPITAL ENCOUNTER (EMERGENCY)
Age: 44
Discharge: HOME | End: 2024-04-25
Attending: EMERGENCY MEDICINE
Payer: MEDICAID

## 2024-04-24 DIAGNOSIS — R45.89 DEPRESSED MOOD: ICD-10-CM

## 2024-04-24 DIAGNOSIS — F19.10 POLYSUBSTANCE ABUSE (HCC): Primary | ICD-10-CM

## 2024-04-24 PROCEDURE — 99283 EMERGENCY DEPT VISIT LOW MDM: CPT

## 2024-04-24 ASSESSMENT — LIFESTYLE VARIABLES
HOW OFTEN DO YOU HAVE A DRINK CONTAINING ALCOHOL: PATIENT DECLINED
HOW MANY STANDARD DRINKS CONTAINING ALCOHOL DO YOU HAVE ON A TYPICAL DAY: PATIENT DECLINED

## 2024-04-24 ASSESSMENT — PAIN - FUNCTIONAL ASSESSMENT: PAIN_FUNCTIONAL_ASSESSMENT: NONE - DENIES PAIN

## 2024-04-25 VITALS
TEMPERATURE: 97.5 F | DIASTOLIC BLOOD PRESSURE: 87 MMHG | WEIGHT: 206.79 LBS | OXYGEN SATURATION: 98 % | SYSTOLIC BLOOD PRESSURE: 136 MMHG | HEART RATE: 113 BPM | BODY MASS INDEX: 28.84 KG/M2 | RESPIRATION RATE: 20 BRPM

## 2024-04-25 LAB
ALBUMIN SERPL-MCNC: 4.7 G/DL (ref 3.5–5.2)
ALBUMIN/GLOB SERPL: 2 {RATIO} (ref 1–2.5)
ALP SERPL-CCNC: 82 U/L (ref 40–129)
ALT SERPL-CCNC: 67 U/L (ref 10–50)
AMPHET UR QL SCN: NEGATIVE
ANION GAP SERPL CALCULATED.3IONS-SCNC: 14 MMOL/L (ref 9–16)
APAP SERPL-MCNC: <5 UG/ML (ref 10–30)
AST SERPL-CCNC: 44 U/L (ref 10–50)
BARBITURATES UR QL SCN: NEGATIVE
BASOPHILS # BLD: 0.07 K/UL (ref 0–0.2)
BASOPHILS NFR BLD: 1 % (ref 0–2)
BENZODIAZ UR QL: NEGATIVE
BILIRUB SERPL-MCNC: 0.4 MG/DL (ref 0–1.2)
BILIRUB UR QL STRIP: NEGATIVE
BUN SERPL-MCNC: 10 MG/DL (ref 6–20)
CALCIUM SERPL-MCNC: 9.2 MG/DL (ref 8.6–10.4)
CANNABINOIDS UR QL SCN: POSITIVE
CHLORIDE SERPL-SCNC: 100 MMOL/L (ref 98–107)
CLARITY UR: CLEAR
CO2 SERPL-SCNC: 23 MMOL/L (ref 20–31)
COCAINE UR QL SCN: POSITIVE
COLOR UR: ABNORMAL
COMMENT: ABNORMAL
CREAT SERPL-MCNC: 0.8 MG/DL (ref 0.7–1.2)
EOSINOPHIL # BLD: 0.22 K/UL (ref 0–0.44)
EOSINOPHILS RELATIVE PERCENT: 3 % (ref 1–4)
ERYTHROCYTE [DISTWIDTH] IN BLOOD BY AUTOMATED COUNT: 12.3 % (ref 11.8–14.4)
ETHANOL PERCENT: 0.06 %
ETHANOLAMINE SERPL-MCNC: 58 MG/DL
FENTANYL UR QL: NEGATIVE
GFR SERPL CREATININE-BSD FRML MDRD: >90 ML/MIN/1.73M2
GLUCOSE SERPL-MCNC: 124 MG/DL (ref 74–99)
GLUCOSE UR STRIP-MCNC: NEGATIVE MG/DL
HCT VFR BLD AUTO: 44.1 % (ref 40.7–50.3)
HGB BLD-MCNC: 15.1 G/DL (ref 13–17)
HGB UR QL STRIP.AUTO: NEGATIVE
IMM GRANULOCYTES # BLD AUTO: 0.03 K/UL (ref 0–0.3)
IMM GRANULOCYTES NFR BLD: 0 %
KETONES UR STRIP-MCNC: ABNORMAL MG/DL
LEUKOCYTE ESTERASE UR QL STRIP: NEGATIVE
LYMPHOCYTES NFR BLD: 2.37 K/UL (ref 1.1–3.7)
LYMPHOCYTES RELATIVE PERCENT: 27 % (ref 24–43)
MCH RBC QN AUTO: 32.3 PG (ref 25.2–33.5)
MCHC RBC AUTO-ENTMCNC: 34.2 G/DL (ref 28.4–34.8)
MCV RBC AUTO: 94.4 FL (ref 82.6–102.9)
METHADONE UR QL: NEGATIVE
MONOCYTES NFR BLD: 0.43 K/UL (ref 0.1–1.2)
MONOCYTES NFR BLD: 5 % (ref 3–12)
NEUTROPHILS NFR BLD: 64 % (ref 36–65)
NEUTS SEG NFR BLD: 5.58 K/UL (ref 1.5–8.1)
NITRITE UR QL STRIP: NEGATIVE
NRBC BLD-RTO: 0 PER 100 WBC
OPIATES UR QL SCN: NEGATIVE
OXYCODONE UR QL SCN: NEGATIVE
PCP UR QL SCN: NEGATIVE
PH UR STRIP: 5.5 [PH] (ref 5–8)
PLATELET # BLD AUTO: 231 K/UL (ref 138–453)
PMV BLD AUTO: 10.5 FL (ref 8.1–13.5)
POTASSIUM SERPL-SCNC: 3.6 MMOL/L (ref 3.7–5.3)
PROT SERPL-MCNC: 7.6 G/DL (ref 6.6–8.7)
PROT UR STRIP-MCNC: NEGATIVE MG/DL
RBC # BLD AUTO: 4.67 M/UL (ref 4.21–5.77)
SALICYLATES SERPL-MCNC: <0.5 MG/DL (ref 0–10)
SODIUM SERPL-SCNC: 137 MMOL/L (ref 136–145)
SP GR UR STRIP: 1.03 (ref 1–1.03)
TEST INFORMATION: ABNORMAL
UROBILINOGEN UR STRIP-ACNC: NORMAL EU/DL (ref 0–1)
WBC OTHER # BLD: 8.7 K/UL (ref 3.5–11.3)

## 2024-04-25 PROCEDURE — 80307 DRUG TEST PRSMV CHEM ANLYZR: CPT

## 2024-04-25 PROCEDURE — 80143 DRUG ASSAY ACETAMINOPHEN: CPT

## 2024-04-25 PROCEDURE — 85025 COMPLETE CBC W/AUTO DIFF WBC: CPT

## 2024-04-25 PROCEDURE — 80179 DRUG ASSAY SALICYLATE: CPT

## 2024-04-25 PROCEDURE — G0480 DRUG TEST DEF 1-7 CLASSES: HCPCS

## 2024-04-25 PROCEDURE — 80053 COMPREHEN METABOLIC PANEL: CPT

## 2024-04-25 PROCEDURE — 81003 URINALYSIS AUTO W/O SCOPE: CPT

## 2024-04-25 ASSESSMENT — ENCOUNTER SYMPTOMS
NAUSEA: 0
DIARRHEA: 0
COUGH: 0
ABDOMINAL PAIN: 0
SHORTNESS OF BREATH: 0
BACK PAIN: 0
VOMITING: 0

## 2024-04-25 NOTE — ED NOTES
Social work called [facility] and spoke with Tello who reported patient is able to come to be assessed for a detox bed at [facility]. Tello asked that patient's face sheet and labs be faxed over.

## 2024-04-25 NOTE — ED PROVIDER NOTES
[facility]   Emergency Department  Faculty Attestation       I performed a history and physical examination of the patient and discussed management with the resident. I reviewed the resident’s note and agree with the documented findings including all diagnostic interpretations and plan of care. Any areas of disagreement are noted on the chart. I was personally present for the key portions of any procedures. I have documented in the chart those procedures where I was not present during the key portions. I have reviewed the emergency nurses triage note. I agree with the chief complaint, past medical history, past surgical history, allergies, medications, social and family history as documented unless otherwise noted below.  For Physician Assistant/ Nurse Practitioner cases/documentation I have personally evaluated this patient and have completed at least one if not all key elements of the E/M (history, physical exam, and MDM). Additional findings are as noted.    Patient Name: Balta Sarmiento  MRN: [id number]  : 1980  Primary Care Physician: No primary care provider on file.    Date of evaluationa: 2024   Note Started: 11:14 PM EDT    Pertinent Comments     Chief Complaint: No chief complaint on file.       Initial vitals: (If not listed, please see nursing documentation)  ED Triage Vitals   BP Temp Temp Source Pulse Respirations SpO2 Height Weight - Scale   24 2306 24 2306 24 2306 24 2306 24 2306 24 2306 -- 24 2305   137/89 97 °F (36.1 °C) Oral (!) 114 18 98 %  93.8 kg (206 lb 12.7 oz)        HPI/PE/Impression:  This is a 43 y.o. male who presents to the Emergency Department frustrated by repeated substance relapses on cocaine, marijuana, and alcohol.  Used just a few minutes prior to arrival. Is supposed to be on Atarax and Effexor.  He lost these a few days ago.  Patient states that he has been feeling depressed and lost the will to do much, but is

## 2024-04-25 NOTE — ED NOTES
[] [facility]    [] [facility]    [x]  [facility]    SUICIDE RISK ASSESSMENT      Y  N     [] [x] In the past two weeks have you had thoughts of hurting yourself in any way?    [] [x] In the past two weeks have you had thoughts that you would be better off dead?   [] [x] Have you made a suicide attempt in the past two months?   [] [x] Do you have a plan for hurting yourself or suicide?   [] [x] Presence of hallucinations/voices related to hurting himself or herself or someone else.    SUICIDE/SECURITY WATCH PRECAUTION CHECKLIST     Orders    [x]  Suicide/Security Watch Precautions initiated as checked below:   4/24/24 11:40 PM EDT 19/H19A    [x] Notified physician:  Leann Gustafson MD  4/24/24 11:40 PM EDT    [x] Orders obtained as appropriate:     [x] 1:1 Observer     [] Psych Consult     [] Psych Consult    Name:  Date:  Time:    [x] 1:1 Observer, Notified by:  Kavon Watkins RN    Contact Nurse Supervisor    [] Remove all personal clothes from room and place in snap/paper gown/pants.  Slipper only    [] Remove all personal belongings from room and secured away from patient.    Documentation    [x] Initiate Suicide/Security Watch Precaution Flow Sheet    [x] Initiate individualized Care Plan/Problem    [x] Document why precautions initiated on flow sheet (Initiate Nursing Care Plan/Problem)    [x] 1:1 Observer in place; instructions provided.  Suicide precautions require observer be within arms length.    [x] Nurse-Observer Communication Hand-off initiated by RN, reviewed with Observer.  Subsequently used as Hand Off between Observers.    [x] Initiate every 15 minute observations per observer as delegated by the RN.    [x] Initiate RN assessment and documentation    Environmental Scan  Search Criteria and Process: OPTIONAL, see Search Policy    [] Reason for search:    [] Nursing in presence of second person to search patient    [] Patient notified of reason for body assessment and belongings

## 2024-04-25 NOTE — ED NOTES
Pt arrived to the ED via triage for c/o of feeling somewhat suicidal  Pt states he doesn't want to harm himself but he wants his problems to go away  Pt states he recently quit two jobs because he can't \" stay clean\"  Pt states he uses marijuana, cocaine and alcohol daily  Pt states he used right before coming in today  Pt states he has no plan to kill himself or others  Pt states if he were he would \" drink himself to death\"

## 2024-04-25 NOTE — ED NOTES
Social work met with patient who reported to be at the hospital because his medications aren't working. Patient reported that he took them here and there, but set the bag down and doesn't know what happened to them. Patient reported he was prescribed Aderax and Adderall by [facility]. Patient reported to want to stay sober and to have been sober for 6 months at one point in time. Patient reported to have been kicked [facility] in [place], [place] and banned for 90 days by [place]. Patient reported he has never been kicked out of [facility], but he has never stayed anywhere longer than 4-5 days. Patient reported to feel helpless because he wants to get clean, but can't stay clean anymore. Patient reported to be are of his triggers and reported to know his environment and people he is around influence him to continue using because they use. Patient reported he last used $10 worth of cocaine, smoked marijuana and drank 3 16oz beers before coming in today. Patient reported to have found a rehab facility willing to take him called [facility] in [place], but they said he needs to complete detox first. Patient reported that is why he feels helpless because he has been calling around and doesn't know where he could go. Patient reported to have Hep C and liver issues and feel helpless and hopeless if he is not able to detox. Patient reported \"I am not suicidal, I just want to be sober.\" Patient reported he does not feel like he wants to harm himself or anyone else. Patient reported he is not hearing voices or seeing things. Patient reported to be willing to go to [facility] if they would allow him to detox.

## 2024-04-25 NOTE — ED NOTES
Per Dr pride , Sitter no longer needed  Pt denies wanting to harm self or others  Pt laying on stretcher with rr even and unlabored

## 2024-04-25 NOTE — ED NOTES
Pt presents to the ED ambulatory through triage with c/o of drug use and depression.   Pt states the medications he was prescribed at [facility] \"are not working\"   Pt states he lost his Effexor and atarax.   Pt admits to using cocaine, marijuana, and alcohol use PTA.

## 2024-04-25 NOTE — DISCHARGE INSTRUCTIONS
You will be transported to [facility] for detoxification and then [facility] in [place] for continued progress. Continue taking your prescribed medications as able.    Stop using drugs.  You can use diphenhydramine (Benadryl) for any feeling of anxiousness.    PLEASE RETURN TO THE EMERGENCY DEPARTMENT IMMEDIATELY for worsening symptoms, or if you develop any concerning symptoms such as: high fever not relieved by acetaminophen (Tylenol) and/or ibuprofen (Motrin / Advil), chills, shortness of breath, chest pain, feeling of your heart fluttering or racing, persistent nausea and/or vomiting, vomiting up blood, blood in your stool, numbness, loss of consciousness, weakness or tingling in the arms or legs or change in color of the extremities, changes in mental status, persistent headache, blurry vision, loss of bladder / bowel control, unable to follow up with your physician, or other any other care or concern.

## 2024-04-25 NOTE — ED NOTES
Social work ordered patient a cab to [facility]. Cab  ID is [phone].

## 2024-04-25 NOTE — ED PROVIDER NOTES
[facility] ED  Emergency Department Encounter  Emergency Medicine Resident     Pt Name:Balta Sarmiento  MRN: [id number]  Birthdate 1980  Date of evaluation: 4/24/24  PCP:  No primary care provider on file.  Note Started: 11:09 PM EDT      CHIEF COMPLAINT       Chief Complaint   Patient presents with    Suicidal       HISTORY OF PRESENT ILLNESS  (Location/Symptom, Timing/Onset, Context/Setting, Quality, Duration, Modifying Factors, Severity.)      Balta Sarmiento is a 43 y.o. male who presents with desire for detoxification.  Patient denies any current intention to hurt himself but states he is frustrated with his repeated drug use with cocaine, cannabis, alcohol just prior to arrival.  He states he is able to be free of drug use for approximately 3 to 4 days before relapse.  He does also note he was recently admitted to [facility] with prescriptions given for Atarax for anxiety and Effexor for depression but that he lost these medications yesterday.  Per chart review he has a history of EtOH, polysubstance abuse, previous suicidal ideation with plans to overdose with [facility] admissions on 3/31 and 4/17.  PAST MEDICAL / SURGICAL / SOCIAL / FAMILY HISTORY      has a past medical history of Anxiety, Depression, and Insomnia.       has no past surgical history on file.      Social History     Socioeconomic History    Marital status: Single     Spouse name: Not on file    Number of children: Not on file    Years of education: Not on file    Highest education level: Not on file   Occupational History    Not on file   Tobacco Use    Smoking status: Every Day     Current packs/day: 0.50     Types: Cigarettes    Smokeless tobacco: Never   Vaping Use    Vaping Use: Every day   Substance and Sexual Activity    Alcohol use: Yes     Comment: 4 beers and a fifth of liquor    Drug use: Yes     Types: Cocaine    Sexual activity: Not on file   Other Topics Concern    Not on file   Social History Narrative    Not on file  124(!)   BUN,BUNPL 10   Creatinine 0.8   Est, Glom Filt Rate >90   Calcium, Total 9.2   Total Protein, Serum 7.6   Albumin 4.7   ALBUMIN/GLOBULIN RATIO 2.0   Total Bilirubin 0.4   Alk Phos 82   ALT 67(!)   AST 44 [KM]   0039 TOX SCR, BLD, ED(!):    Acetaminophen Level <5(!)   ETHANOL,ETHA 58(!)   Ethanol percent 0.058(!)   Salicylate Lvl <0.5 [KM]   0123 Urine Drug Screen(!):    Amphetamine Screen, Ur NEGATIVE   Barbiturate Screen, Ur NEGATIVE   Benzodiazepine Screen, Urine NEGATIVE   Cocaine Metabolite, Urine POSITIVE(!)   METHADONE SCREEN, URINE, 12507 NEGATIVE   Opiates, Urine NEGATIVE   Phencyclidine, Urine NEGATIVE   Cannabinoid Scrn, Ur POSITIVE(!)   Oxycodone Screen, Ur NEGATIVE   Fentanyl, Ur NEGATIVE   TEST INFORMATION Assay provides rapid clinical screening only.  Presumptive positive results for legal purposes should be confirmed by another method.  To request confirmation, please call the lab within 7 days of sample submission. [KM]   0123 Labs complete, will d/c to [facility]. [KM]      ED Course User Index  [KM] Kam Chacon MD       PROCEDURES:  None    CONSULTS:  None    CRITICAL CARE:  There was significant risk of life threatening deterioration of patient's condition requiring my direct management. Critical care time  minutes, excluding any documented procedures.    FINAL IMPRESSION      1. Admitted to substance misuse detoxification center          DISPOSITION / PLAN     DISPOSITION Decision To Discharge 04/25/2024 01:24:01 AM      PATIENT REFERRED TO:  [facility]  [address] [place] [id number]  [phone]    Go to         DISCHARGE MEDICATIONS:  Current Discharge Medication List          Kam Chacon MD  Emergency Medicine Resident    (Please note that portions of thisnote were completed with a voice recognition program.  Efforts were made to edit the dictations but occasionally words are mis-transcribed.)

## 2024-05-03 ENCOUNTER — HOSPITAL ENCOUNTER (INPATIENT)
Age: 44
LOS: 1 days | Discharge: HOME | DRG: 754 | End: 2024-05-03
Attending: EMERGENCY MEDICINE
Payer: MEDICAID

## 2024-05-03 ENCOUNTER — APPOINTMENT (OUTPATIENT)
Dept: GENERAL RADIOLOGY | Age: 44
DRG: 754 | End: 2024-05-03
Payer: MEDICAID

## 2024-05-03 VITALS
TEMPERATURE: 98 F | BODY MASS INDEX: 28.42 KG/M2 | SYSTOLIC BLOOD PRESSURE: 104 MMHG | DIASTOLIC BLOOD PRESSURE: 64 MMHG | RESPIRATION RATE: 17 BRPM | OXYGEN SATURATION: 97 % | WEIGHT: 203 LBS | HEART RATE: 69 BPM | HEIGHT: 71 IN

## 2024-05-03 DIAGNOSIS — A63.0 GENITAL WARTS: ICD-10-CM

## 2024-05-03 DIAGNOSIS — R00.2 PALPITATIONS: ICD-10-CM

## 2024-05-03 DIAGNOSIS — F10.230 ALCOHOL DEPENDENCE WITH UNCOMPLICATED WITHDRAWAL (HCC): Primary | ICD-10-CM

## 2024-05-03 PROBLEM — Z59.00 HOMELESSNESS: Status: ACTIVE | Noted: 2018-08-20

## 2024-05-03 PROBLEM — F10.930 ALCOHOL WITHDRAWAL, UNCOMPLICATED (HCC): Status: ACTIVE | Noted: 2024-05-03

## 2024-05-03 PROBLEM — F33.1 MDD (MAJOR DEPRESSIVE DISORDER), RECURRENT EPISODE, MODERATE (HCC): Status: ACTIVE | Noted: 2024-05-03

## 2024-05-03 LAB
AMPHET UR QL SCN: NEGATIVE
ANION GAP SERPL CALCULATED.3IONS-SCNC: 17 MMOL/L (ref 9–16)
ANION GAP SERPL CALCULATED.3IONS-SCNC: 9 MMOL/L (ref 9–16)
BARBITURATES UR QL SCN: NEGATIVE
BASOPHILS # BLD: 0.05 K/UL (ref 0–0.2)
BASOPHILS NFR BLD: 1 % (ref 0–2)
BENZODIAZ UR QL: NEGATIVE
BNP SERPL-MCNC: <36 PG/ML (ref 0–300)
BUN SERPL-MCNC: 13 MG/DL (ref 6–20)
BUN SERPL-MCNC: 14 MG/DL (ref 6–20)
CA-I BLD-SCNC: 1.09 MMOL/L (ref 1.13–1.33)
CALCIUM SERPL-MCNC: 8.9 MG/DL (ref 8.6–10.4)
CALCIUM SERPL-MCNC: 9.4 MG/DL (ref 8.6–10.4)
CANNABINOIDS UR QL SCN: POSITIVE
CHLORIDE SERPL-SCNC: 102 MMOL/L (ref 98–107)
CHLORIDE SERPL-SCNC: 99 MMOL/L (ref 98–107)
CO2 SERPL-SCNC: 20 MMOL/L (ref 20–31)
CO2 SERPL-SCNC: 25 MMOL/L (ref 20–31)
COCAINE UR QL SCN: POSITIVE
CREAT SERPL-MCNC: 0.7 MG/DL (ref 0.7–1.2)
CREAT SERPL-MCNC: 0.7 MG/DL (ref 0.7–1.2)
D DIMER PPP FEU-MCNC: 0.28 UG/ML FEU (ref 0–0.57)
EKG ATRIAL RATE: 84 BPM
EKG P AXIS: 43 DEGREES
EKG P-R INTERVAL: 166 MS
EKG Q-T INTERVAL: 378 MS
EKG QRS DURATION: 86 MS
EKG QTC CALCULATION (BAZETT): 446 MS
EKG R AXIS: 19 DEGREES
EKG T AXIS: 55 DEGREES
EKG VENTRICULAR RATE: 84 BPM
EOSINOPHIL # BLD: 0.2 K/UL (ref 0–0.44)
EOSINOPHILS RELATIVE PERCENT: 2 % (ref 1–4)
ERYTHROCYTE [DISTWIDTH] IN BLOOD BY AUTOMATED COUNT: 11.9 % (ref 11.8–14.4)
EST. AVERAGE GLUCOSE BLD GHB EST-MCNC: 94 MG/DL
ETHANOL PERCENT: <0.01 %
ETHANOLAMINE SERPL-MCNC: <10 MG/DL
FENTANYL UR QL: NEGATIVE
GFR, ESTIMATED: >90 ML/MIN/1.73M2
GFR, ESTIMATED: >90 ML/MIN/1.73M2
GLUCOSE SERPL-MCNC: 84 MG/DL (ref 74–99)
GLUCOSE SERPL-MCNC: 92 MG/DL (ref 74–99)
HBA1C MFR BLD: 4.9 % (ref 4–6)
HCT VFR BLD AUTO: 44.7 % (ref 40.7–50.3)
HGB BLD-MCNC: 15 G/DL (ref 13–17)
HIV 1+2 AB+HIV1 P24 AG SERPL QL IA: NONREACTIVE
IMM GRANULOCYTES # BLD AUTO: 0.05 K/UL (ref 0–0.3)
IMM GRANULOCYTES NFR BLD: 1 %
LYMPHOCYTES NFR BLD: 2.33 K/UL (ref 1.1–3.7)
LYMPHOCYTES RELATIVE PERCENT: 23 % (ref 24–43)
MAGNESIUM SERPL-MCNC: 2.1 MG/DL (ref 1.6–2.6)
MCH RBC QN AUTO: 31.9 PG (ref 25.2–33.5)
MCHC RBC AUTO-ENTMCNC: 33.6 G/DL (ref 28.4–34.8)
MCV RBC AUTO: 95.1 FL (ref 82.6–102.9)
METHADONE UR QL: NEGATIVE
MONOCYTES NFR BLD: 0.71 K/UL (ref 0.1–1.2)
MONOCYTES NFR BLD: 7 % (ref 3–12)
NEUTROPHILS NFR BLD: 66 % (ref 36–65)
NEUTS SEG NFR BLD: 7 K/UL (ref 1.5–8.1)
NRBC BLD-RTO: 0 PER 100 WBC
OPIATES UR QL SCN: NEGATIVE
OXYCODONE UR QL SCN: NEGATIVE
PCP UR QL SCN: NEGATIVE
PHOSPHATE SERPL-MCNC: 3.2 MG/DL (ref 2.5–4.5)
PLATELET # BLD AUTO: 212 K/UL (ref 138–453)
PMV BLD AUTO: 10.6 FL (ref 8.1–13.5)
POTASSIUM SERPL-SCNC: 3.6 MMOL/L (ref 3.7–5.3)
POTASSIUM SERPL-SCNC: 3.6 MMOL/L (ref 3.7–5.3)
RBC # BLD AUTO: 4.7 M/UL (ref 4.21–5.77)
SODIUM SERPL-SCNC: 136 MMOL/L (ref 136–145)
SODIUM SERPL-SCNC: 136 MMOL/L (ref 136–145)
SOURCE: NORMAL
T PALLIDUM AB SER QL IA: NONREACTIVE
TEST INFORMATION: ABNORMAL
TRICHOMONAS VAGINALI, MOLECULAR: NEGATIVE
TROPONIN I SERPL HS-MCNC: <6 NG/L (ref 0–22)
TROPONIN I SERPL HS-MCNC: <6 NG/L (ref 0–22)
TSH SERPL DL<=0.05 MIU/L-ACNC: 1.02 UIU/ML (ref 0.27–4.2)
WBC OTHER # BLD: 10.3 K/UL (ref 3.5–11.3)

## 2024-05-03 PROCEDURE — 6370000000 HC RX 637 (ALT 250 FOR IP): Performed by: NURSE PRACTITIONER

## 2024-05-03 PROCEDURE — 86780 TREPONEMA PALLIDUM: CPT

## 2024-05-03 PROCEDURE — 80048 BASIC METABOLIC PNL TOTAL CA: CPT

## 2024-05-03 PROCEDURE — 84443 ASSAY THYROID STIM HORMONE: CPT

## 2024-05-03 PROCEDURE — 99285 EMERGENCY DEPT VISIT HI MDM: CPT

## 2024-05-03 PROCEDURE — 96361 HYDRATE IV INFUSION ADD-ON: CPT

## 2024-05-03 PROCEDURE — 96360 HYDRATION IV INFUSION INIT: CPT

## 2024-05-03 PROCEDURE — 84100 ASSAY OF PHOSPHORUS: CPT

## 2024-05-03 PROCEDURE — APPSS60 APP SPLIT SHARED TIME 46-60 MINUTES: Performed by: NURSE PRACTITIONER

## 2024-05-03 PROCEDURE — 87389 HIV-1 AG W/HIV-1&-2 AB AG IA: CPT

## 2024-05-03 PROCEDURE — 6370000000 HC RX 637 (ALT 250 FOR IP): Performed by: STUDENT IN AN ORGANIZED HEALTH CARE EDUCATION/TRAINING PROGRAM

## 2024-05-03 PROCEDURE — 87591 N.GONORRHOEAE DNA AMP PROB: CPT

## 2024-05-03 PROCEDURE — 83036 HEMOGLOBIN GLYCOSYLATED A1C: CPT

## 2024-05-03 PROCEDURE — 99222 1ST HOSP IP/OBS MODERATE 55: CPT | Performed by: STUDENT IN AN ORGANIZED HEALTH CARE EDUCATION/TRAINING PROGRAM

## 2024-05-03 PROCEDURE — 80307 DRUG TEST PRSMV CHEM ANLYZR: CPT

## 2024-05-03 PROCEDURE — 71046 X-RAY EXAM CHEST 2 VIEWS: CPT

## 2024-05-03 PROCEDURE — 93005 ELECTROCARDIOGRAM TRACING: CPT | Performed by: STUDENT IN AN ORGANIZED HEALTH CARE EDUCATION/TRAINING PROGRAM

## 2024-05-03 PROCEDURE — 84484 ASSAY OF TROPONIN QUANT: CPT

## 2024-05-03 PROCEDURE — G0480 DRUG TEST DEF 1-7 CLASSES: HCPCS

## 2024-05-03 PROCEDURE — 83735 ASSAY OF MAGNESIUM: CPT

## 2024-05-03 PROCEDURE — 2580000003 HC RX 258: Performed by: NURSE PRACTITIONER

## 2024-05-03 PROCEDURE — 2580000003 HC RX 258

## 2024-05-03 PROCEDURE — 87661 TRICHOMONAS VAGINALIS AMPLIF: CPT

## 2024-05-03 PROCEDURE — 83880 ASSAY OF NATRIURETIC PEPTIDE: CPT

## 2024-05-03 PROCEDURE — 82330 ASSAY OF CALCIUM: CPT

## 2024-05-03 PROCEDURE — 1200000000 HC SEMI PRIVATE

## 2024-05-03 PROCEDURE — 85025 COMPLETE CBC W/AUTO DIFF WBC: CPT

## 2024-05-03 PROCEDURE — 85379 FIBRIN DEGRADATION QUANT: CPT

## 2024-05-03 PROCEDURE — 87491 CHLMYD TRACH DNA AMP PROBE: CPT

## 2024-05-03 PROCEDURE — 99253 IP/OBS CNSLTJ NEW/EST LOW 45: CPT | Performed by: PSYCHIATRY & NEUROLOGY

## 2024-05-03 RX ORDER — THIAMINE MONONITRATE (VIT B1) 100 MG
100 TABLET ORAL DAILY
Qty: 30 TABLET | Refills: 3 | Status: ON HOLD | OUTPATIENT
Start: 2024-05-03 | End: 2024-05-08 | Stop reason: HOSPADM

## 2024-05-03 RX ORDER — LORAZEPAM 1 MG/1
1 TABLET ORAL
Status: DISCONTINUED | OUTPATIENT
Start: 2024-05-03 | End: 2024-05-03

## 2024-05-03 RX ORDER — GABAPENTIN 300 MG/1
300 CAPSULE ORAL NIGHTLY
Status: DISCONTINUED | OUTPATIENT
Start: 2024-05-06 | End: 2024-05-03 | Stop reason: HOSPADM

## 2024-05-03 RX ORDER — THIAMINE HYDROCHLORIDE 100 MG/ML
100 INJECTION, SOLUTION INTRAMUSCULAR; INTRAVENOUS DAILY
Status: DISCONTINUED | OUTPATIENT
Start: 2024-05-03 | End: 2024-05-03 | Stop reason: HOSPADM

## 2024-05-03 RX ORDER — VENLAFAXINE HYDROCHLORIDE 75 MG/1
75 CAPSULE, EXTENDED RELEASE ORAL
Status: DISCONTINUED | OUTPATIENT
Start: 2024-05-03 | End: 2024-05-03 | Stop reason: HOSPADM

## 2024-05-03 RX ORDER — LORAZEPAM 2 MG/ML
1 INJECTION INTRAMUSCULAR
Status: DISCONTINUED | OUTPATIENT
Start: 2024-05-03 | End: 2024-05-03

## 2024-05-03 RX ORDER — HYDROXYZINE HYDROCHLORIDE 25 MG/1
25 TABLET, FILM COATED ORAL 3 TIMES DAILY PRN
Status: DISCONTINUED | OUTPATIENT
Start: 2024-05-03 | End: 2024-05-03

## 2024-05-03 RX ORDER — VENLAFAXINE HYDROCHLORIDE 75 MG/1
75 CAPSULE, EXTENDED RELEASE ORAL
Qty: 30 CAPSULE | Refills: 3 | Status: ON HOLD | OUTPATIENT
Start: 2024-05-03 | End: 2024-05-08 | Stop reason: HOSPADM

## 2024-05-03 RX ORDER — GABAPENTIN 300 MG/1
300 CAPSULE ORAL 3 TIMES DAILY
Status: DISCONTINUED | OUTPATIENT
Start: 2024-05-04 | End: 2024-05-03 | Stop reason: HOSPADM

## 2024-05-03 RX ORDER — HYDROXYZINE 50 MG/1
50 TABLET, FILM COATED ORAL 3 TIMES DAILY PRN
Qty: 30 TABLET | Refills: 0 | Status: ON HOLD | OUTPATIENT
Start: 2024-05-03 | End: 2024-05-08 | Stop reason: HOSPADM

## 2024-05-03 RX ORDER — SODIUM CHLORIDE 9 MG/ML
INJECTION, SOLUTION INTRAVENOUS PRN
Status: DISCONTINUED | OUTPATIENT
Start: 2024-05-03 | End: 2024-05-03 | Stop reason: HOSPADM

## 2024-05-03 RX ORDER — GABAPENTIN 300 MG/1
300 CAPSULE ORAL 4 TIMES DAILY
Status: DISCONTINUED | OUTPATIENT
Start: 2024-05-03 | End: 2024-05-03 | Stop reason: HOSPADM

## 2024-05-03 RX ORDER — LORAZEPAM 2 MG/1
2 TABLET ORAL
Status: DISCONTINUED | OUTPATIENT
Start: 2024-05-03 | End: 2024-05-03

## 2024-05-03 RX ORDER — SODIUM CHLORIDE, SODIUM LACTATE, POTASSIUM CHLORIDE, AND CALCIUM CHLORIDE .6; .31; .03; .02 G/100ML; G/100ML; G/100ML; G/100ML
1000 INJECTION, SOLUTION INTRAVENOUS ONCE
Status: COMPLETED | OUTPATIENT
Start: 2024-05-03 | End: 2024-05-03

## 2024-05-03 RX ORDER — POTASSIUM CHLORIDE 20 MEQ/1
40 TABLET, EXTENDED RELEASE ORAL PRN
Status: DISCONTINUED | OUTPATIENT
Start: 2024-05-03 | End: 2024-05-03 | Stop reason: HOSPADM

## 2024-05-03 RX ORDER — LORAZEPAM 2 MG/ML
2 INJECTION INTRAMUSCULAR
Status: DISCONTINUED | OUTPATIENT
Start: 2024-05-03 | End: 2024-05-03

## 2024-05-03 RX ORDER — ACETAMINOPHEN 650 MG/1
650 SUPPOSITORY RECTAL EVERY 6 HOURS PRN
Status: DISCONTINUED | OUTPATIENT
Start: 2024-05-03 | End: 2024-05-03 | Stop reason: HOSPADM

## 2024-05-03 RX ORDER — GABAPENTIN 300 MG/1
300 CAPSULE ORAL 2 TIMES DAILY
Status: DISCONTINUED | OUTPATIENT
Start: 2024-05-05 | End: 2024-05-03 | Stop reason: HOSPADM

## 2024-05-03 RX ORDER — SODIUM CHLORIDE 9 MG/ML
INJECTION, SOLUTION INTRAVENOUS CONTINUOUS
Status: DISCONTINUED | OUTPATIENT
Start: 2024-05-03 | End: 2024-05-03

## 2024-05-03 RX ORDER — GABAPENTIN 300 MG/1
CAPSULE ORAL
Qty: 10 CAPSULE | Refills: 0 | Status: ON HOLD | OUTPATIENT
Start: 2024-05-03 | End: 2024-05-08 | Stop reason: HOSPADM

## 2024-05-03 RX ORDER — POTASSIUM CHLORIDE 20 MEQ/1
40 TABLET, EXTENDED RELEASE ORAL ONCE
Status: DISCONTINUED | OUTPATIENT
Start: 2024-05-03 | End: 2024-05-03 | Stop reason: HOSPADM

## 2024-05-03 RX ORDER — ACETAMINOPHEN 325 MG/1
650 TABLET ORAL EVERY 6 HOURS PRN
Status: DISCONTINUED | OUTPATIENT
Start: 2024-05-03 | End: 2024-05-03 | Stop reason: HOSPADM

## 2024-05-03 RX ORDER — FOLIC ACID 1 MG/1
1 TABLET ORAL DAILY
Qty: 30 TABLET | Refills: 0 | Status: ON HOLD | OUTPATIENT
Start: 2024-05-03 | End: 2024-05-08 | Stop reason: HOSPADM

## 2024-05-03 RX ORDER — ONDANSETRON 4 MG/1
4 TABLET, ORALLY DISINTEGRATING ORAL EVERY 8 HOURS PRN
Status: DISCONTINUED | OUTPATIENT
Start: 2024-05-03 | End: 2024-05-03 | Stop reason: HOSPADM

## 2024-05-03 RX ORDER — ONDANSETRON 2 MG/ML
4 INJECTION INTRAMUSCULAR; INTRAVENOUS EVERY 6 HOURS PRN
Status: DISCONTINUED | OUTPATIENT
Start: 2024-05-03 | End: 2024-05-03 | Stop reason: HOSPADM

## 2024-05-03 RX ORDER — POTASSIUM CHLORIDE 7.45 MG/ML
10 INJECTION INTRAVENOUS PRN
Status: DISCONTINUED | OUTPATIENT
Start: 2024-05-03 | End: 2024-05-03 | Stop reason: HOSPADM

## 2024-05-03 RX ORDER — POLYETHYLENE GLYCOL 3350 17 G/17G
17 POWDER, FOR SOLUTION ORAL DAILY PRN
Status: DISCONTINUED | OUTPATIENT
Start: 2024-05-03 | End: 2024-05-03 | Stop reason: HOSPADM

## 2024-05-03 RX ORDER — LORAZEPAM 2 MG/1
4 TABLET ORAL
Status: DISCONTINUED | OUTPATIENT
Start: 2024-05-03 | End: 2024-05-03

## 2024-05-03 RX ORDER — SODIUM CHLORIDE 0.9 % (FLUSH) 0.9 %
5-40 SYRINGE (ML) INJECTION EVERY 12 HOURS SCHEDULED
Status: DISCONTINUED | OUTPATIENT
Start: 2024-05-03 | End: 2024-05-03 | Stop reason: HOSPADM

## 2024-05-03 RX ORDER — LORAZEPAM 2 MG/ML
3 INJECTION INTRAMUSCULAR
Status: DISCONTINUED | OUTPATIENT
Start: 2024-05-03 | End: 2024-05-03

## 2024-05-03 RX ORDER — ENOXAPARIN SODIUM 100 MG/ML
40 INJECTION SUBCUTANEOUS DAILY
Status: DISCONTINUED | OUTPATIENT
Start: 2024-05-03 | End: 2024-05-03 | Stop reason: HOSPADM

## 2024-05-03 RX ORDER — HYDROXYZINE 50 MG/1
50 TABLET, FILM COATED ORAL 3 TIMES DAILY PRN
Status: DISCONTINUED | OUTPATIENT
Start: 2024-05-03 | End: 2024-05-03 | Stop reason: HOSPADM

## 2024-05-03 RX ORDER — MULTIVIT-MIN/IRON FUM/FOLIC AC 7.5 MG-4
1 TABLET ORAL DAILY
Qty: 30 TABLET | Refills: 3 | Status: ON HOLD | OUTPATIENT
Start: 2024-05-03 | End: 2024-05-08 | Stop reason: HOSPADM

## 2024-05-03 RX ORDER — MAGNESIUM SULFATE IN WATER 40 MG/ML
2000 INJECTION, SOLUTION INTRAVENOUS PRN
Status: DISCONTINUED | OUTPATIENT
Start: 2024-05-03 | End: 2024-05-03 | Stop reason: HOSPADM

## 2024-05-03 RX ORDER — LORAZEPAM 2 MG/ML
4 INJECTION INTRAMUSCULAR
Status: DISCONTINUED | OUTPATIENT
Start: 2024-05-03 | End: 2024-05-03

## 2024-05-03 RX ORDER — SODIUM CHLORIDE 0.9 % (FLUSH) 0.9 %
10 SYRINGE (ML) INJECTION PRN
Status: DISCONTINUED | OUTPATIENT
Start: 2024-05-03 | End: 2024-05-03 | Stop reason: HOSPADM

## 2024-05-03 RX ADMIN — VENLAFAXINE HYDROCHLORIDE 75 MG: 75 CAPSULE, EXTENDED RELEASE ORAL at 10:02

## 2024-05-03 RX ADMIN — GABAPENTIN 300 MG: 300 CAPSULE ORAL at 10:02

## 2024-05-03 RX ADMIN — SODIUM CHLORIDE, POTASSIUM CHLORIDE, SODIUM LACTATE AND CALCIUM CHLORIDE 1000 ML: 600; 310; 30; 20 INJECTION, SOLUTION INTRAVENOUS at 01:38

## 2024-05-03 RX ADMIN — SODIUM CHLORIDE: 9 INJECTION, SOLUTION INTRAVENOUS at 05:05

## 2024-05-03 RX ADMIN — SODIUM CHLORIDE, PRESERVATIVE FREE 10 ML: 5 INJECTION INTRAVENOUS at 10:02

## 2024-05-03 ASSESSMENT — ENCOUNTER SYMPTOMS
COLOR CHANGE: 0
ABDOMINAL DISTENTION: 0
CHOKING: 0
CHEST TIGHTNESS: 0
EYE ITCHING: 0
EYE PAIN: 0
APNEA: 0
ABDOMINAL PAIN: 0

## 2024-05-03 ASSESSMENT — HEART SCORE: ECG: NON-SPECIFC REPOLARIZATION DISTURBANCE/LBTB/PM

## 2024-05-03 ASSESSMENT — PAIN - FUNCTIONAL ASSESSMENT
PAIN_FUNCTIONAL_ASSESSMENT: NONE - DENIES PAIN
PAIN_FUNCTIONAL_ASSESSMENT: NONE - DENIES PAIN

## 2024-05-03 NOTE — ED NOTES
Report taken from previous shift RN. Pt introduced to writer. Pt resting in bed with call light in reach, respirations even and unlabored, NAD. Pt has no verbalized needs at this time. Previous RN to complete initial note for accuracy  Pt resting in bed with bed in lowest position, locked, and rails up x2. Respirations even and unlabored. Belongings continue to be locked away, out of patients reach. Constant observer maintained and at patient's bedside.

## 2024-05-03 NOTE — ED NOTES
The following labs were labeled with appropriate pt sticker and tubed to lab:     [x] Blue     [x] Lavender   [] on ice  [x] Green/yellow  [x] Green/black [] on ice  [] Siu  [] on ice  [x] Yellow  [] Red  [] Pink  [] Type/ Screen  [] ABG  [] VBG    [] COVID-19 swab    [] Rapid  [] PCR  [] Flu swab  [] Peds Viral Panel     [x] Urine Sample  [] Fecal Sample  [] Pelvic Cultures  [] Blood Cultures  [] X 2  [] STREP Cultures  [] Wound Cultures     Iv fluid started as ordered

## 2024-05-03 NOTE — ED NOTES
Belongings locked away, out of reach of patient. Checked for contraband by [employer] as communicated to writer by Tala TONY

## 2024-05-03 NOTE — DISCHARGE SUMMARY
[facility]  Office: [phone]  Leandro House DO, Miguel Cruz DO, Cordell Bell DO, Sage Terry DO, Lisa Haney MD, Renae Hobbs MD, Samantha Choe MD, Rosa Wolfe MD,  Miguel Tovar MD, Keyshawn Gonzalez MD, All Turner MD,  Funmilayo Leslie DO, Nneka Mcghee MD, Samir Marley MD, Mundo House DO, Jelly Goldstein MD,  Jarrell Cruz DO, Marina Green MD, Chloe Calderón MD, Liseth Ness MD, Tati Maldonado MD,  Arnel Garcia MD, Antonino Gallo MD, Raheem Good MD, Maria M Irwin MD, Aung Carlos MD, Alex Polk MD, Tom Gomez DO, Tushar Granda DO, Titus Jensen MD,  Kang Moody MD, Shirley Waterhouse, CNP,  Kay Fontanez CNP, Greg Hines, CNP,  Erika Jacques, KENIA, Sole Jimenez, CNP, Jesusita Andrade, CNP, Haley Cornejo CNP, Verito Chua CNP, Briana Cross, PA-C, Violeta Aguillon PA-C, Clarissa Lama, CNP, Page Lui, CNP, Sophia Cisneros CNP, Suzette Ramachandran, CNP, Tawana Hoffmann CNP, Tressa Pinto, CNS, Rosa Garcia, CNP, Olamide Lackey CNP, Tracy Schwab, CNP         [facility]   IN-PATIENT SERVICE   [facility]    Discharge Summary     Patient ID: Balta Sarmiento  :  1980   MRN: [id number]     ACCOUNT:  [id number]   Patient's PCP: No primary care provider on file.  Admit Date: 5/3/2024   Discharge Date: 5/3/2024     Length of Stay: 0  Code Status:  Full Code  Admitting Physician: No admitting provider for patient encounter.  Discharge Physician: All Turner MD     Active Discharge Diagnoses:     Hospital Problem Lists:  Principal Problem:    Depression with suicidal ideation  Active Problems:    Cocaine use disorder (HCC)    Alcohol withdrawal, uncomplicated (HCC)    Homelessness    Alcohol dependence with uncomplicated withdrawal (HCC)  Resolved Problems:    * No resolved hospital problems. *      Admission Condition:  stable     Discharged Condition: stable    Hospital Stay:     Hospital Course:

## 2024-05-03 NOTE — ED NOTES
[] [facility]    [] [facility]    [x]  [facility]    SUICIDE RISK ASSESSMENT      Y  N     [x] [] In the past two weeks have you had thoughts of hurting yourself in any way?    [x] [] In the past two weeks have you had thoughts that you would be better off dead?   [] [x] Have you made a suicide attempt in the past two months?   [] [x] Do you have a plan for hurting yourself or suicide?   [] [x] Presence of hallucinations/voices related to hurting himself or herself or someone else.    SUICIDE/SECURITY WATCH PRECAUTION CHECKLIST     Orders    [x]  Suicide/Security Watch Precautions initiated as checked below:   5/3/24 1:13 AM EDT 07/07    [x] Notified physician:  Jose Beach MD  5/3/24 1:13 AM EDT    [x] Orders obtained as appropriate:     [] 1:1 Observer     [] Psych Consult     [] Psych Consult    Name:  Date:  Time:    [x] 1:1 Observer, Notified by:  Ivet Allred RN    Contact Nurse Supervisor    [x] Remove all personal clothes from room and place in snap/paper gown/pants.  Slipper only    [x] Remove all personal belongings from room and secured away from patient.    Documentation    [x] Initiate Suicide/Security Watch Precaution Flow Sheet    [x] Initiate individualized Care Plan/Problem    [x] Document why precautions initiated on flow sheet (Initiate Nursing Care Plan/Problem)    [x] 1:1 Observer in place; instructions provided.  Suicide precautions require observer be within arms length.    [x] Nurse-Observer Communication Hand-off initiated by RN, reviewed with Observer.  Subsequently used as Hand Off between Observers.    [x] Initiate every 15 minute observations per observer as delegated by the RN.    [x] Initiate RN assessment and documentation    Environmental Scan  Search Criteria and Process: OPTIONAL, see Search Policy    [x] Reason for search: policy     [x] Nursing in presence of second person to search patient    [x] Patient notified of reason for body assessment and  Started:5/03/2024  Etiological Factors: (related to)  [x] Expressed or implied suicidal ideation/behavior  [x] Depression  [] Suicide attempt      [] Low self-esteem  [] Hallucinations      [x] Feeling of Hopelessness  [x] Substance abuse or withdrawal    [] Dysfunctional family  [] Major traumatic event, eg., divorce, etc   [] Excessive stress/anxiety    5/3/24    Expected Outcomes    Patient will:   [x] Patient will remain safe for the duration of their stay   [x] Patient's environment will be safe, eg. Free of potential suicide weapons   [x] Verbalize Recovery from suicidal episode and improvement in self-worth   [x] Discuss feeling that precipitated suicide attempt/thoughts/behavior   [x] Will describe available resources for crisis prevention and management   [x] Will verbalize positive coping skills     Nursing Intervention   [x] Assessment and Observations hourly   [x] Suicide Precautions implemented with patient, should be 1:1 observation   [x] Document observation [id number] and RN assessment hourly   [] Consult physician for:    [] Psychiatric consult    [] Pharmacological therapy    [] Other:    [x] Patient search completed by security   [x] Initiated appropriate safety protocols by removing from the patient's environment anything that could be used to inflict self injury, eg. Order safe tray, snap gown, etc   [x] Maintain open, warm, caring, non-judgmental attitude/manner towards patient   [] Discuss advantages and disadvantages of existing coping methods/skills   [x] Assist and educate patient with identifying present strengths and coping skills   [x] Keep patient informed regarding plan of care and provide clear concise explanations.  Provide the patient/family education information as well as telephone numbers and other information about crisis centers, hot lines, and counselors.    Discharge Planning:   [] Referral  [] Groups [] Health agencies  [] Other:

## 2024-05-03 NOTE — PROGRESS NOTES
CLINICAL PHARMACY NOTE: MEDS TO BEDS    Total # of Prescriptions Filled: 5   The following medications were delivered to the patient:  Hydroxyzine 50mg tabs  Gabapentin 300mg caps  Thera-m tabs  Folic acid 1mg tabs  Thiamine 100mg tabs    Additional Documentation:  Delivered to pt + 1 in room 438 on 5/3 at 1:45. No copay.

## 2024-05-03 NOTE — ED NOTES
The patient is a 43 year old male that has a history of Major Depression. The patient came to the ED today with multiple complaints, one being mental health evaluation. Patient reports that he is depressed. Patient states, \"I am a little suicidal.\" Patient unable to identify a plan and denied any intention of following through with killing himself. Patient report that he does feel hopeless and depressed. The patient does have significant history of alcohol use. Patient reports that he would like to detox. Patient also a daily cocaine user. [facility] to many psychiatric hospital/residential level of care recently. The  Dept has also sent patient to rehab multiple times. Patient to be medically admitted. Recommend psyc eval when medically cleared.

## 2024-05-03 NOTE — ED NOTES
ED to inpatient nurses report      Chief Complaint:  Chief Complaint   Patient presents with    Depression     Present to ED from: home    MOA:     LOC: alert and orientated to name, place, date  Mobility: Independent  Oxygen Baseline: na    Current needs required: na   Pending ED orders: urine  Present condition: alert and oriented     Why did the patient come to the ED? Suicidal thoughts substance abuse   What is the plan? Control heart rate  and see pysche  Any procedures or intervention occur? na  Any safety concerns?? Suicidal risk    Mental Status:  Level of Consciousness: Alert (0)    Psych Assessment:   Psychosocial  Psychosocial (WDL): Exceptions to WDL (SI and homeless)  Vital signs   Vitals:    05/03/24 0015 05/03/24 0211   BP: 119/84 117/80   Pulse: (!) 104 84   Resp: 18 20   Temp: 98.8 °F (37.1 °C) 98.7 °F (37.1 °C)   TempSrc: Oral    SpO2: 98% 98%   Weight: 92.1 kg (203 lb)    Height: 1.803 m (5' 11\")         Vitals:  Patient Vitals for the past 24 hrs:   BP Temp Temp src Pulse Resp SpO2 Height Weight   05/03/24 0211 117/80 98.7 °F (37.1 °C) -- 84 20 98 % -- --   05/03/24 0015 119/84 98.8 °F (37.1 °C) Oral (!) 104 18 98 % 1.803 m (5' 11\") 92.1 kg (203 lb)      Visit Vitals  /80   Pulse 84   Temp 98.7 °F (37.1 °C)   Resp 20   Ht 1.803 m (5' 11\")   Wt 92.1 kg (203 lb)   SpO2 98%   BMI 28.31 kg/m²        LDAs:   Peripheral IV 05/03/24 Proximal;Right;Anterior Forearm (Active)       Ambulatory Status:  Presents to emergency department  because of falls (Syncope, seizure, or loss of consciousness): No, Age > 70: No, Altered Mental Status, Intoxication with alcohol or substance confusion (Disorientation, impaired judgment, poor safety awaremess, or inability to follow instructions): No, Impaired Mobility: Ambulates or transfers with assistive devices or assistance; Unable to ambulate or transer.: No, Nursing Judgement: No    Diagnosis:  DISPOSITION Decision To Admit 05/03/2024 03:33:36 AM   Final

## 2024-05-03 NOTE — CONSULTS
ECG    Neuro Exam:   Muscle Strength & Tone: full ROM    Involuntary Movements: No    Mental Status Examination:  Level of consciousness:  Awake and alert  Appearance: hospital attire, lying in bed, fair grooming  Behavior/Motor:  no abnormalities noted  Attitude toward examiner:  cooperative, attentive and good eye contact  Speech:  Spontaneous, normal rate and volume  Mood: Constricted  Affect: mood congruent  Thought processes:  Linear, goal directed, coherent  Thought content: Reports improving suicidal ideations; patient is able to contract for safety in the community   Denies homicidal ideations    Denies hallucinations; patient is not observed to be responding to internal stimuli   Denies delusions  Cognition:  Oriented to self, situation, location, date  Concentration clinically adequate  Memory age appropriate  Insight & Judgment:  poor    DSM-5 DIAGNOSIS:    Major depressive disorder, recurrent, moderate, without psychotic features  Cocaine use disorder, severe, dependence    Stressors     Severity of stressors is moderate  Source of stressors include:  Other psychosocial and environmental stressors    Plan:    Ok to discontinue suicide precautions and 1:1 safety sitter; patient is able to contract for safety and is at low risk for suicide  EtOH withdrawal management per internal medicine  Patient has had multiple admissions to [facility] with referrals to rehab at discharge. He does not benefit from admission to [facility] and is agreeable for direct admission to rehab facility for AOD treatment   Involuntary admission to [facility] is not warranted at this time  Please provide patient resources for AOD treatment facility outside of the [facility] per his request  Patient would not likely benefit from inpatient psychiatric hospitalization, per discussion with attending psychiatrist   No Medication Changes Today  Additional recommendations will follow the clinical course.       Thank you very much for allowing us to

## 2024-05-03 NOTE — ED NOTES
Pt. Is a walk in to the ED c/o depression. Upon intial check in pt. Denied SI and HI but after further assessment pt. States \" I do feel a little suicidal/\" . Pt. Also states that he has bumps on his penis that he just noticed and wants to be checked for STDs. Pt. Doesn't have a plan at this time. Pt. Admits to use of cocaine. He also states that he had 2 shots of liquor and 1 beer. Pt. States that he is homeless and has been kicked out of the majority of shelters. Pt. Is A&Ox4, RR even and non-labored NAD noted at this time. Dr. Banks at bedside to assess.

## 2024-05-03 NOTE — PROGRESS NOTES
Patient wanting to go out to smoke. Patient is discharged. Still signed going off floor slip and is on chart. Verbalized \"I can charge phone downstairs for the uber in case I am not back.\"

## 2024-05-03 NOTE — CARE COORDINATION
Patient is sleeping and will not open his eyes or answer Writer's questions.  Constant observer maintained and at patient's bedside.

## 2024-05-03 NOTE — CARE COORDINATION
Met with pt after receiving a social work consult for detox placement.  Pt responding but would not open his eyes.  He states that he has been using cocaine daily for the past few days and he has been drinking 2-3 tall boys per day.  He states that he wants to go to a rehab \"out of town\".  Pt states that he doesn't care where it is, just not in [place].  Discussed [facility] in [place], [place].  Pt states that he saw that place on his phone and was agreeable to a referral.  Called [facility] and left a message for admissions.  Waiting for a call back. Called [facility] and waiting for a call back.  Pt completed a phone interview with [facility] in [place], [place]. Faxed information to [facility].  [facility] called back and have accepted pt for admission.  They are sending an Uber to pick pt up at 4:00pm  Pt is agreeable to this plan.  RN is aware of the above plan.

## 2024-05-03 NOTE — ED PROVIDER NOTES
[facility] ED     Emergency Department     Faculty Attestation        I performed a history and physical examination of the patient and discussed management with the resident. I reviewed the resident’s note and agree with the documented findings and plan of care. Any areas of disagreement are noted on the chart. I was personally present for the key portions of any procedures. I have documented in the chart those procedures where I was not present during the key portions. I have reviewed the emergency nurses triage note. I agree with the chief complaint, past medical history, past surgical history, allergies, medications, social and family history as documented unless otherwise noted below.    For mid-level providers such as nurse practitioners as well as physicians assistants:    I have personally seen and evaluated the patient.    I find the patient's history and physical exam are consistent with NP/PA documentation.  I agree with the care provided, treatment rendered, disposition, & follow-up plan.     Additional findings are as noted.    Vital Signs: /84   Pulse (!) 104   Temp 98.8 °F (37.1 °C) (Oral)   Resp 18   Ht 1.803 m (5' 11\")   Wt 92.1 kg (203 lb)   SpO2 98%   BMI 28.31 kg/m²   PCP:  No primary care provider on file.    Pertinent Comments:     Patient with numerous and varied complaints complaining of depression.  States he is not homicidal or suicidal but states he has been more depressive than recently he has been drinking more recently is requesting detox as well.  Also complains of fluttering in his chest and feel like his heart is skipping beats.  He is afebrile nontoxic on exam resting comfortably no acute distress.  Slightly tachycardic.    EKG Interpretation    Interpreted by me    Rhythm: normal sinus   Rate: normal  Axis: normal  Ectopy: none  Conduction: normal  ST Segments: no acute change  T Waves: no acute change  Q Waves: 
 Faculty Sign-Out Attestation  Handoff taken on the following patient from prior Attending Physician: Yong  Note Started: 2:02 AM EDT    I was available and discussed any additional care issues that arose and coordinated the management plans with the resident(s) caring for the patient during my duty period. Any areas of disagreement with resident’s documentation of care or procedures are noted on the chart. I was personally present for the key portions of any/all procedures during my duty period. I have documented in the chart those procedures where I was not present during the key portions.    Cardiac lab pending,   D dimer -,   Needs obs admit,     Inter med admit for withdrawal,     Gabe Castillo DO  Attending Physician      Gabe Castillo DO  05/03/24 0203       Gabe Castillo DO  05/03/24 07    
97.9 °F (36.6 °C) (Oral)   Resp 17   Ht 1.803 m (5' 11\")   Wt 92.1 kg (203 lb)   SpO2 95%   BMI 28.31 kg/m²     Physical Exam  Vitals and nursing note reviewed.   Constitutional:       General: He is not in acute distress.  HENT:      Head: Atraumatic.      Right Ear: External ear normal.      Left Ear: External ear normal.      Nose: Nose normal.      Mouth/Throat:      Mouth: Mucous membranes are moist.      Pharynx: Oropharynx is clear.   Eyes:      Conjunctiva/sclera: Conjunctivae normal.   Cardiovascular:      Rate and Rhythm: Tachycardic and regular rhythm.      Pulses: Normal pulses.   Pulmonary:      Effort: Pulmonary effort is normal. No respiratory distress.      Breath sounds: Normal breath sounds. No wheezing.   Abdominal:      Palpations: Abdomen is soft.      Tenderness: There is no abdominal tenderness.   : Chaperone present RN, multiple penile flesh-colored raised lesions appear similar to wart on penile shaft  Musculoskeletal:         General: Normal range of motion.      Cervical back: Normal range of motion.   Skin:     General: Skin is warm and dry.      Capillary Refill: Capillary refill takes less than 2 seconds.   Neurological:      General: No focal deficit present.      Mental Status: He is alert and oriented to person, place, and time.       DDX/DIAGNOSTIC RESULTS / EMERGENCY DEPARTMENT COURSE / MDM     Medical Decision Making  Amount and/or Complexity of Data Reviewed  Labs: ordered.  Radiology: ordered.  ECG/medicine tests: ordered.    Risk  Prescription drug management.  Decision regarding hospitalization.        EKG  Normal sinus rhythm, nonspecific ST and T wave abnormality    All EKG's are interpreted by the Emergency Department Physician who either signs or Co-signs this chart in the absence of a cardiologist.    EMERGENCY DEPARTMENT COURSE:  43-year-old male, daily EtOH with prior history of withdrawal but no prior seizures, presented to ED with complaints of palpitations

## 2024-05-03 NOTE — H&P
\"pills.\" Unable to tell me whose pills, nor which type of pills. Tells me \"I dont know man pills!\". Patient has been estranged from family who lives in [place] for 7 years. Has not attempted contact will not tell me who is involved with his family.    Past Medical History:     Past Medical History:   Diagnosis Date    Anxiety     Depression     Insomnia         Past Surgical History:     History reviewed. No pertinent surgical history.     Medications Prior to Admission:     Prior to Admission medications    Medication Sig Start Date End Date Taking? Authorizing Provider   venlafaxine (EFFEXOR XR) 75 MG extended release capsule Take 1 capsule by mouth daily (with breakfast)  Patient not taking: Reported on 5/3/2024 4/20/24   Aime Abarca MD        Allergies:     Fish-derived products    Social History:     Tobacco:    reports that he has been smoking cigarettes. He has never used smokeless tobacco.  Alcohol:      reports current alcohol use.  Drug Use:  reports current drug use. Drug: Cocaine.    Family History:     History reviewed. No pertinent family history.    Review of Systems:     Positive and Negative as described in HPI.    Review of Systems   Constitutional:  Negative for activity change and fatigue.   HENT:  Negative for congestion and ear discharge.    Eyes:  Negative for pain and itching.   Respiratory:  Negative for apnea, choking and chest tightness.    Cardiovascular:  Negative for chest pain and leg swelling.   Gastrointestinal:  Negative for abdominal distention and abdominal pain.   Genitourinary:  Negative for difficulty urinating and dysuria.   Musculoskeletal:  Negative for arthralgias and gait problem.   Skin:  Negative for color change and pallor.   Neurological:  Negative for dizziness and facial asymmetry.   Psychiatric/Behavioral:  Positive for agitation, self-injury and suicidal ideas.        Physical Exam:   /76   Pulse 69   Temp 97.8 °F (36.6 °C) (Axillary)   Resp

## 2024-05-04 LAB
EKG ATRIAL RATE: 84 BPM
EKG P AXIS: 43 DEGREES
EKG P-R INTERVAL: 166 MS
EKG Q-T INTERVAL: 378 MS
EKG QRS DURATION: 86 MS
EKG QTC CALCULATION (BAZETT): 446 MS
EKG R AXIS: 19 DEGREES
EKG T AXIS: 55 DEGREES
EKG VENTRICULAR RATE: 84 BPM

## 2024-05-04 PROCEDURE — 93010 ELECTROCARDIOGRAM REPORT: CPT | Performed by: INTERNAL MEDICINE

## 2024-05-06 ENCOUNTER — HOSPITAL ENCOUNTER (EMERGENCY)
Age: 44
Discharge: PSYCHIATRIC OR SUBSTANCE USE FACILITY | DRG: 751 | End: 2024-05-07
Attending: EMERGENCY MEDICINE
Payer: MEDICAID

## 2024-05-06 DIAGNOSIS — R45.851 SUICIDAL IDEATION: Primary | ICD-10-CM

## 2024-05-06 LAB
ALBUMIN SERPL-MCNC: 4.7 G/DL (ref 3.5–5.2)
ALBUMIN/GLOB SERPL: 1 {RATIO} (ref 1–2.5)
ALP SERPL-CCNC: 74 U/L (ref 40–129)
ALT SERPL-CCNC: 60 U/L (ref 10–50)
AMPHET UR QL SCN: NEGATIVE
ANION GAP SERPL CALCULATED.3IONS-SCNC: 15 MMOL/L (ref 9–16)
AST SERPL-CCNC: 34 U/L (ref 10–50)
BARBITURATES UR QL SCN: NEGATIVE
BASOPHILS # BLD: 0.07 K/UL (ref 0–0.2)
BASOPHILS NFR BLD: 1 % (ref 0–2)
BENZODIAZ UR QL: NEGATIVE
BILIRUB SERPL-MCNC: 0.4 MG/DL (ref 0–1.2)
BUN SERPL-MCNC: 10 MG/DL (ref 6–20)
CALCIUM SERPL-MCNC: 9.4 MG/DL (ref 8.6–10.4)
CANNABINOIDS UR QL SCN: POSITIVE
CHLAMYDIA DNA UR QL NAA+PROBE: NEGATIVE
CHLORIDE SERPL-SCNC: 103 MMOL/L (ref 98–107)
CO2 SERPL-SCNC: 23 MMOL/L (ref 20–31)
COCAINE UR QL SCN: NEGATIVE
CREAT SERPL-MCNC: 0.9 MG/DL (ref 0.7–1.2)
EOSINOPHIL # BLD: 0.29 K/UL (ref 0–0.44)
EOSINOPHILS RELATIVE PERCENT: 3 % (ref 1–4)
ERYTHROCYTE [DISTWIDTH] IN BLOOD BY AUTOMATED COUNT: 11.9 % (ref 11.8–14.4)
ETHANOL PERCENT: <0.01 %
ETHANOLAMINE SERPL-MCNC: <10 MG/DL
FENTANYL UR QL: NEGATIVE
GFR, ESTIMATED: >90 ML/MIN/1.73M2
GLUCOSE SERPL-MCNC: 86 MG/DL (ref 74–99)
HCT VFR BLD AUTO: 46 % (ref 40.7–50.3)
HGB BLD-MCNC: 15.5 G/DL (ref 13–17)
IMM GRANULOCYTES # BLD AUTO: 0.05 K/UL (ref 0–0.3)
IMM GRANULOCYTES NFR BLD: 0 %
LYMPHOCYTES NFR BLD: 2.39 K/UL (ref 1.1–3.7)
LYMPHOCYTES RELATIVE PERCENT: 21 % (ref 24–43)
MCH RBC QN AUTO: 32.2 PG (ref 25.2–33.5)
MCHC RBC AUTO-ENTMCNC: 33.7 G/DL (ref 28.4–34.8)
MCV RBC AUTO: 95.4 FL (ref 82.6–102.9)
METHADONE UR QL: NEGATIVE
MONOCYTES NFR BLD: 0.79 K/UL (ref 0.1–1.2)
MONOCYTES NFR BLD: 7 % (ref 3–12)
N GONORRHOEA DNA UR QL NAA+PROBE: NEGATIVE
NEUTROPHILS NFR BLD: 68 % (ref 36–65)
NEUTS SEG NFR BLD: 7.73 K/UL (ref 1.5–8.1)
NRBC BLD-RTO: 0 PER 100 WBC
OPIATES UR QL SCN: NEGATIVE
OXYCODONE UR QL SCN: NEGATIVE
PCP UR QL SCN: NEGATIVE
PLATELET # BLD AUTO: 209 K/UL (ref 138–453)
PMV BLD AUTO: 10.5 FL (ref 8.1–13.5)
POTASSIUM SERPL-SCNC: 3.7 MMOL/L (ref 3.7–5.3)
PROT SERPL-MCNC: 8.1 G/DL (ref 6.6–8.7)
RBC # BLD AUTO: 4.82 M/UL (ref 4.21–5.77)
SODIUM SERPL-SCNC: 141 MMOL/L (ref 136–145)
SPECIMEN DESCRIPTION: NORMAL
TEST INFORMATION: ABNORMAL
WBC OTHER # BLD: 11.3 K/UL (ref 3.5–11.3)

## 2024-05-06 PROCEDURE — 85025 COMPLETE CBC W/AUTO DIFF WBC: CPT

## 2024-05-06 PROCEDURE — G0480 DRUG TEST DEF 1-7 CLASSES: HCPCS

## 2024-05-06 PROCEDURE — 99285 EMERGENCY DEPT VISIT HI MDM: CPT

## 2024-05-06 PROCEDURE — 80307 DRUG TEST PRSMV CHEM ANLYZR: CPT

## 2024-05-06 PROCEDURE — 6370000000 HC RX 637 (ALT 250 FOR IP): Performed by: HEALTH CARE PROVIDER

## 2024-05-06 PROCEDURE — 80053 COMPREHEN METABOLIC PANEL: CPT

## 2024-05-06 RX ORDER — GINSENG 100 MG
CAPSULE ORAL 3 TIMES DAILY
Status: DISCONTINUED | OUTPATIENT
Start: 2024-05-06 | End: 2024-05-07 | Stop reason: HOSPADM

## 2024-05-06 RX ORDER — ACETAMINOPHEN 500 MG
1000 TABLET ORAL ONCE
Status: COMPLETED | OUTPATIENT
Start: 2024-05-06 | End: 2024-05-06

## 2024-05-06 RX ADMIN — BACITRACIN: 500 OINTMENT TOPICAL at 23:36

## 2024-05-06 RX ADMIN — ACETAMINOPHEN 1000 MG: 500 TABLET ORAL at 23:42

## 2024-05-06 ASSESSMENT — PAIN SCALES - GENERAL
PAINLEVEL_OUTOF10: 3
PAINLEVEL_OUTOF10: 0

## 2024-05-06 ASSESSMENT — ENCOUNTER SYMPTOMS
CONSTIPATION: 0
SHORTNESS OF BREATH: 0
ABDOMINAL PAIN: 0
VOMITING: 0
SORE THROAT: 0
NAUSEA: 0
DIARRHEA: 0

## 2024-05-06 ASSESSMENT — PAIN DESCRIPTION - DESCRIPTORS: DESCRIPTORS: BURNING

## 2024-05-06 ASSESSMENT — PAIN DESCRIPTION - ORIENTATION: ORIENTATION: RIGHT;LOWER

## 2024-05-06 ASSESSMENT — PAIN - FUNCTIONAL ASSESSMENT: PAIN_FUNCTIONAL_ASSESSMENT: 0-10

## 2024-05-06 ASSESSMENT — PAIN DESCRIPTION - LOCATION: LOCATION: LEG

## 2024-05-07 ENCOUNTER — HOSPITAL ENCOUNTER (INPATIENT)
Age: 44
LOS: 1 days | Discharge: HOME HEALTH | DRG: 751 | End: 2024-05-08
Attending: PSYCHIATRY & NEUROLOGY | Admitting: PSYCHIATRY & NEUROLOGY
Payer: MEDICAID

## 2024-05-07 VITALS
TEMPERATURE: 97.5 F | HEART RATE: 71 BPM | OXYGEN SATURATION: 97 % | RESPIRATION RATE: 16 BRPM | SYSTOLIC BLOOD PRESSURE: 94 MMHG | DIASTOLIC BLOOD PRESSURE: 61 MMHG

## 2024-05-07 PROCEDURE — 6370000000 HC RX 637 (ALT 250 FOR IP): Performed by: PSYCHIATRY & NEUROLOGY

## 2024-05-07 PROCEDURE — 1240000000 HC EMOTIONAL WELLNESS R&B

## 2024-05-07 PROCEDURE — 99222 1ST HOSP IP/OBS MODERATE 55: CPT | Performed by: PSYCHIATRY & NEUROLOGY

## 2024-05-07 RX ORDER — ACETAMINOPHEN 325 MG/1
650 TABLET ORAL EVERY 4 HOURS PRN
Status: DISCONTINUED | OUTPATIENT
Start: 2024-05-07 | End: 2024-05-08 | Stop reason: HOSPADM

## 2024-05-07 RX ORDER — POLYETHYLENE GLYCOL 3350 17 G
2 POWDER IN PACKET (EA) ORAL
Status: DISCONTINUED | OUTPATIENT
Start: 2024-05-07 | End: 2024-05-08 | Stop reason: HOSPADM

## 2024-05-07 RX ORDER — HYDROXYZINE 50 MG/1
50 TABLET, FILM COATED ORAL 3 TIMES DAILY PRN
Status: DISCONTINUED | OUTPATIENT
Start: 2024-05-07 | End: 2024-05-08 | Stop reason: HOSPADM

## 2024-05-07 RX ORDER — DULOXETIN HYDROCHLORIDE 20 MG/1
20 CAPSULE, DELAYED RELEASE ORAL DAILY
Status: DISCONTINUED | OUTPATIENT
Start: 2024-05-07 | End: 2024-05-08 | Stop reason: HOSPADM

## 2024-05-07 RX ORDER — FOLIC ACID 1 MG/1
1 TABLET ORAL DAILY
Status: DISCONTINUED | OUTPATIENT
Start: 2024-05-07 | End: 2024-05-08 | Stop reason: HOSPADM

## 2024-05-07 RX ORDER — POLYETHYLENE GLYCOL 3350 17 G/17G
17 POWDER, FOR SOLUTION ORAL DAILY PRN
Status: DISCONTINUED | OUTPATIENT
Start: 2024-05-07 | End: 2024-05-08 | Stop reason: HOSPADM

## 2024-05-07 RX ORDER — GAUZE BANDAGE 2" X 2"
100 BANDAGE TOPICAL DAILY
Status: DISCONTINUED | OUTPATIENT
Start: 2024-05-07 | End: 2024-05-08 | Stop reason: HOSPADM

## 2024-05-07 RX ORDER — MAGNESIUM HYDROXIDE/ALUMINUM HYDROXICE/SIMETHICONE 120; 1200; 1200 MG/30ML; MG/30ML; MG/30ML
30 SUSPENSION ORAL EVERY 6 HOURS PRN
Status: DISCONTINUED | OUTPATIENT
Start: 2024-05-07 | End: 2024-05-08 | Stop reason: HOSPADM

## 2024-05-07 RX ORDER — TRAZODONE HYDROCHLORIDE 50 MG/1
50 TABLET ORAL NIGHTLY PRN
Status: DISCONTINUED | OUTPATIENT
Start: 2024-05-07 | End: 2024-05-08 | Stop reason: HOSPADM

## 2024-05-07 RX ORDER — M-VIT,TX,IRON,MINS/CALC/FOLIC 27MG-0.4MG
1 TABLET ORAL DAILY
Status: DISCONTINUED | OUTPATIENT
Start: 2024-05-07 | End: 2024-05-08 | Stop reason: HOSPADM

## 2024-05-07 RX ORDER — IBUPROFEN 400 MG/1
400 TABLET ORAL EVERY 6 HOURS PRN
Status: DISCONTINUED | OUTPATIENT
Start: 2024-05-07 | End: 2024-05-08 | Stop reason: HOSPADM

## 2024-05-07 RX ADMIN — TRAZODONE HYDROCHLORIDE 50 MG: 50 TABLET ORAL at 23:01

## 2024-05-07 RX ADMIN — IBUPROFEN 400 MG: 400 TABLET, FILM COATED ORAL at 18:16

## 2024-05-07 RX ADMIN — DULOXETINE HYDROCHLORIDE 20 MG: 20 CAPSULE, DELAYED RELEASE ORAL at 20:50

## 2024-05-07 ASSESSMENT — PATIENT HEALTH QUESTIONNAIRE - PHQ9
SUM OF ALL RESPONSES TO PHQ9 QUESTIONS 1 & 2: 2
SUM OF ALL RESPONSES TO PHQ QUESTIONS 1-9: 2
SUM OF ALL RESPONSES TO PHQ QUESTIONS 1-9: 2
SUM OF ALL RESPONSES TO PHQ9 QUESTIONS 1 & 2: 2
SUM OF ALL RESPONSES TO PHQ QUESTIONS 1-9: 2
2. FEELING DOWN, DEPRESSED OR HOPELESS: SEVERAL DAYS
1. LITTLE INTEREST OR PLEASURE IN DOING THINGS: SEVERAL DAYS
SUM OF ALL RESPONSES TO PHQ QUESTIONS 1-9: 2
SUM OF ALL RESPONSES TO PHQ QUESTIONS 1-9: 2
1. LITTLE INTEREST OR PLEASURE IN DOING THINGS: SEVERAL DAYS
2. FEELING DOWN, DEPRESSED OR HOPELESS: SEVERAL DAYS
SUM OF ALL RESPONSES TO PHQ QUESTIONS 1-9: 2

## 2024-05-07 ASSESSMENT — SLEEP AND FATIGUE QUESTIONNAIRES
AVERAGE NUMBER OF SLEEP HOURS: 4
SLEEP PATTERN: DIFFICULTY FALLING ASLEEP;DISTURBED/INTERRUPTED SLEEP;INSOMNIA;RESTLESSNESS
DO YOU HAVE DIFFICULTY SLEEPING: YES
DO YOU USE A SLEEP AID: NO

## 2024-05-07 ASSESSMENT — PAIN DESCRIPTION - ORIENTATION: ORIENTATION: RIGHT

## 2024-05-07 ASSESSMENT — LIFESTYLE VARIABLES
HOW MANY STANDARD DRINKS CONTAINING ALCOHOL DO YOU HAVE ON A TYPICAL DAY: PATIENT DECLINED
HOW MANY STANDARD DRINKS CONTAINING ALCOHOL DO YOU HAVE ON A TYPICAL DAY: PATIENT DOES NOT DRINK
HOW OFTEN DO YOU HAVE A DRINK CONTAINING ALCOHOL: NEVER
HOW OFTEN DO YOU HAVE A DRINK CONTAINING ALCOHOL: PATIENT DECLINED

## 2024-05-07 ASSESSMENT — PAIN DESCRIPTION - DESCRIPTORS: DESCRIPTORS: ACHING

## 2024-05-07 ASSESSMENT — PAIN SCALES - GENERAL
PAINLEVEL_OUTOF10: 0
PAINLEVEL_OUTOF10: 2

## 2024-05-07 ASSESSMENT — PAIN DESCRIPTION - LOCATION: LOCATION: ANKLE

## 2024-05-07 NOTE — ED TRIAGE NOTES
Pt presents to the ED ambulatory through triage with c/o suicidal ideations. Pt states he has a plan to harm himself by, \"taking a bunch of pills.\" Pt denies having access to any pills or any other plans. Pt states he does not take his psych medications and is not \"allow to go to [facility] because he's been there too many times.\" Pt states his last drink of alcohol was 3 days ago, denies history of seizures from withdrawal, reports tremors from withdrawal. Pt A&OX4, RR even and unlabored. No distress noted. Pt changed into paper scrubs and hospital socks. Pt's belongings secured and given to D for lockup.  at bedside.

## 2024-05-07 NOTE — GROUP NOTE
Group Therapy Note    Date: 5/7/2024    Group Start Time: 1000  Group End Time: 1040  Group Topic: Psychotherapy    [facility] Enedelia Chan MSW        Group Therapy Note    Attendees: 4/14       Patient's Goal:  Identify healthy v. unhealthy coping skills; Identify the coping skills used in scenarios and how to change them to healthy coping skills.    Patient was offered group therapy today but declined to participate despite encouragement from staff.  1:1 was offered.    Discipline Responsible: /Counselor      Signature:  CHRIS Cummings

## 2024-05-07 NOTE — ED PROVIDER NOTES
[facility] ED  Emergency Department Encounter  Emergency Medicine Resident     Pt Name:Balta Sarmiento  MRN: [id number]  Birthdate 1980  Date of evaluation: 5/6/24  PCP:  No primary care provider on file.  Note Started: 9:37 PM EDT      CHIEF COMPLAINT       Chief Complaint   Patient presents with    Suicidal       HISTORY OF PRESENT ILLNESS  (Location/Symptom, Timing/Onset, Context/Setting, Quality, Duration, Modifying Factors, Severity.)      Balta Sarmiento is a 43 y.o. male who presents with concerns for suicidal ideation.     Was just discharged from the hospital on 5/3 after presenting on 5/3 for palpitations, cocaine use. Cardiac w/up negative. Admitted for monitoring and SI. Saw psych who stated that patient's had multiple admissions for [facility] with referrals to rehab at discharge.  Would not benefit from mission to [facility] and would benefit for direct admission to rehab facility.  Was discharged to [facility].    Returns at this time for SI with plan to overdose on pills. Depressed and suicidal for 3 days. States family has basically disowned him so he's lost his support group.  Has not been taking his antipsych medications.  States he didn't like the rehab that he was at so he left where he was in [place].  Last drink and cocaine use was 3 days ago.  Currently homeless.     PAST MEDICAL / SURGICAL / SOCIAL / FAMILY HISTORY      has a past medical history of Anxiety, Depression, and Insomnia.     has no past surgical history on file.  Denies any PSHx    Social History     Socioeconomic History    Marital status: Single     Spouse name: Not on file    Number of children: Not on file    Years of education: Not on file    Highest education level: Not on file   Occupational History    Not on file   Tobacco Use    Smoking status: Every Day     Current packs/day: 0.50     Types: Cigarettes    Smokeless tobacco: Never   Vaping Use    Vaping Use: Every day   Substance and Sexual Activity    Alcohol use: Yes  tablet by mouth daily 5/3/24   All Turner MD   Multiple Vitamins-Minerals (MULTIVITAMIN WITH MINERALS) tablet Take 1 tablet by mouth daily 5/3/24   All Turner MD     REVIEW OF SYSTEMS       Review of Systems   Constitutional:  Negative for chills and fever.   HENT:  Negative for ear pain, hearing loss and sore throat.    Eyes:  Negative for visual disturbance.   Respiratory:  Negative for shortness of breath.    Cardiovascular:  Negative for chest pain.   Gastrointestinal:  Negative for abdominal pain, constipation, diarrhea, nausea and vomiting.   Genitourinary:  Negative for difficulty urinating and dysuria.   Musculoskeletal:  Negative for arthralgias and myalgias.   Neurological:  Negative for numbness.   Psychiatric/Behavioral:  Positive for suicidal ideas. Negative for agitation and confusion.        PHYSICAL EXAM      INITIAL VITALS:   BP 94/61   Pulse 71   Temp 97.5 °F (36.4 °C) (Oral)   Resp 16   SpO2 97%     Physical Exam  Vitals and nursing note reviewed.   Constitutional:       General: He is not in acute distress.     Appearance: Normal appearance. He is well-developed. He is not ill-appearing or diaphoretic.   HENT:      Head: Normocephalic and atraumatic.      Right Ear: External ear normal.      Left Ear: External ear normal.      Nose: Nose normal.      Mouth/Throat:      Mouth: Mucous membranes are moist.   Eyes:      Extraocular Movements: Extraocular movements intact.      Conjunctiva/sclera: Conjunctivae normal.   Neck:      Trachea: No tracheal deviation.   Cardiovascular:      Rate and Rhythm: Normal rate and regular rhythm.   Pulmonary:      Effort: Pulmonary effort is normal. No respiratory distress.   Abdominal:      General: Abdomen is flat. There is no distension.   Musculoskeletal:         General: No swelling, deformity or signs of injury. Normal range of motion.      Cervical back: Normal range of motion and neck supple.   Skin:     General: Skin is warm and

## 2024-05-07 NOTE — PLAN OF CARE
Problem: Depression  Goal: Will be euthymic at discharge  Description: INTERVENTIONS:  1. Administer medication as ordered  2. Provide emotional support via 1:1 interaction with staff  3. Encourage involvement in milieu/groups/activities  4. Monitor for social isolation  Outcome: Progressing     Problem: Anxiety  Goal: Will report anxiety at manageable levels  Description: INTERVENTIONS:  1. Administer medication as ordered  2. Teach and rehearse alternative coping skills  3. Provide emotional support with 1:1 interaction with staff  Outcome: Progressing     Problem: Self Harm/Suicidality  Goal: Will have no self-injury during hospital stay  Description: INTERVENTIONS:  1.  Ensure constant observer at bedside with Q15M safety checks  2.  Maintain a safe environment  3.  Secure patient belongings  4.  Ensure family/visitors adhere to safety recommendations  5.  Ensure safety tray has been added to patient's diet order  6.  Every shift and PRN: Re-assess suicidal risk via Frequent Screener    Outcome: Progressing  Note: Patient denies suicidal/homicidal ideation. Patient agreeable to seek out staff should thoughts of self harm/harming others arise. Patient denies hallucinations. Patient is positive for anxiety/depression but does not rate. Patient is is irritable, guarded, withdrawn and isolative. Patient is medication compliant and behavior controlled. Comfort and reassurance provided. Safety checks maintained every 15 minutes and as needed.

## 2024-05-07 NOTE — CARE COORDINATION
[facility] Biopsychosocial Assessment    **Patient declined to participate in social work assessment. Information taken from chart.    Current Level of Psychosocial Functioning     Independent XX  Dependent    Minimal Assist     Psychosocial High Risk Factors (check all that apply)    Unable to obtain meds   Chronic illness/pain    Substance abuse XX  Lack of Family Support   Financial stress   Isolation   Inadequate Community Resources  Suicide attempt(s)  Not taking medications   Victim of crime   Developmental Delay  Unable to manage personal needs    Age 65 or older   Homeless XX  No transportation   Readmission within 30 days XX  Unemployment  Traumatic Event    Psychiatric Advanced Directives: N/A    Family to Involve in Treatment: ADDY - Patient declined to participate    Sexual Orientation:  N/A    Patient Strengths: Insurance    Patient Barriers: Lack of housing, Lack of income, Lack of support system, Substance Use, Lack of coping skills    Opiate Education Provided:  Per chart, patient does not use opiates. Per chart, patient was last at [facility].     CMHC/mental health history: Patient has a history of inpatient treatment; last admitted to the [facility] on 4/17/24. Patient has been admitted at least 5 times in the past 6 months. Per chart, patient was last linked to [facility].    Plan of Care   medication management, group/individual therapies, family meetings, psycho -education, treatment team meetings to assist with stabilization    Initial Discharge Plan:  ADDY - Patient declined to participate    Clinical Summary:  Patient is a 42yo male admitted to the [facility] for depression with suicidal ideation. Patient declined to participate in social work assessment. Information taken from chart.    Per chart, Patient has a history of inpatient treatment; last admitted to the [facility] on 4/17/24. Patient has been admitted at least 5 times in the past 6 months. Per chart, patient was last linked to [facility]. Per chart, patient  states he's \"not allowed to go to [facility],\" though it's unclear if this is for inpatient substance use treatment or outpatient services.  Per chart, patient does not use opiates. Per chart, patient was last at [facility] for alcohol and cocaine use. Per chart, patient has a lack of housing but is not allowed at many local shelters. Per chart, patient lacks income and a support system.  Per chart, patient has insurance.     Patient's disposition is unknown at this time.

## 2024-05-07 NOTE — ED PROVIDER NOTES
[facility] ED     Emergency Department     Faculty Attestation    I performed a history and physical examination of the patient and discussed management with the resident. I reviewed the resident’s note and agree with the documented findings and plan of care. Any areas of disagreement are noted on the chart. I was personally present for the key portions of any procedures. I have documented in the chart those procedures where I was not present during the key portions. I have reviewed the emergency nurses triage note. I agree with the chief complaint, past medical history, past surgical history, allergies, medications, social and family history as documented unless otherwise noted below. For Physician Assistant/ Nurse Practitioner cases/documentation I have personally evaluated this patient and have completed at least one if not all key elements of the E/M (history, physical exam, and MDM). Additional findings are as noted.    Note Started: 9:38 PM EDT    Patient with suicidal thoughts no specific plan voiced to me but did voice the resident plan to overdose on pills.  Recent Domo here for same.  On exam well-appearing nontoxic no toxidrome.  Normal pupils normal speech normal gait.  Will check requisite labs social work to see reevaluate need for admission      Critical Care     none    Mundo Tello MD, FACEP, FAAEM  Attending Emergency  Physician           Mundo Telol MD  05/06/24 5246

## 2024-05-07 NOTE — ED PROVIDER NOTES
[facility]   Emergency Department  Emergency Medicine Attending Sign-out   Note started: 12:06 AM EDT    Care of Balta Sarmiento was assumed from previous attending Dr. Tello at 12 AM and is being seen for Suicidal  .  The patient's initial evaluation and plan have been discussed with the prior provider who initially evaluated the patient.     Attestation  I was available and discussed any additional care issues that arose and coordinated the management plans with the resident(s) caring for the patient during my duty period. Any areas of disagreement with resident's documentation of care or procedures are noted on the chart. I was personally present for the key portions of any/all procedures, during my duty period. I have documented in the chart those procedures where I was not present during the key portions.     BRIEF PATIENT SUMMARY/MDM COURSE PER INITIAL PROVIDER:   RECENT VITALS:     Temp: 97.5 °F (36.4 °C),  Pulse: 85, Respirations: 16, BP: 101/72, SpO2: 98 %    This patient is a 43 y.o. Male with suicidal ideation.  Plan to overdose    DIAGNOSTICS/MEDICATIONS:     MEDICATIONS GIVEN:  ED Medication Orders (From admission, onward)      Start Ordered     Status Ordering Provider    05/06/24 2345 05/06/24 2339  acetaminophen (TYLENOL) tablet 1,000 mg  ONCE         Last MAR action: Given - by LISA NOLAN on 05/06/24 at 2342 ANA MARIA GRACIA    05/06/24 2215 05/06/24 2208  bacitracin ointment  3 TIMES DAILY         Last MAR action: Given - by LISA NOLAN on 05/06/24 at 2336 ANA MARIA GRACIA            LABS    Labs Reviewed   CBC WITH AUTO DIFFERENTIAL - Abnormal; Notable for the following components:       Result Value    Neutrophils % 68 (*)     Lymphocytes % 21 (*)     All other components within normal limits   COMPREHENSIVE METABOLIC PANEL W/ REFLEX TO MG FOR LOW K - Abnormal; Notable for the following components:    ALT 60 (*)     All other components within normal limits   URINE DRUG

## 2024-05-07 NOTE — GROUP NOTE
Group Therapy Note    Date: 5/7/2024    Group Start Time: 1100  Group End Time: 1135  Group Topic: Cognitive Skills    Viki Anderson CTRS        Group Therapy Note    Attendees: 8/15    Cognitive Skills Group Note        Date: May 7, 2024       Start Time: 11am  End Time: 11:35am      Number of Participants in Group & Unit Census:  8/15    Topic:  interpersonal skills, decision-making, concentration     Goal of Group: To improve interpersonal skills and decision-making through collaborating with peers and concentrating on a presented task.       Comments:     Patient did not participate in Cognitive Skills group, despite staff encouragement and explanation of benefits.  Patient remain seclusive to self.  Q15 minute safety checks maintained for patient safety and will continue to encourage patient to attend unit programming.      Signature:  ROSELNY Palomino

## 2024-05-07 NOTE — BH NOTE
Patient given tobacco quitline number 70846798445 at this time, refusing to call at this time, patient given information as to the dangers of long term tobacco use. Continue to reinforce the importance of tobacco cessation.

## 2024-05-07 NOTE — H&P
IN-PATIENT SERVICE  [facility] Internal Medicine    CONSULTATION / HISTORY AND PHYSICAL EXAMINATION            Date:   5/7/2024  Patient name:  Balta Sarmiento  Date of admission:  5/7/2024  2:06 AM  MRN:   [id number]  Account:  [id number]  YOB: 1980  PCP:    No primary care provider on file.  Room:   [address] [place] [address]  Code Status:    Full Code    Physician Requesting Consult: Walt Rutledge MD    Reason for Consult:  medical management    Chief Complaint:     No chief complaint on file.      History Obtained From:     Patient medical record nursing staff    History of Present Illness:     Patient is 43-year-old male with past medical history of anxiety, depression, alcohol abuse, polysubstance abuse, multiple admissions in the past with similar symptoms, patient is currently admitted at [facility] for further management of depression and suicidal ideation.  Was initially admitted at [facility], positive for cocaine and cannabis,  Past Medical History:     Past Medical History:   Diagnosis Date    Anxiety     Depression     Insomnia         Past Surgical History:     No past surgical history on file.     Medications Prior to Admission:     Prior to Admission medications    Medication Sig Start Date End Date Taking? Authorizing Provider   hydrOXYzine HCl (ATARAX) 50 MG tablet Take 1 tablet by mouth 3 times daily as needed for Itching or Anxiety 5/3/24 5/13/24  All Turner MD   venlafaxine (EFFEXOR XR) 75 MG extended release capsule Take 1 capsule by mouth daily (with breakfast) 5/3/24   All Turner MD   gabapentin (NEURONTIN) 300 MG capsule Take 1 capsule by mouth 4 times daily for 1 day, THEN 1 capsule 3 times daily for 1 day, THEN 1 capsule in the morning and at bedtime for 1 day, THEN 1 capsule at bedtime for 1 day. 5/3/24 5/7/24  All Turner MD   vitamin B-1 (THIAMINE) 100 MG tablet Take 1 tablet by mouth daily 5/3/24   All Turner MD   folic acid (FOLVITE) 1 MG

## 2024-05-07 NOTE — H&P
Department of Psychiatry  Attending Physician Psychiatric Assessment     Reason for Admission to Psychiatric Unit:  Threat to self requiring 24 hour professional observation  A mental disorder causing major disability in social, interpersonal, occupational, and/or educational functioning that is leading to dangerous or life-threatening functioning, and that can only be addressed in an acute inpatient setting   Concerns about patient's safety in the community  Past Mental Health Diagnosis: a history of  Major Depression and Alcohol and/or Drug Use Disorder  Triggering event or precipitating factor: Psych Treatment Noncompliance  Length of time/duration of triggering event: Weeks  Legal Status: Voluntary    CHIEF COMPLAINT: Depression with suicidal ideation    History obtained from: Patient, electronic medical record          HISTORY OF PRESENT ILLNESS:    Balta Sarmiento is a 43 y.o. male who has a past medical history of depression, alcohol and cocaine abuse. Patient presented to the [facility] ED on 5/6/2024 with a report of suicidal ideation and a plan to overdose on pills.  He reported to ED staff that he had been depressed for the past 3 days, he is noncompliant with antipsychotic medication and has also been noncompliant with drug rehabilitation programming.  He is currently homeless.  He has no family support largely related to his drug use.  He was recently admitted to [facility] from 4/17/2024 through 4/19/2024.  At that time he was discharged to [facility].  He was seen again in the ED on 5/3/2024 and was directly discharged to [facility] rehab facility.  He walked away from both of them relapsing.    Staff reports that Balta has been isolative to his room.  He was most recently prescribed Effexor ER 75 mg daily and hydroxyzine.  He is interviewed today bedside, he does not make eye contact though does move the covers from the side of his face to engage verbally while facing the wall.  He denies that he is

## 2024-05-07 NOTE — ED NOTES
Report given to Zamzam Stratton RN at [facility]. All questions addressed. RN informed of ETA.

## 2024-05-07 NOTE — ED NOTES
The following labs were labeled with appropriate pt sticker and tubed to lab:     [x] Blue     [x] Lavender   [] on ice  [x] Green/yellow  [] Green/black [] on ice  [] Siu  [] on ice  [x] Yellow  [] Red  [] Pink  [] Type/ Screen  [] ABG  [] VBG    [] COVID-19 swab    [] Rapid  [] PCR  [] Flu swab  [] Peds Viral Panel     [] Urine Sample  [] Fecal Sample  [] Pelvic Cultures  [] Blood Cultures  [] X 2  [] STREP Cultures  [] Wound Cultures

## 2024-05-07 NOTE — PROGRESS NOTES

## 2024-05-07 NOTE — ED NOTES
The patient is a 43 year old male that has a history of Major Depression. The patient came to the ED today due to feeling suicidal. Patient reports that he has a plan to kill himself by, \"taking a bunch of pills.\" But, patient reports that he does not have any of his medications. The patient states that he has been depressed and suicidal for the past 3 days. The patient states that he has had people that have passed away and his family has disowned him. Patient was discharged from a medical unit 3 days ago with a psychiatric evaluation for similar complaint. Patient was cleared from a psychiatric stand point at that time. Patient was discharged and sent to a rehab for alcohol use in [place]. Patient states that he did not like the rehab so he came back to [place]. The reports that he last used alcohol and cocaine 3 days ago. The patient denied any current withdrawal symptoms. The patient reports that he is currently homeless and states that he is banned from area shelters. 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

## 2024-05-07 NOTE — PROGRESS NOTES
Behavioral Services  Medicare Certification Upon Admission    I certify that this patient's inpatient psychiatric hospital admission is medically necessary for:    [x] (1) Treatment which could reasonably be expected to improve this patient's condition,       [x] (2) Or for diagnostic study;     AND     [x](2) The inpatient psychiatric services are provided while the individual is under the care of a physician and are included in the individualized plan of care.    Estimated length of stay/service 3 to 5 days    Plan for post-hospital care substance use rehab    Electronically signed by RAPHAEL CASAS MD on 5/7/2024 at 7:31 PM

## 2024-05-07 NOTE — GROUP NOTE
Group Therapy Note    Date: 5/7/2024    Group Start Time: 1330  Group End Time: 1415  Group Topic: Psychoeducation    Viki Anderson CTRS        Group Therapy Note    Attendees: 4/16    Psych-Ed/Relapse Prevention Group Note        Date: May 7, 2024          Start Time: 1:30pm  End Time: 2:15pm      Number of Participants in Group & Unit Census:  4/16    Topic:  interpersonal skills, gratitude, self-expression    Goal of Group: To improve interpersonal skills and gratitude awareness through collaborating with peers and demonstrating self-expression.      Comments:     Patient did not participate in Psych-Ed/Relapse Prevention group, despite staff encouragement and explanation of benefits.  Patient remain seclusive to self.  Q15 minute safety checks maintained for patient safety and will continue to encourage patient to attend unit programming.        Signature:  ROSELYN Palomino

## 2024-05-07 NOTE — ED NOTES
[] [facility]    [] [facility]    [x]  [facility]    SUICIDE RISK ASSESSMENT      Y  N     [x] [] In the past two weeks have you had thoughts of hurting yourself in any way?    [x] [] In the past two weeks have you had thoughts that you would be better off dead?   [] [x] Have you made a suicide attempt in the past two months?   [x] [] Do you have a plan for hurting yourself or suicide?   [] [x] Presence of hallucinations/voices related to hurting himself or herself or someone else.    SUICIDE/SECURITY WATCH PRECAUTION CHECKLIST     Orders    [x]  Suicide/Security Watch Precautions initiated as checked below:   5/6/24 11:18 PM EDT 07/07    [x] Notified physician:  Mundo Tello MD  5/6/24 11:18 PM EDT    [x] Orders obtained as appropriate:     [] 1:1 Observer     [] Psych Consult     [] Psych Consult    Name:  Date:  Time:    [x] 1:1 Observer, Notified by:  Sarah Reynolds RN    Contact Nurse Supervisor    [x] Remove all personal clothes from room and place in snap/paper gown/pants.  Slipper only    [x] Remove all personal belongings from room and secured away from patient.    Documentation    [x] Initiate Suicide/Security Watch Precaution Flow Sheet    [x] Initiate individualized Care Plan/Problem    [x] Document why precautions initiated on flow sheet (Initiate Nursing Care Plan/Problem)    [x] 1:1 Observer in place; instructions provided.  Suicide precautions require observer be within arms length.    [x] Nurse-Observer Communication Hand-off initiated by RN, reviewed with Observer.  Subsequently used as Hand Off between Observers.    [x] Initiate every 15 minute observations per observer as delegated by the RN.    [x] Initiate RN assessment and documentation    Environmental Scan  Search Criteria and Process: OPTIONAL, see Search Policy    [] Reason for search:    [] Nursing in presence of second person to search patient    [] Patient notified of reason for body assessment and belongings  Depression  [] Suicide attempt      [] Low self-esteem  [] Hallucinations      [] Feeling of Hopelessness  [] Substance abuse or withdrawal    [] Dysfunctional family  [] Major traumatic event, eg., divorce, etc   [] Excessive stress/anxiety    5/6/24    Expected Outcomes    Patient will:   [] Patient will remain safe for the duration of their stay   [] Patient's environment will be safe, eg. Free of potential suicide weapons   [] Verbalize Recovery from suicidal episode and improvement in self-worth   [] Discuss feeling that precipitated suicide attempt/thoughts/behavior   [] Will describe available resources for crisis prevention and management   [] Will verbalize positive coping skills     Nursing Intervention   [x] Assessment and Observations hourly   [x] Suicide Precautions implemented with patient, should be 1:1 observation   [x] Document observation [id number] and RN assessment hourly   [] Consult physician for:    [] Psychiatric consult    [] Pharmacological therapy    [] Other:    [x] Patient search completed by security   [x] Initiated appropriate safety protocols by removing from the patient's environment anything that could be used to inflict self injury, eg. Order safe tray, snap gown, etc   [x] Maintain open, warm, caring, non-judgmental attitude/manner towards patient   [] Discuss advantages and disadvantages of existing coping methods/skills   [x] Assist and educate patient with identifying present strengths and coping skills   [x] Keep patient informed regarding plan of care and provide clear concise explanations.  Provide the patient/family education information as well as telephone numbers and other information about crisis centers, hot lines, and counselors.    Discharge Planning:   [] Referral  [] Groups [] Health agencies  [] Other:

## 2024-05-07 NOTE — PROGRESS NOTES
Pharmacy Medication History Note      List of current medications patient is taking is complete.    Source of information: [facility] ([phone]) spoke with TYRA Mott, [place], [facility], [employer] dispense report    Changes made to medication list:  Medications removed (include reason, ex. therapy complete or physician discontinued, noncompliance):  none    Medications flagged for provider review:  none    Medications added/doses adjusted:  none    Other notes (ex. Recent course of antibiotics, Coumadin dosing):  The patient was last discharged from the [facility] on 4/19/24.  The patient was discharged from [facility] on 5/3/24 and prescribed Folic acid, Thiamine, multivitamin, and Gabapentin (qty 10) in addition to his previously prescribed Hydroxyzine and Venlafaxine.      Current Home Medication List at Time of Admission:  Prior to Admission medications    Medication Sig   hydrOXYzine HCl (ATARAX) 50 MG tablet Take 1 tablet by mouth 3 times daily as needed for Itching or Anxiety   venlafaxine (EFFEXOR XR) 75 MG extended release capsule Take 1 capsule by mouth daily (with breakfast)   gabapentin (NEURONTIN) 300 MG capsule Take 1 capsule by mouth 4 times daily for 1 day, THEN 1 capsule 3 times daily for 1 day, THEN 1 capsule in the morning and at bedtime for 1 day, THEN 1 capsule at bedtime for 1 day.   vitamin B-1 (THIAMINE) 100 MG tablet Take 1 tablet by mouth daily   folic acid (FOLVITE) 1 MG tablet Take 1 tablet by mouth daily   Multiple Vitamins-Minerals (MULTIVITAMIN WITH MINERALS) tablet Take 1 tablet by mouth daily         Please let me know if you have any questions about this encounter. Thank you!    Electronically signed by Abida Morel RPH on 5/7/2024 at 11:03 AM

## 2024-05-07 NOTE — ED NOTES
..Transfer Center Checklist for Behavioral Health Transfers      Currently in Restraints Now or During this Encounter: No  (Specify if Agitation or self harm is noted in ED?)            Medical Clearance Documented and Verified in the Chart: Yes    LABS  Labs Resulted (see below, if applicable): Yes  Are Any of the Labs Abnormal: No    CBC:   Lab Results   Component Value Date/Time    WBC 11.3 05/06/2024 09:59 PM    RBC 4.82 05/06/2024 09:59 PM    HGB 15.5 05/06/2024 09:59 PM    HCT 46.0 05/06/2024 09:59 PM    MCV 95.4 05/06/2024 09:59 PM    MCH 32.2 05/06/2024 09:59 PM    MCHC 33.7 05/06/2024 09:59 PM    RDW 11.9 05/06/2024 09:59 PM     05/06/2024 09:59 PM    MPV 10.5 05/06/2024 09:59 PM     CMP:   Lab Results   Component Value Date/Time     05/03/2024 05:09 AM    K 3.6 05/03/2024 05:09 AM    K 3.7 10/22/2023 06:07 AM     05/03/2024 05:09 AM    CO2 25 05/03/2024 05:09 AM    BUN 14 05/03/2024 05:09 AM    CREATININE 0.7 05/03/2024 05:09 AM    GFRAA >60 04/25/2022 12:55 AM    LABGLOM >90 05/03/2024 05:09 AM    LABGLOM >90 04/25/2024 12:08 AM    GLUCOSE 92 05/03/2024 05:09 AM    CALCIUM 8.9 05/03/2024 05:09 AM    BILITOT 0.4 04/25/2024 12:08 AM    ALKPHOS 82 04/25/2024 12:08 AM    AST 44 04/25/2024 12:08 AM    ALT 67 04/25/2024 12:08 AM     Drug Panel:   Lab Results   Component Value Date/Time    OPIAU NEGATIVE 05/03/2024 04:53 AM    LABCOMM  04/25/2024 12:13 AM     Microscopic exam not performed based on chemical results unless requested in original order.     UA:  Lab Results   Component Value Date/Time    COLORU Dark Yellow 04/25/2024 12:13 AM    PROTEINU NEGATIVE 04/25/2024 12:13 AM    GLUCOSEU NEGATIVE 04/25/2024 12:13 AM    KETUA TRACE 04/25/2024 12:13 AM    BILIRUBINUR NEGATIVE 04/25/2024 12:13 AM    BLOODU NEGATIVE 09/27/2023 01:00 AM    UROBILINOGEN Normal 04/25/2024 12:13 AM    NITRU NEGATIVE 04/25/2024 12:13 AM    LEUKOCYTESUR NEGATIVE 04/25/2024 12:13 AM    WBCUA None 12/28/2023 01:46 AM     RBCUA None 12/28/2023 01:46 AM    BACTERIA None 12/28/2023 01:46 AM     PREGNANCY TEST: No results found for: \"PREGTESTUR\"    Patient's Current Location: [facility] ED     Chief Complaint   Patient presents with    Suicidal       Dialysis: No    Target Destination: [facility]     Insurance: Payor: [employer] [place] /  /  /      Current Psychotic Symptoms  Harmful Actions Towards Others: Denied    Orientation Level: x4   Speech Pattern: WNL   General Attitude: Good    Emotions: Congruent    Delusions: Denied    Hallucination:  Denied      Any Suicidal Ideations: High Risk  Plan: Denied     Homicidal Ideations/Violence Risk:   Observed Violent Behavior:    Homicidal Ideations:      Past Psychiatric History:       Diagnosis Date    Anxiety     Depression     Insomnia        Hold (TDO/[place]): Voluntary Patient      Currently on any Psychiatric Medications: No  Past Psychiatric Medications Include:  Patient is uncertain of past medications    The patient is not currently receiving care for the above psychiatric illness.       Past Mental Health Outpatient Care Includes:  1-5 treatment centers    Past Mental Health Hospitalizations: 1-5 admissions    Isolation  Isolation:    HIV Positive: No    Home Medications  Does Patient Have Medications to Bring to the Facility: No  Medications Prior to Admission:   Prior to Admission Medications   Prescriptions Last Dose Informant Patient Reported? Taking?   Multiple Vitamins-Minerals (MULTIVITAMIN WITH MINERALS) tablet   No No   Sig: Take 1 tablet by mouth daily   folic acid (FOLVITE) 1 MG tablet   No No   Sig: Take 1 tablet by mouth daily   gabapentin (NEURONTIN) 300 MG capsule   No No   Sig: Take 1 capsule by mouth 4 times daily for 1 day, THEN 1 capsule 3 times daily for 1 day, THEN 1 capsule in the morning and at bedtime for 1 day, THEN 1 capsule at bedtime for 1 day.   hydrOXYzine HCl (ATARAX) 50 MG tablet   No No   Sig: Take 1 tablet by mouth 3 times

## 2024-05-07 NOTE — ED NOTES
Patient accepted to the [facility]. Patient signed a voluntary and SW faxed it to [facility].

## 2024-05-07 NOTE — BH NOTE
Behavioral Health [place]  Admission Note     Admission Type:   Admission Type: Voluntary    Reason for admission:  Reason for Admission: increased depression with suicidal ideations to overdose on \"a bunch of pill\", patient out of medications since last admission and homeless      Addictive Behavior:   Addictive Behavior  In the Past 3 Months, Have You Felt or Has Someone Told You That You Have a Problem With  : None    Medical Problems:   Past Medical History:   Diagnosis Date    Anxiety     Depression     Insomnia        Status EXAM:  Mental Status and Behavioral Exam  Normal: No  Level of Assistance: Independent/Self  Facial Expression: Flat  Affect: Blunt  Level of Consciousness: Alert  Frequency of Checks: 4 times per hour, close  Mood:Normal: No  Mood: Depressed, Anxious, Helpless, Suspicious  Motor Activity:Normal: Yes  Eye Contact: Good  Observed Behavior: Cooperative, Friendly, Preoccupied, Agitated  Sexual Misconduct History: Current - no  Preception: [place] to person, [place] to time, [place] to place, [place] to situation  Attention:Normal: No  Attention: Distractible  Thought Processes: Unremarkable  Thought Content:Normal: No  Thought Content: Preoccupations  Depression Symptoms: Feelings of hopelessess, Feelings of helplessness, Impaired concentration, Isolative, Loss of interest, Sleep disturbance  Anxiety Symptoms: Generalized  Yumiko Symptoms: Poor judgment  Hallucinations: None  Delusions: No  Memory:Normal: Yes  Insight and Judgment: No  Insight and Judgment: Poor judgment, Poor insight, Unmotivated, Unrealistic    Tobacco Screening:  Practical Counseling, on admission, juan X, if applicable and completed (first 3 are required if patient doesn't refuse):            ( x) Recognizing danger situations (included triggers and roadblocks)                    ( x) Coping skills (new ways to manage stress,relaxation techniques, changing routine, distraction)                                                            (x ) Basic information about quitting (benefits of quitting, techniques in how to quit, available resources  ( ) [facility]                                                                                                                   ( x) Patient refused counseling  ( ) Patient has not smoked in the last 30 days    Metabolic Screening:    Lab Results   Component Value Date    LABA1C 4.9 05/03/2024       Lab Results   Component Value Date    CHOL 194 09/20/2023     Lab Results   Component Value Date    TRIG 59 09/20/2023     Lab Results   Component Value Date    HDL 52 09/20/2023     No components found for: \"LDLCAL\"  No components found for: \"LABVLDL\"      Body mass index is 28.31 kg/m².    BP Readings from Last 2 Encounters:   05/07/24 99/73   05/07/24 94/61     Admitted from [facility] with SI to \"take a bunch of pills\" he reports this is due to people in his life recently passed away and his family has \"disowned\" him. Patient was recently at a rehab facility in [place] but left because he didn't like it there. Reports cocaine and alcohol use three days ago, denies withdraw symptoms. Currently homeless and reports he is not welcome to shelters in the area. Last at the [facility] on 4/17. Calm and cooperative during the admission process. Denies any recent drug or alcohol use upon admit despite telling ER used 3 days ago     Pt signed all admission paperwork and consents for treatment.  Pt oriented to unit and unit handbook given for future reference.  Armband administered.  Q15 minute checks for safety initiated per unit policy.  Picture not taken due to recent picture in chart. VS, PA, and MSE completed.   Pt changed into hospital attire per unit policy.  Pt is pleasant and cooperative and accepting of admission process.       Pt admitted with followings belongings:  Dental Appliances: None  Vision - Corrective Lenses: None  Hearing Aid: None  Jewelry:

## 2024-05-08 VITALS
WEIGHT: 203 LBS | SYSTOLIC BLOOD PRESSURE: 122 MMHG | HEIGHT: 71 IN | DIASTOLIC BLOOD PRESSURE: 76 MMHG | HEART RATE: 84 BPM | TEMPERATURE: 97.4 F | OXYGEN SATURATION: 99 % | RESPIRATION RATE: 14 BRPM | BODY MASS INDEX: 28.42 KG/M2

## 2024-05-08 PROCEDURE — 99222 1ST HOSP IP/OBS MODERATE 55: CPT | Performed by: INTERNAL MEDICINE

## 2024-05-08 PROCEDURE — 6370000000 HC RX 637 (ALT 250 FOR IP): Performed by: PSYCHIATRY & NEUROLOGY

## 2024-05-08 PROCEDURE — 99239 HOSP IP/OBS DSCHRG MGMT >30: CPT | Performed by: PSYCHIATRY & NEUROLOGY

## 2024-05-08 PROCEDURE — 6370000000 HC RX 637 (ALT 250 FOR IP): Performed by: INTERNAL MEDICINE

## 2024-05-08 RX ORDER — DULOXETIN HYDROCHLORIDE 30 MG/1
30 CAPSULE, DELAYED RELEASE ORAL DAILY
Qty: 30 CAPSULE | Refills: 0 | Status: SHIPPED | OUTPATIENT
Start: 2024-05-09

## 2024-05-08 RX ADMIN — NICOTINE POLACRILEX 2 MG: 2 LOZENGE ORAL at 12:56

## 2024-05-08 RX ADMIN — IBUPROFEN 400 MG: 400 TABLET, FILM COATED ORAL at 10:44

## 2024-05-08 RX ADMIN — DULOXETINE HYDROCHLORIDE 20 MG: 20 CAPSULE, DELAYED RELEASE ORAL at 10:07

## 2024-05-08 RX ADMIN — THIAMINE HCL TAB 100 MG 100 MG: 100 TAB at 10:07

## 2024-05-08 RX ADMIN — Medication 1 TABLET: at 10:07

## 2024-05-08 RX ADMIN — FOLIC ACID 1 MG: 1 TABLET ORAL at 10:07

## 2024-05-08 ASSESSMENT — PAIN DESCRIPTION - DESCRIPTORS: DESCRIPTORS: ACHING

## 2024-05-08 ASSESSMENT — PAIN SCALES - GENERAL: PAINLEVEL_OUTOF10: 4

## 2024-05-08 ASSESSMENT — PAIN DESCRIPTION - ORIENTATION: ORIENTATION: RIGHT

## 2024-05-08 ASSESSMENT — PAIN DESCRIPTION - LOCATION: LOCATION: ANKLE

## 2024-05-08 NOTE — BH NOTE
Patient given tobacco quitline number 05837329234 at this time, refusing to call at this time, states \" I just dont want to quit now\"- patient given information as to the dangers of long term tobacco use. Continue to reinforce the importance of tobacco cessation.

## 2024-05-08 NOTE — PLAN OF CARE
Problem: Self Harm/Suicidality  Goal: Will have no self-injury during hospital stay  Description: INTERVENTIONS:  1.  Ensure constant observer at bedside with Q15M safety checks  2.  Maintain a safe environment  3.  Secure patient belongings  4.  Ensure family/visitors adhere to safety recommendations  5.  Ensure safety tray has been added to patient's diet order  6.  Every shift and PRN: Re-assess suicidal risk via Frequent Screener    5/7/2024 2145 by Loni Goodwin RN  Outcome: Progressing     Problem: Depression  Goal: Will be euthymic at discharge  Description: INTERVENTIONS:  1. Administer medication as ordered  2. Provide emotional support via 1:1 interaction with staff  3. Encourage involvement in milieu/groups/activities  4. Monitor for social isolation  5/7/2024 2145 by Loni Goodwin RN  Outcome: Progressing     Problem: Anxiety  Goal: Will report anxiety at manageable levels  Description: INTERVENTIONS:  1. Administer medication as ordered  2. Teach and rehearse alternative coping skills  3. Provide emotional support with 1:1 interaction with staff  5/7/2024 2145 by Loni Goodwin RN  Outcome: Progressing     Patient is alert and oriented. Patient remains free from self harm throughout this shift. Patient agrees to seek out staff if thoughts of self harm arise.  Patient denies suicidal or homicidal ideation at this time. Patient denies any symptoms of anxiety or depression at this time. Patient denies any visual or auditory hallucinations at this time. Patient is out in the milieu this evening social with select peers. Patient is medication compliant and behavior remains controlled at this time. Safe environment provided. Q15 minute checks maintained.

## 2024-05-08 NOTE — TRANSITION OF CARE
Behavioral Health Transition Record to Provider    Patient Name: Balta Sarmiento  YOB: 1980   Medical Record Number: [id number]  Date of Admission: 5/7/2024  2:06 AM   Date of Discharge: 5/8/2024    Attending Provider: Walt Rutledge MD   Discharging Provider: Maty  To contact this individual call [phone] and ask the  to page.  If unavailable, ask to be transferred to Behavioral Health Provider on call.  A Behavioral Health Provider will be available on call 24/7 and during holidays.    Primary Care Provider: No primary care provider on file.    Allergies   Allergen Reactions    Shellfish-Derived Products Rash    Fish-Derived Products        Reason for Admission:  past medical history of depression, alcohol and cocaine abuse. Patient presented to the [facility] ED on 5/6/2024 with a report of suicidal ideation and a plan to overdose on pills.  He reported to ED staff that he had been depressed for the past 3 days, he is noncompliant with antipsychotic medication and has also been noncompliant with drug rehabilitation programming.     Admission Diagnosis: Depression with suicidal ideation [F32.A, R45.851]    * No surgery found *    No results found for this visit on 05/07/24.    Immunizations administered during this encounter:   Immunization History   Administered Date(s) Administered    Hepatitis B vaccine 04/14/1994, 06/27/1994    TDaP, ADACEL (age 10y-64y), BOOSTRIX (age 10y+), IM, 0.5mL 04/29/2018     Influenza Vaccination Status: Documentation of patient's or caregiver's refusal of influenza vaccine.    Screening for Metabolic Disorders for Patients on Antipsychotic Medications  (Data obtained from the EMR)    Estimated Body Mass Index  Body mass index is 28.31 kg/m².      Vital Signs/Blood Pressure  /76   Pulse 84   Temp 97.4 °F (36.3 °C) (Temporal)   Resp 14   Ht 1.803 m (5' 11\")   Wt 92.1 kg (203 lb)   SpO2 99%   BMI 28.31 kg/m²      Fasting Blood Glucose or

## 2024-05-08 NOTE — DISCHARGE INSTRUCTIONS
.Information:  Medications:   Medication summary provided   I understand that I should take only the medications on my list.     -why and when I need to take each medicine.     -which side effects to watch for.     -that I should carry my medication information at all times in case of     Emergency situations.    I will take all of my medicines to follow up appointments.     -check with my physician or pharmacist before taking any new    Medication, over the counter product or drink alcohol.    -Ask about food, drug or dietary supplement interactions.    -discard old lists and update records with medication providers.    Notify Physician:  Notify physician if you notice:   Always call 911 if you feel your life is in danger  In case of an emergency call 911 immediately!  If 911 is not available call your local emergency medical system for help    Behavioral Health Follow Up:  Original Referral Source:[facility]  Discharge Diagnosis: Depression with suicidal ideation [F32.A, R45.851]  Recommendations for Level of Care: follow up with [facility]  Patient status at discharge: stable  My hospital  was: [facility]   Aftercare plan faxed:    -faxed by: staff   -date: 05/08/24   -time: 1500  Prescriptions: [facility]     Smoking: Quit Smoking.   Call the [employer]'s smoking quitline at 1-463-50F-QUIT  Know the signs of a heart attack   If you have any of the following symptoms call 911 immediately, do not wait more    Than five minutes.    1. Pressure, fullness and/ or squeezing in the center of the chest spreading to    The jaw, neck or shoulder.    2. Chest discomfort with light headedness, fainting, sweating, nausea or    Shortness of breath.   3. Upper abdominal pressure or discomfort.   4. Lower chest pain, back pain, unusual fatigue, shortness of breath, nausea   Or dizziness.     General Information:   Questions regarding your bill: Call HELP program [phone]     Suicide Hotline ([facility] Crisis Care Line)

## 2024-05-08 NOTE — TRANSITION OF CARE
Behavioral Health Transition Record to Provider    Patient Name: Balta Sarmiento  YOB: 1980   Medical Record Number: [id number]  Date of Admission: 5/7/2024  2:06 AM   Date of Discharge: 05/08/24    Attending Provider: Walt Rutledge MD   Discharging Provider: Maty  To contact this individual call [phone] and ask the  to page.  If unavailable, ask to be transferred to Behavioral Health Provider on call.  A Behavioral Health Provider will be available on call 24/7 and during holidays.    Primary Care Provider: No primary care provider on file.    Allergies   Allergen Reactions    Shellfish-Derived Products Rash    Fish-Derived Products        Reason for Admission: increased depression with suicidal ideations to overdose on \"a bunch of pill\", patient out of medications since last admission and homeless     Admission Diagnosis: Depression with suicidal ideation [F32.A, R45.851]    * No surgery found *    No results found for this visit on 05/07/24.    Immunizations administered during this encounter:   Immunization History   Administered Date(s) Administered    Hepatitis B vaccine 04/14/1994, 06/27/1994    TDaP, ADACEL (age 10y-64y), BOOSTRIX (age 10y+), IM, 0.5mL 04/29/2018     Influenza Vaccination Status: None of the above/Not documented/Unable to determine from medical record documentation    Screening for Metabolic Disorders for Patients on Antipsychotic Medications  (Data obtained from the EMR)    Estimated Body Mass Index  Body mass index is 28.31 kg/m².      Vital Signs/Blood Pressure  /76   Pulse 84   Temp 97.4 °F (36.3 °C) (Temporal)   Resp 14   Ht 1.803 m (5' 11\")   Wt 92.1 kg (203 lb)   SpO2 99%   BMI 28.31 kg/m²      Fasting Blood Glucose or Hemoglobin A1c  No results found for: \"GLU\", \"GLUCPOC\"    Hemoglobin A1C   Date Value Ref Range Status   05/03/2024 4.9 4.0 - 6.0 % Final       Discharge Diagnosis: Major Depressive disorder, recurrent severe without Psychotic

## 2024-05-08 NOTE — BH NOTE
Behavioral Health [facility]  Discharge Note    Pt discharged with followings belongings:   Dental Appliances: None  Vision - Corrective Lenses: None  Hearing Aid: None  Jewelry: None  Body Piercings Removed: N/A  Clothing: Footwear, Jacket/Coat, Pants, Shirt, Shorts, Socks, Undergarments, Sweater  Other Valuables: Wallet, Credit/Debit Card, Lighter/Matches, Personal Toiletries   Valuables returned to patient. Patient educated on aftercare instructions: Yes  Information faxed to Sonja by RN  at 1:27 PM .Patient verbalize understanding of AVS:  Yes.    Status EXAM upon discharge:  Mental Status and Behavioral Exam  Normal: No  Level of Assistance: Independent/Self  Facial Expression: Flat  Affect: Blunt  Level of Consciousness: Alert  Frequency of Checks: 4 times per hour, close  Mood:Normal: No  Mood: Depressed, Anxious, Irritable  Motor Activity:Normal: No  Motor Activity: Decreased  Eye Contact: Fair  Observed Behavior: Agitated, Cooperative, Guarded, Preoccupied  Sexual Misconduct History: Current - no  Preception: [place] to person, [place] to time, [place] to place, [place] to situation  Attention:Normal: No  Attention: Distractible  Thought Processes: Unremarkable  Thought Content:Normal: No  Thought Content: Preoccupations  Depression Symptoms: Appetite change, Change in energy level, Feelings of hopelessess, Increased irritability, Loss of interest, Isolative  Anxiety Symptoms: Generalized  Yumiko Symptoms: No problems reported or observed.  Hallucinations: None  Delusions: No  Memory:Normal: Yes  Insight and Judgment: No  Insight and Judgment: Poor judgment, Poor insight, Unmotivated    Tobacco Screening:  Practical Counseling, on admission, juan X, if applicable and completed (first 3 are required if patient doesn't refuse):            ( ) Recognizing danger situations (included triggers and roadblocks)                    ( ) Coping skills (new ways to manage stress,relaxation techniques, changing routine,

## 2024-05-08 NOTE — PROGRESS NOTES
CLINICAL PHARMACY NOTE: MEDS TO BEDS    Total # of Prescriptions Filled: 1   The following medications were delivered to the patient:  Duloxetine 30mg    Additional Documentation: delivered to [facility] 5/8/24 anju Ivey 12:10pm anju

## 2024-05-08 NOTE — GROUP NOTE
Group Therapy Note    Date: 5/8/2024    Group Start Time: 1000  Group End Time: 1025  Group Topic: Psychoeducation    STCZ BHLoni Dickey LSW        Group Therapy Note    Attendees: 3/13       Patient's Goal:  mindful coping     Notes:      Status After Intervention:  Improved    Participation Level: Active Listener and Interactive    Participation Quality: Appropriate      Speech:  normal      Thought Process/Content: Logical      Affective Functioning: Congruent      Mood: euthymic      Level of consciousness:  Alert and Oriented x4      Response to Learning: Progressing to goal      Endings: None Reported    Modes of Intervention: Education and Support      Discipline Responsible: /Counselor      Signature:  ROSE MARY Plasencia

## 2024-05-08 NOTE — CARE COORDINATION
Name: Balta Sarmiento    : 1980    Auth number: [id number]     Discharge Date: 24    Destination: home     Discharge Medications:      Medication List        START taking these medications      DULoxetine 30 MG extended release capsule  Commonly known as: CYMBALTA  Take 1 capsule by mouth daily  Start taking on: May 9, 2024  Notes to patient: Improve mood            STOP taking these medications      folic acid 1 MG tablet  Commonly known as: FOLVITE     gabapentin 300 MG capsule  Commonly known as: NEURONTIN     hydrOXYzine HCl 50 MG tablet  Commonly known as: ATARAX     multivitamin with minerals tablet     venlafaxine 75 MG extended release capsule  Commonly known as: EFFEXOR XR     vitamin B-1 100 MG tablet  Commonly known as: THIAMINE               Where to Get Your Medications        These medications were sent to [id number] - [place], [place] - [address] [place] [address] -  [phone] - F [phone]  [address] [place] [address]      Phone: [phone]   DULoxetine 30 MG extended release capsule         Follow Up Appointment: [place] [address]  [phone]  Follow up  You may walk-in at any time Monday-Friday 9a-3p for an intake assessment.

## 2024-05-08 NOTE — DISCHARGE SUMMARY
Provider Discharge Summary     Patient ID:  Balta Sarmiento  [id number]  43 y.o.  1980    Admit date: 5/7/2024    Discharge date and time: 5/8/2024  7:16 PM     Admitting Physician: Aime Abarca MD     Discharge Physician: Walt Rutledge MD    Admission Diagnoses: Depression with suicidal ideation [F32.A, R45.851]    Discharge Diagnoses:      Major depressive disorder, recurrent severe without psychotic features (HCC)     Patient Active Problem List   Diagnosis Code    Depression with suicidal ideation F32.A, R45.851    Alcohol abuse F10.10    Suicidal ideation R45.851    Major depressive disorder, recurrent severe without psychotic features (HCC) F33.2    Cocaine use disorder (HCC) F14.10    Cannabis abuse F12.10    Alcohol withdrawal, uncomplicated ([facility]) F10.930    Homelessness Z59.00    Alcohol dependence with uncomplicated withdrawal (HCC) F10.230    MDD (major depressive disorder), recurrent episode, moderate (HCC) F33.1        Admission Condition: poor    Discharged Condition: stable    Indication for Admission: threat to self    History of Present Illnes (present tense wording is of findings from admission exam and are not necessarily indicative of current findings):   Balta Sarmiento is a 43 y.o. male who has a past medical history of depression, alcohol and cocaine abuse. Patient presented to the [facility] ED on 5/6/2024 with a report of suicidal ideation and a plan to overdose on pills.  He reported to ED staff that he had been depressed for the past 3 days, he is noncompliant with antipsychotic medication and has also been noncompliant with drug rehabilitation programming.  He is currently homeless.  He has no family support largely related to his drug use.  He was recently admitted to [facility] from 4/17/2024 through 4/19/2024.  At that time he was discharged to [facility].  He was seen again in the ED on 5/3/2024 and was directly discharged to [facility] rehab facility.  He walked away from both of them

## 2024-05-08 NOTE — PLAN OF CARE
Problem: Self Harm/Suicidality  Goal: Will have no self-injury during hospital stay  Description: INTERVENTIONS:  1.  Ensure constant observer at bedside with Q15M safety checks  2.  Maintain a safe environment  3.  Secure patient belongings  4.  Ensure family/visitors adhere to safety recommendations  5.  Ensure safety tray has been added to patient's diet order  6.  Every shift and PRN: Re-assess suicidal risk via Frequent Screener    5/8/2024 1100 by Sydney Lemus RN  Outcome: Progressing     Problem: Depression  Goal: Will be euthymic at discharge  Description: INTERVENTIONS:  1. Administer medication as ordered  2. Provide emotional support via 1:1 interaction with staff  3. Encourage involvement in milieu/groups/activities  4. Monitor for social isolation  5/8/2024 1100 by Sydney Lemus RN  Outcome: Progressing     Problem: Anxiety  Goal: Will report anxiety at manageable levels  Description: INTERVENTIONS:  1. Administer medication as ordered  2. Teach and rehearse alternative coping skills  3. Provide emotional support with 1:1 interaction with staff  5/8/2024 1100 by Sydney Lemus, RN  Outcome: Progressing

## 2024-05-08 NOTE — GROUP NOTE
Group Therapy Note    Date: 5/8/2024    Group Start Time: 1100  Group End Time: 1130  Group Topic: Cognitive Skills    Maria E Orellana CTRS    Cognitive Skills Group Note        Date: May 8, 2024 Start Time: 11am  End Time: 11:30am      Number of Participants in Group & Unit Census:  6/13    Topic: cognitive skills     Goal of Group: pt will demonstrate improved coping skills and improved interpersonal relationships      Comments:     Patient did not participate in Cognitive Skills group, despite staff encouragement and explanation of benefits.  Patient remain seclusive to self.  Q15 minute safety checks maintained for patient safety and will continue to encourage patient to attend unit programming.              Signature:  ROSELYN WILLIS

## 2024-05-10 ENCOUNTER — HOSPITAL ENCOUNTER (EMERGENCY)
Age: 44
Discharge: HOME | End: 2024-05-10
Attending: EMERGENCY MEDICINE
Payer: MEDICAID

## 2024-05-10 VITALS
WEIGHT: 203.04 LBS | DIASTOLIC BLOOD PRESSURE: 54 MMHG | TEMPERATURE: 97.2 F | OXYGEN SATURATION: 96 % | RESPIRATION RATE: 16 BRPM | BODY MASS INDEX: 28.43 KG/M2 | HEART RATE: 66 BPM | HEIGHT: 71 IN | SYSTOLIC BLOOD PRESSURE: 94 MMHG

## 2024-05-10 DIAGNOSIS — Z76.5 MALINGERING: ICD-10-CM

## 2024-05-10 DIAGNOSIS — A63.0 CONDYLOMA ACUMINATA: Primary | ICD-10-CM

## 2024-05-10 LAB
AMPHET UR QL SCN: NEGATIVE
ANION GAP SERPL CALCULATED.3IONS-SCNC: 16 MMOL/L (ref 9–16)
APAP SERPL-MCNC: <5 UG/ML (ref 10–30)
BARBITURATES UR QL SCN: NEGATIVE
BASOPHILS # BLD: 0.06 K/UL (ref 0–0.2)
BASOPHILS NFR BLD: 1 % (ref 0–2)
BENZODIAZ UR QL: NEGATIVE
BUN SERPL-MCNC: 8 MG/DL (ref 6–20)
CALCIUM SERPL-MCNC: 9.2 MG/DL (ref 8.6–10.4)
CANNABINOIDS UR QL SCN: POSITIVE
CHLORIDE SERPL-SCNC: 101 MMOL/L (ref 98–107)
CO2 SERPL-SCNC: 21 MMOL/L (ref 20–31)
COCAINE UR QL SCN: POSITIVE
CREAT SERPL-MCNC: 0.8 MG/DL (ref 0.7–1.2)
EOSINOPHIL # BLD: 0.52 K/UL (ref 0–0.44)
EOSINOPHILS RELATIVE PERCENT: 7 % (ref 1–4)
ERYTHROCYTE [DISTWIDTH] IN BLOOD BY AUTOMATED COUNT: 12 % (ref 11.8–14.4)
ETHANOL PERCENT: 0.2 %
ETHANOLAMINE SERPL-MCNC: 196 MG/DL
FENTANYL UR QL: NEGATIVE
GFR, ESTIMATED: >90 ML/MIN/1.73M2
GLUCOSE SERPL-MCNC: 90 MG/DL (ref 74–99)
HCT VFR BLD AUTO: 46.2 % (ref 40.7–50.3)
HGB BLD-MCNC: 15.8 G/DL (ref 13–17)
IMM GRANULOCYTES # BLD AUTO: <0.03 K/UL (ref 0–0.3)
IMM GRANULOCYTES NFR BLD: 0 %
LYMPHOCYTES NFR BLD: 3.15 K/UL (ref 1.1–3.7)
LYMPHOCYTES RELATIVE PERCENT: 40 % (ref 24–43)
MCH RBC QN AUTO: 32.2 PG (ref 25.2–33.5)
MCHC RBC AUTO-ENTMCNC: 34.2 G/DL (ref 28.4–34.8)
MCV RBC AUTO: 94.3 FL (ref 82.6–102.9)
METHADONE UR QL: NEGATIVE
MONOCYTES NFR BLD: 0.37 K/UL (ref 0.1–1.2)
MONOCYTES NFR BLD: 5 % (ref 3–12)
NEUTROPHILS NFR BLD: 47 % (ref 36–65)
NEUTS SEG NFR BLD: 3.71 K/UL (ref 1.5–8.1)
NRBC BLD-RTO: 0 PER 100 WBC
OPIATES UR QL SCN: NEGATIVE
OXYCODONE UR QL SCN: NEGATIVE
PCP UR QL SCN: NEGATIVE
PLATELET # BLD AUTO: 219 K/UL (ref 138–453)
PMV BLD AUTO: 10.4 FL (ref 8.1–13.5)
POTASSIUM SERPL-SCNC: 4.1 MMOL/L (ref 3.7–5.3)
RBC # BLD AUTO: 4.9 M/UL (ref 4.21–5.77)
SALICYLATES SERPL-MCNC: <0.5 MG/DL (ref 0–10)
SODIUM SERPL-SCNC: 138 MMOL/L (ref 136–145)
TEST INFORMATION: ABNORMAL
WBC OTHER # BLD: 7.8 K/UL (ref 3.5–11.3)

## 2024-05-10 PROCEDURE — 80179 DRUG ASSAY SALICYLATE: CPT

## 2024-05-10 PROCEDURE — 80048 BASIC METABOLIC PNL TOTAL CA: CPT

## 2024-05-10 PROCEDURE — 80143 DRUG ASSAY ACETAMINOPHEN: CPT

## 2024-05-10 PROCEDURE — 80307 DRUG TEST PRSMV CHEM ANLYZR: CPT

## 2024-05-10 PROCEDURE — 85025 COMPLETE CBC W/AUTO DIFF WBC: CPT

## 2024-05-10 PROCEDURE — 99285 EMERGENCY DEPT VISIT HI MDM: CPT

## 2024-05-10 PROCEDURE — G0480 DRUG TEST DEF 1-7 CLASSES: HCPCS

## 2024-05-10 ASSESSMENT — LIFESTYLE VARIABLES
HOW MANY STANDARD DRINKS CONTAINING ALCOHOL DO YOU HAVE ON A TYPICAL DAY: 1 OR 2
HOW OFTEN DO YOU HAVE A DRINK CONTAINING ALCOHOL: MONTHLY OR LESS

## 2024-05-10 ASSESSMENT — ENCOUNTER SYMPTOMS
SHORTNESS OF BREATH: 0
ABDOMINAL PAIN: 0

## 2024-05-10 ASSESSMENT — PAIN - FUNCTIONAL ASSESSMENT: PAIN_FUNCTIONAL_ASSESSMENT: NONE - DENIES PAIN

## 2024-05-10 NOTE — ED NOTES
The following labs were labeled with appropriate pt sticker and tubed to lab:     [x] Blue     [x] Lavender   [] on ice  [x] Green/yellow  [x] Green/black [] on ice  [] Siu  [] on ice  [x] Yellow  [] Red  [] Pink  [] Type/ Screen  [] ABG  [] VBG    [] COVID-19 swab    [] Rapid  [] PCR  [] Flu swab  [] Peds Viral Panel     [] Urine Sample  [] Fecal Sample  [] Pelvic Cultures  [] Blood Cultures  [] X 2  [] STREP Cultures  [] Wound Cultures

## 2024-05-10 NOTE — ED NOTES
[] [facility]    [] [facility]    [x]  [facility]    SUICIDE RISK ASSESSMENT      Y  N     [x] [] In the past two weeks have you had thoughts of hurting yourself in any way?    [x] [] In the past two weeks have you had thoughts that you would be better off dead?   [] [x] Have you made a suicide attempt in the past two months?   [x] [] Do you have a plan for hurting yourself or suicide?   [] [x] Presence of hallucinations/voices related to hurting himself or herself or someone else.    SUICIDE/SECURITY WATCH PRECAUTION CHECKLIST     Orders    [x]  Suicide/Security Watch Precautions initiated as checked below:   5/10/24 2:00 AM EDT 09/09    [x] Notified physician:  Mendez Hernandez MD  5/10/24 2:00 AM EDT    [x] Orders obtained as appropriate:     [x] 1:1 Observer     [x] Psych Consult     [] Psych Consult    Name:  Date:  Time:    [x] 1:1 Observer, Notified by:  Kedar Gilmore RN    Contact Nurse Supervisor    [x] Remove all personal clothes from room and place in snap/paper gown/pants.  Slipper only    [x] Remove all personal belongings from room and secured away from patient.    Documentation    [x] Initiate Suicide/Security Watch Precaution Flow Sheet    [x] Initiate individualized Care Plan/Problem    [x] Document why precautions initiated on flow sheet (Initiate Nursing Care Plan/Problem)    [x] 1:1 Observer in place; instructions provided.  Suicide precautions require observer be within arms length.    [x] Nurse-Observer Communication Hand-off initiated by RN, reviewed with Observer.  Subsequently used as Hand Off between Observers.    [x] Initiate every 15 minute observations per observer as delegated by the RN.    [x] Initiate RN assessment and documentation    Environmental Scan  Search Criteria and Process: OPTIONAL, see Search Policy    [x] Reason for search:    [x] Nursing in presence of second person to search patient    [x] Patient notified of reason for body assessment and

## 2024-05-10 NOTE — ED NOTES
Writer attempted to obtain vitals at 0957, patient refused, mumbled \"no, don't touch me.\". RN notified

## 2024-05-10 NOTE — ED NOTES
Per PM SW, Tawana pt does not meet criteria for inpatient admission at this time. Pt verbalized \"I only said I was because there was not a spot to get in.\" Pt agitated with her and refused to speak further. Pt has a history of non-compliance with medications and rehabilitation. He was intoxicated when initially brought into ED. Pt is also homeless. Recently discharged from [facility] on 05/08.     Spoke with resident and she would like psychiatry to look at pt case due to retracting suicidal ideations. Transfer has been initiated.

## 2024-05-10 NOTE — ED PROVIDER NOTES
(!) 97/59   Pulse 71   Temp 97.3 °F (36.3 °C) (Oral)   Resp 17   Ht 1.803 m (5' 11\")   Wt 92.1 kg (203 lb 0.7 oz)   SpO2 98%   BMI 28.32 kg/m²     Physical Exam  Exam conducted with a chaperone present (CHAVO Ambrosio).   Constitutional:       General: He is not in acute distress.     Appearance: He is not toxic-appearing.   HENT:      Head: Normocephalic and atraumatic.      Right Ear: External ear normal.      Nose: Nose normal.   Eyes:      Extraocular Movements: Extraocular movements intact.      Conjunctiva/sclera: Conjunctivae normal.   Cardiovascular:      Rate and Rhythm: Normal rate.   Pulmonary:      Effort: Pulmonary effort is normal.   Genitourinary:     Testes: Normal.         Right: Tenderness or swelling not present.         Left: Tenderness or swelling not present.      Comments: Nontender and palpable lesions noted to the shaft of the penis and scrotum.  These lesions are not herpetic in nature.  Likely HPV.  Musculoskeletal:         General: Normal range of motion.      Cervical back: Normal range of motion.   Neurological:      General: No focal deficit present.      Mental Status: He is alert and oriented to person, place, and time.       DDX/DIAGNOSTIC RESULTS / EMERGENCY DEPARTMENT COURSE / MDM     Medical Decision Making  43-year-old male with history of polysubstance abuse, anxiety, depression who presents with suicidal ideation with plan to overdose on pills.  Patient does have a history of suicide attempt by overdosing.  He denies any homicidal ideation.  He is feeling paranoid and just feels like he wants to give up.  He denies any hallucinations.  He was recently discharged from [facility] 2 days ago.  He states he has not been taking the Cymbalta that he has recently been prescribed.  He does drink daily.  He does admit to drinking 324 ounces of beer as well as part of a fifth of liquor.  He has withdrawn from alcohol in the past but currently denies any withdrawal symptoms.  He  note were completed with a voice recognition program.  Efforts were made to edit the dictations but occasionally words are mis-transcribed.)

## 2024-05-10 NOTE — ED NOTES
Pt has been denied from [facility] due to recent discharge and refusing to participate. Psych concerned for malingering and state pt is appropriate for [facility] if he wants to receive treatment.     Pt remains low risk for suicidal ideations. Pt refusing to participate in his treatment and denies SI. SW met with pt and asked him if he would like to go to [facility]. Pt refusing. Attempted to offer resources for homeless shelters due to him being homeless and [facility] resources due to his alcohol dependence and drug use but pt refused both of these as well. Pt not wanting any further assistance from staff.

## 2024-05-10 NOTE — DISCHARGE INSTRUCTIONS
Call [facility] [phone] or go to [facility] at 7am Monday - Friday to be evaluated (this is first come first serve) to be evaluated for assistance with stopping.  You can also contact [facility] for assistance.    Stop drinking alcohol.    PLEASE RETURN TO THE EMERGENCY DEPARTMENT IMMEDIATELY for worsening symptoms, or if you develop any concerning symptoms such as: high fever not relieved by acetaminophen (Tylenol) and/or ibuprofen (Motrin / Advil), chills, shortness of breath, chest pain, feeling of your heart fluttering or racing, persistent nausea and/or vomiting, vomiting up blood, blood in your stool, numbness, loss of consciousness, weakness or tingling in the arms or legs or change in color of the extremities, changes in mental status, persistent headache, blurry vision, loss of bladder / bowel control, unable to follow up with your physician, or other any other care or concern.

## 2024-05-10 NOTE — ED PROVIDER NOTES
[facility] ED  eMERGENCY dEPARTMENT eNCOUnter   Attending Attestation     Pt Name: Balta Sarmiento  MRN: [id number]  Birthdate 1980  Date of evaluation: 5/10/24       Balta Sarmiento is a 43 y.o. male who presents with Suicidal      3:28 AM EDT      History: Patient Zentz with suicidal ideation.  Patient says that his plan is to run in front of traffic or overdose on pills.  Patient said he was recently admitted and normal listen to him and from the help he needs to help him to ease his psychiatric pain which is causing him ultimately to use drugs.  Patient also has a wound on his ankle    Exam: Heart rate and rhythm are regular.  Lungs are clear to station bilaterally.  Abdomen is soft, nontender.  Patient is awake and alert and is endorsing suicidal ideation.  Wound on ankle appears to be healing and has some granulation tissue    Plan for wrapping of the ankle with bacitracin. Will obtain labs. Will get social work consult. Pt told resident he had a lesion on his penis. Pt Will require Urology followup for likely hpv.       I performed a history and physical examination of the patient and discussed management with the resident. I reviewed the resident’s note and agree with the documented findings and plan of care. Any areas of disagreement are noted on the chart. I was personally present for the key portions of any procedures. I have documented in the chart those procedures where I was not present during the key portions. I have personally reviewed all images and agree with the resident's interpretation. I have reviewed the emergency nurses triage note. I agree with the chief complaint, past medical history, past surgical history, allergies, medications, social and family history as documented unless otherwise noted below. Documentation of the HPI, Physical Exam and Medical Decision Making performed by medical students or scribes is based on my personal performance of the HPI, PE and MDM. For Phys  Assistant/ Nurse Practitioner cases/documentation I have had a face to face evaluation of this patient and have completed at least one if not all key elements of the E/M (history, physical exam, and MDM). Additional findings are as noted.    For APC cases I have personally evaluated and examined the patient in conjunction with the APC and agree with the treatment plan and disposition of the patient as recorded by the APC.    Mendez Hernandez MD  Attending Emergency  Physician       Mendez Hernandez MD  05/10/24 1281

## 2024-05-10 NOTE — ED NOTES
Transfer Center Checklist for Behavioral Health Transfers      Currently in Restraints Now or During this Encounter: No  (Specify if Agitation or self harm is noted in ED?)            Medical Clearance Documented and Verified in the Chart: Yes    LABS  Labs Resulted (see below, if applicable): Yes  Are Any of the Labs Abnormal: No    CBC:   Lab Results   Component Value Date/Time    WBC 7.8 05/10/2024 02:06 AM    RBC 4.90 05/10/2024 02:06 AM    HGB 15.8 05/10/2024 02:06 AM    HCT 46.2 05/10/2024 02:06 AM    MCV 94.3 05/10/2024 02:06 AM    MCH 32.2 05/10/2024 02:06 AM    MCHC 34.2 05/10/2024 02:06 AM    RDW 12.0 05/10/2024 02:06 AM     05/10/2024 02:06 AM    MPV 10.4 05/10/2024 02:06 AM     CMP:   Lab Results   Component Value Date/Time     05/10/2024 02:06 AM    K 4.1 05/10/2024 02:06 AM    K 3.7 10/22/2023 06:07 AM     05/10/2024 02:06 AM    CO2 21 05/10/2024 02:06 AM    BUN 8 05/10/2024 02:06 AM    CREATININE 0.8 05/10/2024 02:06 AM    GFRAA >60 04/25/2022 12:55 AM    LABGLOM >90 05/10/2024 02:06 AM    LABGLOM >90 04/25/2024 12:08 AM    GLUCOSE 90 05/10/2024 02:06 AM    CALCIUM 9.2 05/10/2024 02:06 AM    BILITOT 0.4 05/06/2024 09:59 PM    ALKPHOS 74 05/06/2024 09:59 PM    AST 34 05/06/2024 09:59 PM    ALT 60 05/06/2024 09:59 PM     Drug Panel:   Lab Results   Component Value Date/Time    OPIAU NEGATIVE 05/10/2024 02:27 AM    LABCOMM  04/25/2024 12:13 AM     Microscopic exam not performed based on chemical results unless requested in original order.     UA:  Lab Results   Component Value Date/Time    COLORU Dark Yellow 04/25/2024 12:13 AM    PROTEINU NEGATIVE 04/25/2024 12:13 AM    GLUCOSEU NEGATIVE 04/25/2024 12:13 AM    KETUA TRACE 04/25/2024 12:13 AM    BILIRUBINUR NEGATIVE 04/25/2024 12:13 AM    BLOODU NEGATIVE 09/27/2023 01:00 AM    UROBILINOGEN Normal 04/25/2024 12:13 AM    NITRU NEGATIVE 04/25/2024 12:13 AM    LEUKOCYTESUR NEGATIVE 04/25/2024 12:13 AM    WBCUA None 12/28/2023 01:46 AM     RBCUA None 12/28/2023 01:46 AM    BACTERIA None 12/28/2023 01:46 AM     PREGNANCY TEST: No results found for: \"PREGTESTUR\"    Patient's [facility] Location: [facility] ED     Chief Complaint   Patient presents with    Suicidal       Dialysis: No    Target Destination: [facility]     Insurance: Payor: [employer] [place] /  /  /      Current Psychotic Symptoms  Harmful Actions Towards Others: None Observed  Orientation Level: Oriented X4  Speech Pattern: Within Defined Limits  General Attitude: Cooperative  Emotions: Depressed  Delusions: Within Defined Limits  Hallucination:  None    Any Suicidal Ideations: Low Risk  Plan: Walk in front of traffic     Homicidal Ideations/Violence Risk:   Observed Violent Behavior: No  Homicidal Ideations: No    Past Psychiatric History:       Diagnosis Date    Anxiety     Depression     Insomnia        Hold (TDO/[place]):      Currently on any Psychiatric Medications: Yes: Comment: Cymbalta   Past Psychiatric Medications Include:  Patient is uncertain of past medications    The patient is not currently receiving care for the above psychiatric illness.   Active Therapist: NA    Past Mental Health Outpatient Care Includes:  Greater than 5 treatment centers    Past Mental Health Hospitalizations: Greater than 5 admissions    Isolation  Isolation:  NA  HIV Positive: No    Home Medications  Does Patient Have Medications to Bring to the Facility: No  Medications Prior to Admission:   Prior to Admission Medications   Prescriptions Last Dose Informant Patient Reported? Taking?   DULoxetine (CYMBALTA) 30 MG extended release capsule   No No   Sig: Take 1 capsule by mouth daily      Facility-Administered Medications: None        Able to Perform ADLs:  Yes  Additional Information  Oxygen Use: NA     Any Legal Issues (Current or Past): No    Does Patient have POA or Guardian: No    Current Living Situation:      Roommate Appropriate: Yes    Is this a special case or have any other

## 2024-05-10 NOTE — ED PROVIDER NOTES
Note Started: 7:16 AM EDT     Faculty Sign-Out Attestation  Handoff taken on the following patient from prior Attending Physician: Mary    I was available and discussed any additional care issues that arose and coordinated the management plans with the resident(s) caring for the patient during my duty period. Any areas of disagreement with resident’s documentation of care or procedures are noted on the chart. I was personally present for the key portions of any/all procedures during my duty period. I have documented in the chart those procedures where I was not present during the key portions.    Suicidal ideation, recently discharged from [facility].  Plans to take pills or jump into traffic.  Labs pending.  Patient also noted to have concern for condyloma, will have him follow-up with urology as an outpatient.    Mala Pettit MD  Attending Physician        Mala Pettit MD  05/10/24 0740

## 2024-05-10 NOTE — ED NOTES
Patient refusing vital signs and refusing to participate in answering questions, denies SI/HI, resident and social work aware.

## 2024-05-10 NOTE — ED NOTES
Pt presents to ED with c/o suicidal thoughts and depression, has been going on for several days now. Pt states he was admitted at [facility] 3 days ago for same reason. Pt states he is tired, and find it hard for him to live because he is homeless. Pt states he does not have hallucinations or delusions. Pt states he has concern with STD/ STI. Pt states he has warts on genital area, non-pruritic and not painful. Pt states he has wound on the right leg from scratching. Pt denies n/v/d, LOC, CP, SOB, fever, chills, injury/trauma, change in bowel/bladder function, loss of appetite, pain, or any other sx.      Pt is alert and oriented x4. VSS. Changed into paper clothes. Removed wires on the room. Belongings given to [place] [employer]. Sitter initiated. IV established, blood drawn, labeled and sent to labs. Will monitor plan of care.

## 2024-05-10 NOTE — ED NOTES
Pt is a 43-year-old male who presents to the ED with suicidal ideation and a plan to run in front of traffic and overdose on pills. Pt has hx of depression, alcohol, and cocaine abuse. Pt presented to ED with a 196 alcohol level.  Upon pt being sober pt denies SI to this writer.  Pt appears not engaged and wanting to sleep refraining from eye contact with this writer. When asked if he remembers saying he was suicidal pt answered “Yes”, and said, “I only said I was because there was not a spot to get in”. SW inquired where he meant such as shelter, rehab etc… and he became agitated and said, “Man I'm done with you. I'm not talking to you anymore. Get out. I don't want your help”.  SW ended interview due to pt's agitation and refusing further services.     Pt familiar to the ED and has frequent visit to ED reporting SI. Pt has hx of non-compliancy with antipsychotic medication and drug rehabilitation programming per charting. Per charting pt was recently admitted to [facility] from 4/17/2024 through 4/19/2024 and then discharged to [facility]. Pt was seen again in the ED on 5/3/2024 and was directly discharged to [facility] rehab facility and is reported to have walked away from both facilities and relapsing per charting. Pt was then admitted into [facility] from May 7-8. Pt appears to possibly be malingering or utilizing the hospital for shelter due to homelessness.     Due to pt's numerous recent [facility] admissions and non-compliancy with follow-up or treatment pt does not appear to be benefiting from inpatient treatment.  SUSAN recommends substance use rehab/sober living program or dual diagnosis facility such as [facility] and if pt declines referral to [facility]. SUSAN will discuss with Dr. Hay.

## 2024-05-10 NOTE — ED NOTES
Attempted to complete CSSR with pt. He refused and stated \"nope I'm not doing it.\" Attempted to encourage pt to allow SW to complete it now so pt could go back to sleep and pt mumbled \"quit bothering me.\"

## 2024-05-10 NOTE — ED PROVIDER NOTES
[facility] ED  Emergency Department  Emergency Medicine Resident Turn-Over     Note Started: 6:02 AM EDT    Care of Balta Sarmiento was assumed from Dr. Ramos and is being seen for Suicidal  .  The patient's initial evaluation and plan have been discussed with the prior provider who initially evaluated the patient.     EMERGENCY DEPARTMENT COURSE / MEDICAL DECISION MAKING:       MEDICATIONS GIVEN:  No orders of the defined types were placed in this encounter.      LABS / RADIOLOGY:     Labs Reviewed   CBC WITH AUTO DIFFERENTIAL - Abnormal; Notable for the following components:       Result Value    Eosinophils % 7 (*)     Eosinophils Absolute 0.52 (*)     All other components within normal limits   TOX SCR, BLD, ED - Abnormal; Notable for the following components:    Acetaminophen Level <5 (*)     Ethanol 196 (*)     Ethanol percent 0.196 (*)     All other components within normal limits   URINE DRUG SCREEN - Abnormal; Notable for the following components:    Cocaine Metabolite, Urine POSITIVE (*)     Cannabinoid Scrn, Ur POSITIVE (*)     All other components within normal limits   BASIC METABOLIC PANEL       XR CHEST (2 VW)    Result Date: 5/3/2024  EXAMINATION: TWO XRAY VIEWS OF THE CHEST 5/3/2024 1:45 am COMPARISON: 10/27/2023. HISTORY: ORDERING SYSTEM PROVIDED HISTORY: palpitations TECHNOLOGIST PROVIDED HISTORY: palpitations FINDINGS: The cardiomediastinal silhouette is within normal limits. There is no consolidation, pneumothorax or evidence for edema. No evidence for effusion. No acute osseous abnormality is identified.     No acute airspace disease identified.       RECENT VITALS:     Temp: 97.2 °F (36.2 °C),  Pulse: 66, Respirations: 16, BP: (!) 94/54, SpO2: 96 %    This patient is a 43 y.o. Male with suicidal ideation with plan to overdose on medications. Prior suicide attempts. No hallucinations or homicidal ideation. Recent discharge from [facility] two days prior, started on cymbalta which he  has not been taking. Daily alcohol use. EtOH 196. Endorses cocaine use. Sober time 6 am, will reassess.    Penile lesions, recent STI testing negative, condyloma acuminata.    ED Course as of 05/10/24 1515   Fri May 10, 2024   0206 Social work informed about patient.  They will assess. [KR]   0221 WBC: 7.8 [KR]   0221 Hemoglobin Quant: 15.8 [KR]   0247 Ethyl Alcohol(!): 196 [KR]   0252 Medically stable for transfer to [facility]. [KR]   0303 Reassessed at sober time of 545 [KR]   0604 Signed out to Dr. Ann. [KR]   0919 Patient reportedly told social work he was not suicidal and that he \"only said that because there was not a spot to get in\". Patient is homeless. Patient recently discharged from [facility] on 5/8. Agitated and no longer responding to SW questions. Concern for malingering per SW. Will discuss with psychiatry given history of prior attempts and initially endorsed suicidal ideation that he may be retracting. [JG]   0965 Discussion with psychiatry via phone, they are very familiar with patient. Patient routinely arrives at [facility] and refuses all care and denies any suicidal ideation. They have strong concern for malingering and state patient attempts to use them for housing, and that he is ok to be discharged to [facility] or homeless shelter. Denied for transfer to [facility]. [JG]   1026  offered patient resources for housing, including transport to [facility] or [facility]. Patient telling staff to \"f--k off and leave me alone\". Refusing any further assistance. Patient discharged. [facility] [employer] at bedside to assist with discharge. [JG]      ED Course User Index  [JG] Diann Ann MD  [KR] Simon Ramos MD       OUTSTANDING TASKS / RECOMMENDATIONS:    Reassess  Dispo to [facility]     FINAL IMPRESSION:     1. Condyloma acuminata    2. Malingering        DISPOSITION:         DISPOSITION:  [x]  Discharge   []  Transfer -    []  Admission -     []  Against Medical Advice   []  Eloped   FOLLOW-UP: [facility]  2005

## 2024-07-10 ENCOUNTER — HOSPITAL ENCOUNTER (EMERGENCY)
Age: 44
Discharge: ANOTHER HOSPITAL | DRG: 751 | End: 2024-07-10
Attending: EMERGENCY MEDICINE
Payer: MEDICAID

## 2024-07-10 ENCOUNTER — HOSPITAL ENCOUNTER (INPATIENT)
Age: 44
LOS: 2 days | Discharge: HOME | DRG: 751 | End: 2024-07-12
Attending: PSYCHIATRY & NEUROLOGY | Admitting: PSYCHIATRY & NEUROLOGY
Payer: MEDICAID

## 2024-07-10 VITALS
WEIGHT: 203 LBS | SYSTOLIC BLOOD PRESSURE: 101 MMHG | OXYGEN SATURATION: 97 % | DIASTOLIC BLOOD PRESSURE: 62 MMHG | HEIGHT: 71 IN | RESPIRATION RATE: 16 BRPM | BODY MASS INDEX: 28.42 KG/M2 | TEMPERATURE: 98.1 F | HEART RATE: 70 BPM

## 2024-07-10 DIAGNOSIS — R45.851 SUICIDAL IDEATION: Primary | ICD-10-CM

## 2024-07-10 PROBLEM — F12.90 CANNABIS USE DISORDER: Status: ACTIVE | Noted: 2024-04-17

## 2024-07-10 PROBLEM — F10.90 ALCOHOL USE DISORDER: Status: ACTIVE | Noted: 2024-05-03

## 2024-07-10 LAB
ALBUMIN SERPL-MCNC: 4.5 G/DL (ref 3.5–5.2)
ALBUMIN/GLOB SERPL: 2 {RATIO} (ref 1–2.5)
ALP SERPL-CCNC: 77 U/L (ref 40–129)
ALT SERPL-CCNC: 114 U/L (ref 10–50)
AMPHET UR QL SCN: NEGATIVE
ANION GAP SERPL CALCULATED.3IONS-SCNC: 12 MMOL/L (ref 9–16)
APAP SERPL-MCNC: <5 UG/ML (ref 10–30)
AST SERPL-CCNC: 68 U/L (ref 10–50)
BACTERIA URNS QL MICRO: NORMAL
BARBITURATES UR QL SCN: NEGATIVE
BASOPHILS # BLD: 0.04 K/UL (ref 0–0.2)
BASOPHILS NFR BLD: 1 % (ref 0–2)
BENZODIAZ UR QL: NEGATIVE
BILIRUB SERPL-MCNC: 0.6 MG/DL (ref 0–1.2)
BILIRUB UR QL STRIP: NEGATIVE
BUN SERPL-MCNC: 13 MG/DL (ref 6–20)
CALCIUM SERPL-MCNC: 9.2 MG/DL (ref 8.6–10.4)
CANNABINOIDS UR QL SCN: POSITIVE
CASTS #/AREA URNS LPF: NORMAL /LPF (ref 0–8)
CHLORIDE SERPL-SCNC: 103 MMOL/L (ref 98–107)
CLARITY UR: ABNORMAL
CO2 SERPL-SCNC: 25 MMOL/L (ref 20–31)
COCAINE UR QL SCN: POSITIVE
COLOR UR: ABNORMAL
CREAT SERPL-MCNC: 1 MG/DL (ref 0.7–1.2)
EOSINOPHIL # BLD: 0.21 K/UL (ref 0–0.44)
EOSINOPHILS RELATIVE PERCENT: 3 % (ref 1–4)
EPI CELLS #/AREA URNS HPF: NORMAL /HPF (ref 0–5)
ERYTHROCYTE [DISTWIDTH] IN BLOOD BY AUTOMATED COUNT: 11.9 % (ref 11.8–14.4)
ETHANOL PERCENT: <0.01 %
ETHANOLAMINE SERPL-MCNC: <10 MG/DL (ref 0–0.08)
FENTANYL UR QL: NEGATIVE
GFR, ESTIMATED: >90 ML/MIN/1.73M2
GLUCOSE SERPL-MCNC: 92 MG/DL (ref 74–99)
GLUCOSE UR STRIP-MCNC: NEGATIVE MG/DL
HCT VFR BLD AUTO: 39.1 % (ref 40.7–50.3)
HGB BLD-MCNC: 13.2 G/DL (ref 13–17)
HGB UR QL STRIP.AUTO: NEGATIVE
IMM GRANULOCYTES # BLD AUTO: <0.03 K/UL (ref 0–0.3)
IMM GRANULOCYTES NFR BLD: 0 %
KETONES UR STRIP-MCNC: ABNORMAL MG/DL
LEUKOCYTE ESTERASE UR QL STRIP: NEGATIVE
LYMPHOCYTES NFR BLD: 1.73 K/UL (ref 1.1–3.7)
LYMPHOCYTES RELATIVE PERCENT: 24 % (ref 24–43)
MAGNESIUM SERPL-MCNC: 2.1 MG/DL (ref 1.6–2.6)
MCH RBC QN AUTO: 31 PG (ref 25.2–33.5)
MCHC RBC AUTO-ENTMCNC: 33.8 G/DL (ref 28.4–34.8)
MCV RBC AUTO: 91.8 FL (ref 82.6–102.9)
METHADONE UR QL: NEGATIVE
MONOCYTES NFR BLD: 0.56 K/UL (ref 0.1–1.2)
MONOCYTES NFR BLD: 8 % (ref 3–12)
NEUTROPHILS NFR BLD: 64 % (ref 36–65)
NEUTS SEG NFR BLD: 4.81 K/UL (ref 1.5–8.1)
NITRITE UR QL STRIP: NEGATIVE
NRBC BLD-RTO: 0 PER 100 WBC
OPIATES UR QL SCN: NEGATIVE
OXYCODONE UR QL SCN: NEGATIVE
PCP UR QL SCN: NEGATIVE
PH UR STRIP: 6 [PH] (ref 5–8)
PLATELET # BLD AUTO: 171 K/UL (ref 138–453)
PMV BLD AUTO: 10.8 FL (ref 8.1–13.5)
POTASSIUM SERPL-SCNC: 3.7 MMOL/L (ref 3.7–5.3)
PROT SERPL-MCNC: 7.2 G/DL (ref 6.6–8.7)
PROT UR STRIP-MCNC: ABNORMAL MG/DL
RBC # BLD AUTO: 4.26 M/UL (ref 4.21–5.77)
RBC #/AREA URNS HPF: NORMAL /HPF (ref 0–4)
SALICYLATES SERPL-MCNC: <0.5 MG/DL (ref 0–10)
SODIUM SERPL-SCNC: 140 MMOL/L (ref 136–145)
SP GR UR STRIP: 1.02 (ref 1–1.03)
TEST INFORMATION: ABNORMAL
UROBILINOGEN UR STRIP-ACNC: NORMAL EU/DL (ref 0–1)
WBC #/AREA URNS HPF: NORMAL /HPF (ref 0–5)
WBC OTHER # BLD: 7.4 K/UL (ref 3.5–11.3)

## 2024-07-10 PROCEDURE — 1240000000 HC EMOTIONAL WELLNESS R&B

## 2024-07-10 PROCEDURE — 80179 DRUG ASSAY SALICYLATE: CPT

## 2024-07-10 PROCEDURE — 80307 DRUG TEST PRSMV CHEM ANLYZR: CPT

## 2024-07-10 PROCEDURE — 99223 1ST HOSP IP/OBS HIGH 75: CPT | Performed by: NURSE PRACTITIONER

## 2024-07-10 PROCEDURE — 99285 EMERGENCY DEPT VISIT HI MDM: CPT

## 2024-07-10 PROCEDURE — G0480 DRUG TEST DEF 1-7 CLASSES: HCPCS

## 2024-07-10 PROCEDURE — 80143 DRUG ASSAY ACETAMINOPHEN: CPT

## 2024-07-10 PROCEDURE — 81001 URINALYSIS AUTO W/SCOPE: CPT

## 2024-07-10 PROCEDURE — 83735 ASSAY OF MAGNESIUM: CPT

## 2024-07-10 PROCEDURE — 6370000000 HC RX 637 (ALT 250 FOR IP): Performed by: INTERNAL MEDICINE

## 2024-07-10 PROCEDURE — 93005 ELECTROCARDIOGRAM TRACING: CPT | Performed by: EMERGENCY MEDICINE

## 2024-07-10 PROCEDURE — 80053 COMPREHEN METABOLIC PANEL: CPT

## 2024-07-10 PROCEDURE — 85025 COMPLETE CBC W/AUTO DIFF WBC: CPT

## 2024-07-10 PROCEDURE — 99222 1ST HOSP IP/OBS MODERATE 55: CPT | Performed by: INTERNAL MEDICINE

## 2024-07-10 RX ORDER — M-VIT,TX,IRON,MINS/CALC/FOLIC 27MG-0.4MG
1 TABLET ORAL DAILY
Status: DISCONTINUED | OUTPATIENT
Start: 2024-07-10 | End: 2024-07-12 | Stop reason: HOSPADM

## 2024-07-10 RX ORDER — FOLIC ACID 1 MG/1
1 TABLET ORAL DAILY
Status: DISCONTINUED | OUTPATIENT
Start: 2024-07-10 | End: 2024-07-12 | Stop reason: HOSPADM

## 2024-07-10 RX ORDER — GAUZE BANDAGE 2" X 2"
100 BANDAGE TOPICAL DAILY
Status: DISCONTINUED | OUTPATIENT
Start: 2024-07-10 | End: 2024-07-12 | Stop reason: HOSPADM

## 2024-07-10 RX ORDER — DIPHENHYDRAMINE HYDROCHLORIDE 50 MG/ML
25 INJECTION INTRAMUSCULAR; INTRAVENOUS EVERY 6 HOURS PRN
Status: DISCONTINUED | OUTPATIENT
Start: 2024-07-10 | End: 2024-07-12 | Stop reason: HOSPADM

## 2024-07-10 RX ORDER — TRAZODONE HYDROCHLORIDE 50 MG/1
50 TABLET ORAL NIGHTLY PRN
Status: DISCONTINUED | OUTPATIENT
Start: 2024-07-10 | End: 2024-07-12 | Stop reason: HOSPADM

## 2024-07-10 RX ORDER — HALOPERIDOL 5 MG/ML
5 INJECTION INTRAMUSCULAR EVERY 6 HOURS PRN
Status: DISCONTINUED | OUTPATIENT
Start: 2024-07-10 | End: 2024-07-12 | Stop reason: HOSPADM

## 2024-07-10 RX ORDER — RISPERIDONE 0.5 MG/1
0.5 TABLET ORAL 2 TIMES DAILY
Status: DISCONTINUED | OUTPATIENT
Start: 2024-07-10 | End: 2024-07-11

## 2024-07-10 RX ORDER — HYDROXYZINE 50 MG/1
50 TABLET, FILM COATED ORAL 3 TIMES DAILY PRN
Status: DISCONTINUED | OUTPATIENT
Start: 2024-07-10 | End: 2024-07-12 | Stop reason: HOSPADM

## 2024-07-10 RX ORDER — HALOPERIDOL 5 MG/1
5 TABLET ORAL EVERY 6 HOURS PRN
Status: DISCONTINUED | OUTPATIENT
Start: 2024-07-10 | End: 2024-07-12 | Stop reason: HOSPADM

## 2024-07-10 RX ORDER — SERTRALINE HYDROCHLORIDE 25 MG/1
25 TABLET, FILM COATED ORAL DAILY
Status: DISCONTINUED | OUTPATIENT
Start: 2024-07-11 | End: 2024-07-11

## 2024-07-10 RX ADMIN — Medication 1 TABLET: at 17:46

## 2024-07-10 RX ADMIN — FOLIC ACID 1 MG: 1 TABLET ORAL at 17:46

## 2024-07-10 RX ADMIN — THIAMINE HCL TAB 100 MG 100 MG: 100 TAB at 17:46

## 2024-07-10 ASSESSMENT — SLEEP AND FATIGUE QUESTIONNAIRES
AVERAGE NUMBER OF SLEEP HOURS: 8
SLEEP PATTERN: INSOMNIA
DO YOU HAVE DIFFICULTY SLEEPING: YES
DO YOU USE A SLEEP AID: YES

## 2024-07-10 ASSESSMENT — PATIENT HEALTH QUESTIONNAIRE - PHQ9
2. FEELING DOWN, DEPRESSED OR HOPELESS: SEVERAL DAYS
SUM OF ALL RESPONSES TO PHQ9 QUESTIONS 1 & 2: 2
SUM OF ALL RESPONSES TO PHQ QUESTIONS 1-9: 2
1. LITTLE INTEREST OR PLEASURE IN DOING THINGS: SEVERAL DAYS
SUM OF ALL RESPONSES TO PHQ QUESTIONS 1-9: 2

## 2024-07-10 ASSESSMENT — LIFESTYLE VARIABLES
HOW OFTEN DO YOU HAVE A DRINK CONTAINING ALCOHOL: 4 OR MORE TIMES A WEEK
HOW OFTEN DO YOU HAVE A DRINK CONTAINING ALCOHOL: 4 OR MORE TIMES A WEEK
HOW OFTEN DO YOU HAVE A DRINK CONTAINING ALCOHOL: MONTHLY OR LESS
HOW MANY STANDARD DRINKS CONTAINING ALCOHOL DO YOU HAVE ON A TYPICAL DAY: 5 OR 6
HOW MANY STANDARD DRINKS CONTAINING ALCOHOL DO YOU HAVE ON A TYPICAL DAY: 5 OR 6
HOW MANY STANDARD DRINKS CONTAINING ALCOHOL DO YOU HAVE ON A TYPICAL DAY: 1 OR 2

## 2024-07-10 ASSESSMENT — PAIN - FUNCTIONAL ASSESSMENT: PAIN_FUNCTIONAL_ASSESSMENT: NONE - DENIES PAIN

## 2024-07-10 NOTE — H&P
IN-PATIENT SERVICE  [facility] Internal Medicine    CONSULTATION / HISTORY AND PHYSICAL EXAMINATION            Date:   7/10/2024  Patient name:  Balta Sarmiento  Date of admission:  7/10/2024  1:51 PM  MRN:   [id number]  Account:  [id number]  YOB: 1980  PCP:    No primary care provider on file.  Room:   [address] [place] [address]  Code Status:    Prior    Physician Requesting Consult: Walt Rutledge MD    Reason for Consult:  medical management    Chief Complaint:   Medical comanagement    History Obtained From:     Patient medical record nursing staff    History of Present Illness:   Patient, his past medical major depression, anxiety, alcohol abuse, history of substance abuse, admitted to [facility] unit with worsening depression and suicidal ideation  Patient unfortunately has hospitalization in the past with depression  Never diagnosed with chronic medical condition like diabetes, hypertension, coronary artery disease    Past Medical History:     Past Medical History:   Diagnosis Date    Anxiety     Depression     Insomnia         Past Surgical History:     No past surgical history on file.     Medications Prior to Admission:     Prior to Admission medications    Medication Sig Start Date End Date Taking? Authorizing Provider   DULoxetine (CYMBALTA) 30 MG extended release capsule Take 1 capsule by mouth daily 5/9/24   Walt Rutledge MD        Allergies:     Shellfish-derived products and Fish-derived products    Social History:     Tobacco:    reports that he has been smoking cigarettes. He has never used smokeless tobacco.  Alcohol:      reports current alcohol use.  Drug Use:  reports current drug use. Drug: Cocaine.    Family History:     No family history on file.    Review of Systems:     Positive and Negative as described in HPI.    CONSTITUTIONAL:  negative for fevers, chills, sweats, fatigue, weight loss  HEENT:  negative for vision, hearing changes, runny nose, throat

## 2024-07-10 NOTE — ED NOTES
[] [facility]    [] [facility]    [x]  [facility]    SUICIDE RISK ASSESSMENT      Y  N     [x] [] In the past two weeks have you had thoughts of hurting yourself in any way?    [x] [] In the past two weeks have you had thoughts that you would be better off dead?   [] [x] Have you made a suicide attempt in the past two months?   [x] [] Do you have a plan for hurting yourself or suicide?   [] [x] Presence of hallucinations/voices related to hurting himself or herself or someone else.    SUICIDE/SECURITY WATCH PRECAUTION CHECKLIST     Orders    [x]  Suicide/Security Watch Precautions initiated as checked below:   7/10/24 8:17 AM EDT 26/H26B    [x] Notified physician:  Lisset Espinoza DO  7/10/24 8:17 AM EDT    [x] Orders obtained as appropriate:     [x] 1:1 Observer     [] Psych Consult     [] Psych Consult    Name:  Date:  Time:    [x] 1:1 Observer, Notified by:  Sheri Bergman RN    Contact Nurse Supervisor    [x] Remove all personal clothes from room and place in snap/paper gown/pants.  Slipper only    [x] Remove all personal belongings from room and secured away from patient.    Documentation    [x] Initiate Suicide/Security Watch Precaution Flow Sheet    [x] Initiate individualized Care Plan/Problem    [x] Document why precautions initiated on flow sheet (Initiate Nursing Care Plan/Problem)    [x] 1:1 Observer in place; instructions provided.  Suicide precautions require observer be within arms length.    [x] Nurse-Observer Communication Hand-off initiated by RN, reviewed with Observer.  Subsequently used as Hand Off between Observers.    [x] Initiate every 15 minute observations per observer as delegated by the RN.    [x] Initiate RN assessment and documentation    Environmental Scan  Search Criteria and Process: OPTIONAL, see Search Policy    [] Reason for search:    [] Nursing in presence of second person to search patient    [] Patient notified of reason for body assessment and  Expressed or implied suicidal ideation/behavior  [x] Depression  [] Suicide attempt      [] Low self-esteem  [] Hallucinations      [] Feeling of Hopelessness  [x] Substance abuse or withdrawal    [] Dysfunctional family  [] Major traumatic event, eg., divorce, etc   [] Excessive stress/anxiety    7/10/24    Expected Outcomes    Patient will:   [x] Patient will remain safe for the duration of their stay   [x] Patient's environment will be safe, eg. Free of potential suicide weapons   [] Verbalize Recovery from suicidal episode and improvement in self-worth   [x] Discuss feeling that precipitated suicide attempt/thoughts/behavior   [] Will describe available resources for crisis prevention and management   [] Will verbalize positive coping skills     Nursing Intervention   [x] Assessment and Observations hourly   [x] Suicide Precautions implemented with patient, should be 1:1 observation   [x] Document observation [id number] and RN assessment hourly   [] Consult physician for:    [] Psychiatric consult    [] Pharmacological therapy    [] Other:    [x] Patient search completed by security   [x] Initiated appropriate safety protocols by removing from the patient's environment anything that could be used to inflict self injury, eg. Order safe tray, snap gown, etc   [x] Maintain open, warm, caring, non-judgmental attitude/manner towards patient   [] Discuss advantages and disadvantages of existing coping methods/skills   [x] Assist and educate patient with identifying present strengths and coping skills   [x] Keep patient informed regarding plan of care and provide clear concise explanations.  Provide the patient/family education information as well as telephone numbers and other information about crisis centers, hot lines, and counselors.    Discharge Planning:   [] Referral  [] Groups [] Health agencies  [] Other:

## 2024-07-10 NOTE — H&P
Department of Psychiatry  Attending Physician Psychiatric Assessment     Reason for Admission to Psychiatric Unit:  Threat to self requiring 24 hour professional observation  A mental disorder causing major disability in social, interpersonal, occupational, and/or educational functioning that is leading to dangerous or life-threatening functioning, and that can only be addressed in an acute inpatient setting   Concerns about patient's safety in the community    CHIEF COMPLAINT:  Depression with suicidal ideation    History obtained from: Patient, electronic medical record          HISTORY OF PRESENT ILLNESS:    Balta Sarmiento is a 43 y.o. male who has a past medical history of depression and polysubstance abuse. Patient presented to the ED, per documentation, Balta Sarmiento is a 43 y.o. male who presents with suicidal ideation, with plan to step into traffic or to overdose on medications.  He reports he has a history of depression.  And history of alcohol abuse as well as cocaine abuse.  He was in a treatment plan down in [place] that finished in the end of March.  He says he was sober for about 2 months.  But then got a call about a family death.  And patient started using again.  And he reports that his depression has been getting worse, and now he is suicidal.  And came in for further evaluation.     Patient is found resting in bed, moved to the unit library for interview.  Reports after discharge from [facility] in May, he went to [facility] program in [place], states he was there for 30 days, reports he remained sober however started using alcohol and cocaine this past Friday.  States he stopped taking medication, when questioned why, states, \"give me something that actually works\".  He endorses depression including anhedonia, feelings of worthlessness, hopelessness, fatigue, difficulty concentration, poor appetite, difficulty initiating maintaining sleep.  Endorses suicidal ideation however at this time does not have  Shellfish-derived products and Fish-derived products         Social History:     Born in: [place], [place]  Family: Raised by grandparents  Highest Level of Education: GED  Occupation: Unemployed  Marital Status: Single  Children: Denies however per previous documentation, 2  Residence: Homeless  Stressors: Lack of housing, substance use  Patient Assets/Supportive Factors: Expresses interest in AOD program at [facility] in [place].         DRUG USE HISTORY  Social History     Tobacco Use   Smoking Status Every Day    Current packs/day: 0.50    Types: Cigarettes   Smokeless Tobacco Never     Social History     Substance and Sexual Activity   Alcohol Use Yes    Comment: 4 beers and a fifth of liquor     Social History     Substance and Sexual Activity   Drug Use Yes    Types: Cocaine       UDS positive for cannabinoid and cocaine, EtOH level negative in ER.         LEGAL HISTORY:   HISTORY OF INCARCERATION: [x] Yes [] No    Family History:   No family history on file.    Psychiatric Family History  Patient endorses psychiatric family history.     Suicides in family: [] Yes [x] No    Substance use in family: [x] Yes [] No  Mother - cocaine       PHYSICAL EXAM:  Vitals:  There were no vitals taken for this visit.  Pain Level: denies    LABS:  Labs reviewed: [x] Yes  Metabolic Screening:  [x] Yes [] No (order HgBA1C/Lipid panel if not screened in past year)  Last EKG in EMR reviewed: [x] Yes          Review of Systems   Constitutional: Negative for chills and weight loss.   HENT: Negative for ear pain and nosebleeds.    Eyes: Negative for blurred vision and photophobia.   Respiratory: Negative for cough, shortness of breath and wheezing.    Cardiovascular: Negative for chest pain and palpitations.   Gastrointestinal: Negative for abdominal pain, diarrhea and vomiting.   Genitourinary: Negative for dysuria and urgency.   Musculoskeletal: Negative for falls and joint pain.   Skin: Negative for itching and rash.

## 2024-07-10 NOTE — BH NOTE
Mr. Sarmiento was admitted to the [facility] unit, arriving on a stretcher. He lowered himself from the stretcher and ambulated to the assessment room. He was cooperative with vital assessment. When writer entered the room with the admission paperwork, he stated \"Can we just cut to the ebenezer and let me sign the paperwork so I can go lay down, I've already answered all of these questions\". Writer provided the paperwork to Mr. Sarmiento and asked assessment questions. Once complete, Mr. Sarmiento ask \"Anyway I can get a private room? Everytime you guys give me a roommate, I want to smother them with a pillow\". Writer explained the charge nurse does the room assignments. Writer reached out to the charge nurses and explained his request and comment. Writer was told no private rooms were available. Writer notified manager of request and comment as well as . Mr. Sarmiento was shown to his assigned room and introduced to his roommate. He left the room saying \"Nope, this isn't going to work, I'm not going to do this, you're going to have to do something\". Manager and  notified. Mr. Sarmiento is currently resting quietly in his assigned bed with his eyes closed.

## 2024-07-10 NOTE — ED PROVIDER NOTES
Status: He is alert and oriented to person, place, and time.   Psychiatric:         Mood and Affect: Mood is depressed.         Thought Content: Thought content includes suicidal ideation. Thought content includes suicidal plan.           DDX/DIAGNOSTIC RESULTS / EMERGENCY DEPARTMENT COURSE / MDM     Medical Decision Making  Patient with suicidal ideation, and depression.  Does have plan.  History of this in the past also history of cocaine and alcohol use.  Does not seem intoxicated, will plan for medical evaluation and then likely transfer to psychiatric facility for further evaluation and management.    Amount and/or Complexity of Data Reviewed  Labs: ordered.  ECG/medicine tests: ordered.        EKG  See below    All EKG's are interpreted by the Emergency Department Physician who either signs or Co-signs this chart in the absence of a cardiologist.    EMERGENCY DEPARTMENT COURSE:      ED Course as of 07/10/24 1130   Wed Jul 10, 2024   0812 EKG shows normal sinus rhythm with normal axis no ST elevations or depressions noted, Q waves noted in lead III.  Ventricular rate of 74 TX interval of 174 QRS of 86 and a QT corrected of 463.  No ST elevations [CE]   0854 Patient medically clear for transfer to psychiatric facility.  Social work to evaluate at this time [CE]   1127 Spoke with Dr. HOUSE [CE]   1129 Spoke with Dr. HOUSE who accepted the patient [CE]      ED Course User Index  [CE] Lisset Espinoza DO         CONSULTS:  IP CONSULT TO SOCIAL WORK        FINAL IMPRESSION      1. Suicidal ideation          DISPOSITION / PLAN     DISPOSITION Decision To Transfer 07/10/2024 09:26:56 AM      PATIENT REFERRED TO:  No follow-up provider specified.    DISCHARGE MEDICATIONS:  New Prescriptions    No medications on file       Lisset Espinoza DO  Emergency Medicine     (Please note that portions of thisnote were completed with a voice recognition program.  Efforts were made to edit the dictations but occasionally words are

## 2024-07-10 NOTE — ED TRIAGE NOTES
Pt to ed c/o suicidal ideation. Per pt he has been feeling this way for about a week. Pt states he recently had a family death and has been depressed about it. Pt states he relapsed with alcohol and cocaine on Friday. Pt states he has been drinking a few beers a day and using cocaine daily since Friday. Pt states he has a plan to walk into traffic. Pt states he has been off his medication for a while. Pt states he last attempted to harm himself about 10 years ago. Pt denies homicidal ideation. Pt denies sob , chest pain.     Pt is alert and oriented x4. Ambulatory to room. Pt changed into paper scrubs. Pts belongings [facility] by [facility]. Vitals stable. 1:1 sitter at bedside. Will continue with plan of care.

## 2024-07-10 NOTE — ED NOTES
SW met with patient who expresses being depressed about the death of his uncle.  Patient states he is feeling suicidal with a plan to overdose on pills or walk in traffic.  Patient states he had an intentional overdose of Xanax in 2015, but has not had any other attempts to harm himself.  Patient is non-compliant with his [facility] mental [facility] treatment and his last [facility] admission was from 5/6/2024 to 5/8/2024.  Patient states he was in a treatment program called [facility] in [place] in mid-March and was sober for 2 months.  Patient states he has relapsed on cocaine, THC, and etoh.  Patient states he used last night.  Patient declines wanting to get into another treatment program.  Patient is chronically homeless and states he lives on the streets because he does not want to stay at the shelters.  Patient denies legal issues.  [employer] will be consulted by SUSAN when patient medically cleared.  ROSE MARY Easley

## 2024-07-10 NOTE — ED NOTES
Transfer Center Handoff for Behavioral Health Transfers      Patient's Current Location: [facility] ED     Chief Complaint   Patient presents with    Suicidal       Current or History of Violent Behavior: No    Currently in Restraints Now or During this Encounter: No  (Specify if Agitation or self harm is noted in ED?)  If yes, please describe behaviors requiring restraint:             Medical Clearance Documented and Verified in the Chart: Yes    Allergies   Allergen Reactions    Shellfish-Derived Products Rash    Fish-Derived Products         Can Patient Tolerate Lying Flat: Yes    Able to Perform ADLs:  Yes  (Specify if able to ambulate or uses any mobility devices such as cane or walker)  Activity:    Level of Assistance:    Assistive Device:    Miscellaneous Devices:      LABS    CBC:   Lab Results   Component Value Date/Time    WBC 7.4 07/10/2024 08:08 AM    RBC 4.26 07/10/2024 08:08 AM    HGB 13.2 07/10/2024 08:08 AM    HCT 39.1 07/10/2024 08:08 AM    MCV 91.8 07/10/2024 08:08 AM    MCH 31.0 07/10/2024 08:08 AM    MCHC 33.8 07/10/2024 08:08 AM    RDW 11.9 07/10/2024 08:08 AM     07/10/2024 08:08 AM    MPV 10.8 07/10/2024 08:08 AM     CMP:   Lab Results   Component Value Date/Time     07/10/2024 08:08 AM    K 3.7 07/10/2024 08:08 AM    K 3.7 10/22/2023 06:07 AM     07/10/2024 08:08 AM    CO2 25 07/10/2024 08:08 AM    BUN 13 07/10/2024 08:08 AM    CREATININE 1.0 07/10/2024 08:08 AM    GFRAA >60 04/25/2022 12:55 AM    LABGLOM >90 07/10/2024 08:08 AM    LABGLOM >90 04/25/2024 12:08 AM    GLUCOSE 92 07/10/2024 08:08 AM    CALCIUM 9.2 07/10/2024 08:08 AM    BILITOT 0.6 07/10/2024 08:08 AM    ALKPHOS 77 07/10/2024 08:08 AM    AST 68 07/10/2024 08:08 AM     07/10/2024 08:08 AM     Drug Panel:   Lab Results   Component Value Date/Time    OPIAU NEGATIVE 05/10/2024 02:27 AM    LABCOMM  04/25/2024 12:13 AM     Microscopic exam not performed based on chemical results unless  requested in original order.     UA:  Lab Results   Component Value Date/Time    COLORU Dark Yellow 07/10/2024 08:11 AM    PROTEINU TRACE 07/10/2024 08:11 AM    GLUCOSEU NEGATIVE 07/10/2024 08:11 AM    KETUA TRACE 07/10/2024 08:11 AM    BILIRUBINUR NEGATIVE 07/10/2024 08:11 AM    BLOODU NEGATIVE 09/27/2023 01:00 AM    UROBILINOGEN Normal 07/10/2024 08:11 AM    NITRU NEGATIVE 07/10/2024 08:11 AM    LEUKOCYTESUR NEGATIVE 07/10/2024 08:11 AM    WBCUA 2 TO 5 07/10/2024 08:11 AM    RBCUA 0 TO 2 07/10/2024 08:11 AM    BACTERIA None 07/10/2024 08:11 AM     PREGNANCY TEST: No results found for: \"PREGTESTUR\"    Dialysis: No    Target Destination: [facility]    Insurance: Payor: [employer] [place] [place] /  /  /      Current Psychotic Symptoms  Harmful Actions Towards Others:    Orientation Level:    Speech Pattern:    General Attitude:    Emotions:    Delusions:    Hallucination:       Any Suicidal Ideations: Low Risk    Homicidal Ideations/Violence Risk:   Observed Violent Behavior: No  Homicidal Ideations:      Past Psychiatric History:       Diagnosis Date    Anxiety     Depression     Insomnia        Hold (TDO/Involuntary Hold): No    The patient is not currently receiving care for the above psychiatric illness.     Home Medications  Does Patient Have Medications to Bring to the Facility: No  Medications Prior to Admission:   Prior to Admission Medications   Prescriptions Last Dose Informant Patient Reported? Taking?   DULoxetine (CYMBALTA) 30 MG extended release capsule   No No   Sig: Take 1 capsule by mouth daily      Facility-Administered Medications: None          Additional Information  Isolation:      HIV Positive: no    Oxygen Use: No    Any Legal Issues (Current or Past): no    Does Patient have POA or Guardian: No    Current Living Situation:      Roommate Appropriate: Yes    Is this a special case or have any other considerations:  No  (pregnancy, autism, developmental disabled, etc.)    Does the patient have confirmed

## 2024-07-10 NOTE — ED NOTES
Room assignment: [facility] 228-1  Report#: [id number]  Parth Myers: 1pm    EMTALA documentation completed.

## 2024-07-10 NOTE — BH NOTE
Patient given tobacco quitline number 13978415656 at this time, refusing to call at this time, states \" I just dont want to quit now\"- patient given information as to the dangers of long term tobacco use. Continue to reinforce the importance of tobacco cessation.

## 2024-07-10 NOTE — ED NOTES
Patient accepted to the [facility] by Dr. ZENA Lucas faxed, await bed placement.  [facility].  ROSE MARY Easley

## 2024-07-11 PROCEDURE — 6370000000 HC RX 637 (ALT 250 FOR IP): Performed by: NURSE PRACTITIONER

## 2024-07-11 PROCEDURE — 99232 SBSQ HOSP IP/OBS MODERATE 35: CPT | Performed by: PSYCHIATRY & NEUROLOGY

## 2024-07-11 PROCEDURE — 1240000000 HC EMOTIONAL WELLNESS R&B

## 2024-07-11 PROCEDURE — 6370000000 HC RX 637 (ALT 250 FOR IP): Performed by: PSYCHIATRY & NEUROLOGY

## 2024-07-11 RX ORDER — POLYETHYLENE GLYCOL 3350 17 G
2 POWDER IN PACKET (EA) ORAL
Status: DISCONTINUED | OUTPATIENT
Start: 2024-07-11 | End: 2024-07-12 | Stop reason: HOSPADM

## 2024-07-11 RX ORDER — VENLAFAXINE HYDROCHLORIDE 75 MG/1
75 CAPSULE, EXTENDED RELEASE ORAL
Status: DISCONTINUED | OUTPATIENT
Start: 2024-07-12 | End: 2024-07-12 | Stop reason: HOSPADM

## 2024-07-11 RX ADMIN — HYDROXYZINE HYDROCHLORIDE 50 MG: 50 TABLET, FILM COATED ORAL at 21:37

## 2024-07-11 RX ADMIN — NICOTINE POLACRILEX 2 MG: 2 LOZENGE ORAL at 16:22

## 2024-07-11 RX ADMIN — TRAZODONE HYDROCHLORIDE 50 MG: 50 TABLET ORAL at 21:37

## 2024-07-11 NOTE — PROGRESS NOTES
Behavioral Services  Medicare Certification Upon Admission    I certify that this patient's inpatient psychiatric hospital admission is medically necessary for:    [x] (1) Treatment which could reasonably be expected to improve this patient's condition,       [x] (2) Or for diagnostic study;     AND     [x](2) The inpatient psychiatric services are provided while the individual is under the care of a physician and are included in the individualized plan of care.    Estimated length of stay/service 3-5 days    Plan for post-hospital care home with outpatient [facility] f/u vs inpatient rehab    Electronically signed by RAPHAEL CASAS MD on 7/11/2024 at 8:08 AM

## 2024-07-11 NOTE — PLAN OF CARE
Problem: Self Harm/Suicidality  Goal: Will have no self-injury during hospital stay  Description: INTERVENTIONS:  1.  Ensure constant observer at bedside with Q15M safety checks  2.  Maintain a safe environment  3.  Secure patient belongings  4.  Ensure family/visitors adhere to safety recommendations  5.  Ensure safety tray has been added to patient's diet order  6.  Every shift and PRN: Re-assess suicidal risk via Frequent Screener    Outcome: Progressing  Note: Patient refused to answer about thoughts of self-harm or harm to others during this shift. Staff encouraged patient to notify staff if thoughts of self-harm or harm to others occur. Staff ensure patient safety by intermediate checks for every 15 minutes.          Problem: Depression  Goal: Will be euthymic at discharge  Description: INTERVENTIONS:  1. Administer medication as ordered  2. Provide emotional support via 1:1 interaction with staff  3. Encourage involvement in milieu/groups/activities  4. Monitor for social isolation  Outcome: Progressing  Note: Patient refuses to discuss feelings of depression. Staff ensure safety by providing safety checks on the unit intermittently and every 15 minutes. Patient is encouraged to notify staff if anxiety and depression occurs.

## 2024-07-11 NOTE — PLAN OF CARE
[facility]  Initial Interdisciplinary Treatment Plan Note      Original treatment plan Date & Time: 7/11/2024   1245    Admission Type:  Admission Type: Voluntary    Reason for admission:   Reason for Admission: Depression with suicidal ideaiton. Relapsed on Alcohol and cocaine about 2 weeks ago. Irritable. Non-compliant with medication and follow up appointments    Estimated Length of Stay:  5-7days  Estimated Discharge Date: To be determined by physician.    PATIENT STRENGTHS:  Patient Strengths:   Patient Strengths and Limitations:Limitations: Difficult relationships / poor social skills, Multiple barriers to leisure interests, Inappropriate/potentially harmful leisure interests, Difficulty problem solving/relies on others to help solve problems, Hopeless about future  Addictive Behavior: Addictive Behavior  In the Past 3 Months, Have You Felt or Has Someone Told You That You Have a Problem With  : None  Medical Problems:  Past Medical History:   Diagnosis Date    Anxiety     Depression     Insomnia      Status EXAM:Mental Status and Behavioral Exam  Normal: No  Level of Assistance: Independent/Self  Facial Expression: Avoids Gaze  Affect: Blunt  Level of Consciousness: Alert  Frequency of Checks: 4 times per hour, close  Mood:Normal: No  Mood: Depressed, Anxious  Motor Activity:Normal: No  Motor Activity: Decreased  Eye Contact: Poor  Observed Behavior: Withdrawn, Guarded, Preoccupied  Sexual Misconduct History: Current - no  Preception: [place] to person, [place] to time, [place] to place, [place] to situation  Attention:Normal: No  Attention: Unable to concentrate  Thought Processes: Circumstantial  Thought Content:Normal: No  Thought Content: Preoccupations  Depression Symptoms: Feelings of helplessness, Feelings of hopelessess, Impaired concentration, Isolative, Loss of interest  Anxiety Symptoms: Generalized  Yumiko Symptoms: No problems reported or observed.  Hallucinations: None  Delusions:

## 2024-07-11 NOTE — GROUP NOTE
Group Therapy Note    Date: 7/11/2024    Group Start Time: 1415  Group End Time: 1535  Group Topic: Cognitive Skills    Elyse Beckwith CTRS        Group Therapy Note    Attendees: 9/14     Topic: To increase social interaction, self expression, explore positive coping and stress   management r/t the senses, using creative expression and discussion.         Patient did not participate in Cognitive Skills Group at 1415, despite staff invitation and   explanation of benefits. Pt was up in group area during group time, pt did not engage in group   but while peers were drawing talked with RT about his Recovery treatment goals.RT had provided  Pt with number of agency and pt had talked with [facility] agency , had fax number and   list of necessary information needed.  Staff forwarded information to agency by fax per pt request and Rt reassured pt that information   had been sent due to pt's anxiety, about process. Pt was appreciative of staff support.     Q15 minute safety checks maintained for patient safety and will continue to encourage   patient to attend unit programming.       Discipline Responsible: Psychoeducational Specialist  Signature:  ROSELYN CRAWFORD

## 2024-07-11 NOTE — BH NOTE
MED NOTE    Patient in bed until lunch, refused am vitals/interaction. Patient asked what meds were available  patient began cursing at write and states that he wants his Effexor and will not be taking any other meds. Effexor is not ordered at this time.

## 2024-07-11 NOTE — PLAN OF CARE
Problem: Self Harm/Suicidality  Goal: Will have no self-injury during hospital stay  Description: INTERVENTIONS:  1.  Ensure constant observer at bedside with Q15M safety checks  2.  Maintain a safe environment  3.  Secure patient belongings  4.  Ensure family/visitors adhere to safety recommendations  5.  Ensure safety tray has been added to patient's diet order  6.  Every shift and PRN: Re-assess suicidal risk via Frequent Screener    7/11/2024 1820 by Maryellen Hunt LPN  Outcome: Progressing  Flowsheets (Taken 7/11/2024 1820)  Will have no self-injury during hospital stay:   Maintain a safe environment   Every shift and PRN: Re-assess suicidal risk via Frequent Screener   Secure patient belongings  Note: No self harm noted. Patient denied thoughts of harm to self or others. Patient is irritable, evasive and uncooperative. Patient refused all meds. Safe environment maintained. Q15 minute checks for safety continued per unit policy. Will continue to monitor for safety and provide support and reassurance as needed.    7/11/2024 1244 by Millie Elizabeth, RN  Outcome: Progressing

## 2024-07-11 NOTE — GROUP NOTE
Group Therapy Note    Date: 7/11/2024    Group Start Time: 1100  Group End Time: 1150  Group Topic: Cognitive Skills    Elyse Beckwith CTRS        Group Therapy Note    Attendees: 7/14     Topic: To increase social interaction, decision making and communication skills          Patient did not participate in Cognitive Skills Group at 1100, despite staff invitation and   explanation of benefits. Pt was seclusive to self and room during group.     Q15 minute safety checks maintained for patient safety and will continue to encourage   patient to attend unit programming.       Discipline Responsible: Psychoeducational Specialist  Signature:  ROSELYN CRAWFORD

## 2024-07-11 NOTE — GROUP NOTE
Group Therapy Note    Date: 7/11/2024    Group Start Time: 1015  Group End Time: 1045  Group Topic: Psychoeducation    Vijay Garnica        Group Therapy Note    Attendees: 7/15         Patient was offered group therapy today but declined to participate despite encouragement from staff. 1:1 was offered.    Signature:  Vijay Zuniga

## 2024-07-12 VITALS
OXYGEN SATURATION: 100 % | TEMPERATURE: 97.5 F | DIASTOLIC BLOOD PRESSURE: 71 MMHG | HEIGHT: 71 IN | WEIGHT: 203 LBS | RESPIRATION RATE: 16 BRPM | BODY MASS INDEX: 28.42 KG/M2 | SYSTOLIC BLOOD PRESSURE: 134 MMHG | HEART RATE: 78 BPM

## 2024-07-12 PROCEDURE — 6370000000 HC RX 637 (ALT 250 FOR IP): Performed by: PSYCHIATRY & NEUROLOGY

## 2024-07-12 PROCEDURE — 6370000000 HC RX 637 (ALT 250 FOR IP): Performed by: NURSE PRACTITIONER

## 2024-07-12 PROCEDURE — 99238 HOSP IP/OBS DSCHRG MGMT 30/<: CPT | Performed by: PSYCHIATRY & NEUROLOGY

## 2024-07-12 RX ORDER — M-VIT,TX,IRON,MINS/CALC/FOLIC 27MG-0.4MG
1 TABLET ORAL DAILY
Qty: 30 TABLET | Refills: 0 | Status: SHIPPED | OUTPATIENT
Start: 2024-07-12

## 2024-07-12 RX ORDER — THIAMINE MONONITRATE (VIT B1) 100 MG
100 TABLET ORAL DAILY
Qty: 30 TABLET | Refills: 0 | Status: SHIPPED | OUTPATIENT
Start: 2024-07-12

## 2024-07-12 RX ORDER — VENLAFAXINE HYDROCHLORIDE 75 MG/1
75 CAPSULE, EXTENDED RELEASE ORAL
Qty: 30 CAPSULE | Refills: 0 | Status: SHIPPED | OUTPATIENT
Start: 2024-07-13

## 2024-07-12 RX ORDER — FOLIC ACID 1 MG/1
1 TABLET ORAL DAILY
Qty: 30 TABLET | Refills: 0 | Status: SHIPPED | OUTPATIENT
Start: 2024-07-12

## 2024-07-12 RX ORDER — TRAZODONE HYDROCHLORIDE 50 MG/1
50 TABLET ORAL NIGHTLY PRN
Qty: 30 TABLET | Refills: 0 | Status: SHIPPED | OUTPATIENT
Start: 2024-07-12

## 2024-07-12 RX ADMIN — VENLAFAXINE HYDROCHLORIDE 75 MG: 75 CAPSULE, EXTENDED RELEASE ORAL at 08:06

## 2024-07-12 RX ADMIN — NICOTINE POLACRILEX 2 MG: 2 LOZENGE ORAL at 08:06

## 2024-07-12 RX ADMIN — NICOTINE POLACRILEX 2 MG: 2 LOZENGE ORAL at 13:29

## 2024-07-12 RX ADMIN — HYDROXYZINE HYDROCHLORIDE 50 MG: 50 TABLET, FILM COATED ORAL at 08:06

## 2024-07-12 NOTE — GROUP NOTE
Group Therapy Note    Date: 7/12/2024    Group Start Time: 1330  Group End Time: 1425  Group Topic: Music Therapy    Christiano Avery        Group Therapy Note    Attendees: 8/13       Patient's Goal:  Patients shared music and analyzed lyrics for themes, sharing advice based on themes within their songs. Engaged in education about cognitive distortions \"Black and white thinking.\" Goals to increase self-expression; Increase motivation; Increase sense of communication; Increase socialization; Demonstrate positive use of time;    Notes:  Laila attended first 15 minutes as an active listener, and left spontaneously without saying anything and did not return.    Status After Intervention:  Unchanged    Participation Level: Minimal    Participation Quality: Resistant      Speech:  normal      Thought Process/Content: Logical      Affective Functioning: Congruent      Mood: euthymic      Level of consciousness:  Alert      Response to Learning: Resistant      Endings: None Reported    Modes of Intervention: Support, Socialization, Exploration, Activity, Media, and Reality-testing      Discipline Responsible: Psychoeducational Specialist      Signature:  Christiano Morrison

## 2024-07-12 NOTE — BH NOTE
[facility] [place]  Discharge Note    Pt discharged with followings belongings:       Valuables returned to patient. Patient educated on aftercare instructions: yes, follow up and medication education.  Information faxed to [facility] by nursing staff  at 3:23 PM .Patient verbalize understanding of AVS:  yes. Patient taken to [facility] [place] via insurance cab.     Status EXAM upon discharge:  Mental Status and Behavioral Exam  Normal: No  Level of Assistance: Independent/Self  Facial Expression: Brightened  Affect: Blunt  Level of Consciousness: Alert  Frequency of Checks: 4 times per hour, close  Mood:Normal: No  Mood: Anxious  Motor Activity:Normal: Yes  Motor Activity: Decreased  Eye Contact: Fair  Observed Behavior: Preoccupied  Sexual Misconduct History: Current - no  Preception: [place] to person, [place] to time, [place] to place, [place] to situation  Attention:Normal: No  Attention: Distractible  Thought Processes: Circumstantial  Thought Content:Normal: No  Thought Content: Preoccupations  Depression Symptoms: No problems reported or observed.  Anxiety Symptoms: Generalized  Yumiko Symptoms: No problems reported or observed.  Hallucinations: None  Delusions: No  Memory:Normal: Yes  Memory: Poor remote, Poor recent  Insight and Judgment: No  Insight and Judgment: Poor judgment, Poor insight    Tobacco Screening:  Practical Counseling, on admission, juan X, if applicable and completed (first 3 are required if patient doesn't refuse):            ( ) Recognizing danger situations (included triggers and roadblocks)                    ( ) Coping skills (new ways to manage stress,relaxation techniques, changing routine, distraction)                                                           ( ) Basic information about quitting (benefits of quitting, techniques in how to quit, available resources  ( ) [facility]

## 2024-07-12 NOTE — CARE COORDINATION
Name: Balta Sarmiento    : 1980    Auth number: [id number]     Discharge Date: 24    Destination: [facility]    Discharge Medications:      Medication List        START taking these medications      folic acid 1 MG tablet  Commonly known as: FOLVITE  Take 1 tablet by mouth daily  Notes to patient: supplement     therapeutic multivitamin-minerals tablet  Take 1 tablet by mouth daily  Notes to patient: supplement     traZODone 50 MG tablet  Commonly known as: DESYREL  Take 1 tablet by mouth nightly as needed for Sleep     venlafaxine 75 MG extended release capsule  Commonly known as: EFFEXOR XR  Take 1 capsule by mouth daily (with breakfast)  Start taking on: 2024     vitamin B-1 100 MG tablet  Commonly known as: THIAMINE  Take 1 tablet by mouth daily  Notes to patient: supplement            STOP taking these medications      DULoxetine 30 MG extended release capsule  Commonly known as: CYMBALTA               Where to Get Your Medications        These medications were sent to [facility] #[facility] - [address] -  [phone] - F [phone]  [address] [place], [place] [id number]      Phone: [phone]   folic acid 1 MG tablet  therapeutic multivitamin-minerals tablet  traZODone 50 MG tablet  venlafaxine 75 MG extended release capsule  vitamin B-1 100 MG tablet         Follow Up Appointment: [facility]  [address] [place] [address]  Follow up on 2024  You will readmit to program today per your request.     
Per patient request, Social Work faxed referral for [facility]  
Writer met with Balta to discuss his discharge plan, states he will [facility] to [facility] [address] [address], states he was there prior to admission and has been approved for admission for today and plans to readmit to the program.  
being disturbed and stated \"If another mother ankita wakes me up I am going to lose my shit. You guys need to leave me alone.\" Social work questioned why the patient was here at the hospital, patient stated \"to fucken sleep\" patient then walked to his room.

## 2024-07-12 NOTE — DISCHARGE INSTRUCTIONS
Information:  Medications:   Medication summary provided   I understand that I should take only the medications on my list.     -why and when I need to take each medicine.     -which side effects to watch for.     -that I should carry my medication information at all times in case of     Emergency situations.    I will take all of my medicines to follow up appointments.     -check with my physician or pharmacist before taking any new    Medication, over the counter product or drink alcohol.    -Ask about food, drug or dietary supplement interactions.    -discard old lists and update records with medication providers.    Notify Physician:  Notify physician if you notice:   Always call 911 if you feel your life is in danger  In case of an emergency call 911 immediately!  If 911 is not available call your local emergency medical system for help    Behavioral Health Follow Up:  Original Referral Source: [facility]  Discharge Diagnosis: Depression with suicidal ideation [F32.A, R45.851]  Recommendations for Level of Care: Follow up appointment @ [facility], patient going to inpatient treatment @[facility]   Patient status at discharge: alert, oriented, denies thoughts of harm to self or others  My hospital  was: [facility]  Aftercare plan faxed: [facility]"   -faxed by: nursing staff   -date: 7/12/24   -time: 1300  Prescriptions: filled and sent home with patient per Meds to Beds    Smoking: Quit Smoking.   Call the [facility]'s smoking quitline at 7-869-82D-QUIT  Know the signs of a heart attack   If you have any of the following symptoms call 911 immediately, do not wait more    Than five minutes.    1. Pressure, fullness and/ or squeezing in the center of the chest spreading to    The jaw, neck or shoulder.    2. Chest discomfort with light headedness, fainting, sweating, nausea or    Shortness of breath.   3. Upper abdominal pressure or discomfort.   4. Lower chest pain, back pain, unusual

## 2024-07-12 NOTE — PROGRESS NOTES
Daily Progress Note  Walt Rutledge MD  7/11/2024  CHIEF COMPLAINT: Depression with suicidal ideation    Reviewed patient's current plan of care and vital signs with nursing staff.  Sleep:  several hours last night  Attending groups: No:     SUBJECTIVE:    Patient is transparently manipulative.  Tries to overemphasize the importance of Effexor when he has not been compliant with medication in the past.  He states he wants to go to rehab.  I discussed with him that he has a long history of not benefiting from from prolonged inpatient stays and patient does feel that he is already better and denying suicidal ideation intent or plan.  He agrees that long hospital stays cannot help him.  He would like to get out of the hospital.  We discussed possible discharge tomorrow with continued stability    Mental Status Exam  Level of consciousness:  Within normal limits  Appearance: Hospital attire, seated in chair, with good grooming and hygiene   Behavior/Motor: No abnormalities noted  Attitude toward examiner:  Cooperative, attentive, good eye contact  Speech:  spontaneous, normal rate, normal volume and well articulated  Mood: \"Better\"  Affect: Fair  Thought processes:  linear, goal directed and coherent  Thought content:  denies homicidal ideation  Suicidal Ideation: Denies suicidal ideation  Delusions:  no evidence of delusions  Perceptual Disturbance:  denies any perceptual disturbance  Cognition:  Oriented to self, location, time, and situation  Memory: age appropriate  Insight & Judgement: improving  Medication side effects:  denies       Data   height is 1.803 m (5' 11\") and weight is 92.1 kg (203 lb). His oral temperature is 98.2 °F (36.8 °C). His blood pressure is 112/79 and his pulse is 70. His respiration is 14 and oxygen saturation is 100%.   Labs:   Admission on 07/10/2024, Discharged on 07/10/2024   Component Date Value Ref Range Status    WBC 07/10/2024 7.4  3.5 - 11.3 k/uL Final    RBC 07/10/2024 4.26  4.21 -

## 2024-07-12 NOTE — BH NOTE
Patient given tobacco quitline number 80596063810 at this time, refusing to call at this time, states \" I just dont want to quit now\"- patient given information as to the dangers of long term tobacco use. Continue to reinforce the importance of tobacco cessation.

## 2024-07-12 NOTE — PROGRESS NOTES
CLINICAL PHARMACY NOTE: MEDS TO BEDS    Total # of Prescriptions Filled: 5   The following medications were delivered to the patient:  Trazodone 50mg  Multivitamins   Folic Acid 1mg  Venlafaxine ER 75mg  Thiamine Mono 100mg    Additional Documentation:  Delivered medications to [facility] at nurses station

## 2024-07-12 NOTE — TRANSITION OF CARE
features (HCC)     Discharge Plan/Destination: Patient is goint ot inpatient treatment @ [facility]    Discharge Medication List and Instructions:      Medication List        START taking these medications      folic acid 1 MG tablet  Commonly known as: FOLVITE  Take 1 tablet by mouth daily  Notes to patient: supplement     therapeutic multivitamin-minerals tablet  Take 1 tablet by mouth daily  Notes to patient: supplement     traZODone 50 MG tablet  Commonly known as: DESYREL  Take 1 tablet by mouth nightly as needed for Sleep     venlafaxine 75 MG extended release capsule  Commonly known as: EFFEXOR XR  Take 1 capsule by mouth daily (with breakfast)  Start taking on: July 13, 2024     vitamin B-1 100 MG tablet  Commonly known as: THIAMINE  Take 1 tablet by mouth daily  Notes to patient: supplement            STOP taking these medications      DULoxetine 30 MG extended release capsule  Commonly known as: CYMBALTA               Where to Get Your Medications        These medications were sent to [id number] - [address] -  [phone] - F [phone]  [address] [place] [address]      Phone: [phone]   folic acid 1 MG tablet  therapeutic multivitamin-minerals tablet  traZODone 50 MG tablet  venlafaxine 75 MG extended release capsule  vitamin B-1 100 MG tablet         Unresulted Labs (24h ago, onward)      None            To obtain results of studies pending at discharge, please contact [phone]    Follow-up Information       Follow up With Specialties Details Why Contact Info    [facility]  Follow up [facility] Monday-Friday between the hours 9:00 am-3:30 pm. [address] [place] [address]             Advanced Directive:   Does the patient have an appointed surrogate decision maker? No  Does the patient have a Medical Advance Directive? No  Does the patient have a Psychiatric Advance  Directive? No  If the patient does not have a surrogate or Medical Advance Directive AND Psychiatric Advance Directive, the patient was offered information on these advance directives Patient declined to complete    Patient Instructions: Please continue all medications until otherwise directed by physician.      Tobacco Cessation Discharge Plan:   Is the patient a tobacco user  and needs referral for tobacco cessation? Yes  Patient referred to the following for tobacco cessation with an appointment? Patient refused  Patient was offered medication to assist with tobacco cessation at discharge? Patient refused    Alcohol/Substance Abuse Discharge Plan:   Does the patient have a history of substance/alcohol abuse and requires a referral for treatment? Yes  Patient referred to the following for substance/alcohol abuse treatment with an appointment? yes  Patient was offered medication to assist with substance/alcohol abuse cessation at discharge? Yes      Patient discharged to: [facility]  [address],   [facility] [address] [phone]

## 2024-07-12 NOTE — PLAN OF CARE
Problem: Self Harm/Suicidality  Goal: Will have no self-injury during hospital stay  Description: INTERVENTIONS:  1.  Ensure constant observer at bedside with Q15M safety checks  2.  Maintain a safe environment  3.  Secure patient belongings  4.  Ensure family/visitors adhere to safety recommendations  5.  Ensure safety tray has been added to patient's diet order  6.  Every shift and PRN: Re-assess suicidal risk via Frequent Screener    7/11/2024 2344 by Isha Hinton LPN  Note: Patient is free from self related injuries at this time and he currently denies any ideas of harm to himself or others. Patient is agreeable to notify staff if thoughts of self harm occur. 15 minute safety checks continue.     Problem: Depression  Goal: Will be euthymic at discharge  Description: INTERVENTIONS:  1. Administer medication as ordered  2. Provide emotional support via 1:1 interaction with staff  3. Encourage involvement in milieu/groups/activities  4. Monitor for social isolation  7/11/2024 2344 by Isha Hinton LPN  Note: Patient denies feeling depressed at this time, he reports he feels fine and is just waiting to discharge to [facility] possibly tomorrow. Patient reports sleep has been fair and appetite adequate. Patient has been out in the dayroom selectively social with peers.

## 2024-07-13 NOTE — DISCHARGE SUMMARY
Reports after discharge from [facility] in May, he went to [facility] in [place], states he was there for 30 days, reports he remained sober however started using alcohol and cocaine this past Friday.  States he stopped taking medication, when questioned why, states, \"give me something that actually works\".  He endorses depression including anhedonia, feelings of worthlessness, hopelessness, fatigue, difficulty concentration, poor appetite, difficulty initiating maintaining sleep.  Endorses suicidal ideation however at this time does not have a plan.  Denies homicidal ideation.  Endorses anxiety including restlessness, muscle tension, and feeling on edge.  Denies having symptoms related to panic attacks.     At this time not able to elicit symptoms related to milo/hypomania.  He denies auditory, visual hallucinations, delusions or paranoia.  Denies symptoms related to OCD and PTSD.       UDS positive for cannabinoid and cocaine, EtOH level negative in ER.  He is not forthcoming in providing details of substance use other than relapsing past Friday.  States he wants go back to [facility] program in [place].     We reviewed the treatment options, will start Zoloft, will also start Risperdal to augment mood.  Medication risks, benefits, and possible side effects discussed.     At this time patient is not able to contract for safety outside of the hospital, he requires inpatient hospitalization for safety and stabilization.    Hospital Course:   Upon admission, Balta Sarmiento was provided a safe secure environment, introduced to unit milieu. Patient participated in groups and individual therapies. Meds were adjusted as noted below.  After few days of hospital care, patient began to feel improvement.  Depression lifted, thoughts to harm self ceased.  Sleep improved, appetite was good. On morning rounds 7/12/2024, Balta Sarmiento  endorses feeling ready for discharge. Patient denies suicidal or homicidal ideations, denies  appropriate  Insight & Judgement: fair  Medication side effects: denies     Disposition: home    Patient Instructions:   Activity: activity as tolerated  1. Patient instructed to take medications regularly and follow up with outpatient appointments.     Follow-up as scheduled with outpatient h f/u       Signed:    Electronically signed by RAPHAEL CASAS MD on 7/12/24 at 8:39 PM EDT    Time Spent on discharge is less than 30 minutes in the examination, evaluation, counseling and review of medications and discharge plan.

## 2024-07-14 LAB
EKG ATRIAL RATE: 74 BPM
EKG P AXIS: 12 DEGREES
EKG P-R INTERVAL: 174 MS
EKG Q-T INTERVAL: 418 MS
EKG QRS DURATION: 86 MS
EKG QTC CALCULATION (BAZETT): 463 MS
EKG R AXIS: -11 DEGREES
EKG T AXIS: 37 DEGREES
EKG VENTRICULAR RATE: 74 BPM

## 2024-07-30 ENCOUNTER — HOSPITAL ENCOUNTER (EMERGENCY)
Age: 44
Discharge: HOME | End: 2024-07-30
Attending: EMERGENCY MEDICINE
Payer: MEDICAID

## 2024-07-30 VITALS
HEART RATE: 99 BPM | WEIGHT: 200.62 LBS | DIASTOLIC BLOOD PRESSURE: 75 MMHG | OXYGEN SATURATION: 99 % | RESPIRATION RATE: 16 BRPM | HEIGHT: 71 IN | SYSTOLIC BLOOD PRESSURE: 106 MMHG | BODY MASS INDEX: 28.09 KG/M2 | TEMPERATURE: 97.5 F

## 2024-07-30 DIAGNOSIS — F10.20 UNCOMPLICATED ALCOHOL DEPENDENCE (HCC): Primary | ICD-10-CM

## 2024-07-30 LAB
ALBUMIN SERPL-MCNC: 4.8 G/DL (ref 3.5–5.2)
ALBUMIN/GLOB SERPL: 2 {RATIO} (ref 1–2.5)
ALP SERPL-CCNC: 77 U/L (ref 40–129)
ALT SERPL-CCNC: 77 U/L (ref 10–50)
AMPHET UR QL SCN: NEGATIVE
ANION GAP SERPL CALCULATED.3IONS-SCNC: 15 MMOL/L (ref 9–16)
APAP SERPL-MCNC: <5 UG/ML (ref 10–30)
AST SERPL-CCNC: 50 U/L (ref 10–50)
BARBITURATES UR QL SCN: NEGATIVE
BASOPHILS # BLD: 0.07 K/UL (ref 0–0.2)
BASOPHILS NFR BLD: 1 % (ref 0–2)
BENZODIAZ UR QL: NEGATIVE
BILIRUB SERPL-MCNC: 0.4 MG/DL (ref 0–1.2)
BUN SERPL-MCNC: 9 MG/DL (ref 6–20)
CALCIUM SERPL-MCNC: 9.6 MG/DL (ref 8.6–10.4)
CANNABINOIDS UR QL SCN: NEGATIVE
CHLORIDE SERPL-SCNC: 101 MMOL/L (ref 98–107)
CO2 SERPL-SCNC: 22 MMOL/L (ref 20–31)
COCAINE UR QL SCN: POSITIVE
CREAT SERPL-MCNC: 0.9 MG/DL (ref 0.7–1.2)
EOSINOPHIL # BLD: 0.37 K/UL (ref 0–0.44)
EOSINOPHILS RELATIVE PERCENT: 3 % (ref 1–4)
ERYTHROCYTE [DISTWIDTH] IN BLOOD BY AUTOMATED COUNT: 12.4 % (ref 11.8–14.4)
ETHANOL PERCENT: 0.14 %
ETHANOLAMINE SERPL-MCNC: 135 MG/DL (ref 0–0.08)
FENTANYL UR QL: NEGATIVE
GFR, ESTIMATED: >90 ML/MIN/1.73M2
GLUCOSE SERPL-MCNC: 89 MG/DL (ref 74–99)
HCT VFR BLD AUTO: 44.2 % (ref 40.7–50.3)
HGB BLD-MCNC: 15.3 G/DL (ref 13–17)
IMM GRANULOCYTES # BLD AUTO: 0.05 K/UL (ref 0–0.3)
IMM GRANULOCYTES NFR BLD: 0 %
LYMPHOCYTES NFR BLD: 3.13 K/UL (ref 1.1–3.7)
LYMPHOCYTES RELATIVE PERCENT: 28 % (ref 24–43)
MCH RBC QN AUTO: 31.6 PG (ref 25.2–33.5)
MCHC RBC AUTO-ENTMCNC: 34.6 G/DL (ref 28.4–34.8)
MCV RBC AUTO: 91.3 FL (ref 82.6–102.9)
METHADONE UR QL: NEGATIVE
MONOCYTES NFR BLD: 0.64 K/UL (ref 0.1–1.2)
MONOCYTES NFR BLD: 6 % (ref 3–12)
NEUTROPHILS NFR BLD: 62 % (ref 36–65)
NEUTS SEG NFR BLD: 7.01 K/UL (ref 1.5–8.1)
NRBC BLD-RTO: 0 PER 100 WBC
OPIATES UR QL SCN: NEGATIVE
OXYCODONE UR QL SCN: NEGATIVE
PCP UR QL SCN: NEGATIVE
PLATELET # BLD AUTO: 233 K/UL (ref 138–453)
PMV BLD AUTO: 10.5 FL (ref 8.1–13.5)
POTASSIUM SERPL-SCNC: 3.9 MMOL/L (ref 3.7–5.3)
PROT SERPL-MCNC: 7.7 G/DL (ref 6.6–8.7)
RBC # BLD AUTO: 4.84 M/UL (ref 4.21–5.77)
SALICYLATES SERPL-MCNC: <0.5 MG/DL (ref 0–10)
SODIUM SERPL-SCNC: 138 MMOL/L (ref 136–145)
TEST INFORMATION: ABNORMAL
WBC OTHER # BLD: 11.3 K/UL (ref 3.5–11.3)

## 2024-07-30 PROCEDURE — 80143 DRUG ASSAY ACETAMINOPHEN: CPT

## 2024-07-30 PROCEDURE — G0480 DRUG TEST DEF 1-7 CLASSES: HCPCS

## 2024-07-30 PROCEDURE — 99283 EMERGENCY DEPT VISIT LOW MDM: CPT

## 2024-07-30 PROCEDURE — 80053 COMPREHEN METABOLIC PANEL: CPT

## 2024-07-30 PROCEDURE — 80179 DRUG ASSAY SALICYLATE: CPT

## 2024-07-30 PROCEDURE — 80307 DRUG TEST PRSMV CHEM ANLYZR: CPT

## 2024-07-30 PROCEDURE — 85025 COMPLETE CBC W/AUTO DIFF WBC: CPT

## 2024-07-30 ASSESSMENT — ENCOUNTER SYMPTOMS
NAUSEA: 0
VOMITING: 0
SHORTNESS OF BREATH: 0
ABDOMINAL PAIN: 0

## 2024-07-30 ASSESSMENT — LIFESTYLE VARIABLES
HOW OFTEN DO YOU HAVE A DRINK CONTAINING ALCOHOL: 4 OR MORE TIMES A WEEK
HOW MANY STANDARD DRINKS CONTAINING ALCOHOL DO YOU HAVE ON A TYPICAL DAY: 5 OR 6

## 2024-07-30 ASSESSMENT — PAIN - FUNCTIONAL ASSESSMENT: PAIN_FUNCTIONAL_ASSESSMENT: NONE - DENIES PAIN

## 2024-07-30 NOTE — ED NOTES
The patient is a 43 year old male that came to the ED today due to wanting to detox from alcohol and cocaine. Patient reports that he is requesting help from his alcohol and cocaine use. Patient reports that he drinks at least 4-24oz beers per day. He states that he has been drink this much for a long time. Patient reports that  his first drink was at the age 5. He states that he last had alcohol about 2300 on 07.29.2024. Patient reports that he uses any where from $8 to 1 gram of cocaine per day. He states that he usually smokes the cocaine. Patient unsure when he stated cocaine. The patient reports that his alcohol and drug use has caused impacts in his life such as being homeless,estranged from family and friends and unable to hold down employment. The patient repots that when he is not using alcohol he gets tremors in his hands and not feeling well. Patient denied any withdrawal seizures or DTs.  The patient reports that he has attempted sobriety many times but relapses either because of his mental health or being places back in an environment not conducive to recovery. The patient is interested in withdrawal management and then would like referred to sober living. The patient does have a history of history of Major Depression. Patient denied any mental health symptoms tonight. Patient denied any suicidal or homicidal thoughts or plans. Patient reports that he is compliant with is Effexor.

## 2024-07-30 NOTE — ED NOTES
Per report from CHAVO Grimm. Pt is awaiting acceptance and discharge to [facility] facility. Pt uses ETOH and Cocaine would like to see treatment for those substances. Pt is resting comfortably on stretcher, NAD.

## 2024-07-30 NOTE — DISCHARGE INSTRUCTIONS
You were seen in the emergency department for social work eval to go for alcohol detox.  Your vital signs were stable.  Lab work within normal limits.  Social work did speak with [facility] who accepted you.  Social work will schedule you a cab at 8 AM.    Please return to the emergency department if you have any other concerns.

## 2024-07-30 NOTE — ED PROVIDER NOTES
[facility] ED  Emergency Department Encounter  Emergency Medicine Resident     Pt Name:Balta Sarmiento  MRN: [id number]  Birthdate 1980  Date of evaluation: 7/30/24  PCP:  No primary care provider on file.  Note Started: 1:55 AM EDT      CHIEF COMPLAINT       Chief Complaint   Patient presents with    Alcohol Problem    Addiction Problem       HISTORY OF PRESENT ILLNESS  (Location/Symptom, Timing/Onset, Context/Setting, Quality, Duration, Modifying Factors, Severity.)      Balta Sarmiento is a 43 y.o. male with history of drug and alcohol abuse who presents for help to quit drinking.  Patient states that he does drink daily.  His last drink was around 10 or 11 last night.  He states that he has had tremors before when he stops drinking but has never had any seizures.  He does admit to using crack cocaine.  He denies any suicidal or homicidal ideation.  Denies any visual or auditory hallucinations.  He has no other medical complaints at this time.    PAST MEDICAL / SURGICAL / SOCIAL / FAMILY HISTORY      has a past medical history of Anxiety, Depression, and Insomnia.     has no past surgical history on file.      Social History     Socioeconomic History    Marital status: Single     Spouse name: Not on file    Number of children: Not on file    Years of education: Not on file    Highest education level: Not on file   Occupational History    Not on file   Tobacco Use    Smoking status: Every Day     Current packs/day: 0.50     Types: Cigarettes    Smokeless tobacco: Never   Vaping Use    Vaping Use: Every day   Substance and Sexual Activity    Alcohol use: Yes     Comment: 4 beers and a fifth of liquor    Drug use: Yes     Types: Cocaine    Sexual activity: Not on file   Other Topics Concern    Not on file   Social History Narrative    Not on file     Social Determinants of Health     Financial Resource Strain: Not on file   Food Insecurity: Patient Declined (7/10/2024)    Hunger Vital Sign     Worried    Wt (S) 91 kg (200 lb 9.9 oz)   SpO2 99%   BMI 27.98 kg/m²     Physical Exam  Constitutional:       General: He is not in acute distress.     Appearance: He is not ill-appearing or toxic-appearing.   HENT:      Head: Normocephalic and atraumatic.      Right Ear: External ear normal.      Left Ear: External ear normal.      Nose: Nose normal.      Mouth/Throat:      Mouth: Mucous membranes are moist.   Eyes:      Extraocular Movements: Extraocular movements intact.      Conjunctiva/sclera: Conjunctivae normal.   Cardiovascular:      Rate and Rhythm: Normal rate.      Pulses: Normal pulses.      Heart sounds: Normal heart sounds.   Pulmonary:      Effort: Pulmonary effort is normal. No respiratory distress.      Breath sounds: No stridor. No wheezing, rhonchi or rales.   Abdominal:      General: Abdomen is flat. There is no distension.      Palpations: Abdomen is soft.      Tenderness: There is no abdominal tenderness. There is no guarding or rebound.   Musculoskeletal:         General: Normal range of motion.      Cervical back: Normal range of motion.   Neurological:      General: No focal deficit present.      Mental Status: He is alert and oriented to person, place, and time.       DDX/DIAGNOSTIC RESULTS / EMERGENCY DEPARTMENT COURSE / MDM     Medical Decision Making  43-year-old male with history of drug and alcohol abuse who presents for help to quit drinking.  Patient states that he does drink daily.  His last drink was around 10 or 11 last night.  He states that he has had tremors before when he stops drinking but has never had any seizures.  He does admit to using crack cocaine.  He denies any suicidal or homicidal ideation.  Denies any visual or auditory hallucinations.  He has no other medical complaints at this time.  Patient is not in acute distress.  Vital signs are stable.  Heart sounds normal.  Breath sounds clear to auscultation bilaterally.  No respiratory distress.  Abdomen soft, nontender,

## 2024-07-30 NOTE — ED NOTES
Pt to ED for drug/alcohol rehab resources. Pt states that he was discharged from one facility yesterday afternoon, but was sent back to [place], where he didn't want to go because he thought he would relapse. Pt admits to doing cocaine and drinking 4 beers around 10 pm yesterday. Pt denies any suicidal/homicidal ideations.

## 2024-07-30 NOTE — ED NOTES
Per Nguyễn , pt is accepted at [facility] but won't be able to be admitted until 0800. Per SUSAN pt should be discharged around 0730 and sent via cab.     Pt updated on plan of care, and is agreeable. Denies needs at this time.

## 2024-07-30 NOTE — ED NOTES
Patient accepted to [facility]. SUSAN will taxi patient to [facility] @5350

## 2024-07-30 NOTE — ED NOTES
Per Dr. Ramos pt will be discharged when Nguyễn ADVIS calls cab for patient. Upon speaking to SUSAN Adrian, he will call a cab around 0700 so that patient can be discharged at 0730 to go to [facility]. Per Dr. Ramos pt needs to remain in system and NOT be discharged until closer to cab arrival ([phone]).  Pt is currently laying on hallway stretcher, sleeping, calm,  and cooperative.

## 2024-08-03 NOTE — ED PROVIDER NOTES
[facility]   Emergency Department  Faculty Attestation       I performed a history and physical examination of the patient and discussed management with the resident. I reviewed the resident’s note and agree with the documented findings including all diagnostic interpretations and plan of care. Any areas of disagreement are noted on the chart. I was personally present for the key portions of any procedures. I have documented in the chart those procedures where I was not present during the key portions. I have reviewed the emergency nurses triage note. I agree with the chief complaint, past medical history, past surgical history, allergies, medications, social and family history as documented unless otherwise noted below.  For Physician Assistant/ Nurse Practitioner cases/documentation I have personally evaluated this patient and have completed at least one if not all key elements of the E/M (history, physical exam, and MDM). Additional findings are as noted.    Patient Name: Balta Sarmiento  MRN: [id number]  : 1980  Primary Care Physician: No primary care provider on file.    Date of evaluationa: 2024   Note Started: 4:49 PM EDT    Pertinent Comments     Chief Complaint:   Chief Complaint   Patient presents with    Alcohol Problem    Addiction Problem        Initial vitals: (If not listed, please see nursing documentation)  ED Triage Vitals   BP Temp Temp Source Pulse Respirations SpO2 Height Weight - Scale   24 0127 24 0127 24 0459 24 0127 24 0127 24 0127 24 0458 24 0458   106/75 97.5 °F (36.4 °C) Oral 99 16 99 % 1.803 m (5' 11\") (S) 91 kg (200 lb 9.9 oz)        HPI/PE/Impression:  This is a 43 y.o. male who presents to the Emergency Department w/ request for detox program. Had tried multiple centers and all closed for the night.   No active symtpoms.  Has been using alcohol and cocaine. On exam awake and aelrt in no distress. Talking in

## 2024-09-12 ENCOUNTER — APPOINTMENT (OUTPATIENT)
Dept: GENERAL RADIOLOGY | Age: 44
DRG: 193 | End: 2024-09-12
Payer: COMMERCIAL

## 2024-09-12 ENCOUNTER — HOSPITAL ENCOUNTER (INPATIENT)
Age: 44
LOS: 1 days | Discharge: HOME | DRG: 193 | End: 2024-09-14
Attending: EMERGENCY MEDICINE | Admitting: FAMILY MEDICINE
Payer: COMMERCIAL

## 2024-09-12 DIAGNOSIS — J96.01 ACUTE HYPOXIC RESPIRATORY FAILURE: ICD-10-CM

## 2024-09-12 DIAGNOSIS — R07.9 CHEST PAIN, UNSPECIFIED TYPE: Primary | ICD-10-CM

## 2024-09-12 DIAGNOSIS — F14.90 CRACK COCAINE USE: ICD-10-CM

## 2024-09-12 PROCEDURE — 80053 COMPREHEN METABOLIC PANEL: CPT

## 2024-09-12 PROCEDURE — 2580000003 HC RX 258: Performed by: STUDENT IN AN ORGANIZED HEALTH CARE EDUCATION/TRAINING PROGRAM

## 2024-09-12 PROCEDURE — 96375 TX/PRO/DX INJ NEW DRUG ADDON: CPT

## 2024-09-12 PROCEDURE — 85025 COMPLETE CBC W/AUTO DIFF WBC: CPT

## 2024-09-12 PROCEDURE — 84484 ASSAY OF TROPONIN QUANT: CPT

## 2024-09-12 PROCEDURE — 93005 ELECTROCARDIOGRAM TRACING: CPT

## 2024-09-12 PROCEDURE — 96374 THER/PROPH/DIAG INJ IV PUSH: CPT

## 2024-09-12 PROCEDURE — 99285 EMERGENCY DEPT VISIT HI MDM: CPT

## 2024-09-12 PROCEDURE — 6360000002 HC RX W HCPCS: Performed by: STUDENT IN AN ORGANIZED HEALTH CARE EDUCATION/TRAINING PROGRAM

## 2024-09-12 PROCEDURE — 2500000003 HC RX 250 WO HCPCS: Performed by: STUDENT IN AN ORGANIZED HEALTH CARE EDUCATION/TRAINING PROGRAM

## 2024-09-12 RX ORDER — LORAZEPAM 2 MG/ML
1 INJECTION INTRAMUSCULAR ONCE
Status: COMPLETED | OUTPATIENT
Start: 2024-09-12 | End: 2024-09-12

## 2024-09-12 RX ORDER — LORAZEPAM 2 MG/ML
1 INJECTION INTRAMUSCULAR ONCE
Status: COMPLETED | OUTPATIENT
Start: 2024-09-13 | End: 2024-09-13

## 2024-09-12 RX ORDER — ONDANSETRON 2 MG/ML
4 INJECTION INTRAMUSCULAR; INTRAVENOUS ONCE
Status: COMPLETED | OUTPATIENT
Start: 2024-09-12 | End: 2024-09-12

## 2024-09-12 RX ADMIN — ONDANSETRON 4 MG: 2 INJECTION INTRAMUSCULAR; INTRAVENOUS at 23:51

## 2024-09-12 RX ADMIN — FAMOTIDINE 20 MG: 10 INJECTION, SOLUTION INTRAVENOUS at 23:51

## 2024-09-12 RX ADMIN — LORAZEPAM 1 MG: 2 INJECTION INTRAMUSCULAR; INTRAVENOUS at 23:50

## 2024-09-13 ENCOUNTER — APPOINTMENT (OUTPATIENT)
Dept: GENERAL RADIOLOGY | Age: 44
DRG: 193 | End: 2024-09-13
Payer: COMMERCIAL

## 2024-09-13 ENCOUNTER — APPOINTMENT (OUTPATIENT)
Dept: CT IMAGING | Age: 44
DRG: 193 | End: 2024-09-13
Payer: COMMERCIAL

## 2024-09-13 PROBLEM — B18.2 CHRONIC HEPATITIS C VIRUS INFECTION (HCC): Status: ACTIVE | Noted: 2024-09-13

## 2024-09-13 PROBLEM — A63.0 GENITAL WARTS DUE TO HPV (HUMAN PAPILLOMAVIRUS): Status: ACTIVE | Noted: 2024-09-13

## 2024-09-13 PROBLEM — R07.9 CHEST PAIN: Status: ACTIVE | Noted: 2024-09-13

## 2024-09-13 PROBLEM — J96.01 ACUTE HYPOXIC RESPIRATORY FAILURE: Status: ACTIVE | Noted: 2024-09-13

## 2024-09-13 PROBLEM — J18.9 ATYPICAL PNEUMONIA: Status: ACTIVE | Noted: 2024-09-13

## 2024-09-13 LAB
ALBUMIN SERPL-MCNC: 4.5 G/DL (ref 3.5–5.2)
ALBUMIN/GLOB SERPL: 1 {RATIO} (ref 1–2.5)
ALP SERPL-CCNC: 78 U/L (ref 40–129)
ALT SERPL-CCNC: 58 U/L (ref 10–50)
AMPHET UR QL SCN: NEGATIVE
ANION GAP SERPL CALCULATED.3IONS-SCNC: 12 MMOL/L (ref 9–16)
AST SERPL-CCNC: 39 U/L (ref 10–50)
B PARAP IS1001 DNA NPH QL NAA+NON-PROBE: NOT DETECTED
B PERT DNA SPEC QL NAA+PROBE: NOT DETECTED
BARBITURATES UR QL SCN: NEGATIVE
BASOPHILS # BLD: 0.04 K/UL (ref 0–0.2)
BASOPHILS NFR BLD: 0 % (ref 0–2)
BENZODIAZ UR QL: NEGATIVE
BILIRUB SERPL-MCNC: 0.5 MG/DL (ref 0–1.2)
BILIRUB UR QL STRIP: NEGATIVE
BNP SERPL-MCNC: <36 PG/ML (ref 0–300)
BUN SERPL-MCNC: 8 MG/DL (ref 6–20)
C PNEUM DNA NPH QL NAA+NON-PROBE: NOT DETECTED
CALCIUM SERPL-MCNC: 8.8 MG/DL (ref 8.6–10.4)
CANNABINOIDS UR QL SCN: POSITIVE
CHLORIDE SERPL-SCNC: 101 MMOL/L (ref 98–107)
CLARITY UR: CLEAR
CO2 SERPL-SCNC: 24 MMOL/L (ref 20–31)
COCAINE UR QL SCN: POSITIVE
COLOR UR: YELLOW
COMMENT: ABNORMAL
CREAT SERPL-MCNC: 0.9 MG/DL (ref 0.7–1.2)
EKG ATRIAL RATE: 117 BPM
EKG P AXIS: 33 DEGREES
EKG P-R INTERVAL: 182 MS
EKG Q-T INTERVAL: 314 MS
EKG QRS DURATION: 86 MS
EKG QTC CALCULATION (BAZETT): 438 MS
EKG R AXIS: -34 DEGREES
EKG T AXIS: 34 DEGREES
EKG VENTRICULAR RATE: 117 BPM
EOSINOPHIL # BLD: 0.22 K/UL (ref 0–0.44)
EOSINOPHILS RELATIVE PERCENT: 2 % (ref 1–4)
ERYTHROCYTE [DISTWIDTH] IN BLOOD BY AUTOMATED COUNT: 12.8 % (ref 11.8–14.4)
FENTANYL UR QL: NEGATIVE
FLUAV AG SPEC QL: NEGATIVE
FLUAV RNA NPH QL NAA+NON-PROBE: NOT DETECTED
FLUBV AG SPEC QL: NEGATIVE
FLUBV RNA NPH QL NAA+NON-PROBE: NOT DETECTED
GFR, ESTIMATED: >90 ML/MIN/1.73M2
GLUCOSE SERPL-MCNC: 88 MG/DL (ref 74–99)
GLUCOSE UR STRIP-MCNC: NEGATIVE MG/DL
HADV DNA NPH QL NAA+NON-PROBE: NOT DETECTED
HCOV 229E RNA NPH QL NAA+NON-PROBE: NOT DETECTED
HCOV HKU1 RNA NPH QL NAA+NON-PROBE: NOT DETECTED
HCOV NL63 RNA NPH QL NAA+NON-PROBE: NOT DETECTED
HCOV OC43 RNA NPH QL NAA+NON-PROBE: NOT DETECTED
HCT VFR BLD AUTO: 43.6 % (ref 40.7–50.3)
HCV AB SERPL QL IA: REACTIVE
HGB BLD-MCNC: 14.6 G/DL (ref 13–17)
HGB UR QL STRIP.AUTO: NEGATIVE
HMPV RNA NPH QL NAA+NON-PROBE: NOT DETECTED
HPIV1 RNA NPH QL NAA+NON-PROBE: NOT DETECTED
HPIV2 RNA NPH QL NAA+NON-PROBE: NOT DETECTED
HPIV3 RNA NPH QL NAA+NON-PROBE: NOT DETECTED
HPIV4 RNA NPH QL NAA+NON-PROBE: NOT DETECTED
IMM GRANULOCYTES # BLD AUTO: 0.03 K/UL (ref 0–0.3)
IMM GRANULOCYTES NFR BLD: 0 %
KETONES UR STRIP-MCNC: ABNORMAL MG/DL
L PNEUMO1 AG UR QL IA.RAPID: NEGATIVE
LEUKOCYTE ESTERASE UR QL STRIP: NEGATIVE
LYMPHOCYTES NFR BLD: 1.19 K/UL (ref 1.1–3.7)
LYMPHOCYTES RELATIVE PERCENT: 10 % (ref 24–43)
M PNEUMO DNA NPH QL NAA+NON-PROBE: NOT DETECTED
MCH RBC QN AUTO: 31.3 PG (ref 25.2–33.5)
MCHC RBC AUTO-ENTMCNC: 33.5 G/DL (ref 28.4–34.8)
MCV RBC AUTO: 93.4 FL (ref 82.6–102.9)
METHADONE UR QL: NEGATIVE
MONOCYTES NFR BLD: 0.27 K/UL (ref 0.1–1.2)
MONOCYTES NFR BLD: 2 % (ref 3–12)
NEUTROPHILS NFR BLD: 86 % (ref 36–65)
NEUTS SEG NFR BLD: 10.75 K/UL (ref 1.5–8.1)
NITRITE UR QL STRIP: NEGATIVE
NRBC BLD-RTO: 0 PER 100 WBC
OPIATES UR QL SCN: NEGATIVE
OXYCODONE UR QL SCN: NEGATIVE
PCP UR QL SCN: NEGATIVE
PH UR STRIP: 5.5 [PH] (ref 5–8)
PLATELET # BLD AUTO: 194 K/UL (ref 138–453)
PMV BLD AUTO: 10.8 FL (ref 8.1–13.5)
POTASSIUM SERPL-SCNC: 3.8 MMOL/L (ref 3.7–5.3)
PROCALCITONIN SERPL-MCNC: 7.95 NG/ML (ref 0–0.09)
PROT SERPL-MCNC: 7.6 G/DL (ref 6.6–8.7)
PROT UR STRIP-MCNC: NEGATIVE MG/DL
RBC # BLD AUTO: 4.67 M/UL (ref 4.21–5.77)
RSV RNA NPH QL NAA+NON-PROBE: NOT DETECTED
RV+EV RNA NPH QL NAA+NON-PROBE: NOT DETECTED
S PNEUM AG SPEC QL: NEGATIVE
SARS-COV-2 RDRP RESP QL NAA+PROBE: NOT DETECTED
SARS-COV-2 RNA NPH QL NAA+NON-PROBE: NOT DETECTED
SODIUM SERPL-SCNC: 137 MMOL/L (ref 136–145)
SP GR UR STRIP: 1.05 (ref 1–1.03)
SPECIMEN DESCRIPTION: NORMAL
SPECIMEN DESCRIPTION: NORMAL
SPECIMEN SOURCE: NORMAL
TEST INFORMATION: ABNORMAL
TROPONIN I SERPL HS-MCNC: <6 NG/L (ref 0–22)
TROPONIN I SERPL HS-MCNC: <6 NG/L (ref 0–22)
UROBILINOGEN UR STRIP-ACNC: NORMAL EU/DL (ref 0–1)
WBC OTHER # BLD: 12.5 K/UL (ref 3.5–11.3)

## 2024-09-13 PROCEDURE — 2580000003 HC RX 258

## 2024-09-13 PROCEDURE — 87522 HEPATITIS C REVRS TRNSCRPJ: CPT

## 2024-09-13 PROCEDURE — 36415 COLL VENOUS BLD VENIPUNCTURE: CPT

## 2024-09-13 PROCEDURE — 6370000000 HC RX 637 (ALT 250 FOR IP)

## 2024-09-13 PROCEDURE — 6370000000 HC RX 637 (ALT 250 FOR IP): Performed by: INTERNAL MEDICINE

## 2024-09-13 PROCEDURE — 81003 URINALYSIS AUTO W/O SCOPE: CPT

## 2024-09-13 PROCEDURE — 94760 N-INVAS EAR/PLS OXIMETRY 1: CPT

## 2024-09-13 PROCEDURE — 84484 ASSAY OF TROPONIN QUANT: CPT

## 2024-09-13 PROCEDURE — 87635 SARS-COV-2 COVID-19 AMP PRB: CPT

## 2024-09-13 PROCEDURE — 71275 CT ANGIOGRAPHY CHEST: CPT

## 2024-09-13 PROCEDURE — 87804 INFLUENZA ASSAY W/OPTIC: CPT

## 2024-09-13 PROCEDURE — 6360000002 HC RX W HCPCS: Performed by: STUDENT IN AN ORGANIZED HEALTH CARE EDUCATION/TRAINING PROGRAM

## 2024-09-13 PROCEDURE — 99222 1ST HOSP IP/OBS MODERATE 55: CPT | Performed by: INTERNAL MEDICINE

## 2024-09-13 PROCEDURE — 83880 ASSAY OF NATRIURETIC PEPTIDE: CPT

## 2024-09-13 PROCEDURE — 80307 DRUG TEST PRSMV CHEM ANLYZR: CPT

## 2024-09-13 PROCEDURE — 6360000002 HC RX W HCPCS: Performed by: INTERNAL MEDICINE

## 2024-09-13 PROCEDURE — 87641 MR-STAPH DNA AMP PROBE: CPT

## 2024-09-13 PROCEDURE — 87899 AGENT NOS ASSAY W/OPTIC: CPT

## 2024-09-13 PROCEDURE — 74174 CTA ABD&PLVS W/CONTRAST: CPT

## 2024-09-13 PROCEDURE — 87449 NOS EACH ORGANISM AG IA: CPT

## 2024-09-13 PROCEDURE — 71045 X-RAY EXAM CHEST 1 VIEW: CPT

## 2024-09-13 PROCEDURE — 84145 PROCALCITONIN (PCT): CPT

## 2024-09-13 PROCEDURE — 6360000002 HC RX W HCPCS

## 2024-09-13 PROCEDURE — 86803 HEPATITIS C AB TEST: CPT

## 2024-09-13 PROCEDURE — 6360000004 HC RX CONTRAST MEDICATION: Performed by: STUDENT IN AN ORGANIZED HEALTH CARE EDUCATION/TRAINING PROGRAM

## 2024-09-13 PROCEDURE — 2060000000 HC ICU INTERMEDIATE R&B

## 2024-09-13 PROCEDURE — 0202U NFCT DS 22 TRGT SARS-COV-2: CPT

## 2024-09-13 RX ORDER — LANOLIN ALCOHOL/MO/W.PET/CERES
100 CREAM (GRAM) TOPICAL DAILY
Status: DISCONTINUED | OUTPATIENT
Start: 2024-09-13 | End: 2024-09-14 | Stop reason: HOSPADM

## 2024-09-13 RX ORDER — IOPAMIDOL 755 MG/ML
100 INJECTION, SOLUTION INTRAVASCULAR
Status: COMPLETED | OUTPATIENT
Start: 2024-09-13 | End: 2024-09-13

## 2024-09-13 RX ORDER — FUROSEMIDE 10 MG/ML
20 INJECTION INTRAMUSCULAR; INTRAVENOUS 2 TIMES DAILY
Status: DISPENSED | OUTPATIENT
Start: 2024-09-13 | End: 2024-09-14

## 2024-09-13 RX ORDER — GAUZE BANDAGE 2" X 2"
100 BANDAGE TOPICAL DAILY
Status: DISCONTINUED | OUTPATIENT
Start: 2024-09-13 | End: 2024-09-13 | Stop reason: RX

## 2024-09-13 RX ORDER — LORAZEPAM 1 MG/1
1 TABLET ORAL EVERY 4 HOURS PRN
Status: DISCONTINUED | OUTPATIENT
Start: 2024-09-13 | End: 2024-09-14 | Stop reason: HOSPADM

## 2024-09-13 RX ORDER — NICOTINE 21 MG/24HR
1 PATCH, TRANSDERMAL 24 HOURS TRANSDERMAL DAILY
Status: DISCONTINUED | OUTPATIENT
Start: 2024-09-13 | End: 2024-09-14 | Stop reason: HOSPADM

## 2024-09-13 RX ORDER — GUAIFENESIN 600 MG/1
600 TABLET, EXTENDED RELEASE ORAL 2 TIMES DAILY
Status: DISCONTINUED | OUTPATIENT
Start: 2024-09-13 | End: 2024-09-13

## 2024-09-13 RX ORDER — FOLIC ACID 1 MG/1
1 TABLET ORAL DAILY
Status: DISCONTINUED | OUTPATIENT
Start: 2024-09-13 | End: 2024-09-14 | Stop reason: HOSPADM

## 2024-09-13 RX ORDER — CARVEDILOL 3.12 MG/1
3.12 TABLET ORAL 2 TIMES DAILY WITH MEALS
Status: DISCONTINUED | OUTPATIENT
Start: 2024-09-13 | End: 2024-09-14 | Stop reason: HOSPADM

## 2024-09-13 RX ORDER — TRAZODONE HYDROCHLORIDE 50 MG/1
50 TABLET, FILM COATED ORAL NIGHTLY PRN
Status: DISCONTINUED | OUTPATIENT
Start: 2024-09-13 | End: 2024-09-14 | Stop reason: HOSPADM

## 2024-09-13 RX ORDER — HYDROXYZINE HYDROCHLORIDE 10 MG/1
50 TABLET, FILM COATED ORAL EVERY 8 HOURS PRN
Status: DISCONTINUED | OUTPATIENT
Start: 2024-09-13 | End: 2024-09-14 | Stop reason: HOSPADM

## 2024-09-13 RX ORDER — ENOXAPARIN SODIUM 100 MG/ML
40 INJECTION SUBCUTANEOUS DAILY
Status: DISCONTINUED | OUTPATIENT
Start: 2024-09-13 | End: 2024-09-13

## 2024-09-13 RX ORDER — LORAZEPAM 2 MG/ML
2 INJECTION INTRAMUSCULAR EVERY 4 HOURS
Status: DISCONTINUED | OUTPATIENT
Start: 2024-09-13 | End: 2024-09-13

## 2024-09-13 RX ORDER — POLYETHYLENE GLYCOL 3350 17 G/17G
17 POWDER, FOR SOLUTION ORAL DAILY PRN
Status: DISCONTINUED | OUTPATIENT
Start: 2024-09-13 | End: 2024-09-14 | Stop reason: HOSPADM

## 2024-09-13 RX ORDER — ATORVASTATIN CALCIUM 40 MG/1
40 TABLET, FILM COATED ORAL NIGHTLY
Status: DISCONTINUED | OUTPATIENT
Start: 2024-09-13 | End: 2024-09-14 | Stop reason: HOSPADM

## 2024-09-13 RX ORDER — PROMETHAZINE HYDROCHLORIDE 12.5 MG/1
12.5 TABLET ORAL EVERY 6 HOURS PRN
Status: DISCONTINUED | OUTPATIENT
Start: 2024-09-13 | End: 2024-09-14 | Stop reason: HOSPADM

## 2024-09-13 RX ORDER — MORPHINE SULFATE 4 MG/ML
2 INJECTION, SOLUTION INTRAMUSCULAR; INTRAVENOUS EVERY 4 HOURS PRN
Status: DISCONTINUED | OUTPATIENT
Start: 2024-09-13 | End: 2024-09-14 | Stop reason: HOSPADM

## 2024-09-13 RX ORDER — POTASSIUM CHLORIDE 7.45 MG/ML
10 INJECTION INTRAVENOUS PRN
Status: DISCONTINUED | OUTPATIENT
Start: 2024-09-13 | End: 2024-09-14 | Stop reason: HOSPADM

## 2024-09-13 RX ORDER — MAGNESIUM SULFATE IN WATER 40 MG/ML
2000 INJECTION, SOLUTION INTRAVENOUS PRN
Status: DISCONTINUED | OUTPATIENT
Start: 2024-09-13 | End: 2024-09-14 | Stop reason: HOSPADM

## 2024-09-13 RX ORDER — NITROGLYCERIN 0.4 MG/1
0.4 TABLET SUBLINGUAL EVERY 5 MIN PRN
Status: DISCONTINUED | OUTPATIENT
Start: 2024-09-13 | End: 2024-09-14 | Stop reason: HOSPADM

## 2024-09-13 RX ORDER — LEVALBUTEROL INHALATION SOLUTION 1.25 MG/3ML
1.25 SOLUTION RESPIRATORY (INHALATION) EVERY 8 HOURS PRN
Status: DISCONTINUED | OUTPATIENT
Start: 2024-09-13 | End: 2024-09-14 | Stop reason: HOSPADM

## 2024-09-13 RX ORDER — ACETAMINOPHEN 650 MG/1
650 SUPPOSITORY RECTAL EVERY 6 HOURS PRN
Status: DISCONTINUED | OUTPATIENT
Start: 2024-09-13 | End: 2024-09-14 | Stop reason: HOSPADM

## 2024-09-13 RX ORDER — CLONIDINE HYDROCHLORIDE 0.1 MG/1
0.1 TABLET ORAL EVERY 6 HOURS PRN
Status: DISCONTINUED | OUTPATIENT
Start: 2024-09-13 | End: 2024-09-14 | Stop reason: HOSPADM

## 2024-09-13 RX ORDER — DOXYCYCLINE HYCLATE 100 MG
100 TABLET ORAL EVERY 12 HOURS SCHEDULED
Status: DISCONTINUED | OUTPATIENT
Start: 2024-09-13 | End: 2024-09-14 | Stop reason: HOSPADM

## 2024-09-13 RX ORDER — VENLAFAXINE HYDROCHLORIDE 37.5 MG/1
75 CAPSULE, EXTENDED RELEASE ORAL
Status: DISCONTINUED | OUTPATIENT
Start: 2024-09-13 | End: 2024-09-14 | Stop reason: HOSPADM

## 2024-09-13 RX ORDER — ENOXAPARIN SODIUM 100 MG/ML
40 INJECTION SUBCUTANEOUS DAILY
Status: DISCONTINUED | OUTPATIENT
Start: 2024-09-13 | End: 2024-09-14 | Stop reason: HOSPADM

## 2024-09-13 RX ORDER — ONDANSETRON 2 MG/ML
4 INJECTION INTRAMUSCULAR; INTRAVENOUS EVERY 6 HOURS PRN
Status: DISCONTINUED | OUTPATIENT
Start: 2024-09-13 | End: 2024-09-14 | Stop reason: HOSPADM

## 2024-09-13 RX ORDER — SODIUM CHLORIDE 9 MG/ML
INJECTION, SOLUTION INTRAVENOUS CONTINUOUS
Status: DISCONTINUED | OUTPATIENT
Start: 2024-09-13 | End: 2024-09-13

## 2024-09-13 RX ORDER — ACETAMINOPHEN 325 MG/1
650 TABLET ORAL EVERY 6 HOURS PRN
Status: DISCONTINUED | OUTPATIENT
Start: 2024-09-13 | End: 2024-09-14 | Stop reason: HOSPADM

## 2024-09-13 RX ORDER — ASPIRIN 81 MG/1
81 TABLET, CHEWABLE ORAL DAILY
Status: DISCONTINUED | OUTPATIENT
Start: 2024-09-14 | End: 2024-09-14 | Stop reason: HOSPADM

## 2024-09-13 RX ORDER — FAMOTIDINE 20 MG/1
20 TABLET, FILM COATED ORAL 2 TIMES DAILY
Status: DISCONTINUED | OUTPATIENT
Start: 2024-09-13 | End: 2024-09-14 | Stop reason: HOSPADM

## 2024-09-13 RX ORDER — POTASSIUM CHLORIDE 1500 MG/1
40 TABLET, EXTENDED RELEASE ORAL PRN
Status: DISCONTINUED | OUTPATIENT
Start: 2024-09-13 | End: 2024-09-14 | Stop reason: HOSPADM

## 2024-09-13 RX ADMIN — IOPAMIDOL 100 ML: 755 INJECTION, SOLUTION INTRAVENOUS at 01:21

## 2024-09-13 RX ADMIN — SODIUM CHLORIDE: 9 INJECTION, SOLUTION INTRAVENOUS at 10:15

## 2024-09-13 RX ADMIN — ATORVASTATIN CALCIUM 40 MG: 40 TABLET, FILM COATED ORAL at 19:58

## 2024-09-13 RX ADMIN — DOXYCYCLINE HYCLATE 100 MG: 100 TABLET, COATED ORAL at 19:58

## 2024-09-13 RX ADMIN — FAMOTIDINE 20 MG: 20 TABLET, FILM COATED ORAL at 19:58

## 2024-09-13 RX ADMIN — FUROSEMIDE 20 MG: 10 INJECTION, SOLUTION INTRAMUSCULAR; INTRAVENOUS at 13:20

## 2024-09-13 RX ADMIN — SODIUM CHLORIDE: 9 INJECTION, SOLUTION INTRAVENOUS at 13:52

## 2024-09-13 RX ADMIN — LORAZEPAM 1 MG: 2 INJECTION INTRAMUSCULAR; INTRAVENOUS at 00:04

## 2024-09-13 ASSESSMENT — LIFESTYLE VARIABLES
HOW MANY STANDARD DRINKS CONTAINING ALCOHOL DO YOU HAVE ON A TYPICAL DAY: 3 OR 4
HOW OFTEN DO YOU HAVE A DRINK CONTAINING ALCOHOL: 4 OR MORE TIMES A WEEK

## 2024-09-14 ENCOUNTER — APPOINTMENT (OUTPATIENT)
Age: 44
DRG: 193 | End: 2024-09-14
Payer: COMMERCIAL

## 2024-09-14 VITALS
RESPIRATION RATE: 17 BRPM | SYSTOLIC BLOOD PRESSURE: 112 MMHG | TEMPERATURE: 98.2 F | WEIGHT: 200 LBS | DIASTOLIC BLOOD PRESSURE: 86 MMHG | OXYGEN SATURATION: 98 % | BODY MASS INDEX: 28 KG/M2 | HEIGHT: 71 IN | HEART RATE: 95 BPM

## 2024-09-14 LAB
ANION GAP SERPL CALCULATED.3IONS-SCNC: 8 MMOL/L (ref 9–16)
BASOPHILS # BLD: 0.03 K/UL (ref 0–0.2)
BASOPHILS NFR BLD: 0 % (ref 0–2)
BUN SERPL-MCNC: 9 MG/DL (ref 6–20)
CALCIUM SERPL-MCNC: 8.9 MG/DL (ref 8.6–10.4)
CHLORIDE SERPL-SCNC: 102 MMOL/L (ref 98–107)
CO2 SERPL-SCNC: 27 MMOL/L (ref 20–31)
CREAT SERPL-MCNC: 0.7 MG/DL (ref 0.7–1.2)
EOSINOPHIL # BLD: 1.07 K/UL (ref 0–0.44)
EOSINOPHILS RELATIVE PERCENT: 13 % (ref 1–4)
ERYTHROCYTE [DISTWIDTH] IN BLOOD BY AUTOMATED COUNT: 12.5 % (ref 11.8–14.4)
GFR, ESTIMATED: >90 ML/MIN/1.73M2
GLUCOSE SERPL-MCNC: 106 MG/DL (ref 74–99)
HCT VFR BLD AUTO: 41.8 % (ref 40.7–50.3)
HGB BLD-MCNC: 13.9 G/DL (ref 13–17)
IMM GRANULOCYTES # BLD AUTO: 0.03 K/UL (ref 0–0.3)
IMM GRANULOCYTES NFR BLD: 0 %
LYMPHOCYTES NFR BLD: 1.98 K/UL (ref 1.1–3.7)
LYMPHOCYTES RELATIVE PERCENT: 24 % (ref 24–43)
MCH RBC QN AUTO: 31.2 PG (ref 25.2–33.5)
MCHC RBC AUTO-ENTMCNC: 33.3 G/DL (ref 28.4–34.8)
MCV RBC AUTO: 93.7 FL (ref 82.6–102.9)
MONOCYTES NFR BLD: 0.51 K/UL (ref 0.1–1.2)
MONOCYTES NFR BLD: 6 % (ref 3–12)
MRSA, DNA, NASAL: NEGATIVE
NEUTROPHILS NFR BLD: 57 % (ref 36–65)
NEUTS SEG NFR BLD: 4.8 K/UL (ref 1.5–8.1)
NRBC BLD-RTO: 0 PER 100 WBC
PLATELET # BLD AUTO: 150 K/UL (ref 138–453)
PMV BLD AUTO: 10.7 FL (ref 8.1–13.5)
POTASSIUM SERPL-SCNC: 3.9 MMOL/L (ref 3.7–5.3)
RBC # BLD AUTO: 4.46 M/UL (ref 4.21–5.77)
SODIUM SERPL-SCNC: 137 MMOL/L (ref 136–145)
SPECIMEN DESCRIPTION: NORMAL
WBC OTHER # BLD: 8.4 K/UL (ref 3.5–11.3)

## 2024-09-14 PROCEDURE — 6360000002 HC RX W HCPCS

## 2024-09-14 PROCEDURE — 6370000000 HC RX 637 (ALT 250 FOR IP): Performed by: INTERNAL MEDICINE

## 2024-09-14 PROCEDURE — 36415 COLL VENOUS BLD VENIPUNCTURE: CPT

## 2024-09-14 PROCEDURE — 99232 SBSQ HOSP IP/OBS MODERATE 35: CPT | Performed by: FAMILY MEDICINE

## 2024-09-14 PROCEDURE — 85025 COMPLETE CBC W/AUTO DIFF WBC: CPT

## 2024-09-14 PROCEDURE — 93306 TTE W/DOPPLER COMPLETE: CPT | Performed by: STUDENT IN AN ORGANIZED HEALTH CARE EDUCATION/TRAINING PROGRAM

## 2024-09-14 PROCEDURE — 6370000000 HC RX 637 (ALT 250 FOR IP)

## 2024-09-14 PROCEDURE — 80048 BASIC METABOLIC PNL TOTAL CA: CPT

## 2024-09-14 PROCEDURE — 93306 TTE W/DOPPLER COMPLETE: CPT

## 2024-09-14 RX ORDER — VENLAFAXINE HYDROCHLORIDE 75 MG/1
75 CAPSULE, EXTENDED RELEASE ORAL
Qty: 30 CAPSULE | Refills: 0 | Status: SHIPPED | OUTPATIENT
Start: 2024-09-14

## 2024-09-14 RX ORDER — IMIQUIMOD 12.5 MG/.25G
CREAM TOPICAL
Qty: 12 EACH | Refills: 0 | Status: SHIPPED | OUTPATIENT
Start: 2024-09-14 | End: 2024-09-21

## 2024-09-14 RX ORDER — ASPIRIN 81 MG/1
81 TABLET, CHEWABLE ORAL DAILY
Qty: 30 TABLET | Refills: 3 | Status: SHIPPED | OUTPATIENT
Start: 2024-09-15

## 2024-09-14 RX ORDER — METOPROLOL TARTRATE 25 MG/1
25 TABLET, FILM COATED ORAL 2 TIMES DAILY
Qty: 30 TABLET | Refills: 1 | Status: SHIPPED | OUTPATIENT
Start: 2024-09-14

## 2024-09-14 RX ORDER — M-VIT,TX,IRON,MINS/CALC/FOLIC 27MG-0.4MG
1 TABLET ORAL DAILY
Qty: 30 TABLET | Refills: 0 | Status: SHIPPED | OUTPATIENT
Start: 2024-09-14

## 2024-09-14 RX ORDER — HYDROXYZINE HYDROCHLORIDE 25 MG/1
25 TABLET, FILM COATED ORAL EVERY 8 HOURS PRN
Qty: 30 TABLET | Refills: 0 | Status: SHIPPED | OUTPATIENT
Start: 2024-09-14 | End: 2024-09-24

## 2024-09-14 RX ORDER — DOXYCYCLINE HYCLATE 100 MG
100 TABLET ORAL EVERY 12 HOURS SCHEDULED
Qty: 20 TABLET | Refills: 0 | Status: ON HOLD | OUTPATIENT
Start: 2024-09-14 | End: 2024-09-17 | Stop reason: HOSPADM

## 2024-09-14 RX ADMIN — VENLAFAXINE HYDROCHLORIDE 75 MG: 37.5 CAPSULE, EXTENDED RELEASE ORAL at 09:49

## 2024-09-14 RX ADMIN — DOXYCYCLINE HYCLATE 100 MG: 100 TABLET, COATED ORAL at 09:49

## 2024-09-14 RX ADMIN — ENOXAPARIN SODIUM 40 MG: 100 INJECTION SUBCUTANEOUS at 09:50

## 2024-09-14 RX ADMIN — Medication 100 MG: at 09:49

## 2024-09-14 RX ADMIN — ASPIRIN 81 MG 81 MG: 81 TABLET ORAL at 09:49

## 2024-09-14 RX ADMIN — FOLIC ACID 1 MG: 1 TABLET ORAL at 09:49

## 2024-09-14 RX ADMIN — FAMOTIDINE 20 MG: 20 TABLET, FILM COATED ORAL at 09:49

## 2024-09-14 RX ADMIN — CARVEDILOL 3.12 MG: 3.12 TABLET, FILM COATED ORAL at 09:49

## 2024-09-14 ASSESSMENT — ENCOUNTER SYMPTOMS
CONSTIPATION: 0
NAUSEA: 0
DIARRHEA: 0
CHEST TIGHTNESS: 0
VOMITING: 0
COUGH: 0
ABDOMINAL PAIN: 0
SHORTNESS OF BREATH: 0
WHEEZING: 0
BLOOD IN STOOL: 0

## 2024-09-15 ENCOUNTER — APPOINTMENT (OUTPATIENT)
Dept: CT IMAGING | Age: 44
DRG: 193 | End: 2024-09-15
Payer: COMMERCIAL

## 2024-09-15 ENCOUNTER — HOSPITAL ENCOUNTER (INPATIENT)
Age: 44
LOS: 2 days | Discharge: HOME | DRG: 193 | End: 2024-09-17
Attending: EMERGENCY MEDICINE | Admitting: STUDENT IN AN ORGANIZED HEALTH CARE EDUCATION/TRAINING PROGRAM
Payer: COMMERCIAL

## 2024-09-15 ENCOUNTER — APPOINTMENT (OUTPATIENT)
Dept: GENERAL RADIOLOGY | Age: 44
DRG: 193 | End: 2024-09-15
Payer: COMMERCIAL

## 2024-09-15 DIAGNOSIS — J18.9 PNEUMONIA OF BOTH LOWER LOBES DUE TO INFECTIOUS ORGANISM: Primary | ICD-10-CM

## 2024-09-15 LAB
ANION GAP SERPL CALCULATED.3IONS-SCNC: 14 MMOL/L (ref 9–16)
BASOPHILS # BLD: 0.04 K/UL (ref 0–0.2)
BASOPHILS NFR BLD: 0 % (ref 0–2)
BNP SERPL-MCNC: 81 PG/ML (ref 0–300)
BUN SERPL-MCNC: 14 MG/DL (ref 6–20)
CALCIUM SERPL-MCNC: 9 MG/DL (ref 8.6–10.4)
CHLORIDE SERPL-SCNC: 99 MMOL/L (ref 98–107)
CO2 SERPL-SCNC: 23 MMOL/L (ref 20–31)
CREAT SERPL-MCNC: 0.9 MG/DL (ref 0.7–1.2)
ECHO AO ROOT DIAM: 3.1 CM
ECHO AO ROOT INDEX: 1.47 CM/M2
ECHO AV AREA PEAK VELOCITY: 3.7 CM2
ECHO AV AREA VTI: 3.6 CM2
ECHO AV AREA/BSA PEAK VELOCITY: 1.8 CM2/M2
ECHO AV AREA/BSA VTI: 1.7 CM2/M2
ECHO AV MEAN GRADIENT: 3 MMHG
ECHO AV MEAN VELOCITY: 0.8 M/S
ECHO AV PEAK GRADIENT: 6 MMHG
ECHO AV PEAK VELOCITY: 1.2 M/S
ECHO AV VELOCITY RATIO: 0.92
ECHO AV VTI: 26.6 CM
ECHO BSA: 2.13 M2
ECHO EST RA PRESSURE: 3 MMHG
ECHO LA AREA 2C: 19.9 CM2
ECHO LA AREA 4C: 16.2 CM2
ECHO LA DIAMETER INDEX: 1.9 CM/M2
ECHO LA DIAMETER: 4 CM
ECHO LA MAJOR AXIS: 4.9 CM
ECHO LA MINOR AXIS: 5.3 CM
ECHO LA TO AORTIC ROOT RATIO: 1.29
ECHO LA VOL BP: 53 ML (ref 18–58)
ECHO LA VOL MOD A2C: 63 ML (ref 18–58)
ECHO LA VOL MOD A4C: 42 ML (ref 18–58)
ECHO LA VOL/BSA BIPLANE: 25 ML/M2 (ref 16–34)
ECHO LA VOLUME INDEX MOD A2C: 30 ML/M2 (ref 16–34)
ECHO LA VOLUME INDEX MOD A4C: 20 ML/M2 (ref 16–34)
ECHO LV E' LATERAL VELOCITY: 16 CM/S
ECHO LV E' SEPTAL VELOCITY: 10 CM/S
ECHO LV EDV A2C: 89 ML
ECHO LV EDV A4C: 104 ML
ECHO LV EDV INDEX A4C: 49 ML/M2
ECHO LV EDV NDEX A2C: 42 ML/M2
ECHO LV EJECTION FRACTION A2C: 55 %
ECHO LV EJECTION FRACTION A4C: 59 %
ECHO LV EJECTION FRACTION BIPLANE: 58 % (ref 55–100)
ECHO LV ESV A2C: 40 ML
ECHO LV ESV A4C: 42 ML
ECHO LV ESV INDEX A2C: 19 ML/M2
ECHO LV ESV INDEX A4C: 20 ML/M2
ECHO LV FRACTIONAL SHORTENING: 43 % (ref 28–44)
ECHO LV INTERNAL DIMENSION DIASTOLE INDEX: 2.32 CM/M2
ECHO LV INTERNAL DIMENSION DIASTOLIC: 4.9 CM (ref 4.2–5.9)
ECHO LV INTERNAL DIMENSION SYSTOLIC INDEX: 1.33 CM/M2
ECHO LV INTERNAL DIMENSION SYSTOLIC: 2.8 CM
ECHO LV IVSD: 1 CM (ref 0.6–1)
ECHO LV MASS 2D: 176 G (ref 88–224)
ECHO LV MASS INDEX 2D: 83.4 G/M2 (ref 49–115)
ECHO LV POSTERIOR WALL DIASTOLIC: 1 CM (ref 0.6–1)
ECHO LV RELATIVE WALL THICKNESS RATIO: 0.41
ECHO LVOT AREA: 4.2 CM2
ECHO LVOT AV VTI INDEX: 0.87
ECHO LVOT DIAM: 2.3 CM
ECHO LVOT MEAN GRADIENT: 2 MMHG
ECHO LVOT PEAK GRADIENT: 5 MMHG
ECHO LVOT PEAK VELOCITY: 1.1 M/S
ECHO LVOT STROKE VOLUME INDEX: 45.5 ML/M2
ECHO LVOT SV: 95.9 ML
ECHO LVOT VTI: 23.1 CM
ECHO MV A VELOCITY: 0.71 M/S
ECHO MV AREA VTI: 3.2 CM2
ECHO MV E DECELERATION TIME (DT): 265 MS
ECHO MV E VELOCITY: 0.85 M/S
ECHO MV E/A RATIO: 1.2
ECHO MV E/E' LATERAL: 5.31
ECHO MV E/E' RATIO (AVERAGED): 6.91
ECHO MV E/E' SEPTAL: 8.5
ECHO MV LVOT VTI INDEX: 1.29
ECHO MV MAX VELOCITY: 0.9 M/S
ECHO MV MEAN GRADIENT: 2 MMHG
ECHO MV MEAN VELOCITY: 0.6 M/S
ECHO MV PEAK GRADIENT: 3 MMHG
ECHO MV VTI: 29.8 CM
ECHO PV MAX VELOCITY: 0.8 M/S
ECHO PV PEAK GRADIENT: 2 MMHG
ECHO RIGHT VENTRICULAR SYSTOLIC PRESSURE (RVSP): 25 MMHG
ECHO RV FREE WALL PEAK S': 11 CM/S
ECHO RV INTERNAL DIMENSION: 4.3 CM
ECHO RV TAPSE: 2 CM (ref 1.7–?)
ECHO TV REGURGITANT MAX VELOCITY: 2.36 M/S
ECHO TV REGURGITANT PEAK GRADIENT: 22 MMHG
EOSINOPHIL # BLD: 0.3 K/UL (ref 0–0.44)
EOSINOPHILS RELATIVE PERCENT: 2 % (ref 1–4)
ERYTHROCYTE [DISTWIDTH] IN BLOOD BY AUTOMATED COUNT: 12.5 % (ref 11.8–14.4)
GFR, ESTIMATED: >90 ML/MIN/1.73M2
GLUCOSE SERPL-MCNC: 95 MG/DL (ref 74–99)
HCT VFR BLD AUTO: 40.2 % (ref 40.7–50.3)
HGB BLD-MCNC: 13.4 G/DL (ref 13–17)
IMM GRANULOCYTES # BLD AUTO: 0.08 K/UL (ref 0–0.3)
IMM GRANULOCYTES NFR BLD: 1 %
LYMPHOCYTES NFR BLD: 1.21 K/UL (ref 1.1–3.7)
LYMPHOCYTES RELATIVE PERCENT: 9 % (ref 24–43)
MCH RBC QN AUTO: 31.2 PG (ref 25.2–33.5)
MCHC RBC AUTO-ENTMCNC: 33.3 G/DL (ref 28.4–34.8)
MCV RBC AUTO: 93.7 FL (ref 82.6–102.9)
MONOCYTES NFR BLD: 0.6 K/UL (ref 0.1–1.2)
MONOCYTES NFR BLD: 4 % (ref 3–12)
NEUTROPHILS NFR BLD: 84 % (ref 36–65)
NEUTS SEG NFR BLD: 11.79 K/UL (ref 1.5–8.1)
NRBC BLD-RTO: 0 PER 100 WBC
PLATELET # BLD AUTO: 175 K/UL (ref 138–453)
PMV BLD AUTO: 10.3 FL (ref 8.1–13.5)
POTASSIUM SERPL-SCNC: 3.9 MMOL/L (ref 3.7–5.3)
RBC # BLD AUTO: 4.29 M/UL (ref 4.21–5.77)
SODIUM SERPL-SCNC: 136 MMOL/L (ref 136–145)
TROPONIN I SERPL HS-MCNC: <6 NG/L (ref 0–22)
WBC OTHER # BLD: 14 K/UL (ref 3.5–11.3)

## 2024-09-15 PROCEDURE — 71260 CT THORAX DX C+: CPT

## 2024-09-15 PROCEDURE — 1200000000 HC SEMI PRIVATE

## 2024-09-15 PROCEDURE — 70450 CT HEAD/BRAIN W/O DYE: CPT

## 2024-09-15 PROCEDURE — 99285 EMERGENCY DEPT VISIT HI MDM: CPT

## 2024-09-15 PROCEDURE — 80048 BASIC METABOLIC PNL TOTAL CA: CPT

## 2024-09-15 PROCEDURE — 83880 ASSAY OF NATRIURETIC PEPTIDE: CPT

## 2024-09-15 PROCEDURE — 87040 BLOOD CULTURE FOR BACTERIA: CPT

## 2024-09-15 PROCEDURE — 84484 ASSAY OF TROPONIN QUANT: CPT

## 2024-09-15 PROCEDURE — 2580000003 HC RX 258

## 2024-09-15 PROCEDURE — 71045 X-RAY EXAM CHEST 1 VIEW: CPT

## 2024-09-15 PROCEDURE — G0480 DRUG TEST DEF 1-7 CLASSES: HCPCS

## 2024-09-15 PROCEDURE — 6360000004 HC RX CONTRAST MEDICATION

## 2024-09-15 PROCEDURE — 83605 ASSAY OF LACTIC ACID: CPT

## 2024-09-15 PROCEDURE — 85025 COMPLETE CBC W/AUTO DIFF WBC: CPT

## 2024-09-15 PROCEDURE — 93005 ELECTROCARDIOGRAM TRACING: CPT

## 2024-09-15 RX ORDER — 0.9 % SODIUM CHLORIDE 0.9 %
1000 INTRAVENOUS SOLUTION INTRAVENOUS ONCE
Status: COMPLETED | OUTPATIENT
Start: 2024-09-15 | End: 2024-09-16

## 2024-09-15 RX ORDER — 0.9 % SODIUM CHLORIDE 0.9 %
30 INTRAVENOUS SOLUTION INTRAVENOUS ONCE
Status: DISCONTINUED | OUTPATIENT
Start: 2024-09-16 | End: 2024-09-17 | Stop reason: HOSPADM

## 2024-09-15 RX ORDER — IOPAMIDOL 755 MG/ML
75 INJECTION, SOLUTION INTRAVASCULAR
Status: COMPLETED | OUTPATIENT
Start: 2024-09-15 | End: 2024-09-15

## 2024-09-15 RX ADMIN — IOPAMIDOL 75 ML: 755 INJECTION, SOLUTION INTRAVENOUS at 23:06

## 2024-09-15 RX ADMIN — SODIUM CHLORIDE 1000 ML: 9 INJECTION, SOLUTION INTRAVENOUS at 23:04

## 2024-09-16 ENCOUNTER — APPOINTMENT (OUTPATIENT)
Dept: GENERAL RADIOLOGY | Age: 44
DRG: 193 | End: 2024-09-16
Payer: COMMERCIAL

## 2024-09-16 PROBLEM — R55 SYNCOPE: Status: ACTIVE | Noted: 2024-09-16

## 2024-09-16 LAB
AMPHET UR QL SCN: POSITIVE
BARBITURATES UR QL SCN: NEGATIVE
BENZODIAZ UR QL: NEGATIVE
CANNABINOIDS UR QL SCN: POSITIVE
COCAINE UR QL SCN: POSITIVE
EKG ATRIAL RATE: 117 BPM
EKG P AXIS: 33 DEGREES
EKG P-R INTERVAL: 182 MS
EKG Q-T INTERVAL: 314 MS
EKG QRS DURATION: 86 MS
EKG QTC CALCULATION (BAZETT): 438 MS
EKG R AXIS: -34 DEGREES
EKG T AXIS: 34 DEGREES
EKG VENTRICULAR RATE: 117 BPM
ETHANOL PERCENT: <0.01 %
ETHANOLAMINE SERPL-MCNC: <10 MG/DL (ref 0–0.08)
FENTANYL UR QL: NEGATIVE
LACTIC ACID, WHOLE BLOOD: 0.8 MMOL/L (ref 0.7–2.1)
METHADONE UR QL: NEGATIVE
MRSA, DNA, NASAL: NEGATIVE
OPIATES UR QL SCN: NEGATIVE
OXYCODONE UR QL SCN: NEGATIVE
PCP UR QL SCN: NEGATIVE
SPECIMEN DESCRIPTION: NORMAL
TEST INFORMATION: ABNORMAL
TROPONIN I SERPL HS-MCNC: <6 NG/L (ref 0–22)

## 2024-09-16 PROCEDURE — 6370000000 HC RX 637 (ALT 250 FOR IP): Performed by: NURSE PRACTITIONER

## 2024-09-16 PROCEDURE — 2580000003 HC RX 258: Performed by: NURSE PRACTITIONER

## 2024-09-16 PROCEDURE — 2T015 HOSPITALIST 2ND TOUCH: CPT | Performed by: FAMILY MEDICINE

## 2024-09-16 PROCEDURE — 6360000002 HC RX W HCPCS: Performed by: STUDENT IN AN ORGANIZED HEALTH CARE EDUCATION/TRAINING PROGRAM

## 2024-09-16 PROCEDURE — 71046 X-RAY EXAM CHEST 2 VIEWS: CPT

## 2024-09-16 PROCEDURE — 6360000002 HC RX W HCPCS

## 2024-09-16 PROCEDURE — 87641 MR-STAPH DNA AMP PROBE: CPT

## 2024-09-16 PROCEDURE — 99223 1ST HOSP IP/OBS HIGH 75: CPT | Performed by: INTERNAL MEDICINE

## 2024-09-16 PROCEDURE — 2580000003 HC RX 258: Performed by: STUDENT IN AN ORGANIZED HEALTH CARE EDUCATION/TRAINING PROGRAM

## 2024-09-16 PROCEDURE — 1200000000 HC SEMI PRIVATE

## 2024-09-16 PROCEDURE — 99223 1ST HOSP IP/OBS HIGH 75: CPT | Performed by: STUDENT IN AN ORGANIZED HEALTH CARE EDUCATION/TRAINING PROGRAM

## 2024-09-16 PROCEDURE — 80307 DRUG TEST PRSMV CHEM ANLYZR: CPT

## 2024-09-16 PROCEDURE — 2580000003 HC RX 258

## 2024-09-16 RX ORDER — ACETAMINOPHEN 325 MG/1
650 TABLET ORAL EVERY 6 HOURS PRN
Status: DISCONTINUED | OUTPATIENT
Start: 2024-09-16 | End: 2024-09-17 | Stop reason: HOSPADM

## 2024-09-16 RX ORDER — IPRATROPIUM BROMIDE AND ALBUTEROL SULFATE 2.5; .5 MG/3ML; MG/3ML
1 SOLUTION RESPIRATORY (INHALATION)
Status: DISCONTINUED | OUTPATIENT
Start: 2024-09-16 | End: 2024-09-16

## 2024-09-16 RX ORDER — VENLAFAXINE HYDROCHLORIDE 75 MG/1
75 CAPSULE, EXTENDED RELEASE ORAL
Status: DISCONTINUED | OUTPATIENT
Start: 2024-09-16 | End: 2024-09-17 | Stop reason: HOSPADM

## 2024-09-16 RX ORDER — HYDROXYZINE HYDROCHLORIDE 25 MG/1
25 TABLET, FILM COATED ORAL EVERY 8 HOURS PRN
Status: DISCONTINUED | OUTPATIENT
Start: 2024-09-16 | End: 2024-09-17 | Stop reason: HOSPADM

## 2024-09-16 RX ORDER — METOPROLOL TARTRATE 25 MG/1
25 TABLET, FILM COATED ORAL 2 TIMES DAILY
Status: DISCONTINUED | OUTPATIENT
Start: 2024-09-16 | End: 2024-09-17 | Stop reason: HOSPADM

## 2024-09-16 RX ORDER — ACETAMINOPHEN 650 MG/1
650 SUPPOSITORY RECTAL EVERY 6 HOURS PRN
Status: DISCONTINUED | OUTPATIENT
Start: 2024-09-16 | End: 2024-09-17 | Stop reason: HOSPADM

## 2024-09-16 RX ORDER — SODIUM CHLORIDE 9 MG/ML
INJECTION, SOLUTION INTRAVENOUS PRN
Status: DISCONTINUED | OUTPATIENT
Start: 2024-09-16 | End: 2024-09-17 | Stop reason: HOSPADM

## 2024-09-16 RX ORDER — SODIUM CHLORIDE 9 MG/ML
INJECTION, SOLUTION INTRAVENOUS CONTINUOUS
Status: DISCONTINUED | OUTPATIENT
Start: 2024-09-16 | End: 2024-09-17 | Stop reason: HOSPADM

## 2024-09-16 RX ORDER — ONDANSETRON 2 MG/ML
4 INJECTION INTRAMUSCULAR; INTRAVENOUS EVERY 6 HOURS PRN
Status: DISCONTINUED | OUTPATIENT
Start: 2024-09-16 | End: 2024-09-17 | Stop reason: HOSPADM

## 2024-09-16 RX ORDER — IPRATROPIUM BROMIDE AND ALBUTEROL SULFATE 2.5; .5 MG/3ML; MG/3ML
1 SOLUTION RESPIRATORY (INHALATION) EVERY 4 HOURS PRN
Status: DISCONTINUED | OUTPATIENT
Start: 2024-09-16 | End: 2024-09-17 | Stop reason: HOSPADM

## 2024-09-16 RX ORDER — M-VIT,TX,IRON,MINS/CALC/FOLIC 27MG-0.4MG
1 TABLET ORAL DAILY
Status: DISCONTINUED | OUTPATIENT
Start: 2024-09-16 | End: 2024-09-17 | Stop reason: HOSPADM

## 2024-09-16 RX ORDER — ALBUTEROL SULFATE 0.83 MG/ML
2.5 SOLUTION RESPIRATORY (INHALATION)
Status: DISCONTINUED | OUTPATIENT
Start: 2024-09-16 | End: 2024-09-17 | Stop reason: HOSPADM

## 2024-09-16 RX ORDER — ENOXAPARIN SODIUM 100 MG/ML
40 INJECTION SUBCUTANEOUS DAILY
Status: DISCONTINUED | OUTPATIENT
Start: 2024-09-16 | End: 2024-09-17 | Stop reason: HOSPADM

## 2024-09-16 RX ORDER — ONDANSETRON 2 MG/ML
4 INJECTION INTRAMUSCULAR; INTRAVENOUS ONCE
Status: COMPLETED | OUTPATIENT
Start: 2024-09-16 | End: 2024-09-16

## 2024-09-16 RX ORDER — ASPIRIN 81 MG/1
81 TABLET, CHEWABLE ORAL DAILY
Status: DISCONTINUED | OUTPATIENT
Start: 2024-09-16 | End: 2024-09-17 | Stop reason: HOSPADM

## 2024-09-16 RX ORDER — ONDANSETRON 4 MG/1
4 TABLET, ORALLY DISINTEGRATING ORAL EVERY 8 HOURS PRN
Status: DISCONTINUED | OUTPATIENT
Start: 2024-09-16 | End: 2024-09-17 | Stop reason: HOSPADM

## 2024-09-16 RX ORDER — SODIUM CHLORIDE 0.9 % (FLUSH) 0.9 %
10 SYRINGE (ML) INJECTION PRN
Status: DISCONTINUED | OUTPATIENT
Start: 2024-09-16 | End: 2024-09-17 | Stop reason: HOSPADM

## 2024-09-16 RX ORDER — SODIUM CHLORIDE 0.9 % (FLUSH) 0.9 %
5-40 SYRINGE (ML) INJECTION EVERY 12 HOURS SCHEDULED
Status: DISCONTINUED | OUTPATIENT
Start: 2024-09-16 | End: 2024-09-17 | Stop reason: HOSPADM

## 2024-09-16 RX ADMIN — PIPERACILLIN AND TAZOBACTAM 3375 MG: 3; .375 INJECTION, POWDER, LYOPHILIZED, FOR SOLUTION INTRAVENOUS at 07:31

## 2024-09-16 RX ADMIN — WATER 40 MG: 1 INJECTION INTRAMUSCULAR; INTRAVENOUS; SUBCUTANEOUS at 03:19

## 2024-09-16 RX ADMIN — PIPERACILLIN AND TAZOBACTAM 3375 MG: 3; .375 INJECTION, POWDER, LYOPHILIZED, FOR SOLUTION INTRAVENOUS at 14:38

## 2024-09-16 RX ADMIN — Medication 1 TABLET: at 08:26

## 2024-09-16 RX ADMIN — WATER 40 MG: 1 INJECTION INTRAMUSCULAR; INTRAVENOUS; SUBCUTANEOUS at 14:38

## 2024-09-16 RX ADMIN — ONDANSETRON 4 MG: 2 INJECTION INTRAMUSCULAR; INTRAVENOUS at 00:39

## 2024-09-16 RX ADMIN — METOPROLOL TARTRATE 25 MG: 25 TABLET, FILM COATED ORAL at 20:45

## 2024-09-16 RX ADMIN — SODIUM CHLORIDE: 9 INJECTION, SOLUTION INTRAVENOUS at 04:02

## 2024-09-16 RX ADMIN — VANCOMYCIN HYDROCHLORIDE 2250 MG: 1 INJECTION, POWDER, LYOPHILIZED, FOR SOLUTION INTRAVENOUS at 04:04

## 2024-09-16 RX ADMIN — SODIUM CHLORIDE 1500 MG: 9 INJECTION, SOLUTION INTRAVENOUS at 18:52

## 2024-09-16 RX ADMIN — CEFTRIAXONE SODIUM 1000 MG: 1 INJECTION, POWDER, FOR SOLUTION INTRAMUSCULAR; INTRAVENOUS at 00:34

## 2024-09-16 RX ADMIN — PIPERACILLIN AND TAZOBACTAM 3375 MG: 3; .375 INJECTION, POWDER, LYOPHILIZED, FOR SOLUTION INTRAVENOUS at 23:15

## 2024-09-16 RX ADMIN — VENLAFAXINE HYDROCHLORIDE 75 MG: 75 CAPSULE, EXTENDED RELEASE ORAL at 08:26

## 2024-09-16 RX ADMIN — AZITHROMYCIN DIHYDRATE 500 MG: 500 INJECTION, POWDER, LYOPHILIZED, FOR SOLUTION INTRAVENOUS at 00:44

## 2024-09-16 RX ADMIN — METOPROLOL TARTRATE 25 MG: 25 TABLET, FILM COATED ORAL at 02:32

## 2024-09-16 RX ADMIN — ASPIRIN 81 MG 81 MG: 81 TABLET ORAL at 08:26

## 2024-09-16 RX ADMIN — SODIUM CHLORIDE, PRESERVATIVE FREE 10 ML: 5 INJECTION INTRAVENOUS at 21:39

## 2024-09-16 ASSESSMENT — ENCOUNTER SYMPTOMS
SHORTNESS OF BREATH: 1
ABDOMINAL PAIN: 0
NAUSEA: 0
VOICE CHANGE: 0
BACK PAIN: 0
VOMITING: 0

## 2024-09-17 ENCOUNTER — HOSPITAL ENCOUNTER (EMERGENCY)
Age: 44
Discharge: HOME | End: 2024-09-18
Attending: EMERGENCY MEDICINE
Payer: COMMERCIAL

## 2024-09-17 VITALS
WEIGHT: 206.13 LBS | DIASTOLIC BLOOD PRESSURE: 66 MMHG | HEIGHT: 71 IN | BODY MASS INDEX: 28.86 KG/M2 | OXYGEN SATURATION: 96 % | TEMPERATURE: 98.4 F | HEART RATE: 55 BPM | SYSTOLIC BLOOD PRESSURE: 99 MMHG | RESPIRATION RATE: 17 BRPM

## 2024-09-17 VITALS
HEART RATE: 79 BPM | SYSTOLIC BLOOD PRESSURE: 115 MMHG | OXYGEN SATURATION: 96 % | DIASTOLIC BLOOD PRESSURE: 77 MMHG | RESPIRATION RATE: 18 BRPM | TEMPERATURE: 97.3 F

## 2024-09-17 DIAGNOSIS — K64.9 HEMORRHOIDS, UNSPECIFIED HEMORRHOID TYPE: Primary | ICD-10-CM

## 2024-09-17 LAB
ANION GAP SERPL CALCULATED.3IONS-SCNC: 12 MMOL/L (ref 9–16)
BASOPHILS # BLD: 0.06 K/UL (ref 0–0.2)
BASOPHILS NFR BLD: 0 % (ref 0–2)
BUN SERPL-MCNC: 12 MG/DL (ref 6–20)
CALCIUM SERPL-MCNC: 9.6 MG/DL (ref 8.6–10.4)
CHLORIDE SERPL-SCNC: 103 MMOL/L (ref 98–107)
CO2 SERPL-SCNC: 24 MMOL/L (ref 20–31)
CREAT SERPL-MCNC: 1 MG/DL (ref 0.7–1.2)
EOSINOPHIL # BLD: 0.08 K/UL (ref 0–0.44)
EOSINOPHILS RELATIVE PERCENT: 1 % (ref 1–4)
ERYTHROCYTE [DISTWIDTH] IN BLOOD BY AUTOMATED COUNT: 12.6 % (ref 11.8–14.4)
GFR, ESTIMATED: >90 ML/MIN/1.73M2
GLUCOSE SERPL-MCNC: 89 MG/DL (ref 74–99)
HCT VFR BLD AUTO: 39 % (ref 40.7–50.3)
HCV RNA # SERPL NAA+PROBE: DETECTED {COPIES}/ML
HCV RNA SERPL NAA+PROBE-ACNC: 2850 IU/ML
HCV RNA SERPL NAA+PROBE-LOG IU: 3.45 LOG IU/ML
HGB BLD-MCNC: 13.3 G/DL (ref 13–17)
IMM GRANULOCYTES # BLD AUTO: 0.16 K/UL (ref 0–0.3)
IMM GRANULOCYTES NFR BLD: 1 %
LYMPHOCYTES NFR BLD: 2.89 K/UL (ref 1.1–3.7)
LYMPHOCYTES RELATIVE PERCENT: 20 % (ref 24–43)
MCH RBC QN AUTO: 31.2 PG (ref 25.2–33.5)
MCHC RBC AUTO-ENTMCNC: 34.1 G/DL (ref 28.4–34.8)
MCV RBC AUTO: 91.5 FL (ref 82.6–102.9)
MONOCYTES NFR BLD: 1.04 K/UL (ref 0.1–1.2)
MONOCYTES NFR BLD: 7 % (ref 3–12)
NEUTROPHILS NFR BLD: 71 % (ref 36–65)
NEUTS SEG NFR BLD: 10.34 K/UL (ref 1.5–8.1)
NRBC BLD-RTO: 0 PER 100 WBC
PLATELET # BLD AUTO: 219 K/UL (ref 138–453)
PMV BLD AUTO: 10.5 FL (ref 8.1–13.5)
POTASSIUM SERPL-SCNC: 3.4 MMOL/L (ref 3.7–5.3)
RBC # BLD AUTO: 4.26 M/UL (ref 4.21–5.77)
SODIUM SERPL-SCNC: 139 MMOL/L (ref 136–145)
SPECIMEN SOURCE: ABNORMAL
WBC OTHER # BLD: 14.6 K/UL (ref 3.5–11.3)

## 2024-09-17 PROCEDURE — 99239 HOSP IP/OBS DSCHRG MGMT >30: CPT | Performed by: STUDENT IN AN ORGANIZED HEALTH CARE EDUCATION/TRAINING PROGRAM

## 2024-09-17 PROCEDURE — 99283 EMERGENCY DEPT VISIT LOW MDM: CPT

## 2024-09-17 PROCEDURE — 80048 BASIC METABOLIC PNL TOTAL CA: CPT

## 2024-09-17 PROCEDURE — 85025 COMPLETE CBC W/AUTO DIFF WBC: CPT

## 2024-09-17 PROCEDURE — 2580000003 HC RX 258: Performed by: STUDENT IN AN ORGANIZED HEALTH CARE EDUCATION/TRAINING PROGRAM

## 2024-09-17 PROCEDURE — 6360000002 HC RX W HCPCS: Performed by: STUDENT IN AN ORGANIZED HEALTH CARE EDUCATION/TRAINING PROGRAM

## 2024-09-17 PROCEDURE — 99232 SBSQ HOSP IP/OBS MODERATE 35: CPT | Performed by: INTERNAL MEDICINE

## 2024-09-17 PROCEDURE — 2580000003 HC RX 258: Performed by: NURSE PRACTITIONER

## 2024-09-17 PROCEDURE — 6370000000 HC RX 637 (ALT 250 FOR IP): Performed by: NURSE PRACTITIONER

## 2024-09-17 RX ORDER — ALBUTEROL SULFATE 90 UG/1
2 INHALANT RESPIRATORY (INHALATION) 4 TIMES DAILY PRN
Qty: 18 G | Refills: 0 | Status: SHIPPED | OUTPATIENT
Start: 2024-09-17

## 2024-09-17 RX ORDER — LEVOFLOXACIN 750 MG/1
750 TABLET, FILM COATED ORAL DAILY
Qty: 5 TABLET | Refills: 0 | Status: SHIPPED | OUTPATIENT
Start: 2024-09-17 | End: 2024-09-22

## 2024-09-17 RX ADMIN — ASPIRIN 81 MG 81 MG: 81 TABLET ORAL at 08:18

## 2024-09-17 RX ADMIN — WATER 40 MG: 1 INJECTION INTRAMUSCULAR; INTRAVENOUS; SUBCUTANEOUS at 02:17

## 2024-09-17 RX ADMIN — SODIUM CHLORIDE 1500 MG: 9 INJECTION, SOLUTION INTRAVENOUS at 04:24

## 2024-09-17 RX ADMIN — PIPERACILLIN AND TAZOBACTAM 3375 MG: 3; .375 INJECTION, POWDER, LYOPHILIZED, FOR SOLUTION INTRAVENOUS at 08:22

## 2024-09-17 RX ADMIN — Medication 1 TABLET: at 08:18

## 2024-09-17 RX ADMIN — VENLAFAXINE HYDROCHLORIDE 75 MG: 75 CAPSULE, EXTENDED RELEASE ORAL at 08:18

## 2024-09-17 RX ADMIN — SODIUM CHLORIDE, PRESERVATIVE FREE 10 ML: 5 INJECTION INTRAVENOUS at 08:18

## 2024-09-17 RX ADMIN — METOPROLOL TARTRATE 25 MG: 25 TABLET, FILM COATED ORAL at 08:18

## 2024-09-17 ASSESSMENT — ENCOUNTER SYMPTOMS
NAUSEA: 0
VOICE CHANGE: 0
BACK PAIN: 0
SHORTNESS OF BREATH: 1
ABDOMINAL PAIN: 0
VOMITING: 0

## 2024-09-18 LAB
ALBUMIN SERPL-MCNC: 4 G/DL (ref 3.5–5.2)
ALBUMIN/GLOB SERPL: 1 {RATIO} (ref 1–2.5)
ALP SERPL-CCNC: 56 U/L (ref 40–129)
ALT SERPL-CCNC: 25 U/L (ref 10–50)
AMPHET UR QL SCN: POSITIVE
ANION GAP SERPL CALCULATED.3IONS-SCNC: 10 MMOL/L (ref 9–16)
AST SERPL-CCNC: 21 U/L (ref 10–50)
BARBITURATES UR QL SCN: NEGATIVE
BENZODIAZ UR QL: NEGATIVE
BILIRUB SERPL-MCNC: 0.4 MG/DL (ref 0–1.2)
BUN SERPL-MCNC: 13 MG/DL (ref 6–20)
CALCIUM SERPL-MCNC: 8.9 MG/DL (ref 8.6–10.4)
CANNABINOIDS UR QL SCN: POSITIVE
CHLORIDE SERPL-SCNC: 105 MMOL/L (ref 98–107)
CO2 SERPL-SCNC: 25 MMOL/L (ref 20–31)
COCAINE UR QL SCN: POSITIVE
CREAT SERPL-MCNC: 0.9 MG/DL (ref 0.7–1.2)
EKG ATRIAL RATE: 108 BPM
EKG P AXIS: 38 DEGREES
EKG P-R INTERVAL: 152 MS
EKG Q-T INTERVAL: 342 MS
EKG QRS DURATION: 98 MS
EKG QTC CALCULATION (BAZETT): 458 MS
EKG R AXIS: -12 DEGREES
EKG T AXIS: 43 DEGREES
EKG VENTRICULAR RATE: 108 BPM
ETHANOL PERCENT: <0.01 %
ETHANOLAMINE SERPL-MCNC: <10 MG/DL (ref 0–0.08)
FENTANYL UR QL: POSITIVE
GFR, ESTIMATED: >90 ML/MIN/1.73M2
GLUCOSE SERPL-MCNC: 89 MG/DL (ref 74–99)
METHADONE UR QL: NEGATIVE
OPIATES UR QL SCN: NEGATIVE
OXYCODONE UR QL SCN: NEGATIVE
PCP UR QL SCN: NEGATIVE
POTASSIUM SERPL-SCNC: 3.5 MMOL/L (ref 3.7–5.3)
PROT SERPL-MCNC: 6.9 G/DL (ref 6.6–8.7)
SODIUM SERPL-SCNC: 140 MMOL/L (ref 136–145)
TEST INFORMATION: ABNORMAL

## 2024-09-18 PROCEDURE — 80053 COMPREHEN METABOLIC PANEL: CPT

## 2024-09-18 PROCEDURE — G0480 DRUG TEST DEF 1-7 CLASSES: HCPCS

## 2024-09-18 PROCEDURE — 6370000000 HC RX 637 (ALT 250 FOR IP)

## 2024-09-18 PROCEDURE — 80307 DRUG TEST PRSMV CHEM ANLYZR: CPT

## 2024-09-18 RX ORDER — POTASSIUM CHLORIDE 1500 MG/1
40 TABLET, EXTENDED RELEASE ORAL ONCE
Status: COMPLETED | OUTPATIENT
Start: 2024-09-18 | End: 2024-09-18

## 2024-09-18 RX ADMIN — POTASSIUM CHLORIDE 40 MEQ: 1500 TABLET, EXTENDED RELEASE ORAL at 02:16

## 2024-09-21 LAB
MICROORGANISM SPEC CULT: NORMAL
SERVICE CMNT-IMP: NORMAL
SPECIMEN DESCRIPTION: NORMAL

## 2024-12-15 ENCOUNTER — HOSPITAL ENCOUNTER (EMERGENCY)
Age: 44
Discharge: HOME | End: 2024-12-15
Attending: EMERGENCY MEDICINE
Payer: COMMERCIAL

## 2024-12-15 VITALS
OXYGEN SATURATION: 98 % | DIASTOLIC BLOOD PRESSURE: 86 MMHG | TEMPERATURE: 97.7 F | HEART RATE: 106 BPM | SYSTOLIC BLOOD PRESSURE: 132 MMHG | RESPIRATION RATE: 18 BRPM | WEIGHT: 218.26 LBS | BODY MASS INDEX: 30.44 KG/M2

## 2024-12-15 DIAGNOSIS — Z76.0 ENCOUNTER FOR MEDICATION REFILL: Primary | ICD-10-CM

## 2024-12-15 PROCEDURE — 99283 EMERGENCY DEPT VISIT LOW MDM: CPT | Performed by: EMERGENCY MEDICINE

## 2024-12-15 RX ORDER — METOPROLOL TARTRATE 25 MG/1
25 TABLET, FILM COATED ORAL 2 TIMES DAILY
Qty: 30 TABLET | Refills: 1 | Status: SHIPPED | OUTPATIENT
Start: 2024-12-15

## 2024-12-15 RX ORDER — VENLAFAXINE HYDROCHLORIDE 75 MG/1
75 CAPSULE, EXTENDED RELEASE ORAL
Qty: 30 CAPSULE | Refills: 0 | Status: SHIPPED | OUTPATIENT
Start: 2024-12-15

## 2024-12-15 RX ORDER — LAMOTRIGINE 25 MG/1
25 TABLET ORAL DAILY
COMMUNITY

## 2024-12-15 ASSESSMENT — PAIN - FUNCTIONAL ASSESSMENT: PAIN_FUNCTIONAL_ASSESSMENT: NONE - DENIES PAIN

## 2024-12-15 NOTE — ED NOTES
Need refill on effexor, lamictal, metoprolol. Has been out x3 days, homeless living at the shelter, came here from [place]. Pt denies any SI/HI. Patient reports he ran out of meds and needs new script. Pt reports he can tell he is beginning to have changes in his mood and more short fused.

## 2024-12-15 NOTE — ED NOTES
Writer met with patient. He reported he has called around to treatment programs and the only person willing to accept him is [facility]. He stated he called [facility] and they were unable to accept.   Writer will follow up with [facility].

## 2024-12-15 NOTE — DISCHARGE INSTRUCTIONS
You were seen in the ER today for concern of medication refill.  You are actually not supposed to be taking Lamictal anymore per your psychiatry notes as well as your medicine doctors note this.  We will not refill this 1, however we will refill your Effexor which you are supposed to take daily as well as metoprolol.  Please follow-up with the family doctor, the numbers listed below.  Also I recommend that you follow-up with the mental health community provider as provided to you by social work.  Please return for any worsening symptoms or concerns.

## 2024-12-15 NOTE — ED NOTES
The patient presents to the emergency room with concerns regarding sobriety and the need for medication. They recently traveled from [place], where they spent two months, patient stayed in [place] for a month, and have now returned to [place] as of December 12, 2024. The patient reports a history of substance abuse, specifically methamphetamine and cocaine, but has also relapsed on alcohol and marijuana. The patient is currently residing in a homeless shelter and has [employer] insurance.  The patient is concerned about not being able to access their Hepatitis C medication due to their recent relapse. They are considering treatment at [facility] and plan to contact the facility for possible admission. The patient also has a history of anxiety and anger, and reports that their negative behaviors tend to escalate without medication.  The patient indicated they have no social support and are currently living at a shelter. Social work (SW) provided the patient with resources for community mental health agencies to support their mental health and substance abuse needs.

## 2024-12-15 NOTE — ED PROVIDER NOTES
[facility] EMERGENCY DEPARTMENT  Emergency Department Encounter  Emergency Medicine Resident     Pt Name:Balta Sarmiento  MRN: [id number]  Birthdate 1980  Date of evaluation: 12/15/24  PCP:  No primary care provider on file.  Note Started: 1:17 PM EST      CHIEF COMPLAINT       Chief Complaint   Patient presents with    Medication Refill     3 DAY OUT OF MEDICATIONS X 3 DAYS       HISTORY OF PRESENT ILLNESS  (Location/Symptom, Timing/Onset, Context/Setting, Quality, Duration, Modifying Factors, Severity.)      Balta Sarmiento is a 44 y.o. male who presents with concern for wanting his medications refilled.  Patient states that he is on Lamictal, Effexor, for mood stabilization, he is also requesting refill of his Lopressor which he takes for hypertension.  Patient states that he was recently seen at [facility] over the summer and was prescribed these medications, and he states that after that he moved to [place], and was unable to get his medications refilled due to an insurance issue.  He states he moved back to [place] now and he is trying to get his insurance switched back.  Patient states that he is currently not feeling suicidal or homicidal, but he states that he feels like his mood is getting \"worse.\"  He states that he wants to restart taking his medications before his mood gets \"out of control.\"  He denies any physical symptoms.    PAST MEDICAL / SURGICAL / SOCIAL / FAMILY HISTORY      has a past medical history of Anxiety, Depression, and Insomnia.       has no past surgical history on file.      Social History     Socioeconomic History    Marital status: Single     Spouse name: Not on file    Number of children: Not on file    Years of education: Not on file    Highest education level: Not on file   Occupational History    Not on file   Tobacco Use    Smoking status: Every Day     Current packs/day: 0.50     Types: Cigarettes    Smokeless tobacco: Never   Vaping Use    Vaping status: Every Day

## 2024-12-15 NOTE — ED PROVIDER NOTES
[facility] EMERGENCY DEPARTMENT     Emergency Department     Faculty Attestation        I performed a history and physical examination of the patient and discussed management with the resident. I reviewed the resident’s note and agree with the documented findings and plan of care. Any areas of disagreement are noted on the chart. I was personally present for the key portions of any procedures. I have documented in the chart those procedures where I was not present during the key portions. I have reviewed the emergency nurses triage note. I agree with the chief complaint, past medical history, past surgical history, allergies, medications, social and family history as documented unless otherwise noted below.    For mid-level providers such as nurse practitioners as well as physicians assistants:    I have personally seen and evaluated the patient.    I find the patient's history and physical exam are consistent with NP/PA documentation.  I agree with the care provided, treatment rendered, disposition, & follow-up plan.     Additional findings are as noted.    Vital Signs: /86   Pulse (!) 106   Temp 97.7 °F (36.5 °C) (Oral)   Resp 18   Wt 99 kg (218 lb 4.1 oz)   SpO2 98%   BMI 30.44 kg/m²   PCP:  No primary care provider on file.    Pertinent Comments:     Patient presents with a concern for medication refill he is out of metoprolol Mictral and Effexor.  He has no medical complaints      Critical Care  None          Aaron Beach MD    Attending Emergency Medicine Physician            Jose Beach MD  12/15/24 [id number]

## 2024-12-15 NOTE — ED NOTES
made telephone call to [facility] intake department. Reaganr spoke with Rosa in intake. Writer was transferred to Mona.   Mona reported the patient hung up on her. Mona stated she needed more information before assessment.

## 2024-12-15 NOTE — ED NOTES
received a phone call from Mona of [facility]. Mona reported Dr. Servin report patient does not meet inpatient criteria. Patient could present tomorrow for intake and be referred to outpatient services.

## 2024-12-15 NOTE — ED NOTES
Patient updated that [facility] is unable to accept him as he doesn't meet criteria. He was told [facility] stated he could present tomorrow for intake for outpatient services, he stated that will not work.     SW encouraged patient to utilize resources list provided by [facility] to obtain medication and treatment compliance.     Patient requested bus pass for travel, SW provided patient with bus pass to return to shelter on time.

## 2025-02-21 ENCOUNTER — HOSPITAL ENCOUNTER (EMERGENCY)
Age: 45
Discharge: HOME | End: 2025-02-21
Attending: EMERGENCY MEDICINE

## 2025-02-21 VITALS
HEART RATE: 98 BPM | DIASTOLIC BLOOD PRESSURE: 78 MMHG | OXYGEN SATURATION: 98 % | RESPIRATION RATE: 16 BRPM | SYSTOLIC BLOOD PRESSURE: 114 MMHG | TEMPERATURE: 97.3 F

## 2025-02-21 DIAGNOSIS — Z75.1 ENCOUNTER FOR PERSON AWAITING ADMISSION TO REHABILITATION CARE SETTING: Primary | ICD-10-CM

## 2025-02-21 LAB
ALBUMIN SERPL-MCNC: 4.5 G/DL (ref 3.5–5.2)
ALBUMIN/GLOB SERPL: 1.5 {RATIO} (ref 1–2.5)
ALP SERPL-CCNC: 70 U/L (ref 40–129)
ALT SERPL-CCNC: 20 U/L (ref 10–50)
AMPHET UR QL SCN: NEGATIVE
ANION GAP SERPL CALCULATED.3IONS-SCNC: 16 MMOL/L (ref 9–16)
APAP SERPL-MCNC: <5 UG/ML (ref 10–30)
AST SERPL-CCNC: 32 U/L (ref 10–50)
BARBITURATES UR QL SCN: NEGATIVE
BASOPHILS # BLD: 0.05 K/UL (ref 0–0.2)
BASOPHILS NFR BLD: 1 % (ref 0–2)
BENZODIAZ UR QL: NEGATIVE
BILIRUB SERPL-MCNC: 0.3 MG/DL (ref 0–1.2)
BUN SERPL-MCNC: 8 MG/DL (ref 6–20)
CALCIUM SERPL-MCNC: 9.3 MG/DL (ref 8.6–10.4)
CANNABINOIDS UR QL SCN: POSITIVE
CHLORIDE SERPL-SCNC: 104 MMOL/L (ref 98–107)
CO2 SERPL-SCNC: 22 MMOL/L (ref 20–31)
COCAINE UR QL SCN: POSITIVE
CREAT SERPL-MCNC: 0.8 MG/DL (ref 0.7–1.2)
EOSINOPHIL # BLD: 0.11 K/UL (ref 0–0.44)
EOSINOPHILS RELATIVE PERCENT: 1 % (ref 1–4)
ERYTHROCYTE [DISTWIDTH] IN BLOOD BY AUTOMATED COUNT: 12.2 % (ref 11.8–14.4)
ETHANOL PERCENT: 0.04 %
ETHANOLAMINE SERPL-MCNC: 44 MG/DL (ref 0–0.08)
FENTANYL UR QL: NEGATIVE
GFR, ESTIMATED: >90 ML/MIN/1.73M2
GLUCOSE SERPL-MCNC: 83 MG/DL (ref 74–99)
HCT VFR BLD AUTO: 43.1 % (ref 40.7–50.3)
HGB BLD-MCNC: 14.9 G/DL (ref 13–17)
IMM GRANULOCYTES # BLD AUTO: <0.03 K/UL (ref 0–0.3)
IMM GRANULOCYTES NFR BLD: 0 %
LYMPHOCYTES NFR BLD: 2.51 K/UL (ref 1.1–3.7)
LYMPHOCYTES RELATIVE PERCENT: 32 % (ref 24–43)
MCH RBC QN AUTO: 31.2 PG (ref 25.2–33.5)
MCHC RBC AUTO-ENTMCNC: 34.6 G/DL (ref 28.4–34.8)
MCV RBC AUTO: 90.2 FL (ref 82.6–102.9)
METHADONE UR QL: NEGATIVE
MONOCYTES NFR BLD: 0.27 K/UL (ref 0.1–1.2)
MONOCYTES NFR BLD: 3 % (ref 3–12)
NEUTROPHILS NFR BLD: 63 % (ref 36–65)
NEUTS SEG NFR BLD: 4.95 K/UL (ref 1.5–8.1)
NRBC BLD-RTO: 0 PER 100 WBC
OPIATES UR QL SCN: NEGATIVE
OXYCODONE UR QL SCN: NEGATIVE
PCP UR QL SCN: NEGATIVE
PLATELET # BLD AUTO: ABNORMAL K/UL (ref 138–453)
PLATELET, FLUORESCENCE: ABNORMAL K/UL (ref 138–453)
PLATELETS.RETICULATED NFR BLD AUTO: 11.1 % (ref 1.1–10.3)
POTASSIUM SERPL-SCNC: 4 MMOL/L (ref 3.7–5.3)
PROT SERPL-MCNC: 7.6 G/DL (ref 6.6–8.7)
RBC # BLD AUTO: 4.78 M/UL (ref 4.21–5.77)
SALICYLATES SERPL-MCNC: <0.5 MG/DL (ref 0–10)
SODIUM SERPL-SCNC: 142 MMOL/L (ref 136–145)
TEST INFORMATION: ABNORMAL
WBC OTHER # BLD: 7.9 K/UL (ref 3.5–11.3)

## 2025-02-21 PROCEDURE — 80053 COMPREHEN METABOLIC PANEL: CPT

## 2025-02-21 PROCEDURE — 85025 COMPLETE CBC W/AUTO DIFF WBC: CPT

## 2025-02-21 PROCEDURE — 85055 RETICULATED PLATELET ASSAY: CPT

## 2025-02-21 PROCEDURE — 80179 DRUG ASSAY SALICYLATE: CPT

## 2025-02-21 PROCEDURE — 80143 DRUG ASSAY ACETAMINOPHEN: CPT

## 2025-02-21 PROCEDURE — G0480 DRUG TEST DEF 1-7 CLASSES: HCPCS

## 2025-02-21 PROCEDURE — 99285 EMERGENCY DEPT VISIT HI MDM: CPT

## 2025-02-21 PROCEDURE — 80307 DRUG TEST PRSMV CHEM ANLYZR: CPT

## 2025-02-21 ASSESSMENT — ENCOUNTER SYMPTOMS
COUGH: 0
VOMITING: 0
DIARRHEA: 0
NAUSEA: 0
BACK PAIN: 0
SHORTNESS OF BREATH: 0
ABDOMINAL PAIN: 0

## 2025-02-21 NOTE — ED PROVIDER NOTES
[facility] EMERGENCY DEPARTMENT  Emergency Department Encounter  Emergency Medicine Resident     Pt Name:Balta Sarmiento  MRN: [id number]  Birthdate 1980  Date of evaluation: 2/21/25  PCP:  No primary care provider on file.  Note Started: 6:28 PM EST      CHIEF COMPLAINT       Chief Complaint   Patient presents with    Anxiety       HISTORY OF PRESENT ILLNESS  (Location/Symptom, Timing/Onset, Context/Setting, Quality, Duration, Modifying Factors, Severity.)      Balta Sarmiento is a 44 y.o. male who presents with relapse of marijuana, alcohol, cocaine use that he states to me was for the past day.  Patient states he was previously 5 months clean.  He states he has been out of his medications including Effexor, Lamictal, antihypertensives, HPV cream for the past 2 months due to imprisonment.  He denies any current physical complaints.  He denies SI/HI.    PAST MEDICAL / SURGICAL / SOCIAL / FAMILY HISTORY      has a past medical history of Anxiety, Depression, and Insomnia.       has no past surgical history on file.      Social History     Socioeconomic History    Marital status: Single     Spouse name: Not on file    Number of children: Not on file    Years of education: Not on file    Highest education level: Not on file   Occupational History    Not on file   Tobacco Use    Smoking status: Every Day     Current packs/day: 0.50     Types: Cigarettes    Smokeless tobacco: Never   Vaping Use    Vaping status: Every Day   Substance and Sexual Activity    Alcohol use: Yes     Comment: 4 beers and a fifth of liquor    Drug use: Yes     Types: Cocaine, Marijuana (Weed)    Sexual activity: Not on file   Other Topics Concern    Not on file   Social History Narrative    Not on file     Social Determinants of Health     Financial Resource Strain: Not on file   Food Insecurity: Food Insecurity Present (9/17/2024)    Hunger Vital Sign     Worried About Running Out of Food in the Last Year: Sometimes true     Ran Out of

## 2025-02-21 NOTE — ED NOTES
Pt to ED w/ c/o anxiety.   Pt states he has been in assisted the last two months and didn't have any access to his psych meds.  Pt states that he wants to go to rehab for drugs and alcohol use.   Pt states that he used cocaine, marijuana, and drank a fifth of vodka w/ beers last night.   Pt states he is not in withdrawal.   Pt is requesting to speak to SW.   Pt denies SI/HI.   Pt resting in cot.

## 2025-02-21 NOTE — ED NOTES
Pt is a 44 year old male who presented to the ED. Pt reports needing assistance with getting into a rehab facility. Pt was accepted to a place in [place] but needs transportation to [place] for them to be willing to pick him up. Pt does not have insurance so S[facility]AN told pt we could not assist him in getting to [place]. Pt agreeable to going somewhere local for now. Pt requesting transfer to [facility].     Pt was clean for 5 months prior to relapsing 2 days ago. Pt reports that he drank 2 fifths and a beer yesterday and 2 pints and 2 beers today (02/21/25.) Pt has been smoking \"a lot\" of Marijuana the past two days with last use being 02/21/25. Pt also used 50 dollars worth of Cocaine today (02/21/25.) Pt denies SI, HI, and hallucinations. Pt denies legal trouble. Pt reports that he is currently homeless. Pt has a history of mental health concerns and has been prescribed medications. Pt reports that he has not taken any of his medications for 2 months now due to running out. Pt is not connected with any facility outpatient for his mental health.

## 2025-02-21 NOTE — ED PROVIDER NOTES
[facility]     Emergency Department     Faculty Attestation    I performed a history and physical examination of the patient and discussed management with the resident. I reviewed the resident’s note and agree with the documented findings and plan of care. Any areas of disagreement are noted on the chart. I was personally present for the key portions of any procedures. I have documented in the chart those procedures where I was not present during the key portions. I have reviewed the emergency nurses triage note. I agree with the chief complaint, past medical history, past surgical history, allergies, medications, social and family history as documented unless otherwise noted below.        For Physician Assistant/ Nurse Practitioner cases/documentation I have personally evaluated this patient and have completed at least one if not all key elements of the E/M (history, physical exam, and MDM). Additional findings are as noted.  I have personally seen and evaluated the patient.  I find the patient's history and physical exam are consistent with the NP/PA documentation.  I agree with the care provided, treatment rendered, disposition and follow-up plan.          Critical Care     Chnio Jamison M.D.  Attending Emergency  Physician           Chino Jamison MD  02/21/25 0451

## 2025-02-22 NOTE — ED NOTES
SAFETY PLAN    A suicide Safety Plan is a document that supports someone when they are having thoughts of suicide.    Warning Signs that indicate a suicidal crisis may be developing: What (situations, thoughts, feelings, body sensations, behaviors, etc.) do you experience that lets you know you are beginning to think about suicide?  1. Pt declined SI/HI- stated he was suicidal to get back into room      Internal Coping Strategies:  What things can I do (relaxation techniques, hobbies, physical activities, etc.) to take my mind off my problems without contacting another person?  1. NA      People and social settings that provide distraction: Who can I call or where can I go to distract me?  1. Name: NA      People whom I can ask for help: Who can I call when I need help - for example, friends, family, clergy, someone else?  1. Name: NA                    Professionals or Behavioral Health agencies I can contact during a crisis: Who can I call for help - for example, my doctor, my psychiatrist, my psychologist, a mental health provider, a suicide hotline?      1. Suicide Prevention Lifeline: 5-489-222-TALK (1182)    2. Local Behavioral Health Emergency Services -  for example, [facility], [facility] suicide hotline, [facility] Hotline: [facility] Crisis      Emergency Services Address: [address] [place] [address]      Emergency Services Phone: [phone]    Making the environment safe: How can I make my environment (house/apartment/living space) safer? For example, can I remove guns, medications, and other items?  1. NA

## 2025-02-22 NOTE — DISCHARGE INSTRUCTIONS
You are being discharged to [facility] to be evaluated (this is first come first serve) to be evaluated for assistance with stopping.      Stop drinking alcohol and using the other drugs mentioned.    PLEASE RETURN TO THE EMERGENCY DEPARTMENT IMMEDIATELY for worsening symptoms, or if you develop any concerning symptoms such as: Hallucinations, tremors, chills, shortness of breath, chest pain, feeling of your heart fluttering or racing, persistent nausea and/or vomiting, vomiting up blood, blood in your stool, numbness, loss of consciousness, weakness or tingling in the arms or legs or change in color of the extremities, changes in mental status, persistent headache, blurry vision, loss of bladder / bowel control, unable to follow up with your physician, or any other care or concern.

## 2025-02-22 NOTE — ED NOTES
Patient requested to speak with social work about rehab facility in [place]. Patient stated he is speaking with [facility] [phone]. He stated facility would like to speak with [facility] about referral. Patient stated they are willing to buy [employer] bus ticket if he he is able to get to bus station.  [facility] called [facility], message left.

## 2025-02-22 NOTE — ED NOTES
Labs resulted. Referral faxed to [facility]. Awaiting determination of acceptance.

## 2025-02-22 NOTE — ED NOTES
Patient stated he is having difficulty reaching [facility].  Patient requested to proceed with [facility] referral. He will need labs drawn for medical clearance.

## 2025-03-12 ENCOUNTER — HOSPITAL ENCOUNTER (EMERGENCY)
Age: 45
Discharge: ANOTHER HOSPITAL | DRG: 751 | End: 2025-03-13
Attending: EMERGENCY MEDICINE
Payer: MEDICAID

## 2025-03-12 DIAGNOSIS — T14.91XA SUICIDE ATTEMPT (HCC): Primary | ICD-10-CM

## 2025-03-12 PROBLEM — R07.9 CHEST PAIN: Status: RESOLVED | Noted: 2024-09-13 | Resolved: 2025-03-12

## 2025-03-12 LAB
ERYTHROCYTE [DISTWIDTH] IN BLOOD BY AUTOMATED COUNT: 11.8 % (ref 11.8–14.4)
HCT VFR BLD AUTO: 42.3 % (ref 40.7–50.3)
HGB BLD-MCNC: 14.4 G/DL (ref 13–17)
MCH RBC QN AUTO: 30.4 PG (ref 25.2–33.5)
MCHC RBC AUTO-ENTMCNC: 34 G/DL (ref 28.4–34.8)
MCV RBC AUTO: 89.2 FL (ref 82.6–102.9)
NRBC BLD-RTO: 0 PER 100 WBC
PLATELET # BLD AUTO: 253 K/UL (ref 138–453)
PMV BLD AUTO: 10.2 FL (ref 8.1–13.5)
RBC # BLD AUTO: 4.74 M/UL (ref 4.21–5.77)
WBC OTHER # BLD: 9.8 K/UL (ref 3.5–11.3)

## 2025-03-12 PROCEDURE — 80143 DRUG ASSAY ACETAMINOPHEN: CPT

## 2025-03-12 PROCEDURE — 80179 DRUG ASSAY SALICYLATE: CPT

## 2025-03-12 PROCEDURE — 99285 EMERGENCY DEPT VISIT HI MDM: CPT

## 2025-03-12 PROCEDURE — G0480 DRUG TEST DEF 1-7 CLASSES: HCPCS

## 2025-03-12 PROCEDURE — 80053 COMPREHEN METABOLIC PANEL: CPT

## 2025-03-12 PROCEDURE — 80307 DRUG TEST PRSMV CHEM ANLYZR: CPT

## 2025-03-12 PROCEDURE — 85027 COMPLETE CBC AUTOMATED: CPT

## 2025-03-12 PROCEDURE — 93005 ELECTROCARDIOGRAM TRACING: CPT

## 2025-03-12 ASSESSMENT — PAIN - FUNCTIONAL ASSESSMENT: PAIN_FUNCTIONAL_ASSESSMENT: NONE - DENIES PAIN

## 2025-03-13 ENCOUNTER — HOSPITAL ENCOUNTER (INPATIENT)
Age: 45
LOS: 5 days | Discharge: HOME | DRG: 751 | End: 2025-03-18
Attending: PSYCHIATRY & NEUROLOGY | Admitting: PSYCHIATRY & NEUROLOGY
Payer: MEDICAID

## 2025-03-13 VITALS
DIASTOLIC BLOOD PRESSURE: 68 MMHG | SYSTOLIC BLOOD PRESSURE: 101 MMHG | HEART RATE: 88 BPM | OXYGEN SATURATION: 96 % | WEIGHT: 214 LBS | RESPIRATION RATE: 17 BRPM | TEMPERATURE: 97.6 F | BODY MASS INDEX: 29.85 KG/M2

## 2025-03-13 LAB
ALBUMIN SERPL-MCNC: 4.5 G/DL (ref 3.5–5.2)
ALBUMIN/GLOB SERPL: 1.5 {RATIO} (ref 1–2.5)
ALP SERPL-CCNC: 64 U/L (ref 40–129)
ALT SERPL-CCNC: 13 U/L (ref 10–50)
AMPHET UR QL SCN: NEGATIVE
ANION GAP SERPL CALCULATED.3IONS-SCNC: 13 MMOL/L (ref 9–16)
APAP SERPL-MCNC: <5 UG/ML (ref 10–30)
AST SERPL-CCNC: 21 U/L (ref 10–50)
BARBITURATES UR QL SCN: NEGATIVE
BENZODIAZ UR QL: POSITIVE
BILIRUB SERPL-MCNC: <0.2 MG/DL (ref 0–1.2)
BUN SERPL-MCNC: 5 MG/DL (ref 6–20)
CALCIUM SERPL-MCNC: 8.9 MG/DL (ref 8.6–10.4)
CANNABINOIDS UR QL SCN: POSITIVE
CHLORIDE SERPL-SCNC: 100 MMOL/L (ref 98–107)
CO2 SERPL-SCNC: 23 MMOL/L (ref 20–31)
COCAINE UR QL SCN: POSITIVE
CREAT SERPL-MCNC: 0.7 MG/DL (ref 0.7–1.2)
ETHANOL PERCENT: 0.12 %
ETHANOLAMINE SERPL-MCNC: 118 MG/DL (ref 0–0.08)
FENTANYL UR QL: NEGATIVE
GFR, ESTIMATED: >90 ML/MIN/1.73M2
GLUCOSE SERPL-MCNC: 89 MG/DL (ref 74–99)
METHADONE UR QL: NEGATIVE
OPIATES UR QL SCN: NEGATIVE
OXYCODONE UR QL SCN: NEGATIVE
PCP UR QL SCN: NEGATIVE
POTASSIUM SERPL-SCNC: 4 MMOL/L (ref 3.7–5.3)
PROT SERPL-MCNC: 7.5 G/DL (ref 6.6–8.7)
SALICYLATES SERPL-MCNC: <0.5 MG/DL (ref 0–10)
SODIUM SERPL-SCNC: 136 MMOL/L (ref 136–145)
TEST INFORMATION: ABNORMAL

## 2025-03-13 PROCEDURE — 6370000000 HC RX 637 (ALT 250 FOR IP): Performed by: PSYCHIATRY & NEUROLOGY

## 2025-03-13 PROCEDURE — 6370000000 HC RX 637 (ALT 250 FOR IP): Performed by: INTERNAL MEDICINE

## 2025-03-13 PROCEDURE — 99222 1ST HOSP IP/OBS MODERATE 55: CPT | Performed by: PSYCHIATRY & NEUROLOGY

## 2025-03-13 PROCEDURE — 1240000000 HC EMOTIONAL WELLNESS R&B

## 2025-03-13 PROCEDURE — APPSS60 APP SPLIT SHARED TIME 46-60 MINUTES

## 2025-03-13 PROCEDURE — 99222 1ST HOSP IP/OBS MODERATE 55: CPT | Performed by: INTERNAL MEDICINE

## 2025-03-13 PROCEDURE — 6370000000 HC RX 637 (ALT 250 FOR IP): Performed by: NURSE PRACTITIONER

## 2025-03-13 RX ORDER — LORAZEPAM 2 MG/ML
2 INJECTION INTRAMUSCULAR EVERY 6 HOURS PRN
Status: DISCONTINUED | OUTPATIENT
Start: 2025-03-13 | End: 2025-03-18 | Stop reason: HOSPADM

## 2025-03-13 RX ORDER — HYDROXYZINE HYDROCHLORIDE 50 MG/1
50 TABLET, FILM COATED ORAL 3 TIMES DAILY PRN
Status: DISCONTINUED | OUTPATIENT
Start: 2025-03-13 | End: 2025-03-18 | Stop reason: HOSPADM

## 2025-03-13 RX ORDER — LAMOTRIGINE 25 MG/1
25 TABLET ORAL DAILY
Status: DISCONTINUED | OUTPATIENT
Start: 2025-03-13 | End: 2025-03-14

## 2025-03-13 RX ORDER — METOPROLOL TARTRATE 25 MG/1
25 TABLET, FILM COATED ORAL 2 TIMES DAILY
Status: DISCONTINUED | OUTPATIENT
Start: 2025-03-13 | End: 2025-03-18 | Stop reason: HOSPADM

## 2025-03-13 RX ORDER — HALOPERIDOL 5 MG/ML
5 INJECTION INTRAMUSCULAR EVERY 6 HOURS PRN
Status: DISCONTINUED | OUTPATIENT
Start: 2025-03-13 | End: 2025-03-18 | Stop reason: HOSPADM

## 2025-03-13 RX ORDER — ASPIRIN 81 MG/1
81 TABLET, CHEWABLE ORAL DAILY
Status: DISCONTINUED | OUTPATIENT
Start: 2025-03-13 | End: 2025-03-18 | Stop reason: HOSPADM

## 2025-03-13 RX ORDER — POLYETHYLENE GLYCOL 3350 17 G
2 POWDER IN PACKET (EA) ORAL
Status: DISCONTINUED | OUTPATIENT
Start: 2025-03-13 | End: 2025-03-18 | Stop reason: HOSPADM

## 2025-03-13 RX ORDER — MAGNESIUM HYDROXIDE/ALUMINUM HYDROXICE/SIMETHICONE 120; 1200; 1200 MG/30ML; MG/30ML; MG/30ML
30 SUSPENSION ORAL EVERY 6 HOURS PRN
Status: DISCONTINUED | OUTPATIENT
Start: 2025-03-13 | End: 2025-03-18 | Stop reason: HOSPADM

## 2025-03-13 RX ORDER — ACETAMINOPHEN 325 MG/1
650 TABLET ORAL EVERY 4 HOURS PRN
Status: DISCONTINUED | OUTPATIENT
Start: 2025-03-13 | End: 2025-03-18 | Stop reason: HOSPADM

## 2025-03-13 RX ORDER — M-VIT,TX,IRON,MINS/CALC/FOLIC 27MG-0.4MG
1 TABLET ORAL DAILY
Status: DISCONTINUED | OUTPATIENT
Start: 2025-03-13 | End: 2025-03-18 | Stop reason: HOSPADM

## 2025-03-13 RX ORDER — IBUPROFEN 400 MG/1
400 TABLET, FILM COATED ORAL EVERY 6 HOURS PRN
Status: DISCONTINUED | OUTPATIENT
Start: 2025-03-13 | End: 2025-03-18 | Stop reason: HOSPADM

## 2025-03-13 RX ORDER — VENLAFAXINE HYDROCHLORIDE 75 MG/1
75 CAPSULE, EXTENDED RELEASE ORAL
Status: DISCONTINUED | OUTPATIENT
Start: 2025-03-14 | End: 2025-03-18 | Stop reason: HOSPADM

## 2025-03-13 RX ORDER — DIPHENHYDRAMINE HYDROCHLORIDE 50 MG/ML
50 INJECTION, SOLUTION INTRAMUSCULAR; INTRAVENOUS EVERY 6 HOURS PRN
Status: DISCONTINUED | OUTPATIENT
Start: 2025-03-13 | End: 2025-03-18 | Stop reason: HOSPADM

## 2025-03-13 RX ORDER — LORAZEPAM 1 MG/1
2 TABLET ORAL EVERY 6 HOURS PRN
Status: DISCONTINUED | OUTPATIENT
Start: 2025-03-13 | End: 2025-03-18 | Stop reason: HOSPADM

## 2025-03-13 RX ORDER — POLYETHYLENE GLYCOL 3350 17 G/17G
17 POWDER, FOR SOLUTION ORAL DAILY PRN
Status: DISCONTINUED | OUTPATIENT
Start: 2025-03-13 | End: 2025-03-18 | Stop reason: HOSPADM

## 2025-03-13 RX ORDER — HALOPERIDOL 5 MG/1
5 TABLET ORAL EVERY 6 HOURS PRN
Status: DISCONTINUED | OUTPATIENT
Start: 2025-03-13 | End: 2025-03-18 | Stop reason: HOSPADM

## 2025-03-13 RX ORDER — POLYETHYLENE GLYCOL 3350 17 G
2 POWDER IN PACKET (EA) ORAL
OUTPATIENT
Start: 2025-03-13

## 2025-03-13 RX ADMIN — Medication 1 TABLET: at 17:37

## 2025-03-13 RX ADMIN — LAMOTRIGINE 25 MG: 25 TABLET ORAL at 17:37

## 2025-03-13 ASSESSMENT — PATIENT HEALTH QUESTIONNAIRE - PHQ9
SUM OF ALL RESPONSES TO PHQ QUESTIONS 1-9: 2
SUM OF ALL RESPONSES TO PHQ QUESTIONS 1-9: 2
1. LITTLE INTEREST OR PLEASURE IN DOING THINGS: SEVERAL DAYS
2. FEELING DOWN, DEPRESSED OR HOPELESS: SEVERAL DAYS
SUM OF ALL RESPONSES TO PHQ QUESTIONS 1-9: 2
SUM OF ALL RESPONSES TO PHQ QUESTIONS 1-9: 2

## 2025-03-13 ASSESSMENT — LIFESTYLE VARIABLES
HOW OFTEN DO YOU HAVE A DRINK CONTAINING ALCOHOL: MONTHLY OR LESS
HOW MANY STANDARD DRINKS CONTAINING ALCOHOL DO YOU HAVE ON A TYPICAL DAY: 5 OR 6
HOW OFTEN DO YOU HAVE A DRINK CONTAINING ALCOHOL: 2-3 TIMES A WEEK
HOW MANY STANDARD DRINKS CONTAINING ALCOHOL DO YOU HAVE ON A TYPICAL DAY: 5 OR 6

## 2025-03-13 ASSESSMENT — SLEEP AND FATIGUE QUESTIONNAIRES
SLEEP PATTERN: DIFFICULTY FALLING ASLEEP;DISTURBED/INTERRUPTED SLEEP
DO YOU HAVE DIFFICULTY SLEEPING: NO
AVERAGE NUMBER OF SLEEP HOURS: 8
DO YOU USE A SLEEP AID: NO

## 2025-03-13 NOTE — GROUP NOTE
Group Therapy Note    Date: 3/13/2025    Group Start Time: 1000  Group End Time: 1030  Group Topic: Psychoeducation    [facility]    Vijay Zuniga        Group Therapy Note    Attendees: 6/10     Patient was offered group therapy today but declined to participate despite encouragement from staff. 1:1 was offered.    Signature:  Vijay Zuniga

## 2025-03-13 NOTE — H&P
Denies     Body or Vocal Tics: Denies     Visual Hallucinations: Denies     Auditory Hallucinations: Denies     Delusions/Paranoia: Denies     PTSD: Endorses    Past Medical History:        Diagnosis Date    Anxiety     Chest pain 2024    Depression     Insomnia        Past Surgical History:    History reviewed. No pertinent surgical history.    Allergies:  Shellfish-derived products, Fish-derived products, and Propoxyphene         Social History:     Born in: [place], [place]  Family: Raised by grandparents, \"alright\" childhood, not in contact with mother, father  two days ago   Highest Level of Education: GED  Occupation: Unemployed   Marital Status: Single   Children: Denies however 2 children per previous documentation  Residence: Homeless  Stressors: Lack of housing, father's death, relapse   Patient Assets/Supportive Factors: Willingness to seek treatment, family, friends          DRUG USE HISTORY  Social History     Tobacco Use   Smoking Status Every Day    Current packs/day: 0.50    Types: Cigarettes   Smokeless Tobacco Never     Social History     Substance and Sexual Activity   Alcohol Use Yes    Comment: 4 beers and a fifth of liquor     Social History     Substance and Sexual Activity   Drug Use Yes    Types: Cocaine, Marijuana (Weed)       Patient relapsed on cocaine and alcohol yesterday. He was sober for 6 months prior to relapse.  Ethanol level 118 and UDS positive for benzodiazepines, cannabis, and cocaine.          LEGAL HISTORY:   HISTORY OF INCARCERATION: [x] Yes [] No   Released on . He was arrested for probation violation.     Family History:   History reviewed. No pertinent family history.    Psychiatric Family History  Patient denies psychiatric family history.     Suicides in family: [] Yes [x] No    Substance use in family: [x] Yes [] No   Uncles and cousins use unknown drugs   Mother - cocaine          PHYSICAL EXAM:  Vitals:  /75   Pulse 91   Temp 98.2 °F (36.8 
drug abuse  DVT prophylaxis,  Pt mobile   Full code status       Consultations:   None      Nickolas Parker MD  3/13/2025  10:12 AM    Copy sent to Dr. Mesa primary care provider on file.    Please note that this chart was generated using voice recognition Dragon dictation software.  Although every effort was made to ensure the accuracy of this automated transcription, some errors in transcription may have occurred.

## 2025-03-13 NOTE — ED NOTES
[] [facility]    [] [facility]    [x]  [facility]    SUICIDE RISK ASSESSMENT      Y  N     [x] [] In the past two weeks have you had thoughts of hurting yourself in any way?    [x] [] In the past two weeks have you had thoughts that you would be better off dead?   [] [x] Have you made a suicide attempt in the past two months?   [x] [] Do you have a plan for hurting yourself or suicide?   [] [x] Presence of hallucinations/voices related to hurting himself or herself or someone else.    SUICIDE/SECURITY WATCH PRECAUTION CHECKLIST     Orders    [x]  Suicide/Security Watch Precautions initiated as checked below:   3/13/25 12:08 AM EDT 11/11    [x] Notified physician:  Gabe Castillo DO  3/13/25 12:08 AM EDT    [x] Orders obtained as appropriate:     [x] 1:1 Observer     [x] Psych Consult     [] Psych Consult    Name:  Date:  Time:    [x] 1:1 Observer, Notified by:  Ely Maya RN    Contact Nurse Supervisor    [x] Remove all personal clothes from room and place in snap/paper gown/pants.  Slipper only    [x] Remove all personal belongings from room and secured away from patient.    Documentation    [x] Initiate Suicide/Security Watch Precaution Flow Sheet    [x] Initiate individualized Care Plan/Problem    [x] Document why precautions initiated on flow sheet (Initiate Nursing Care Plan/Problem)    [x] 1:1 Observer in place; instructions provided.  Suicide precautions require observer be within arms length.    [x] Nurse-Observer Communication Hand-off initiated by RN, reviewed with Observer.  Subsequently used as Hand Off between Observers.    [x] Initiate every 15 minute observations per observer as delegated by the RN.    [x] Initiate RN assessment and documentation    Environmental Scan  Search Criteria and Process: OPTIONAL, see Search Policy    [x] Reason for search:    [x] Nursing in presence of second person to search patient    [x] Patient notified of reason for body assessment and

## 2025-03-13 NOTE — ED NOTES
Social work collected Emtala.    Social work obtained a signature for voluntary inpatient form.    Social work faxed paperwork to [facility].    [facility].

## 2025-03-13 NOTE — ED NOTES
Social work met with patient who reported to be at the hospital as he has relapsed after 6 months and taken the rest of his medication, used cocaine and drank alcohol in hopes that this would end his life. Patient reported he has been suicidal since yesterday when his father . Patient reported to have no other supports. Patient reported he is still suicidal. Patient reported he is not homicidal, or having auditory or visual hallucinations. Patient reported he is not linked to services, that he last received his medication from [facility] on 25. Patient reported he is homeless and has no pending criminal charges.

## 2025-03-13 NOTE — ED NOTES
Pt came in ambulatory through triage with c/o of depression and suicide attempt.  Pt states he attempted suicide by taking taking three Effexor XR 75 mg tablets and six Lamictal 25 mg tablets at 1300.  Pt then states he drank two beers, 3 pints of liquor, and smoked crack cocaine just PTA.  Pt states he recently had life stress of his father passing away yesterday.   Pt denies hearing voices, denies homicidal ideation, denies other complaints.

## 2025-03-13 NOTE — ED NOTES
..Transfer Center Handoff for Behavioral Health Transfers      Patient's Current Location: [facility] EMERGENCY DEPARTMENT     Chief Complaint   Patient presents with    Suicide Attempt       Current or History of Violent Behavior: No    Currently in Restraints Now or During this Encounter: No  (Specify if Agitation or self harm is noted in ED?)  If yes, please describe behaviors requiring restraint:             Medical Clearance Documented and Verified in the Chart: Yes    Allergies   Allergen Reactions    Shellfish-Derived Products Rash    Fish-Derived Products     Propoxyphene Hives        Can Patient Tolerate Lying Flat: Yes    Able to Perform ADLs:  Yes  (Specify if able to ambulate or uses any mobility devices such as cane or walker)  Activity:    Level of Assistance:    Assistive Device:    Miscellaneous Devices:      LABS    CBC:   Lab Results   Component Value Date/Time    WBC 9.8 03/12/2025 11:29 PM    RBC 4.74 03/12/2025 11:29 PM    HGB 14.4 03/12/2025 11:29 PM    HCT 42.3 03/12/2025 11:29 PM    MCV 89.2 03/12/2025 11:29 PM    MCH 30.4 03/12/2025 11:29 PM    MCHC 34.0 03/12/2025 11:29 PM    RDW 11.8 03/12/2025 11:29 PM     03/12/2025 11:29 PM    MPV 10.2 03/12/2025 11:29 PM     CMP:   Lab Results   Component Value Date/Time     03/12/2025 11:29 PM    K 4.0 03/12/2025 11:29 PM    K 3.7 10/22/2023 06:07 AM     03/12/2025 11:29 PM    CO2 23 03/12/2025 11:29 PM    BUN 5 03/12/2025 11:29 PM    CREATININE 0.7 03/12/2025 11:29 PM    GFRAA >60 04/25/2022 12:55 AM    LABGLOM >90 03/12/2025 11:29 PM    LABGLOM >90 04/25/2024 12:08 AM    GLUCOSE 89 03/12/2025 11:29 PM    CALCIUM 8.9 03/12/2025 11:29 PM    BILITOT <0.2 03/12/2025 11:29 PM    ALKPHOS 64 03/12/2025 11:29 PM    AST 21 03/12/2025 11:29 PM    ALT 13 03/12/2025 11:29 PM     Drug Panel:   Lab Results   Component Value Date/Time    OPIAU NEGATIVE 03/12/2025 11:28 PM    LABCOMM  09/13/2024 05:20 AM     Microscopic exam not performed

## 2025-03-13 NOTE — ED NOTES
[facility] called to notify of acceptance to [facility] Unit bed 229    ETA TBD

## 2025-03-13 NOTE — ED PROVIDER NOTES
[facility]     Emergency Department     Faculty Attestation    I performed a history and physical examination of the patient and discussed management with the resident. I have reviewed and agree with the resident’s findings including all diagnostic interpretations, and treatment plans as written. Any areas of disagreement are noted on the chart. I was personally present for the key portions of any procedures. I have documented in the chart those procedures where I was not present during the key portions. I have reviewed the emergency nurses triage note. I agree with the chief complaint, past medical history, past surgical history, allergies, medications, social and family history as documented unless otherwise noted below. Documentation of the HPI, Physical Exam and Medical Decision Making performed by sonya is based on my personal performance of the HPI, PE and MDM. For Physician Assistant/ Nurse Practitioner cases/documentation I have personally evaluated this patient and have completed at least one if not all key elements of the E/M (history, physical exam, and MDM). Additional findings are as noted.    Note Started: 11:09 PM EDT     45 yo M suicidal pt took extra effexor, & cocaine, no injury, no fever,   PE tachy, gcs 15, neck supple, no pressured speech, cooperative / conversant  >>> bhi  EKG Interpretation    Interpreted by me  Normal sinus, HR 92, no ischemia, left axis, QTc 450    CRITICAL CARE: There was a high probability of clinically significant/life threatening deterioration in this patient's condition which required my urgent intervention.  Total critical care time was 0 minutes.  This excludes any time for separately reportable procedures.       Gabe Leiva DO  03/12/25 [id number]       Gabe Castillo DO  03/12/25 0731       Gabe Castillo DO  03/13/25 7116

## 2025-03-13 NOTE — GROUP NOTE
Group Therapy Note    Date: 3/13/2025    Group Start Time: 1100  Group End Time: 1135  Group Topic: Cognitive Skills    [facility]    Maria E Castañeda CTRS    Cognitive Skills Group Note        Date: March 13, 2025 Start Time: 11am  End Time:  1135 am       Number of Participants in Group & Unit Census:  7/10    Topic: cognitive skills     Goal of Group:pt will demonstrate improved coping skills and improved interpersonal relationships       Comments:     Patient did not participate in Cognitive Skills group, despite staff encouragement and explanation of benefits.  Patient remain seclusive to self.  Q15 minute safety checks maintained for patient safety and will continue to encourage patient to attend unit programming.               Signature:  ROSELYN WILLIS

## 2025-03-13 NOTE — ED PROVIDER NOTES
[facility] EMERGENCY DEPARTMENT  Emergency Department Encounter  Emergency Medicine Resident     Pt Name:Balta Sarmiento  MRN: [id number]  Birthdate 1980  Date of evaluation: 3/12/25  PCP:  No primary care provider on file.  Note Started: 11:17 PM EDT      CHIEF COMPLAINT       Chief Complaint   Patient presents with    Suicide Attempt     Pt took 3 effexor and 6 lamictial tablets around 1300, 3 pints of liquor, 2 beers, and crack cocaine        HISTORY OF PRESENT ILLNESS  (Location/Symptom, Timing/Onset, Context/Setting, Quality, Duration, Modifying Factors, Severity.)      Balta Sarmiento is a 44 y.o. male who presents with suicidal ideation with attempted overdose today.  Patient states he lost his father yesterday who was the only person who cared about him.  Today felt like kill himself I am started by trying to overdose on his meds, but he only had 3 days worth of Effexor and 2 days worth of Lamictal but took it all.  Believes he took this at around noon.  Stated this did not work so he tried smoking some crack.  That also did not work so he engaged in some risky behaviors which did not result in death.  Then patient tried to drink alcohol to mix with the medications to see if that would cause death and that also did not work.  He is currently feeling very down and wants some help and checked himself into the emergency department.  He is willing to get inpatient help at this point.  He denies any shortness of breath or chest pain.  Denies abdominal pain, nausea, vomiting.  States he does feel little tired and lightheaded but otherwise feels his baseline.    PAST MEDICAL / SURGICAL / SOCIAL / FAMILY HISTORY      has a past medical history of Anxiety, Chest pain, Depression, and Insomnia.       has no past surgical history on file.      Social History     Socioeconomic History    Marital status: Single     Spouse name: Not on file    Number of children: Not on file    Years of education: Not on file

## 2025-03-14 LAB
EKG ATRIAL RATE: 92 BPM
EKG P AXIS: 43 DEGREES
EKG P-R INTERVAL: 180 MS
EKG Q-T INTERVAL: 364 MS
EKG QRS DURATION: 90 MS
EKG QTC CALCULATION (BAZETT): 450 MS
EKG R AXIS: -6 DEGREES
EKG T AXIS: 50 DEGREES
EKG VENTRICULAR RATE: 92 BPM

## 2025-03-14 PROCEDURE — 1240000000 HC EMOTIONAL WELLNESS R&B

## 2025-03-14 PROCEDURE — 6370000000 HC RX 637 (ALT 250 FOR IP): Performed by: INTERNAL MEDICINE

## 2025-03-14 PROCEDURE — 6370000000 HC RX 637 (ALT 250 FOR IP): Performed by: NURSE PRACTITIONER

## 2025-03-14 PROCEDURE — 6370000000 HC RX 637 (ALT 250 FOR IP): Performed by: PSYCHIATRY & NEUROLOGY

## 2025-03-14 PROCEDURE — 90833 PSYTX W PT W E/M 30 MIN: CPT | Performed by: PSYCHIATRY & NEUROLOGY

## 2025-03-14 PROCEDURE — 99232 SBSQ HOSP IP/OBS MODERATE 35: CPT | Performed by: INTERNAL MEDICINE

## 2025-03-14 PROCEDURE — 99232 SBSQ HOSP IP/OBS MODERATE 35: CPT | Performed by: PSYCHIATRY & NEUROLOGY

## 2025-03-14 PROCEDURE — APPSS30 APP SPLIT SHARED TIME 16-30 MINUTES

## 2025-03-14 RX ORDER — LAMOTRIGINE 25 MG/1
50 TABLET ORAL DAILY
Status: DISCONTINUED | OUTPATIENT
Start: 2025-03-15 | End: 2025-03-18 | Stop reason: HOSPADM

## 2025-03-14 RX ADMIN — Medication 3 MG: at 21:49

## 2025-03-14 RX ADMIN — VENLAFAXINE HYDROCHLORIDE 75 MG: 75 CAPSULE, EXTENDED RELEASE ORAL at 10:13

## 2025-03-14 RX ADMIN — NICOTINE POLACRILEX 2 MG: 2 LOZENGE ORAL at 20:15

## 2025-03-14 RX ADMIN — METOPROLOL TARTRATE 25 MG: 25 TABLET, FILM COATED ORAL at 10:13

## 2025-03-14 RX ADMIN — METOPROLOL TARTRATE 25 MG: 25 TABLET, FILM COATED ORAL at 21:25

## 2025-03-14 RX ADMIN — Medication 1 TABLET: at 10:13

## 2025-03-14 RX ADMIN — NICOTINE POLACRILEX 2 MG: 2 LOZENGE ORAL at 14:04

## 2025-03-14 RX ADMIN — LAMOTRIGINE 25 MG: 25 TABLET ORAL at 10:13

## 2025-03-14 RX ADMIN — ASPIRIN 81 MG: 81 TABLET, CHEWABLE ORAL at 10:13

## 2025-03-14 ASSESSMENT — LIFESTYLE VARIABLES
HOW OFTEN DO YOU HAVE A DRINK CONTAINING ALCOHOL: 2-3 TIMES A WEEK
HOW MANY STANDARD DRINKS CONTAINING ALCOHOL DO YOU HAVE ON A TYPICAL DAY: 5 OR 6

## 2025-03-14 ASSESSMENT — PAIN SCALES - GENERAL
PAINLEVEL_OUTOF10: 0
PAINLEVEL_OUTOF10: 0

## 2025-03-14 NOTE — GROUP NOTE
Group Therapy Note    Date: 3/14/2025    Group Start Time: 1100  Group End Time: 1140  Group Topic: Cognitive Skills    [facility]    Maria E Castañeda CTRS    Cognitive Skills Group Note        Date: March 14, 2025 Start Time: 11am  End Time:  1140 am      Number of Participants in Group & Unit Census:  6/8    Topic: cognitive skills     Goal of Group:pt will demonstrate improved coping skills and improved interpersonal relationships       Comments:     Patient did not participate in Cognitive Skills group, despite staff encouragement and explanation of benefits.  Patient remain seclusive to self.  Q15 minute safety checks maintained for patient safety and will continue to encourage patient to attend unit programming.              Signature:  RSOELYN WILLIS

## 2025-03-14 NOTE — GROUP NOTE
Group Therapy Note    Date: 3/14/2025    Group Start Time: 1330  Group End Time: 1410  Group Topic: Cognitive Skills    [facility]    Maria E Castañeda CTRS    Cognitive Skills Group Note        Date: March 14, 2025 Start Time: 1:30pm  End Time:  210pm      Number of Participants in Group & Unit Census:  5/9    Topic: cognitive skills     Goal of Group: pt will demonstrate improved coping skills and improved interpersonal relationships       Comments:     Patient did not participate in Cognitive Skills group, despite staff encouragement and explanation of benefits.  Patient remain seclusive to self.  Q15 minute safety checks maintained for patient safety and will continue to encourage patient to attend unit programming.              Signature:  ROSELYN WILLIS

## 2025-03-14 NOTE — GROUP NOTE
Group Therapy Note    Date: 3/14/2025    Group Start Time: 1000  Group End Time: 1030  Group Topic: Psychotherapy    [facility]    Debbie Blanco MSW, ROSE MARY        Group Therapy Note    Attendees: 6/9       Patient was offered group therapy today but declined to participate despite encouragement from staff.  1:1 was offered.       Signature:  CHRIS Aguilar, ROSE MARY

## 2025-03-15 PROCEDURE — APPSS30 APP SPLIT SHARED TIME 16-30 MINUTES

## 2025-03-15 PROCEDURE — 1240000000 HC EMOTIONAL WELLNESS R&B

## 2025-03-15 PROCEDURE — 6370000000 HC RX 637 (ALT 250 FOR IP): Performed by: NURSE PRACTITIONER

## 2025-03-15 PROCEDURE — 90833 PSYTX W PT W E/M 30 MIN: CPT | Performed by: PSYCHIATRY & NEUROLOGY

## 2025-03-15 PROCEDURE — 99232 SBSQ HOSP IP/OBS MODERATE 35: CPT | Performed by: INTERNAL MEDICINE

## 2025-03-15 PROCEDURE — 6370000000 HC RX 637 (ALT 250 FOR IP): Performed by: PSYCHIATRY & NEUROLOGY

## 2025-03-15 PROCEDURE — 99232 SBSQ HOSP IP/OBS MODERATE 35: CPT | Performed by: PSYCHIATRY & NEUROLOGY

## 2025-03-15 PROCEDURE — 6370000000 HC RX 637 (ALT 250 FOR IP): Performed by: INTERNAL MEDICINE

## 2025-03-15 RX ADMIN — Medication 1 TABLET: at 08:37

## 2025-03-15 RX ADMIN — ASPIRIN 81 MG: 81 TABLET, CHEWABLE ORAL at 08:37

## 2025-03-15 RX ADMIN — Medication 3 MG: at 21:05

## 2025-03-15 RX ADMIN — LAMOTRIGINE 50 MG: 25 TABLET ORAL at 08:37

## 2025-03-15 RX ADMIN — VENLAFAXINE HYDROCHLORIDE 75 MG: 75 CAPSULE, EXTENDED RELEASE ORAL at 08:37

## 2025-03-15 RX ADMIN — METOPROLOL TARTRATE 25 MG: 25 TABLET, FILM COATED ORAL at 21:05

## 2025-03-15 RX ADMIN — NICOTINE POLACRILEX 2 MG: 2 LOZENGE ORAL at 13:42

## 2025-03-15 RX ADMIN — NICOTINE POLACRILEX 2 MG: 2 LOZENGE ORAL at 17:30

## 2025-03-15 RX ADMIN — METOPROLOL TARTRATE 25 MG: 25 TABLET, FILM COATED ORAL at 08:37

## 2025-03-15 ASSESSMENT — LIFESTYLE VARIABLES
HOW MANY STANDARD DRINKS CONTAINING ALCOHOL DO YOU HAVE ON A TYPICAL DAY: 5 OR 6
HOW OFTEN DO YOU HAVE A DRINK CONTAINING ALCOHOL: 2-3 TIMES A WEEK

## 2025-03-15 NOTE — GROUP NOTE
Group Therapy Note    Date: 3/15/2025    Group Start Time: 1030  Group End Time: 1100  Group Topic: Psychotherapy    [facility] Enedelia Chan MSW        Group Therapy Note    Attendees: 4/12     Patient was offered group therapy today but declined to participate despite encouragement from staff.  1:1 was offered.    Discipline Responsible: /Counselor      Signature:  CHRIS Cummings

## 2025-03-15 NOTE — GROUP NOTE
Group Therapy Note    Date: 3/15/2025    Group Start Time: 1410  Group End Time: 1500  Group Topic: Music Therapy    Christiano Grace        Group Therapy Note    Attendees: 4/12       Patient's Goal:  Patients shared songs analyzing lyrics for themes, sharing advice based on music selections. Patient Goals to engage in peer support; Increase sense of community; Increase socialization; Demonstrate positive use of time; Increase rapport with staff    Notes:  Patient attended and participated in group having positive interactions with peers and staff throughout. Patient was pleasant and engaging throughout. Shared music and appropriate advice. Supportive of peers music and sharing as well    Status After Intervention:  Improved    Participation Level: Active Listener and Interactive    Participation Quality: Appropriate, Attentive, and Sharing      Speech:  normal      Thought Process/Content: Logical  Linear      Affective Functioning: Congruent      Mood: euthymic      Level of consciousness:  Alert and Attentive      Response to Learning: Able to verbalize current knowledge/experience and Progressing to goal      Endings: None Reported    Modes of Intervention: Support, Socialization, Exploration, Activity, Media, and Reality-testing      Discipline Responsible: Psychoeducational Specialist      Signature:  Christiano Morrison

## 2025-03-15 NOTE — GROUP NOTE
Group Therapy Note    Date: 3/15/2025    Group Start Time: 0900  Group End Time: 1015  Group Topic: Nursing    [facility] Gerda Jay, RN; RN    Group Therapy Note    Attendees: 3/12     GoalsGroup Note        Date: 3/15/25 Start Time: 0930  End Time: 1010      Number of Participants in Group & Unit Census:  3/12    Topic: Goals    Goal of Group:To discuss attainable goals for the day      Comments:     Patient did not participate in Goals group, despite staff encouragement and explanation of benefits.  Patient remain seclusive to self.  Q15 minute safety checks maintained for patient safety and will continue to encourage patient to attend unit programming.         Signature:  Gerda Camacho RN

## 2025-03-16 PROCEDURE — 6370000000 HC RX 637 (ALT 250 FOR IP): Performed by: NURSE PRACTITIONER

## 2025-03-16 PROCEDURE — 6370000000 HC RX 637 (ALT 250 FOR IP): Performed by: INTERNAL MEDICINE

## 2025-03-16 PROCEDURE — 99232 SBSQ HOSP IP/OBS MODERATE 35: CPT | Performed by: PSYCHIATRY & NEUROLOGY

## 2025-03-16 PROCEDURE — APPSS30 APP SPLIT SHARED TIME 16-30 MINUTES

## 2025-03-16 PROCEDURE — 90833 PSYTX W PT W E/M 30 MIN: CPT | Performed by: PSYCHIATRY & NEUROLOGY

## 2025-03-16 PROCEDURE — 6370000000 HC RX 637 (ALT 250 FOR IP): Performed by: PSYCHIATRY & NEUROLOGY

## 2025-03-16 PROCEDURE — 99232 SBSQ HOSP IP/OBS MODERATE 35: CPT | Performed by: INTERNAL MEDICINE

## 2025-03-16 PROCEDURE — 1240000000 HC EMOTIONAL WELLNESS R&B

## 2025-03-16 RX ADMIN — VENLAFAXINE HYDROCHLORIDE 75 MG: 75 CAPSULE, EXTENDED RELEASE ORAL at 10:02

## 2025-03-16 RX ADMIN — Medication 1 TABLET: at 10:01

## 2025-03-16 RX ADMIN — NICOTINE POLACRILEX 2 MG: 2 LOZENGE ORAL at 10:20

## 2025-03-16 RX ADMIN — NICOTINE POLACRILEX 2 MG: 2 LOZENGE ORAL at 16:33

## 2025-03-16 RX ADMIN — METOPROLOL TARTRATE 25 MG: 25 TABLET, FILM COATED ORAL at 20:57

## 2025-03-16 RX ADMIN — ASPIRIN 81 MG: 81 TABLET, CHEWABLE ORAL at 10:01

## 2025-03-16 RX ADMIN — LAMOTRIGINE 50 MG: 25 TABLET ORAL at 10:01

## 2025-03-16 RX ADMIN — Medication 3 MG: at 20:57

## 2025-03-16 RX ADMIN — METOPROLOL TARTRATE 25 MG: 25 TABLET, FILM COATED ORAL at 10:02

## 2025-03-16 ASSESSMENT — LIFESTYLE VARIABLES
HOW OFTEN DO YOU HAVE A DRINK CONTAINING ALCOHOL: 2-3 TIMES A WEEK
HOW MANY STANDARD DRINKS CONTAINING ALCOHOL DO YOU HAVE ON A TYPICAL DAY: 5 OR 6
HOW MANY STANDARD DRINKS CONTAINING ALCOHOL DO YOU HAVE ON A TYPICAL DAY: 5 OR 6
HOW OFTEN DO YOU HAVE A DRINK CONTAINING ALCOHOL: 2-3 TIMES A WEEK

## 2025-03-16 NOTE — GROUP NOTE
Group Therapy Note    Date: 3/16/2025    Group Start Time: 1400  Group End Time: 1440  Group Topic: Psychoeducation    [facility] Viki Smith CTRS        Group Therapy Note    Attendees: 10/15       Patient's Goal:  To improve interpersonal skills and coping skills awareness through collaborating with peers and demonstrating self-expression.     Notes:  Patient attended and participated in group. Patient was able to collaborate with peers and demonstrate self-expression. Patient was able to identify coping skills. Patient was pleasant and cooperative.     Status After Intervention:  Improved    Participation Level: Active Listener and Interactive    Participation Quality: Appropriate, Attentive, Sharing, and Supportive      Speech:  normal      Thought Process/Content: Logical and Linear      Affective Functioning: Congruent      Mood: euthymic      Level of consciousness:  Alert and Attentive      Response to Learning: Able to verbalize current knowledge/experience, Able to retain information, and Progressing to goal      Endings: None Reported    Modes of Intervention: Education, Support, Socialization, and Exploration      Discipline Responsible: Psychoeducational Specialist      Signature:  ROSELYN Palomino

## 2025-03-16 NOTE — GROUP NOTE
Group Therapy Note    Date: 3/16/2025    Group Start Time: 1030  Group End Time: 1120  Group Topic: Psychotherapy    [facility] Enedelia Gale MSW        Group Therapy Note    Attendees: 8/14       Patient's Goal:  Practice various coping skills (music, conversation, etc.)    Notes:     Status After Intervention:  Improved    Participation Level: Active Listener and Interactive    Participation Quality: Appropriate, Attentive, and Sharing      Speech:  normal      Thought Process/Content: Logical  Linear      Affective Functioning: Congruent      Mood: euthymic      Level of consciousness:  Alert and Oriented x4      Response to Learning: Able to verbalize current knowledge/experience and Able to verbalize/acknowledge new learning      Endings: None Reported    Modes of Intervention: Education, Support, and Socialization      Discipline Responsible: /Counselor      Signature:  CHRIS Cummings

## 2025-03-17 LAB — HIV 1+2 AB+HIV1 P24 AG SERPL QL IA: NONREACTIVE

## 2025-03-17 PROCEDURE — 87389 HIV-1 AG W/HIV-1&-2 AB AG IA: CPT

## 2025-03-17 PROCEDURE — 6370000000 HC RX 637 (ALT 250 FOR IP): Performed by: NURSE PRACTITIONER

## 2025-03-17 PROCEDURE — 1240000000 HC EMOTIONAL WELLNESS R&B

## 2025-03-17 PROCEDURE — 99232 SBSQ HOSP IP/OBS MODERATE 35: CPT | Performed by: INTERNAL MEDICINE

## 2025-03-17 PROCEDURE — 6370000000 HC RX 637 (ALT 250 FOR IP): Performed by: PSYCHIATRY & NEUROLOGY

## 2025-03-17 PROCEDURE — APPSS30 APP SPLIT SHARED TIME 16-30 MINUTES

## 2025-03-17 PROCEDURE — 99232 SBSQ HOSP IP/OBS MODERATE 35: CPT | Performed by: PSYCHIATRY & NEUROLOGY

## 2025-03-17 PROCEDURE — 36415 COLL VENOUS BLD VENIPUNCTURE: CPT

## 2025-03-17 PROCEDURE — 6370000000 HC RX 637 (ALT 250 FOR IP): Performed by: INTERNAL MEDICINE

## 2025-03-17 PROCEDURE — 90833 PSYTX W PT W E/M 30 MIN: CPT | Performed by: PSYCHIATRY & NEUROLOGY

## 2025-03-17 RX ORDER — LAMOTRIGINE 25 MG/1
50 TABLET ORAL DAILY
Qty: 60 TABLET | Refills: 0 | Status: SHIPPED | OUTPATIENT
Start: 2025-03-18

## 2025-03-17 RX ORDER — METOPROLOL TARTRATE 25 MG/1
25 TABLET, FILM COATED ORAL 2 TIMES DAILY
Qty: 60 TABLET | Refills: 0 | Status: SHIPPED | OUTPATIENT
Start: 2025-03-17

## 2025-03-17 RX ORDER — ASPIRIN 81 MG/1
81 TABLET, CHEWABLE ORAL DAILY
Qty: 30 TABLET | Refills: 0 | Status: SHIPPED | OUTPATIENT
Start: 2025-03-17

## 2025-03-17 RX ORDER — VENLAFAXINE HYDROCHLORIDE 75 MG/1
75 CAPSULE, EXTENDED RELEASE ORAL
Qty: 30 CAPSULE | Refills: 0 | Status: SHIPPED | OUTPATIENT
Start: 2025-03-18

## 2025-03-17 RX ORDER — M-VIT,TX,IRON,MINS/CALC/FOLIC 27MG-0.4MG
1 TABLET ORAL DAILY
Qty: 30 TABLET | Refills: 0 | Status: SHIPPED | OUTPATIENT
Start: 2025-03-17

## 2025-03-17 RX ORDER — POLYETHYLENE GLYCOL 3350 17 G
2 POWDER IN PACKET (EA) ORAL
Qty: 100 EACH | Refills: 0 | Status: SHIPPED | OUTPATIENT
Start: 2025-03-17

## 2025-03-17 RX ADMIN — LAMOTRIGINE 50 MG: 25 TABLET ORAL at 08:34

## 2025-03-17 RX ADMIN — Medication 1 TABLET: at 08:34

## 2025-03-17 RX ADMIN — NICOTINE POLACRILEX 2 MG: 2 LOZENGE ORAL at 13:21

## 2025-03-17 RX ADMIN — VENLAFAXINE HYDROCHLORIDE 75 MG: 75 CAPSULE, EXTENDED RELEASE ORAL at 08:34

## 2025-03-17 RX ADMIN — METOPROLOL TARTRATE 25 MG: 25 TABLET, FILM COATED ORAL at 08:35

## 2025-03-17 RX ADMIN — Medication 3 MG: at 22:18

## 2025-03-17 RX ADMIN — ASPIRIN 81 MG: 81 TABLET, CHEWABLE ORAL at 08:34

## 2025-03-17 RX ADMIN — NICOTINE POLACRILEX 2 MG: 2 LOZENGE ORAL at 08:54

## 2025-03-17 NOTE — GROUP NOTE
Group Therapy Note    Date: 3/17/2025    Group Start Time: 1430  Group End Time: 1515  Group Topic: psycheducattion group     [facility] Maria E Dobson CTRS        Group Therapy Note    Attendees: 5/13       Patient's Goal:  pt will demonstrate improved coping skills and improved interpersonal relationships     Notes:  pt was pleasant and participated well    Status After Intervention:  Improved    Participation Level: Active Listener and Interactive    Participation Quality: Appropriate, Sharing, and Supportive      Speech:  normal      Thought Process/Content: Logical      Affective Functioning: Congruent      Mood: euthymic      Level of consciousness:  Alert      Response to Learning: Able to verbalize current knowledge/experience, Capable of insight, and Progressing to goal      Endings: None Reported    Modes of Intervention: Education, Support, and Activity      Discipline Responsible: Psychoeducational Specialist      Signature:  ROSELYN WILLIS

## 2025-03-17 NOTE — DISCHARGE INSTRUCTIONS
[address] [facility] [address]  Phone: [phone]      Phone: [phone]    [facility]      [facility]  [address] [place] [address]    [address] [facility] [address] [address]  Phone: [phone]      Phone: [phone]    [facility] Behavior[facility]     [facility] Department of Psychiatry  [address] [facility] [address]   [address] [place], [place] [address]  Phone: [phone]      Phone: [phone]    [facility]       [facility]  [address] [address]     [address] [facility] [address]  Phone: [phone]      Phone: [phone]    [facility]   [address] [address]    [address] [facility] [address]  Phone: [phone]      Phone: [phone] Ext: [phone]    [place] [address] [facility] [address] / [address] [facility] [address] [facility] [address] / [address] [facility] [address]  Phone: [phone]    [facility]   [facility]  [address] [facility] [address]  [address] [place] [address]  Phone: [phone]      Phone: [phone]    [facility]  [address] [facility] [address]    [address] [facility] [address]  Phone: [phone]      Phone: [phone]

## 2025-03-17 NOTE — Clinical Note
[employer] called to confirm transportation for 3/18 between 7:30 and 8 AM. RN spoke with Tori who informed that we would receive a call when the  was en route. He stated

## 2025-03-17 NOTE — GROUP NOTE
Group Therapy Note    Date: 3/17/2025    Group Start Time: 1000  Group End Time: 1040  Group Topic: Psychotherapy    [facility] Enedelia Chan MSW        Group Therapy Note    Attendees: 9/14       Patient's Goal:  Identify 1 thing you like about yourself, 2 goals for the upcoming week, and 3 coping skills you use    Notes:     Status After Intervention:  Improved    Participation Level: Active Listener and Interactive    Participation Quality: Appropriate, Attentive, and Sharing      Speech:  normal      Thought Process/Content: Logical  Linear      Affective Functioning: Congruent      Mood: euthymic      Level of consciousness:  Alert and Oriented x4      Response to Learning: Able to verbalize current knowledge/experience and Able to verbalize/acknowledge new learning      Endings: None Reported    Modes of Intervention: Support, Socialization, and Exploration      Discipline Responsible: /Counselor      Signature:  CHRIS Cummings

## 2025-03-17 NOTE — PROGRESS NOTES
Behavioral Services  Medicare Certification Upon Admission    I certify that this patient's inpatient psychiatric hospital admission is medically necessary for:    [x] (1) Treatment which could reasonably be expected to improve this patient's condition,       [x] (2) Or for diagnostic study;     AND     [x](2) The inpatient psychiatric services are provided while the individual is under the care of a physician and are included in the individualized plan of care.    Estimated length of stay/service 4-7 days    Plan for post-hospital care home with outpatient [facility] f/u    Electronically signed by RAPHAEL CASAS MD on 3/14/2025 at 7:01 AM      
    [facility] Internal Medicine  Nickolas Parker MD; Tyler Snider MD, Abiodun Arenas MD, Martita Hicks MD, Dr Sharon Guadarrama MD; Danuta Rojas MD    [facility] Internal Medicine   IN-PATIENT SERVICE   [facility]     HISTORY AND PHYSICAL EXAMINATION            Date:   3/16/2025  Patient name:  Balta Sarmiento  Date of admission:  3/13/2025  4:45 AM  MRN:   [id number]  Account:  [id number]  YOB: 1980  PCP:    No primary care provider on file.  Room:   [address] [place] [address]  Code Status:    Full Code      Chief Complaint:     Mental health disorder    History Obtained From:     Patient/EMR/bedside RN     History of Present Illness:     44-year-old  male with history of street drug abuse history of mental disorder including anxiety and depression history of hepatitis C patient has not been treated denies any skin discoloration no change in urine color no right upper quadrant pain no history of hepatitis      Past Medical History:     Past Medical History:   Diagnosis Date    Anxiety     Chest pain 09/13/2024    Depression     Insomnia         Past Surgical History:     History reviewed. No pertinent surgical history.     Medications Prior to Admission:     Prior to Admission medications    Medication Sig Start Date End Date Taking? Authorizing Provider   lamoTRIgine (LAMICTAL) 25 MG tablet Take 1 tablet by mouth daily    Provider, MD Robert   metoprolol tartrate (LOPRESSOR) 25 MG tablet Take 1 tablet by mouth 2 times daily 12/15/24   Hazel Bonilla MD   venlafaxine (EFFEXOR XR) 75 MG extended release capsule Take 1 capsule by mouth daily (with breakfast) 12/15/24   Hazel Bonilla MD   albuterol sulfate HFA (VENTOLIN HFA) 108 (90 Base) MCG/ACT inhaler Inhale 2 puffs into the lungs 4 times daily as needed for Wheezing  Patient not taking: Reported on 12/15/2024 9/17/24   Alex Polk MD   Multiple Vitamins-Minerals (THERAPEUTIC 
    [facility] Internal Medicine  Nickolas Parker MD; Tyler Snider MD, Abiodun Arenas MD, Martita Hicks MD, Dr Sharon Guadarrama MD; Danuta Rojas MD    [facility] Internal Medicine   IN-PATIENT SERVICE   [facility]     HISTORY AND PHYSICAL EXAMINATION            Date:   3/15/2025  Patient name:  Balta Sarmiento  Date of admission:  3/13/2025  4:45 AM  MRN:   [id number]  Account:  [id number]  YOB: 1980  PCP:    No primary care provider on file.  Room:   [address] [place] [address]  Code Status:    Full Code      Chief Complaint:     Mental health disorder    History Obtained From:     Patient/EMR/bedside RN     History of Present Illness:     44-year-old  male with history of street drug abuse history of mental disorder including anxiety and depression history of hepatitis C patient has not been treated denies any skin discoloration no change in urine color no right upper quadrant pain no history of hepatitis      Past Medical History:     Past Medical History:   Diagnosis Date    Anxiety     Chest pain 09/13/2024    Depression     Insomnia         Past Surgical History:     History reviewed. No pertinent surgical history.     Medications Prior to Admission:     Prior to Admission medications    Medication Sig Start Date End Date Taking? Authorizing Provider   lamoTRIgine (LAMICTAL) 25 MG tablet Take 1 tablet by mouth daily    Provider, MD Robert   metoprolol tartrate (LOPRESSOR) 25 MG tablet Take 1 tablet by mouth 2 times daily 12/15/24   Hazel Bonilla MD   venlafaxine (EFFEXOR XR) 75 MG extended release capsule Take 1 capsule by mouth daily (with breakfast) 12/15/24   Hazel Bonilla MD   albuterol sulfate HFA (VENTOLIN HFA) 108 (90 Base) MCG/ACT inhaler Inhale 2 puffs into the lungs 4 times daily as needed for Wheezing  Patient not taking: Reported on 12/15/2024 9/17/24   Alex Polk MD   Multiple Vitamins-Minerals (THERAPEUTIC 
CLINICAL PHARMACY NOTE: MEDS TO BEDS    Total # of Prescriptions Filled: 7   The following medications were delivered to the patient:  Lamotrigine 25MG Tablets  Aspirin 81MG Tablets  Metoprolol Tartrate 25MG Tablets   Venlafaxine HCL ER 75MG Capsules   Multivitamins   Nicotine 2MG Lozenges   Melatonin 3MG Tablets     Additional Documentation:  Delivered to GERRI Lorenz RN 4:08PM 3/17/25  
RT ASSESSMENT TREATMENT GOALS    [x]Pt Goal:  Pt will identify 1-2 positive coping skills by time of discharge.    []Pt Goal:  Pt will identify 1-2 positive aspects of self by time of discharge.    []Pt Goal:  Pt will remain on task/topic for 15-30 minutes during group by time of discharge.    [x]Pt Goal:  Pt will identify 1-2 aspects of relapse prevention plan by time of discharge.    []Pt Goal:  Pt will join in conversation with peers 1-2 times per group by time of discharge.    []Pt Goal:  Pt will identify 1-2 new leisure interests by time of discharge.    []Pt Goal:  Pt will not voice any delusional content by time of discharge.   
MULTIVITAMIN-MINERALS) tablet Take 1 tablet by mouth daily  Patient not taking: Reported on 12/15/2024 9/14/24   Rosa Wolfe MD   aspirin 81 MG chewable tablet Take 1 tablet by mouth daily  Patient not taking: Reported on 12/15/2024 9/15/24   Rosa Wolfe MD        Allergies:     Shellfish-derived products, Fish-derived products, and Propoxyphene    Social History:     Tobacco:    reports that he has been smoking cigarettes. He has never used smokeless tobacco.  Alcohol:      reports current alcohol use.  Drug Use:  reports current drug use. Drugs: Cocaine and Marijuana (Weed).    Family History:     History reviewed. No pertinent family history.    Review of Systems:     Positive and Negative as described in HPI.    CONSTITUTIONAL:  negative for fevers, chills, sweats, fatigue, weight loss  HEENT:  negative for vision, hearing changes, runny nose, throat pain  RESPIRATORY:  negative for shortness of breath, cough, congestion, wheezing.  CARDIOVASCULAR:  negative for chest pain, palpitations.  GASTROINTESTINAL:  negative for nausea, vomiting, diarrhea, constipation, change in bowel habits, abdominal pain   GENITOURINARY:  negative for difficulty of urination, burning with urination, frequency   INTEGUMENT:  negative for rash, skin lesions, easy bruising   HEMATOLOGIC/LYMPHATIC:  negative for swelling/edema   ALLERGIC/IMMUNOLOGIC:  negative for urticaria , itching  ENDOCRINE:  negative increase in drinking, increase in urination, hot or cold intolerance  MUSCULOSKELETAL:  negative joint pains, muscle aches, swelling of joints  NEUROLOGICAL:  negative for headaches, dizziness, lightheadedness, numbness, pain, tingling extremities      Physical Exam:     /72   Pulse 70   Temp 97.8 °F (36.6 °C) (Temporal)   Resp 14   Ht 1.803 m (5' 11\")   Wt 91.6 kg (202 lb)   SpO2 96%   BMI 28.17 kg/m²   Temp (24hrs), Av.6 °F (36.4 °C), Min:97.3 °F (36.3 °C), Max:97.8 °F (36.6 °C)    No results for input(s): 
height is 1.803 m (5' 11\") and weight is 91.6 kg (202 lb). His temperature is 97.5 °F (36.4 °C). His blood pressure is 124/89 and his pulse is 63. His respiration is 14 and oxygen saturation is 98%.   Labs:   No visits with results within 2 Day(s) from this visit.   Latest known visit with results is:   Admission on 03/12/2025, Discharged on 03/13/2025   Component Date Value Ref Range Status    WBC 03/12/2025 9.8  3.5 - 11.3 k/uL Final    RBC 03/12/2025 4.74  4.21 - 5.77 m/uL Final    Hemoglobin 03/12/2025 14.4  13.0 - 17.0 g/dL Final    Hematocrit 03/12/2025 42.3  40.7 - 50.3 % Final    MCV 03/12/2025 89.2  82.6 - 102.9 fL Final    MCH 03/12/2025 30.4  25.2 - 33.5 pg Final    MCHC 03/12/2025 34.0  28.4 - 34.8 g/dL Final    RDW 03/12/2025 11.8  11.8 - 14.4 % Final    Platelets 03/12/2025 253  138 - 453 k/uL Final    MPV 03/12/2025 10.2  8.1 - 13.5 fL Final    NRBC Automated 03/12/2025 0.0  0.0 per 100 WBC Final    Sodium 03/12/2025 136  136 - 145 mmol/L Final    Potassium 03/12/2025 4.0  3.7 - 5.3 mmol/L Final    Chloride 03/12/2025 100  98 - 107 mmol/L Final    CO2 03/12/2025 23  20 - 31 mmol/L Final    Anion Gap 03/12/2025 13  9 - 16 mmol/L Final    Glucose 03/12/2025 89  74 - 99 mg/dL Final    BUN 03/12/2025 5 (L)  6 - 20 mg/dL Final    Creatinine 03/12/2025 0.7  0.7 - 1.2 mg/dL Final    Est, Glom Filt Rate 03/12/2025 >90  >60 mL/min/1.73m2 Final    Comment:       These results are not intended for use in patients <18 years of age.        eGFR results are calculated without a race factor using the 2021 CKD-EPI equation.  Careful clinical correlation is recommended, particularly when comparing to results   calculated using previous equations.  The CKD-EPI equation is less accurate in patients with extremes of muscle mass, extra-renal   metabolism of creatine, excessive creatine ingestion, or following therapy that affects   renal tubular secretion.      Calcium 03/12/2025 8.9  8.6 - 10.4 mg/dL Final    Total 
paranoia.  Perceptual Disturbance: Patient does not appear to be responding to internal stimuli. Denies auditory hallucinations. Denies visual hallucinations.   Cognition: Oriented to self, location, time, and situation.  Memory: Intact.  Insight & Judgement: Poor.     Data   height is 1.803 m (5' 11\") and weight is 91.6 kg (202 lb). His temporal temperature is 97.8 °F (36.6 °C). His blood pressure is 119/72 and his pulse is 70. His respiration is 14 and oxygen saturation is 96%.   Labs:   Admission on 03/12/2025, Discharged on 03/13/2025   Component Date Value Ref Range Status    WBC 03/12/2025 9.8  3.5 - 11.3 k/uL Final    RBC 03/12/2025 4.74  4.21 - 5.77 m/uL Final    Hemoglobin 03/12/2025 14.4  13.0 - 17.0 g/dL Final    Hematocrit 03/12/2025 42.3  40.7 - 50.3 % Final    MCV 03/12/2025 89.2  82.6 - 102.9 fL Final    MCH 03/12/2025 30.4  25.2 - 33.5 pg Final    MCHC 03/12/2025 34.0  28.4 - 34.8 g/dL Final    RDW 03/12/2025 11.8  11.8 - 14.4 % Final    Platelets 03/12/2025 253  138 - 453 k/uL Final    MPV 03/12/2025 10.2  8.1 - 13.5 fL Final    NRBC Automated 03/12/2025 0.0  0.0 per 100 WBC Final    Sodium 03/12/2025 136  136 - 145 mmol/L Final    Potassium 03/12/2025 4.0  3.7 - 5.3 mmol/L Final    Chloride 03/12/2025 100  98 - 107 mmol/L Final    CO2 03/12/2025 23  20 - 31 mmol/L Final    Anion Gap 03/12/2025 13  9 - 16 mmol/L Final    Glucose 03/12/2025 89  74 - 99 mg/dL Final    BUN 03/12/2025 5 (L)  6 - 20 mg/dL Final    Creatinine 03/12/2025 0.7  0.7 - 1.2 mg/dL Final    Est, Glom Filt Rate 03/12/2025 >90  >60 mL/min/1.73m2 Final    Comment:       These results are not intended for use in patients <18 years of age.        eGFR results are calculated without a race factor using the 2021 CKD-EPI equation.  Careful clinical correlation is recommended, particularly when comparing to results   calculated using previous equations.  The CKD-EPI equation is less accurate in patients with extremes of muscle mass, 
patient positive for benzodiazepine cannabis cocaine and alcohol,  Strongly advised cessation next  Vital stable  Consultations:   IP CONSULT TO INTERNAL MEDICINE      Martita Hicks MD  3/17/2025  1:10 PM    Copy sent to Dr. Mesa primary care provider on file.    Please note that this chart was generated using voice recognition Dragon dictation software.  Although every effort was made to ensure the accuracy of this automated transcription, some errors in transcription may have occurred.  
continues to be monitored in the inpatient psychiatric facility at [facility] for safety and stabilization. Patient continues to need, on a daily basis, active treatment furnished directly by or requiring the supervision of inpatient psychiatric personnel.    Electronically signed by CRISTOPHER Elkins CNP on 3/17/2025 at 11:26 AM    I independently saw and evaluated the patient.  I reviewed the nurse practitioners documentation above.  Principle diagnosis we are treating for is Depression with suicidal ideation.  Any additional comments or changes to the nurse practitioners documentation are stated below otherwise agree with assessment.  Plan will be as follows:  Spent 17 minutes with the patient in supportive psychotherapy.  Patient denying side effects to medication.  Reporting improvement in mood.  Denying suicidal or homicidal ideation intent or plan.  Denying psychotic symptoms.  Forward-looking and constructive.  Discussed if continued stability or improvement through tomorrow would consider discharge and patient is in agreement    PLAN  Patient s symptoms   are improving  Continue with current medication for now  Attempt to develop insight  Psycho-education conducted.  Supportive Therapy conducted.  Probable discharge is tomorrow  Follow-up daily while on inpatient unit    Electronically signed by RAPHAEL CASAS MD on 3/17/2025 at 2:51 PM        
stabilization. Patient continues to need, on a daily basis, active treatment furnished directly by or requiring the supervision of inpatient psychiatric personnel.    Electronically signed by CRISTOPHER Ennis CNP on 3/15/2025 at 12:23 PM    **This report has been created using voice recognition software. It may contain minor errors which are inherent in voice recognition technology.**     I independently saw and evaluated the patient.  I reviewed the nurse practitioners documentation above. Principle diagnosis we are treating for is Depression with suicidal ideation.  Any additional comments or changes to the nurse practitioners documentation are stated below otherwise agree with assessment. Spent 17 minutes with the patient in supportive psychotherapy.  Plan will be as follows:      PLAN  Patient s symptoms   are improving  Continue with current medication for now  Attempt to develop insight  Psycho-education conducted.  Supportive Therapy conducted.  Probable discharge is likely Monday  Follow-up daily while on inpatient unit

## 2025-03-17 NOTE — GROUP NOTE
Group Therapy Note    Date: 3/17/2025    Group Start Time: 1430  Group End Time: 1515  Group Topic: Psychoeducation    Maria E Orellana CTRS    Psycheducation Group Note        Date: March 17, 2025 Start Time: 2:30pm  End Time:  315 pm      Number of Participants in Group & Unit Census:  5/13    Topic: psycheducation group     Goal of Group: pt will identify positive coping skills and improve interpersonal relationships       Comments:     Patient did not participate in Psych-Ed/Relapse Prevention group, despite staff encouragement and explanation of benefits.  Patient remain seclusive to self.  Q15 minute safety checks maintained for patient safety and will continue to encourage patient to attend unit programming.              Signature:  ROSELYN WILLIS

## 2025-03-17 NOTE — DISCHARGE INSTR - DIET

## 2025-03-17 NOTE — GROUP NOTE
Group Therapy Note    Date: 3/17/2025    Group Start Time: 1100  Group End Time: 1140  Group Topic: Cognitive Skills    Maria E Orellana CTRS        Group Therapy Note    Attendees: 7/14       Patient's Goal:  pt will demonstrate improved coping skills and improved interpersonal relationships     Notes:  pt was pleasant and participated well    Status After Intervention:  Improved    Participation Level: Active Listener and Interactive    Participation Quality: Appropriate, Sharing, and Supportive      Speech:  normal      Thought Process/Content: Logical      Affective Functioning: Congruent      Mood: euthymic      Level of consciousness:  Alert      Response to Learning: Able to verbalize current knowledge/experience, Capable of insight, and Progressing to goal      Endings: None Reported    Modes of Intervention: Education, Support, and Activity      Discipline Responsible: Psychoeducational Specialist      Signature:  ROSELYN WILLIS

## 2025-03-18 VITALS
HEIGHT: 71 IN | WEIGHT: 202 LBS | HEART RATE: 74 BPM | RESPIRATION RATE: 14 BRPM | SYSTOLIC BLOOD PRESSURE: 138 MMHG | BODY MASS INDEX: 28.28 KG/M2 | TEMPERATURE: 97.7 F | DIASTOLIC BLOOD PRESSURE: 89 MMHG | OXYGEN SATURATION: 97 %

## 2025-03-18 PROCEDURE — 6370000000 HC RX 637 (ALT 250 FOR IP): Performed by: PSYCHIATRY & NEUROLOGY

## 2025-03-18 PROCEDURE — 99239 HOSP IP/OBS DSCHRG MGMT >30: CPT | Performed by: PSYCHIATRY & NEUROLOGY

## 2025-03-18 PROCEDURE — 6370000000 HC RX 637 (ALT 250 FOR IP): Performed by: INTERNAL MEDICINE

## 2025-03-18 RX ADMIN — METOPROLOL TARTRATE 25 MG: 25 TABLET, FILM COATED ORAL at 07:25

## 2025-03-18 RX ADMIN — VENLAFAXINE HYDROCHLORIDE 75 MG: 75 CAPSULE, EXTENDED RELEASE ORAL at 07:26

## 2025-03-18 RX ADMIN — ASPIRIN 81 MG: 81 TABLET, CHEWABLE ORAL at 07:25

## 2025-03-18 RX ADMIN — LAMOTRIGINE 50 MG: 25 TABLET ORAL at 07:25

## 2025-03-18 RX ADMIN — Medication 1 TABLET: at 07:26

## 2025-03-18 NOTE — TRANSITION OF CARE
Behavioral Health Transition Record    Patient Name: Balta Sarmiento  YOB: 1980   Medical Record Number: [id number]  Date of Admission: 3/13/2025  4:45 AM   Date of Discharge: 3/18/25    Attending Provider: Walt Rutledge MD   Discharging Provider: Dr. Rutledge   To contact this individual call [phone] and ask the  to page.  If unavailable, ask to be transferred to Behavioral Health Provider on call.  A Behavioral Health Provider will be available on call 24/7 and during holidays.    Primary Care Provider: No primary care provider on file.    Allergies   Allergen Reactions    Shellfish-Derived Products Rash    Fish-Derived Products     Propoxyphene Hives       Reason for Admission: Threat to self requiring 24 hour professional observation  Concerns about patient's safety in the community  Past Mental Health Diagnosis: a history of  Major Depression, Prior suicide attempt, and Alcohol and/or Drug Use Disorder  Triggering event or precipitating factor: Housing instability, Grief r/t loss , and Alcohol/Drug Relapse  Length of time/duration of triggering event: Days  Legal Status: Voluntary     CHIEF COMPLAINT:  Depression with suicidal ideation     Admission Diagnosis: Depression with suicidal ideation [F32.A, R45.851]    * No surgery found *    Results for orders placed or performed during the hospital encounter of 03/13/25   HIV Screen   Result Value Ref Range    HIV Ag/Ab NONREACTIVE NONREACTIVE       Immunizations administered during this encounter:   Immunization History   Administered Date(s) Administered    Hepatitis B vaccine 04/14/1994, 06/27/1994    TDaP, ADACEL (age 10y-64y), BOOSTRIX (age 10y+), IM, 0.5mL 04/29/2018     Influenza Vaccination Status: None of the above/Not documented/Unable to determine from medical record documentation    Screening for Metabolic Disorders for Patients on Antipsychotic Medications  (Data obtained from the EMR)    Estimated Body Mass Index  Body mass index

## 2025-03-18 NOTE — CARE COORDINATION
met with Balta regarding his discharge plans. He makes multiple different plans regarding discharge. He states he wishes to return to [facility]; /Balta discussed he may only be eligible for the partial hospitalization program, beds may/may not be available. He states he has considered [facility], writer offers to fax clinical referral, he refuses offer to fax.  followed up on clinicals sent to [facility] states he no longer wants to go there as they do not offer sober living. He states he has contacted Prasanna at [facility], states there no beds now, but asked to send referral.      continues to work with Balta to solidify his discharge plan and making definitive decisions on next steps.   
BHI Biopsychosocial Assessment    Current Level of Psychosocial Functioning     Independent   Dependent    Minimal Assist X    Comments:    Psychosocial High Risk Factors (check all that apply)    Unable to obtain meds   Chronic illness/pain    Substance abuse X  Lack of Family Support X  Financial stress X  Isolation   Inadequate Community Resources  Suicide attempt(s) X  Not taking medications X  Victim of crime   Developmental Delay  Unable to manage personal needs  X  Age 65 or older   Homeless X  No transportation X  Readmission within 30 days  Unemployment  Traumatic Event    Psychiatric Advanced Directives: none reported     Family to Involve in Treatment: Lack of family support     Sexual Orientation:  ADDY    Patient Strengths: insurance, motivated for treatment     Patient Barriers: substance use, homeless, off medications, not linked with [facility]     Opiate Education Provided: Patient denies any current Heroin or Opiate use/abuse. His Ethanol level upon admission was .118. His drug screen upon admission was positive for Benzodiazepine, Cocaine, and Cannabis.     CMHC/mental health history:Not linked, homeless, reports he was recently in inpatient treatment at [facility] and left there on 2/26/2025, Interested in inpatient substance use treatment and was provided with AOD resource folder on this date.     Plan of Care   medication management, group/individual therapies, family meetings, psycho -education, treatment team meetings to assist with stabilization    Initial Discharge Plan:  Patient reports that he is Interested in inpatient substance use treatment and was provided with AOD resource folder on this date.     Clinical Summary:  Patient is a 44 year old male who presents on admission following suicide attempt of overdosing on 3 Effexor and 6 Lamictal tablets, patient also reports drinking 3 pints of liquor and using Crack/Cocaine in an attempt to kill himself. Patient reports that his 
Name: Balta Sarmiento    : 1980    Auth number: [id number]     Discharge Date: 3/18/2025    Destination: [facility]    *If you have any specific discharge questions, please contact the assigned /discharge planner: Loni ([phone])       Discharge Medications:      Medication List        START taking these medications      melatonin 3 MG Tabs tablet  Take 1 tablet by mouth nightly as needed (insomnia)     nicotine polacrilex 2 MG lozenge  Commonly known as: COMMIT  Take 1 lozenge by mouth every hour as needed for Smoking cessation            CHANGE how you take these medications      lamoTRIgine 25 MG tablet  Commonly known as: LAMICTAL  Take 2 tablets by mouth daily  What changed: how much to take  Notes to patient: Seizure Medication             CONTINUE taking these medications      aspirin 81 MG chewable tablet  Take 1 tablet by mouth daily  Notes to patient: Heart medication     metoprolol tartrate 25 MG tablet  Commonly known as: LOPRESSOR  Take 1 tablet by mouth 2 times daily  Notes to patient: Lower blood pressure     therapeutic multivitamin-minerals tablet  Take 1 tablet by mouth daily  Notes to patient: Vitamin Supplement      venlafaxine 75 MG extended release capsule  Commonly known as: EFFEXOR XR  Take 1 capsule by mouth daily (with breakfast)  Notes to patient: Antidepressant, Helps lower depression             STOP taking these medications      albuterol sulfate  (90 Base) MCG/ACT inhaler  Commonly known as: Ventolin HFA               Where to Get Your Medications        These medications were sent to [facility] [address] -  [phone] - F [phone]  [address] [place] [address]      Phone: [phone]   aspirin 81 MG chewable tablet  lamoTRIgine 25 MG tablet  melatonin 3 MG Tabs tablet  metoprolol tartrate 25 MG tablet  nicotine polacrilex 2 MG lozenge  therapeutic multivitamin-minerals tablet  venlafaxine 75 MG 
Patient approached Social Work and requested a referral be faxed to [facility] (P:[phone]  F: [phone]) which is a MH and [facility] treatment center.    Social Work faxed referral at this date and time   
SOCIAL SERVICE NOTE: Patient's paperwork for faxed to [facility] with permission from patient to: [phone]  
Social Work called [facility] ([phone], opt. 2 for admissions) to inquire about referral sent; Was on hold for 5 minutes. Will call again Monday.  
Social Work called [facility] ([phone], opt. 2 for admissions) to inquire about referral sent.  Spoke to Janice who stated that they received the referral and that it is waiting to be reviewed and will call back with determination.  
Social Work spoke to Janice at [facility] (P:[phone], opt. 2 for admissions). She states he is accepted to their program starting tomorrow. 3/18/25. They do not provide transportation so he will need his Medicaid Cab to bring him.  Will need meds-to-beds.    MD updated via M-DAQ    
[address] [address] [facility] [address]  Phone: [phone]     Phone: [phone]    [facility]     [facility]  [address] [place] [address]   [address] [facility] [address]  Phone: [phone]     Phone: [phone]    [facility]    [facility] Department of Psychiatry  [address] [facility] [address]  [address] [place], [place] [address]  Phone: [phone]     Phone: [phone]    [facility]      [facility]  [address] [address]    [address] [facility] [address]  Phone: [phone]     Phone: [phone]    [facility]   [address] [facility] [address]   [address] [facility] [address] [facility] [address]  Phone: [phone]     Phone: [phone] Ext: [phone]    [facility] [place] [facility]  [address] [facility] [address] or  [address] [facility] [address] or  [address] [facility] [address]  Phone: [phone]    [facility]  [facility]  [address]    [address] [place] [address]  [facility] [address]     Phone: [phone]  Phone: [phone]          [facility]  [address] [facility] [address]   [address] [facility] [address]  Phone: [phone]     Phone: [phone]

## 2025-03-18 NOTE — BH NOTE
[facility]  Admission Note     Admission Type:   Admission Type: Voluntary    Reason for admission:  Reason for Admission: Voluntary patient from [facility] following suicide attempt by ingesting pills, crack cocaine and alcohol. He reports the recent passing of his father causing him to break 6 months of sobriety. Patient is currently homeless, depressed, anxious, helpless and hopeless. He states he has no supports and is hoping to go to a sober living facility on discharge. He was last here in July, 2024.      Addictive Behavior:   Addictive Behavior  In the Past 3 Months, Have You Felt or Has Someone Told You That You Have a Problem With  : None    Medical Problems:   Past Medical History:   Diagnosis Date    Anxiety     Chest pain 09/13/2024    Depression     Insomnia        Status EXAM:  Mental Status and Behavioral Exam  Normal: No  Level of Assistance: Independent/Self  Facial Expression: Flat, Worried, Sad  Affect: Appropriate  Level of Consciousness: Alert  Frequency of Checks: 4 times per hour, close  Mood:Normal: No  Mood: Depressed, Anxious, Helpless, Sad  Motor Activity:Normal: Yes  Eye Contact: Fair  Observed Behavior: Cooperative, Preoccupied  Sexual Misconduct History: Current - no  Preception: Witt to time, Witt to place, Witt to situation, Witt to person  Attention:Normal: No  Attention: Distractible  Thought Processes: Circumstantial  Thought Content:Normal: No  Thought Content: Preoccupations  Depression Symptoms: Feelings of helplessness, Feelings of hopelessess, Impaired concentration, Sleep disturbance  Anxiety Symptoms: Generalized  Yumiko Symptoms: No problems reported or observed.  Hallucinations: None  Delusions: No  Memory:Normal: No  Memory: Poor recent  Insight and Judgment: No  Insight and Judgment: Poor judgment, Poor insight    Tobacco Screening:  Practical Counseling, on admission, juan X, if applicable and completed (first 3 are required if patient 
Best practice advisory for suicide precautions popped up for patient. Patient agrees to staying safe while in the hospital. Dr. Rutledge notified and states that q15min safety rounding on patient is sufficient. Order for suicidal precautions discontinued.    
[employer] transportation #[phone] p/u at 3/18/25, 8:00 am from [facility] to [facility] [address], [place], [place] [address]. Trip confirmation number #[id number]      Writer contacted Meghna to confirm trip. Patient is being p/u at [facility] services [phone] at 8 am  
Mr. Sarmiento declined breakfast.   
Mr. Sarmiento declined lunch  
Mr. Sarmiento refused his scheduled Aspirin and Metoprolol. He reports he does not take these medications anymore.   
OQ ID: [id number]   
Patient discharged to [facility] , [address] [place] [address]     Via taxi. At 0736 am this morning.   
Patient given tobacco quitline number 12186021250 at this time, refusing to call at this time, states \" I just dont want to quit now\"- patient given information as to the dangers of long term tobacco use. Continue to reinforce the importance of tobacco cessation.   
Patient given tobacco quitline number 15685064295 at this time, refusing to call at this time, states \" I just dont want to quit now\"- patient given information as to the dangers of long term tobacco use. Continue to reinforce the importance of tobacco cessation.   
Transfer to room 208. Report given along with valuables to staff. Pt controlled et cooperative with transfer.   
(benefits of quitting, techniques in how to quit, available resources  ( ) [facility]                                                                                                                   ( ) Patient refused counseling  ( ) Patient refused referral  ( ) Patient refused prescription upon discharge  ( ) Patient has not smoked in the last 30 days    Metabolic Screening:    Lab Results   Component Value Date    LABA1C 4.9 05/03/2024       Lab Results   Component Value Date    CHOL 194 09/20/2023     Lab Results   Component Value Date    TRIG 59 09/20/2023     Lab Results   Component Value Date    HDL 52 09/20/2023     No components found for: \"LDLCAL\"  No components found for: \"LABVLDL\"    Mary Vora LPN    Patient discharged Via taxi cab to [facility] subs[facility] use treatment.  Address is [address] [place] [address]

## 2025-03-18 NOTE — PLAN OF CARE
Problem: Coping  Goal: Pt/Family able to verbalize concerns and demonstrate effective coping strategies  Description: INTERVENTIONS:  1. Assist patient/family to identify coping skills, available support systems and cultural and spiritual values  2. Provide emotional support, including active listening and acknowledgement of concerns of patient and caregivers  3. Reduce environmental stimuli, as able  4. Instruct patient/family in relaxation techniques, as appropriate  5. Assess for spiritual pain/suffering and initiate Spiritual Care, Psychosocial Clinical Specialist consults as needed  Outcome: Not Progressing     Problem: Depression/Self Harm  Goal: Effect of psychiatric condition will be minimized and patient will be protected from self harm  Description: INTERVENTIONS:  1. Assess impact of patient's symptoms on level of functioning, self care needs and offer support as indicated  2. Assess patient/family knowledge of depression, impact on illness and need for teaching  3. Provide emotional support, presence and reassurance  4. Assess for possible suicidal thoughts or ideation. If patient expresses suicidal thoughts or statements do not leave alone, initiate Suicide Precautions, move to a room close to the nursing station and obtain sitter  5. Initiate consults as appropriate with Mental Health Professional, Spiritual Care, Psychosocial CNS, and consider a recommendation to the LIP for a Psychiatric Consultation  3/13/2025 1600 by Lorna Milan, CHAVO  Note: Mr. Sarmiento has slept most of this shift. He has been elusive with writer, and only grunted a negative response when prompted for meals. Mr. Sarmiento did come out to the day area and made a couple phone calls to treatment facilities before returning back to his room.      Problem: Self Harm/Suicidality  Goal: Will have no self-injury during hospital stay  Description: INTERVENTIONS:  1.  Ensure constant observer at bedside with Q15M safety checks  2.  Maintain 
  Problem: Depression/Self Harm  Goal: Effect of psychiatric condition will be minimized and patient will be protected from self harm  Description: INTERVENTIONS:  1. Assess impact of patient's symptoms on level of functioning, self care needs and offer support as indicated  2. Assess patient/family knowledge of depression, impact on illness and need for teaching  3. Provide emotional support, presence and reassurance  4. Assess for possible suicidal thoughts or ideation. If patient expresses suicidal thoughts or statements do not leave alone, initiate Suicide Precautions, move to a room close to the nursing station and obtain sitter  5. Initiate consults as appropriate with Mental Health Professional, Spiritual Care, Psychosocial CNS, and consider a recommendation to the LIP for a Psychiatric Consultation  3/16/2025 2041 by Tomer Steiner LPN  Outcome: Progressing  Note: Patient denies depression and self harm this shift. Patient educated about groups to develop coping skills, and education on addiction     Problem: Pain  Goal: Verbalizes/displays adequate comfort level or baseline comfort level  3/16/2025 2041 by Tomer Steiner LPN  Outcome: Progressing  Note: Patient denies pain this shift. Patient watching TV in day room and is social with peers. Will continue to monitor.     Problem: Depression/Self Harm  Goal: Effect of psychiatric condition will be minimized and patient will be protected from self harm  Description: INTERVENTIONS:  1. Assess impact of patient's symptoms on level of functioning, self care needs and offer support as indicated  2. Assess patient/family knowledge of depression, impact on illness and need for teaching  3. Provide emotional support, presence and reassurance  4. Assess for possible suicidal thoughts or ideation. If patient expresses suicidal thoughts or statements do not leave alone, initiate Suicide Precautions, move to a room close to the nursing station and obtain 
  Problem: Depression/Self Harm  Goal: Effect of psychiatric condition will be minimized and patient will be protected from self harm  Description: INTERVENTIONS:  1. Assess impact of patient's symptoms on level of functioning, self care needs and offer support as indicated  2. Assess patient/family knowledge of depression, impact on illness and need for teaching  3. Provide emotional support, presence and reassurance  4. Assess for possible suicidal thoughts or ideation. If patient expresses suicidal thoughts or statements do not leave alone, initiate Suicide Precautions, move to a room close to the nursing station and obtain sitter  5. Initiate consults as appropriate with Mental Health Professional, Spiritual Care, Psychosocial CNS, and consider a recommendation to the LIP for a Psychiatric Consultation  Outcome: Progressing   1:1 with pt x ten minutes.  Pt encouraged to attend unit programming and interact with peers and staff.  Pt also encouraged to tend to hygiene and ADLs.  Pt encouraged to discuss feelings with staff and feedback and reassurance provided.    Pt denies thoughts of self harm and is agreeable to seeking out should thoughts of self harm arise.  Safe environment maintained.  Q15 minute checks for safety cont per unit policy.  Will cont to monitor for safety and provides support and reassurance as needed.    Pt is working on finding sober living. Pt is cooperative with staff. Medication compliant.   
  Problem: Depression/Self Harm  Goal: Effect of psychiatric condition will be minimized and patient will be protected from self harm  Description: INTERVENTIONS:  1. Assess impact of patient's symptoms on level of functioning, self care needs and offer support as indicated  2. Assess patient/family knowledge of depression, impact on illness and need for teaching  3. Provide emotional support, presence and reassurance  4. Assess for possible suicidal thoughts or ideation. If patient expresses suicidal thoughts or statements do not leave alone, initiate Suicide Precautions, move to a room close to the nursing station and obtain sitter  5. Initiate consults as appropriate with Mental Health Professional, Spiritual Care, Psychosocial CNS, and consider a recommendation to the LIP for a Psychiatric Consultation  Outcome: Progressing  Patient denying depression due to his ability to get back on his medications. He stated that he is feeling better and hopeful.     Problem: Self Harm/Suicidality  Goal: Will have no self-injury during hospital stay  Description: INTERVENTIONS:  1.  Ensure constant observer at bedside with Q15M safety checks  2.  Maintain a safe environment  3.  Secure patient belongings  4.  Ensure family/visitors adhere to safety recommendations  5.  Ensure safety tray has been added to patient's diet order  6.  Every shift and PRN: Re-assess suicidal risk via Frequent Screener    Outcome: Progressing  Patient denying self harm intent. He remains safe within a hazard free environment. Q 15 minute safety checks maintained     Problem: Pain  Goal: Verbalizes/displays adequate comfort level or baseline comfort level  Outcome: Progressing  Patient not complaining about any pain at this time. PRN pain medication available upon request.     
  Problem: Self Harm/Suicidality  Goal: Will have no self-injury during hospital stay  Description: INTERVENTIONS:  1.  Ensure constant observer at bedside with Q15M safety checks  2.  Maintain a safe environment  3.  Secure patient belongings  4.  Ensure family/visitors adhere to safety recommendations  5.  Ensure safety tray has been added to patient's diet order  6.  Every shift and PRN: Re-assess suicidal risk via Frequent Screener    3/15/2025 0207 by Haley Puga, RN  Outcome: Progressing  Note: Patient is cooperative, friendly, withdrawn, flat, isolative to self. Endorses depression at a manageable level. Denies suicidal ideations. Pt denies thoughts of self harm and is agreeable to seeking out should thoughts of self harm arise. Safe environment maintained. Every 15 minute checks for safety cont per unit policy. Will cont to monitor for safety and provides support and reassurance as needed.       Problem: Depression/Self Harm  Goal: Effect of psychiatric condition will be minimized and patient will be protected from self harm  Description: INTERVENTIONS:  1. Assess impact of patient's symptoms on level of functioning, self care needs and offer support as indicated  2. Assess patient/family knowledge of depression, impact on illness and need for teaching  3. Provide emotional support, presence and reassurance  4. Assess for possible suicidal thoughts or ideation. If patient expresses suicidal thoughts or statements do not leave alone, initiate Suicide Precautions, move to a room close to the nursing station and obtain sitter  5. Initiate consults as appropriate with Mental Health Professional, Spiritual Care, Psychosocial CNS, and consider a recommendation to the LIP for a Psychiatric Consultation  3/15/2025 0207 by Haley Puga, RN  Outcome: Progressing     
  Problem: Self Harm/Suicidality  Goal: Will have no self-injury during hospital stay  Description: INTERVENTIONS:  1.  Ensure constant observer at bedside with Q15M safety checks  2.  Maintain a safe environment  3.  Secure patient belongings  4.  Ensure family/visitors adhere to safety recommendations  5.  Ensure safety tray has been added to patient's diet order  6.  Every shift and PRN: Re-assess suicidal risk via Frequent Screener    3/15/2025 1123 by Gerda Camacho RN  Outcome: Progressing  Note: Patient denies thoughts to self harm or thoughts to harm others. Environment remains safe. No self injurious behaviors noted or reported per shift. Q15 minute checks continues for safety.      Problem: Pain  Goal: Verbalizes/displays adequate comfort level or baseline comfort level  Outcome: Progressing  Flowsheets (Taken 3/15/2025 1123)  Verbalizes/displays adequate comfort level or baseline comfort level:   Encourage patient to monitor pain and request assistance   Assess pain using appropriate pain scale   Administer analgesics based on type and severity of pain and evaluate response   Implement non-pharmacological measures as appropriate and evaluate response   Consider cultural and social influences on pain and pain management   Notify Licensed Independent Practitioner if interventions unsuccessful or patient reports new pain  Note: Patient denies pain at this time. Patient has agreed to seek staff with symptoms of pain.      
  Problem: Self Harm/Suicidality  Goal: Will have no self-injury during hospital stay  Description: INTERVENTIONS:  1.  Ensure constant observer at bedside with Q15M safety checks  2.  Maintain a safe environment  3.  Secure patient belongings  4.  Ensure family/visitors adhere to safety recommendations  5.  Ensure safety tray has been added to patient's diet order  6.  Every shift and PRN: Re-assess suicidal risk via Frequent Screener    3/15/2025 2034 by Koki Kramer, RN  Outcome: Progressing     Problem: Pain  Goal: Verbalizes/displays adequate comfort level or baseline comfort level  3/15/2025 2034 by Koki Kramer, RN  Outcome: Progressing  Flowsheets (Taken 3/15/2025 1123 by Gerda Camacho, RN)  Verbalizes/displays adequate comfort level or baseline comfort level:   Encourage patient to monitor pain and request assistance   Assess pain using appropriate pain scale   Administer analgesics based on type and severity of pain and evaluate response   Implement non-pharmacological measures as appropriate and evaluate response   Consider cultural and social influences on pain and pain management   Notify Licensed Independent Practitioner if interventions unsuccessful or patient reports new pain     
[facility]  Day 3 Interdisciplinary Treatment Plan NOTE    Review Date & Time: 3/15/25 1257pm    Admission Type:   Admission Type: Voluntary    Reason for admission:  Reason for Admission: Voluntary patient from [facility] following suicide attempt by ingesting pills, crack cocaine and alcohol. He reports the recent passing of his father causing him to break 6 months of sobriety. Patient is currently homeless, depressed, anxious, helpless and hopeless. He states he has no supports and is hoping to go to a sober living facility on discharge. He was last here in July, 2024.  Estimated Length of Stay: 5-7 days  Estimated Discharge Date Update: to be determined by physician    PATIENT STRENGTHS:  Patient Strengths    Patient Strengths and Limitations:Limitations: Difficulty problem solving/relies on others to help solve problems, Inappropriate/potentially harmful leisure interests, Apathetic / unmotivated, Difficult relationships / poor social skills  Addictive Behavior:Addictive Behavior  In the Past 3 Months, Have You Felt or Has Someone Told You That You Have a Problem With  : None  Medical Problems:  Past Medical History:   Diagnosis Date    Anxiety     Chest pain 09/13/2024    Depression     Insomnia        Risk:  Fall Risk   Cezar Scale Cezar Scale Score: 22  BVC    Change in scores no Changes to plan of Care no    Status EXAM:   Mental Status and Behavioral Exam  Normal: Yes  Level of Assistance: Independent/Self  Facial Expression: Flat  Affect: Appropriate  Level of Consciousness: Alert  Frequency of Checks: 4 times per hour, close  Mood:Normal: No  Mood: Depressed  Motor Activity:Normal: Yes  Eye Contact: Fair  Observed Behavior: Friendly, Cooperative, Withdrawn  Sexual Misconduct History: Current - no  Preception: [place] to person, [place] to time, [place] to place, [place] to situation  Attention:Normal: Yes  Attention: Distractible  Thought Processes: Unremarkable  Thought 
[facility]  Initial Interdisciplinary Treatment Plan NO      Original treatment plan Date & Time: 3/13/2025   8:35 am    Admission Type:  Admission Type: Voluntary    Reason for admission:   Reason for Admission: Voluntary patient from [facility] following suicide attempt by ingesting pills, crack cocaine and alcohol. He reports the recent passing of his father causing him to break 6 months of sobriety. Patient is currently homeless, depressed, anxious, helpless and hopeless. He states he has no supports and is hoping to go to a sober living facility on discharge. He was last here in July, 2024.    Estimated Length of Stay:  5-7days  Estimated Discharge Date: to be determined by physician    PATIENT STRENGTHS:  Patient Strengths:   Patient Strengths and Limitations:   Addictive Behavior: Addictive Behavior  In the Past 3 Months, Have You Felt or Has Someone Told You That You Have a Problem With  : None  Medical Problems:  Past Medical History:   Diagnosis Date    Anxiety     Chest pain 09/13/2024    Depression     Insomnia      Status EXAM:Mental Status and Behavioral Exam  Normal: No  Level of Assistance: Independent/Self  Facial Expression: Flat, Worried, Sad  Affect: Appropriate  Level of Consciousness: Alert  Frequency of Checks: 4 times per hour, close  Mood:Normal: No  Mood: Depressed, Anxious, Helpless, Sad  Motor Activity:Normal: Yes  Eye Contact: Fair  Observed Behavior: Cooperative, Preoccupied  Sexual Misconduct History: Current - no  Preception: [place] to time, [place] to place, [place] to situation, [place] to person  Attention:Normal: No  Attention: Distractible  Thought Processes: Circumstantial  Thought Content:Normal: No  Thought Content: Preoccupations  Depression Symptoms: Feelings of helplessness, Feelings of hopelessess, Impaired concentration, Sleep disturbance  Anxiety Symptoms: Generalized  Yumiko Symptoms: No problems reported or observed.  Hallucinations: None  Delusions: 
Problem: Coping  Goal: Pt/Family able to verbalize concerns and demonstrate effective coping strategies  Description: INTERVENTIONS:  1. Assist patient/family to identify coping skills, available support systems and cultural and spiritual values  2. Provide emotional support, including active listening and acknowledgement of concerns of patient and caregivers  3. Reduce environmental stimuli, as able  4. Instruct patient/family in relaxation techniques, as appropriate  5. Assess for spiritual pain/suffering and initiate Spiritual Care, Psychosocial Clinical Specialist consults as needed  Outcome: Progressing   Patient reports that he is going to use coping skills.     Problem: Depression/Self Harm  Goal: Effect of psychiatric condition will be minimized and patient will be protected from self harm  Description: INTERVENTIONS:  1. Assess impact of patient's symptoms on level of functioning, self care needs and offer support as indicated  2. Assess patient/family knowledge of depression, impact on illness and need for teaching  3. Provide emotional support, presence and reassurance  4. Assess for possible suicidal thoughts or ideation. If patient expresses suicidal thoughts or statements do not leave alone, initiate Suicide Precautions, move to a room close to the nursing station and obtain sitter  5. Initiate consults as appropriate with Mental Health Professional, Spiritual Care, Psychosocial CNS, and consider a recommendation to the LIP for a Psychiatric Consultation  3/17/2025 1036 by Danni Billingsley RN  Outcome: Progressing   Patient denied suicidal ideations. Safe environment maintained. Safety checks every 15 minutes continued. Patient denied depression, he is attending groups and social with peers in day area. He like to spend time watching TV in day area.     Problem: Self Harm/Suicidality  Goal: Will have no self-injury during hospital stay  Description: INTERVENTIONS:  1.  Ensure constant observer at bedside 
Frequent Screener    3/18/2025 0751 by Mary Vora LPN  Outcome: Adequate for Discharge     Problem: Pain  Goal: Verbalizes/displays adequate comfort level or baseline comfort level  3/18/2025 0751 by Mary Vora LPN  Outcome: Adequate for Discharge        
initiate Spiritual Care, Psychosocial Clinical Specialist consults as needed  Outcome: Not Progressing  Note: Patient does not demonstrate coping skills at this time. He isolated to room entire shift. Patient did not participate in assessment or unit programming. Patient refused medications. Encouraged to seek staff with needs. Safety maintained with every 15 minute and incidental rounding. Patient has remained free from self harm.     
level  3/16/2025 1019 by Birdie Gaston LPN  Outcome: Progressing     Patient safe on unit with 15 minute safety checks in place. Patient is not at risk for falls at this time. Patient denies any thoughts to harm self or others. Patient denies any auditory/visual hallucinations at this time. Patient denies any pain at this time. Patient is not a risk for self harm at this time but showing signs of anxiety depression. Offered support and will continue to monitor and assess.
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
## 2025-04-09 ENCOUNTER — HOSPITAL ENCOUNTER (EMERGENCY)
Age: 45
Discharge: ANOTHER HOSPITAL | End: 2025-04-10
Attending: EMERGENCY MEDICINE
Payer: MEDICAID

## 2025-04-09 DIAGNOSIS — R45.851 DEPRESSION WITH SUICIDAL IDEATION: Primary | ICD-10-CM

## 2025-04-09 DIAGNOSIS — F32.A DEPRESSION WITH SUICIDAL IDEATION: Primary | ICD-10-CM

## 2025-04-09 LAB
ALBUMIN SERPL-MCNC: 4.7 G/DL (ref 3.4–5)
ALBUMIN/GLOB SERPL: 1.5 {RATIO} (ref 1.1–2.2)
ALP SERPL-CCNC: 67 U/L (ref 40–129)
ALT SERPL-CCNC: 18 U/L (ref 10–40)
AMPHETAMINES UR QL SCN>1000 NG/ML: ABNORMAL
ANION GAP SERPL CALCULATED.3IONS-SCNC: 14 MMOL/L (ref 3–16)
APAP SERPL-MCNC: <5 UG/ML (ref 10–30)
AST SERPL-CCNC: 24 U/L (ref 15–37)
BARBITURATES UR QL SCN>200 NG/ML: ABNORMAL
BASOPHILS # BLD: 0.1 K/UL (ref 0–0.2)
BASOPHILS NFR BLD: 0.5 %
BENZODIAZ UR QL SCN>200 NG/ML: ABNORMAL
BILIRUB SERPL-MCNC: 0.4 MG/DL (ref 0–1)
BILIRUB UR QL STRIP.AUTO: NEGATIVE
BUN SERPL-MCNC: 11 MG/DL (ref 7–20)
CALCIUM SERPL-MCNC: 9.5 MG/DL (ref 8.3–10.6)
CANNABINOIDS UR QL SCN>50 NG/ML: POSITIVE
CHLORIDE SERPL-SCNC: 99 MMOL/L (ref 99–110)
CLARITY UR: CLEAR
CO2 SERPL-SCNC: 26 MMOL/L (ref 21–32)
COCAINE UR QL SCN: ABNORMAL
COLOR UR: YELLOW
CREAT SERPL-MCNC: 0.8 MG/DL (ref 0.9–1.3)
DEPRECATED RDW RBC AUTO: 13 % (ref 12.4–15.4)
DRUG SCREEN COMMENT UR-IMP: ABNORMAL
EOSINOPHIL # BLD: 0.2 K/UL (ref 0–0.6)
EOSINOPHIL NFR BLD: 1.8 %
ETHANOLAMINE SERPL-MCNC: 112 MG/DL (ref 0–0.08)
ETHANOLAMINE SERPL-MCNC: 60 MG/DL (ref 0–0.08)
FENTANYL SCREEN, URINE: ABNORMAL
GFR SERPLBLD CREATININE-BSD FMLA CKD-EPI: >90 ML/MIN/{1.73_M2}
GLUCOSE SERPL-MCNC: 96 MG/DL (ref 70–99)
GLUCOSE UR STRIP.AUTO-MCNC: NEGATIVE MG/DL
HCT VFR BLD AUTO: 43.5 % (ref 40.5–52.5)
HGB BLD-MCNC: 14.9 G/DL (ref 13.5–17.5)
HGB UR QL STRIP.AUTO: NEGATIVE
KETONES UR STRIP.AUTO-MCNC: NEGATIVE MG/DL
LEUKOCYTE ESTERASE UR QL STRIP.AUTO: NEGATIVE
LYMPHOCYTES # BLD: 3.3 K/UL (ref 1–5.1)
LYMPHOCYTES NFR BLD: 27.5 %
MCH RBC QN AUTO: 31 PG (ref 26–34)
MCHC RBC AUTO-ENTMCNC: 34.3 G/DL (ref 31–36)
MCV RBC AUTO: 90.3 FL (ref 80–100)
METHADONE UR QL SCN>300 NG/ML: ABNORMAL
MONOCYTES # BLD: 0.7 K/UL (ref 0–1.3)
MONOCYTES NFR BLD: 6.2 %
NEUTROPHILS # BLD: 7.7 K/UL (ref 1.7–7.7)
NEUTROPHILS NFR BLD: 64 %
NITRITE UR QL STRIP.AUTO: NEGATIVE
OPIATES UR QL SCN>300 NG/ML: ABNORMAL
OXYCODONE UR QL SCN: ABNORMAL
PCP UR QL SCN>25 NG/ML: ABNORMAL
PH UR STRIP.AUTO: 5.5 [PH] (ref 5–8)
PH UR STRIP: 5.5 [PH]
PLATELET # BLD AUTO: 243 K/UL (ref 135–450)
PMV BLD AUTO: 8.8 FL (ref 5–10.5)
POTASSIUM SERPL-SCNC: 3.9 MMOL/L (ref 3.5–5.1)
PROT SERPL-MCNC: 7.8 G/DL (ref 6.4–8.2)
PROT UR STRIP.AUTO-MCNC: NEGATIVE MG/DL
RBC # BLD AUTO: 4.82 M/UL (ref 4.2–5.9)
SALICYLATES SERPL-MCNC: <0.5 MG/DL (ref 15–30)
SODIUM SERPL-SCNC: 139 MMOL/L (ref 136–145)
SP GR UR STRIP.AUTO: 1.01 (ref 1–1.03)
UA COMPLETE W REFLEX CULTURE PNL UR: NORMAL
UA DIPSTICK W REFLEX MICRO PNL UR: NORMAL
URN SPEC COLLECT METH UR: NORMAL
UROBILINOGEN UR STRIP-ACNC: 1 E.U./DL
WBC # BLD AUTO: 12 K/UL (ref 4–11)

## 2025-04-09 PROCEDURE — 80307 DRUG TEST PRSMV CHEM ANLYZR: CPT

## 2025-04-09 PROCEDURE — 81003 URINALYSIS AUTO W/O SCOPE: CPT

## 2025-04-09 PROCEDURE — 93005 ELECTROCARDIOGRAM TRACING: CPT | Performed by: EMERGENCY MEDICINE

## 2025-04-09 PROCEDURE — 85025 COMPLETE CBC W/AUTO DIFF WBC: CPT

## 2025-04-09 PROCEDURE — 80143 DRUG ASSAY ACETAMINOPHEN: CPT

## 2025-04-09 PROCEDURE — 80053 COMPREHEN METABOLIC PANEL: CPT

## 2025-04-09 PROCEDURE — 82077 ASSAY SPEC XCP UR&BREATH IA: CPT

## 2025-04-09 PROCEDURE — 99285 EMERGENCY DEPT VISIT HI MDM: CPT

## 2025-04-09 PROCEDURE — 80179 DRUG ASSAY SALICYLATE: CPT

## 2025-04-09 ASSESSMENT — PAIN - FUNCTIONAL ASSESSMENT: PAIN_FUNCTIONAL_ASSESSMENT: NONE - DENIES PAIN

## 2025-04-10 ENCOUNTER — HOSPITAL ENCOUNTER (INPATIENT)
Age: 45
LOS: 1 days | Discharge: HOME | DRG: 751 | End: 2025-04-11
Attending: PSYCHIATRY & NEUROLOGY | Admitting: PSYCHIATRY & NEUROLOGY
Payer: MEDICAID

## 2025-04-10 VITALS
SYSTOLIC BLOOD PRESSURE: 109 MMHG | OXYGEN SATURATION: 97 % | TEMPERATURE: 98.1 F | HEIGHT: 72 IN | WEIGHT: 211 LBS | RESPIRATION RATE: 20 BRPM | DIASTOLIC BLOOD PRESSURE: 78 MMHG | HEART RATE: 71 BPM | BODY MASS INDEX: 28.58 KG/M2

## 2025-04-10 PROBLEM — F33.9 MAJOR DEPRESSIVE DISORDER, RECURRENT: Status: ACTIVE | Noted: 2025-04-10

## 2025-04-10 LAB
EKG ATRIAL RATE: 102 BPM
EKG DIAGNOSIS: NORMAL
EKG P AXIS: 37 DEGREES
EKG P-R INTERVAL: 182 MS
EKG Q-T INTERVAL: 344 MS
EKG QRS DURATION: 86 MS
EKG QTC CALCULATION (BAZETT): 448 MS
EKG R AXIS: -22 DEGREES
EKG T AXIS: 48 DEGREES
EKG VENTRICULAR RATE: 102 BPM
FLUAV RNA UPPER RESP QL NAA+PROBE: NEGATIVE
FLUBV AG NPH QL: NEGATIVE
SARS-COV-2 RDRP RESP QL NAA+PROBE: NOT DETECTED

## 2025-04-10 PROCEDURE — 1240000000 HC EMOTIONAL WELLNESS R&B

## 2025-04-10 PROCEDURE — 6370000000 HC RX 637 (ALT 250 FOR IP)

## 2025-04-10 PROCEDURE — 87635 SARS-COV-2 COVID-19 AMP PRB: CPT

## 2025-04-10 PROCEDURE — 93010 ELECTROCARDIOGRAM REPORT: CPT | Performed by: INTERNAL MEDICINE

## 2025-04-10 PROCEDURE — 6370000000 HC RX 637 (ALT 250 FOR IP): Performed by: NURSE PRACTITIONER

## 2025-04-10 PROCEDURE — 87804 INFLUENZA ASSAY W/OPTIC: CPT

## 2025-04-10 RX ORDER — LAMOTRIGINE 25 MG/1
25 TABLET ORAL DAILY
Status: DISCONTINUED | OUTPATIENT
Start: 2025-04-10 | End: 2025-04-11 | Stop reason: HOSPADM

## 2025-04-10 RX ORDER — ACETAMINOPHEN 325 MG/1
650 TABLET ORAL EVERY 4 HOURS PRN
Status: DISCONTINUED | OUTPATIENT
Start: 2025-04-10 | End: 2025-04-11 | Stop reason: HOSPADM

## 2025-04-10 RX ORDER — MAGNESIUM HYDROXIDE/ALUMINUM HYDROXICE/SIMETHICONE 120; 1200; 1200 MG/30ML; MG/30ML; MG/30ML
30 SUSPENSION ORAL EVERY 6 HOURS PRN
Status: DISCONTINUED | OUTPATIENT
Start: 2025-04-10 | End: 2025-04-11 | Stop reason: HOSPADM

## 2025-04-10 RX ORDER — LORAZEPAM 2 MG/1
2 TABLET ORAL
Status: DISCONTINUED | OUTPATIENT
Start: 2025-04-10 | End: 2025-04-11 | Stop reason: HOSPADM

## 2025-04-10 RX ORDER — LORAZEPAM 2 MG/ML
3 INJECTION INTRAMUSCULAR
Status: DISCONTINUED | OUTPATIENT
Start: 2025-04-10 | End: 2025-04-11 | Stop reason: HOSPADM

## 2025-04-10 RX ORDER — IBUPROFEN 400 MG/1
400 TABLET, FILM COATED ORAL EVERY 6 HOURS PRN
Status: DISCONTINUED | OUTPATIENT
Start: 2025-04-10 | End: 2025-04-11 | Stop reason: HOSPADM

## 2025-04-10 RX ORDER — POLYETHYLENE GLYCOL 3350 17 G
2 POWDER IN PACKET (EA) ORAL
Status: DISCONTINUED | OUTPATIENT
Start: 2025-04-10 | End: 2025-04-11 | Stop reason: HOSPADM

## 2025-04-10 RX ORDER — LORAZEPAM 2 MG/ML
4 INJECTION INTRAMUSCULAR
Status: DISCONTINUED | OUTPATIENT
Start: 2025-04-10 | End: 2025-04-11 | Stop reason: HOSPADM

## 2025-04-10 RX ORDER — OLANZAPINE 10 MG/1
10 TABLET ORAL EVERY 4 HOURS PRN
Status: DISCONTINUED | OUTPATIENT
Start: 2025-04-10 | End: 2025-04-11 | Stop reason: HOSPADM

## 2025-04-10 RX ORDER — LORAZEPAM 2 MG/ML
2 INJECTION INTRAMUSCULAR
Status: DISCONTINUED | OUTPATIENT
Start: 2025-04-10 | End: 2025-04-11 | Stop reason: HOSPADM

## 2025-04-10 RX ORDER — LORAZEPAM 2 MG/ML
1 INJECTION INTRAMUSCULAR
Status: DISCONTINUED | OUTPATIENT
Start: 2025-04-10 | End: 2025-04-11 | Stop reason: HOSPADM

## 2025-04-10 RX ORDER — HYDROXYZINE PAMOATE 50 MG/1
50 CAPSULE ORAL 3 TIMES DAILY PRN
Status: ON HOLD | COMMUNITY
End: 2025-04-11 | Stop reason: HOSPADM

## 2025-04-10 RX ORDER — NICOTINE 21 MG/24HR
1 PATCH, TRANSDERMAL 24 HOURS TRANSDERMAL DAILY
Status: DISCONTINUED | OUTPATIENT
Start: 2025-04-10 | End: 2025-04-11 | Stop reason: HOSPADM

## 2025-04-10 RX ORDER — TRAZODONE HYDROCHLORIDE 50 MG/1
50 TABLET ORAL NIGHTLY
Status: ON HOLD | COMMUNITY
End: 2025-04-11 | Stop reason: HOSPADM

## 2025-04-10 RX ORDER — LORAZEPAM 1 MG/1
1 TABLET ORAL
Status: DISCONTINUED | OUTPATIENT
Start: 2025-04-10 | End: 2025-04-11 | Stop reason: HOSPADM

## 2025-04-10 RX ORDER — BENZTROPINE MESYLATE 1 MG/ML
2 INJECTION, SOLUTION INTRAMUSCULAR; INTRAVENOUS 2 TIMES DAILY PRN
Status: DISCONTINUED | OUTPATIENT
Start: 2025-04-10 | End: 2025-04-11 | Stop reason: HOSPADM

## 2025-04-10 RX ORDER — HYDROXYZINE HYDROCHLORIDE 50 MG/1
50 TABLET, FILM COATED ORAL 3 TIMES DAILY PRN
Status: DISCONTINUED | OUTPATIENT
Start: 2025-04-10 | End: 2025-04-11 | Stop reason: HOSPADM

## 2025-04-10 RX ORDER — LAMOTRIGINE 25 MG/1
25 TABLET ORAL DAILY
Status: DISCONTINUED | OUTPATIENT
Start: 2025-04-11 | End: 2025-04-10

## 2025-04-10 RX ORDER — LAMOTRIGINE 25 MG/1
50 TABLET ORAL DAILY
Status: DISCONTINUED | OUTPATIENT
Start: 2025-04-10 | End: 2025-04-10

## 2025-04-10 RX ORDER — LANOLIN ALCOHOL/MO/W.PET/CERES
100 CREAM (GRAM) TOPICAL DAILY
Status: DISCONTINUED | OUTPATIENT
Start: 2025-04-10 | End: 2025-04-11 | Stop reason: HOSPADM

## 2025-04-10 RX ORDER — VENLAFAXINE HYDROCHLORIDE 75 MG/1
150 CAPSULE, EXTENDED RELEASE ORAL DAILY
Status: DISCONTINUED | OUTPATIENT
Start: 2025-04-10 | End: 2025-04-11 | Stop reason: HOSPADM

## 2025-04-10 RX ORDER — MULTIVITAMIN WITH IRON
1 TABLET ORAL DAILY
Status: DISCONTINUED | OUTPATIENT
Start: 2025-04-10 | End: 2025-04-11 | Stop reason: HOSPADM

## 2025-04-10 RX ORDER — TRAZODONE HYDROCHLORIDE 50 MG/1
50 TABLET ORAL NIGHTLY PRN
Status: DISCONTINUED | OUTPATIENT
Start: 2025-04-10 | End: 2025-04-11 | Stop reason: HOSPADM

## 2025-04-10 RX ORDER — LORAZEPAM 2 MG/1
4 TABLET ORAL
Status: DISCONTINUED | OUTPATIENT
Start: 2025-04-10 | End: 2025-04-11 | Stop reason: HOSPADM

## 2025-04-10 RX ADMIN — LAMOTRIGINE 25 MG: 25 TABLET ORAL at 13:03

## 2025-04-10 RX ADMIN — ACETAMINOPHEN 650 MG: 325 TABLET ORAL at 20:29

## 2025-04-10 RX ADMIN — VENLAFAXINE HYDROCHLORIDE 150 MG: 75 CAPSULE, EXTENDED RELEASE ORAL at 12:31

## 2025-04-10 ASSESSMENT — PATIENT HEALTH QUESTIONNAIRE - PHQ9
9. THOUGHTS THAT YOU WOULD BE BETTER OFF DEAD, OR OF HURTING YOURSELF: NOT AT ALL
5. POOR APPETITE OR OVEREATING: MORE THAN HALF THE DAYS
2. FEELING DOWN, DEPRESSED OR HOPELESS: MORE THAN HALF THE DAYS
SUM OF ALL RESPONSES TO PHQ QUESTIONS 1-9: 9
8. MOVING OR SPEAKING SO SLOWLY THAT OTHER PEOPLE COULD HAVE NOTICED. OR THE OPPOSITE, BEING SO FIGETY OR RESTLESS THAT YOU HAVE BEEN MOVING AROUND A LOT MORE THAN USUAL: NOT AT ALL
9. THOUGHTS THAT YOU WOULD BE BETTER OFF DEAD, OR OF HURTING YOURSELF: NEARLY EVERY DAY
SUM OF ALL RESPONSES TO PHQ QUESTIONS 1-9: 12
4. FEELING TIRED OR HAVING LITTLE ENERGY: MORE THAN HALF THE DAYS
8. MOVING OR SPEAKING SO SLOWLY THAT OTHER PEOPLE COULD HAVE NOTICED. OR THE OPPOSITE, BEING SO FIGETY OR RESTLESS THAT YOU HAVE BEEN MOVING AROUND A LOT MORE THAN USUAL: NOT AT ALL
SUM OF ALL RESPONSES TO PHQ QUESTIONS 1-9: 12
7. TROUBLE CONCENTRATING ON THINGS, SUCH AS READING THE NEWSPAPER OR WATCHING TELEVISION: NOT AT ALL
SUM OF ALL RESPONSES TO PHQ QUESTIONS 1-9: 3
SUM OF ALL RESPONSES TO PHQ QUESTIONS 1-9: 3
3. TROUBLE FALLING OR STAYING ASLEEP: SEVERAL DAYS
10. IF YOU CHECKED OFF ANY PROBLEMS, HOW DIFFICULT HAVE THESE PROBLEMS MADE IT FOR YOU TO DO YOUR WORK, TAKE CARE OF THINGS AT HOME, OR GET ALONG WITH OTHER PEOPLE: EXTREMELY DIFFICULT
10. IF YOU CHECKED OFF ANY PROBLEMS, HOW DIFFICULT HAVE THESE PROBLEMS MADE IT FOR YOU TO DO YOUR WORK, TAKE CARE OF THINGS AT HOME, OR GET ALONG WITH OTHER PEOPLE: NOT DIFFICULT AT ALL
5. POOR APPETITE OR OVEREATING: NOT AT ALL
1. LITTLE INTEREST OR PLEASURE IN DOING THINGS: NOT AT ALL
SUM OF ALL RESPONSES TO PHQ QUESTIONS 1-9: 3
SUM OF ALL RESPONSES TO PHQ QUESTIONS 1-9: 3
2. FEELING DOWN, DEPRESSED OR HOPELESS: MORE THAN HALF THE DAYS
1. LITTLE INTEREST OR PLEASURE IN DOING THINGS: SEVERAL DAYS
7. TROUBLE CONCENTRATING ON THINGS, SUCH AS READING THE NEWSPAPER OR WATCHING TELEVISION: NOT AT ALL
4. FEELING TIRED OR HAVING LITTLE ENERGY: NOT AT ALL
6. FEELING BAD ABOUT YOURSELF - OR THAT YOU ARE A FAILURE OR HAVE LET YOURSELF OR YOUR FAMILY DOWN: SEVERAL DAYS
SUM OF ALL RESPONSES TO PHQ QUESTIONS 1-9: 12
6. FEELING BAD ABOUT YOURSELF - OR THAT YOU ARE A FAILURE OR HAVE LET YOURSELF OR YOUR FAMILY DOWN: SEVERAL DAYS
3. TROUBLE FALLING OR STAYING ASLEEP: NOT AT ALL

## 2025-04-10 ASSESSMENT — SLEEP AND FATIGUE QUESTIONNAIRES
AVERAGE NUMBER OF SLEEP HOURS: 7
SLEEP PATTERN: NORMAL
SLEEP PATTERN: DISTURBED/INTERRUPTED SLEEP
DO YOU HAVE DIFFICULTY SLEEPING: NO
DO YOU HAVE DIFFICULTY SLEEPING: NO
DO YOU USE A SLEEP AID: NO
DO YOU USE A SLEEP AID: NO
AVERAGE NUMBER OF SLEEP HOURS: 6

## 2025-04-10 ASSESSMENT — PAIN DESCRIPTION - DESCRIPTORS: DESCRIPTORS: ACHING

## 2025-04-10 ASSESSMENT — PAIN SCALES - GENERAL
PAINLEVEL_OUTOF10: 0
PAINLEVEL_OUTOF10: 3

## 2025-04-10 ASSESSMENT — PAIN - FUNCTIONAL ASSESSMENT
PAIN_FUNCTIONAL_ASSESSMENT: ACTIVITIES ARE NOT PREVENTED
PAIN_FUNCTIONAL_ASSESSMENT: NONE - DENIES PAIN
PAIN_FUNCTIONAL_ASSESSMENT: NONE - DENIES PAIN

## 2025-04-10 ASSESSMENT — PAIN DESCRIPTION - ONSET: ONSET: GRADUAL

## 2025-04-10 ASSESSMENT — PAIN DESCRIPTION - ORIENTATION: ORIENTATION: ANTERIOR;LEFT;RIGHT

## 2025-04-10 ASSESSMENT — PAIN DESCRIPTION - LOCATION: LOCATION: HEAD

## 2025-04-10 ASSESSMENT — PAIN DESCRIPTION - PAIN TYPE: TYPE: ACUTE PAIN

## 2025-04-10 ASSESSMENT — PAIN DESCRIPTION - FREQUENCY: FREQUENCY: INTERMITTENT

## 2025-04-10 NOTE — H&P
Patient has been seen and evaluated within 30 days by IM provider and medically cleared for admission to [facility]. Please refer to medical H&P from 3/13/2025.    Vitals reviewed and stable. Labs reviewed and nothing to follow . Orders reviewed.  Ethanol level 112--60.  Will defer to psychiatry regarding CIWA.     Discussed with BEREKET Ingram.     Please contact with IM provider with concerns.    CRISTOPHER Gusman - CNP   4/10/2025 11:33 AM      
with patient including medication risks, benefits, side effects.  Obtained informed consent for treatment.     Sarah Weaver, PMCONNIEP-BC

## 2025-04-10 NOTE — PROGRESS NOTES
[facility]  Admission Note     Admission Type:   Admission Type: Voluntary    Reason for admission:  Reason for Admission: Suicidal thoughts with plan to overdose      Addictive Behavior:   Addictive Behavior  In the Past 3 Months, Have You Felt or Has Someone Told You That You Have a Problem With  : None    Medical Problems:   Past Medical History:   Diagnosis Date    Anxiety     Chest pain 09/13/2024    Depression     Insomnia        Status EXAM:  Mental Status and Behavioral Exam  Normal: No  Level of Assistance: Independent/Self  Facial Expression: Avoids Gaze, Flat, Sad  Affect: Congruent  Level of Consciousness: Alert  Frequency of Checks: 4 times per hour, close  Mood:Normal: No  Mood: Depressed, Anxious, Helpless  Motor Activity:Normal: No  Motor Activity: Decreased  Eye Contact: Fair  Observed Behavior: Cooperative, Preoccupied  Sexual Misconduct History: Current - no  Preception: [place] to person, [place] to time, [place] to situation  Attention:Normal: Yes  Thought Processes: Unremarkable  Thought Content:Normal: No  Thought Content: Preoccupations  Depression Symptoms: Appetite change, Change in energy level, Feelings of helplessness, Feelings of hopelessess, Sleep disturbance  Anxiety Symptoms: Generalized  [place] Symptoms: Poor judgment  Hallucinations: None  Delusions: No  Memory:Normal: Yes  Insight and Judgment: No  Insight and Judgment: Poor judgment    Tobacco Screening:  Practical Counseling, on admission, juan X, if applicable and completed (first 3 are required if patient doesn't refuse):            ( ) Recognizing danger situations (included triggers and roadblocks)                    ( ) Coping skills (new ways to manage stress,relaxation techniques, changing routine, distraction)                                                           ( ) Basic information about quitting (benefits of quitting, techniques in how to quit, available resources  (X) Referral for counseling faxed

## 2025-04-10 NOTE — PLAN OF CARE
Patient has been isolative to his room today. He comes onto the unit and to the nurse's station to verbalize his needs. He states that he is tired and would like to rest and sleep in his room. Read a book in his room this afternoon. Good PO intake.     Patient was initially irritable and uncooperative in the morning. However, later in the morning, he was more agreeable to speak, be assessed and have vitals taken. He refused to have his ECG administered.     Medication compliant. Denied Nicoderm CQ. No PRN medications.   He endorses vague suicidal ideation at times. He denies having a plan and agreed to let staff know if he starts to feel unsafe.  He denies homicidal ideation and denies hallucinations. Denied pain.   CIWA@ 1130=4, CIWA @2021=3.    Vitals:    04/10/25 1130   BP: 102/62   Pulse: 73   Resp: 16   Temp: 97.4 °F (36.3 °C)   SpO2: 97%     Problem: Self Harm/Suicidality  Goal: Will have no self-injury during hospital stay  Description: INTERVENTIONS:  1.  Ensure constant observer at bedside with Q15M safety checks  2.  Maintain a safe environment  3.  Secure patient belongings  4.  Ensure family/visitors adhere to safety recommendations  5.  Ensure safety tray has been added to patient's diet order  6.  Every shift and PRN: Re-assess suicidal risk via Frequent Screener    4/10/2025 0555 by Sonia Arellano, CHAVO  Outcome: Completed     Problem: Anxiety  Goal: Will report anxiety at manageable levels  Description: INTERVENTIONS:  1. Administer medication as ordered  2. Teach and rehearse alternative coping skills  3. Provide emotional support with 1:1 interaction with staff  4/10/2025 1317 by Rosette Jackson, RN  Outcome: Progressing  4/10/2025 0555 by Sonia Arellano, CHAVO  Outcome: Progressing     Problem: Coping  Goal: Pt/Family able to verbalize concerns and demonstrate effective coping strategies  Description: INTERVENTIONS:  1. Assist patient/family to identify coping

## 2025-04-10 NOTE — ED PROVIDER NOTES
ETHANOL       All other labs were within normal range or not returned as of this dictation.    EKG: All EKG's are interpreted by the Emergency Department Physician who eithersigns or Co-signs this chart in the absence of a cardiologist.    The Ekg interpreted by me in the absence of a cardiologist shows.  Normal Sinus rhythm   Rate of   102  Axis is   Normal  QTc is  normal  Intervals and Durations are unremarkable.      Nonspecific ST-T wave changes appreciated.  No evidence of acute ischemia.            RADIOLOGY:   Non-plain film images such as CT, Ultrasound and MRI are read by the radiologist. Plain radiographic images are visualized by myself.      *    Interpretation per the Radiologist below, if available at the time of this note:    No orders to display         PROCEDURES   Unless otherwise noted below, none     Procedures    *    CRITICAL CARE TIME   N/A      EMERGENCY DEPARTMENT COURSE and DIFFERENTIALDIAGNOSIS/MDM:   Vitals:    Vitals:    04/09/25 2113   BP: (!) 135/91   Pulse: (!) 109   Resp: 18   Temp: 98.1 °F (36.7 °C)   TempSrc: Oral   SpO2: 97%   Weight: 95.7 kg (211 lb)   Height: 1.829 m (6')       Patient was given thefollowing medications:  Medications - No data to display        The patient tolerated their visit well.   The patient and / or the familywere informed of the results of any tests, a time was given to answer questions.    FINAL IMPRESSION      1. Depression with suicidal ideation    The case was presented to Dr. James Epley who agreed to accept the patient in transfer    DISPOSITION/PLAN   DISPOSITION Decision To Transfer 04/09/2025 11:54:25 PM   DISPOSITION CONDITION StableThe patient is medically cleared          PATIENT REFERRED TO:  No follow-up provider specified.    DISCHARGE MEDICATIONS:  New Prescriptions    No medications on file       DISCONTINUED MEDICATIONS:  Discontinued Medications    No medications on file              (Please note that portions of this note were  completed with a voice recognition program.  Efforts were made to edit the dictations but occasionally words are mis-transcribed.)    Alexander Alamo MD (electronically signed)       Alexander Alamo MD  04/10/25 0054       Alexander Alamo MD  04/10/25 0117

## 2025-04-10 NOTE — PLAN OF CARE
Problem: Self Harm/Suicidality  Goal: Will have no self-injury during hospital stay  Description: INTERVENTIONS:  1.  Ensure constant observer at bedside with Q15M safety checks  2.  Maintain a safe environment  3.  Secure patient belongings  4.  Ensure family/visitors adhere to safety recommendations  5.  Ensure safety tray has been added to patient's diet order  6.  Every shift and PRN: Re-assess suicidal risk via Frequent Screener    Outcome: Completed     Problem: Anxiety  Goal: Will report anxiety at manageable levels  Description: INTERVENTIONS:  1. Administer medication as ordered  2. Teach and rehearse alternative coping skills  3. Provide emotional support with 1:1 interaction with staff  Outcome: Progressing     Problem: Coping  Goal: Pt/Family able to verbalize concerns and demonstrate effective coping strategies  Description: INTERVENTIONS:  1. Assist patient/family to identify coping skills, available support systems and cultural and spiritual values  2. Provide emotional support, including active listening and acknowledgement of concerns of patient and caregivers  3. Reduce environmental stimuli, as able  4. Instruct patient/family in relaxation techniques, as appropriate  5. Assess for spiritual pain/suffering and initiate Spiritual Care, Psychosocial Clinical Specialist consults as needed  Outcome: Progressing     Problem: Behavior  Goal: Pt/Family maintain appropriate behavior and adhere to behavioral management agreement, if implemented  Description: INTERVENTIONS:  1. Assess patient/family's coping skills and  non-compliant behavior (including use of illegal substances)  2. Notify security of behavior or suspected illegal substances which indicate the need for search of the family and/or belongings  3. Encourage verbalization of thoughts and concerns in a socially appropriate manner  4. Utilize positive, consistent limit setting strategies supporting safety of patient, staff and others  5.

## 2025-04-10 NOTE — PROGRESS NOTES
Balta was admitted in the unit after having suicidal thoughts with plan to overdose on meds & alcohol. Pt has multiple history of psych admits & previous SI attempt most recent was 3/2025 via overdose. Pt also has history substance & alcohol abuse. Pt is currently homeless for the past 10yrs & has been hopping from places to places from [facility]. Upon admission pt was cooperative, appears depressed & flat, denies AVH, reports passive SI but denies any plan nor intent, states that he plans to inform the staff if with any active SI or if at anytime he feels unsafe. Safe environment provided & maintained, to continue to monitor at this time.

## 2025-04-10 NOTE — PROGRESS NOTES
CSSR-S Assessment completed with patient who then scored HIGH RISK.     Provider CRISTOPHER Mckeon was notified of HIGH RISK score, via Telephone at 0437.      Were suicide precautions ordered: NO    If not ordered, justification as follows: Upon admission, states still with passive suicidal thoughts but denies any active plan nor intent to harm self, states that he feels safe in the unit & plans to inform the staff if with any active suicidal thoughts or if at anytime he feels unsafe, on call ANTHONY Dooley was informed at 0437, suicide & constant observation discontinued at 0451.    Completed by: MARKY Arellano

## 2025-04-10 NOTE — PROGRESS NOTES
4 Eyes Skin Assessment     NAME:  Balta Sarmiento  YOB: 1980  MEDICAL RECORD NUMBER:  [id number]    The patient is being assessed for  Admission    I agree that at least one RN has performed a thorough Head to Toe Skin Assessment on the patient. ALL assessment sites listed below have been assessed.      Areas assessed by both nurses:    Head, Face, Ears, Shoulders, Back, Chest, Arms, Elbows, Hands, Sacrum. Buttock, Coccyx, Ischium, and Legs. Feet and Heels        Does the Patient have a Wound? No noted wound(s)     No fresh injury noted nor reported  Cezar Prevention initiated by RN: No  Wound Care Orders initiated by RN: No    Pressure Injury (Stage 3,4, Unstageable, DTI, NWPT, and Complex wounds) if present, place Wound referral order by RN under : No    New Ostomies, if present place, Ostomy referral order under : No     Nurse 1 eSignature: Electronically signed by Sonia Arellano RN on 4/10/25 at 6:01 AM EDT    **SHARE this note so that the co-signing nurse can place an eSignature**    Nurse 2 eSignature: Electronically signed by Daria Gibbons RN on 4/10/25 at 6:16 AM EDT

## 2025-04-10 NOTE — PROGRESS NOTES
Behavioral Services  Medicare Certification Upon Admission    I certify that this patient's inpatient psychiatric hospital admission is medically necessary for:    [x] (1) Treatment which could reasonably be expected to improve this patient's condition,       [x] (2) Or for diagnostic study;     AND     [x](2) The inpatient psychiatric services are provided while the individual is under the care of a physician and are included in the individualized plan of care.    Estimated length of stay/service 2-5 days    Plan for post-hospital care outpatient support    Electronically signed by CRISTOPHER White CNP on 4/10/2025 at 10:01 AM

## 2025-04-10 NOTE — PROGRESS NOTES
0428, patient arrived in the unit on stretcher via ambulance from [facility], accompanied by EMS staff, alert & oriented, physically well, walks independently, calm & cooperative. Suicide & constant observation initiated upon admission, all belongings secured & labeled. Checked for any contraband items and non was found. Oriented to the unit & his room, VS checked & recorded, snacks & drinks was provided, changed into hospital gown & no fresh injury noted nor reported. Agreed to participate in the admission process, safe environment provided & maintained.

## 2025-04-10 NOTE — PROGRESS NOTES
Home Medication Reconciliation Status          [] COMPLETE       Medication history has been reviewed and obtained from the following source(s):       [] patient/family verbal report             [] patient/family provided written list       [] external pharmacy   [] external facility list         []  Provider notified that home medication reconciliation is complete          [x] IN PROGRESS       Medication reconciliation marked in progress at this time due to:       [] patient/family poor historian      [] waiting arrival of family to clarify       [] waiting for accurate list        [x] external pharmacy needs called      * Follow up is needed.          [] UNABLE TO ASSESS       Medication reconciliation is incomplete and unable to assess at this time due to:       [] critical patient condition   [] patient is unresponsive        [] no family available                       [] unknown pharmacy       [] anonymous patient          * Follow up is needed.      [] Pharmacy consult placed for medication reconciliation assistance   Additional comments:

## 2025-04-10 NOTE — CARE COORDINATION
Education HS graduate -GED   Special education Other  (was in special ed class not sure what for.)   Work History   Currently employed No   Recent job loss or change Quit  (quit job mid march.)    service   (none)   /[facility] involvement none   Cultural and Spiritual   Spiritual concerns No   Cultural concerns No   Comment none   Collateral Contacts   Contacts Other (Comment)  (needs connected)

## 2025-04-11 VITALS
HEART RATE: 83 BPM | DIASTOLIC BLOOD PRESSURE: 93 MMHG | WEIGHT: 211 LBS | HEIGHT: 71 IN | BODY MASS INDEX: 29.54 KG/M2 | SYSTOLIC BLOOD PRESSURE: 140 MMHG | OXYGEN SATURATION: 100 % | RESPIRATION RATE: 18 BRPM | TEMPERATURE: 98.7 F

## 2025-04-11 PROCEDURE — 6370000000 HC RX 637 (ALT 250 FOR IP)

## 2025-04-11 PROCEDURE — 5130000000 HC BRIDGE APPOINTMENT

## 2025-04-11 RX ORDER — LAMOTRIGINE 25 MG/1
25 TABLET ORAL DAILY
Qty: 30 TABLET | Refills: 0 | Status: SHIPPED | OUTPATIENT
Start: 2025-04-12

## 2025-04-11 RX ORDER — VENLAFAXINE HYDROCHLORIDE 150 MG/1
150 CAPSULE, EXTENDED RELEASE ORAL DAILY
Qty: 30 CAPSULE | Refills: 0 | Status: SHIPPED | OUTPATIENT
Start: 2025-04-12

## 2025-04-11 RX ADMIN — VENLAFAXINE HYDROCHLORIDE 150 MG: 75 CAPSULE, EXTENDED RELEASE ORAL at 10:51

## 2025-04-11 RX ADMIN — LAMOTRIGINE 25 MG: 25 TABLET ORAL at 10:52

## 2025-04-11 NOTE — PROGRESS NOTES
Pt. In bed sleeping, became agitated with  when she approached him for lab draw the AM.  says she will return when pt. Is awake and settled.

## 2025-04-11 NOTE — PROGRESS NOTES
Pt. In TV area watching TV. Voided headache 3/10, PRN Tylenol administered per order. Tylenol effective with 0/10 pain on reassessment. Pt. Remains A&O X 4, VSS, see below, CIWA score at 2021 is 3. Pt. Denies feelings of self-harm, SI/HI/AVH. Pt reminded to alert staff in having negative feelings. Pt. Voiced understanding.   Vitals:    04/10/25 2056   BP: 115/80   Pulse: 87   Resp: 16   Temp: 98.7 °F (37.1 °C)   SpO2: 99%

## 2025-04-11 NOTE — PLAN OF CARE
Problem: Anxiety  Goal: Will report anxiety at manageable levels  4/10/2025 2250 by Ashli Love, RN  Outcome: Progressing  Pt. Is calm and cooperative, anxious at times, pt educated on interventions for coping and managing anxiety levels. Pt. Voiced understanding.     Problem: Coping  Goal: Pt/Family able to verbalize concerns and demonstrate effective coping strategies  4/10/2025 2250 by Ashli Love, RN  Outcome: Progressing  Pt. States he has a family member that helps with coping. Has no other support system.      Problem: Behavior  Goal: Pt/Family maintain appropriate behavior and adhere to behavioral management agreement, if implemented  4/10/2025 2250 by Ashli Love, RN  Outcome: Progressing  Pt. Calm and cooperative thus far. Will maintain appropriate behavior during this shift.      Problem: Depression/Self Harm  Goal: Effect of psychiatric condition will be minimized and patient will be protected from self harm  Outcome: Progressing  Flowsheets (Taken 4/10/2025 1136 by Rosette Jackson, RN)  Effect of psychiatric condition will be minimized and patient will be protected from self harm:   Provide emotional support, presence and reassurance   Assess for suicidal thoughts or ideation. If patient expresses suicidal thoughts or statements do not leave alone, initiate Suicide Precautions, move near nurse station, obtain sitter   Pt.denies self-harm, SI/HI/AVH thus far. Reminded to alert staff is experiencing negative feeling of self harm, HI/SI/AVH.

## 2025-04-11 NOTE — DISCHARGE SUMMARY
hydroxide, nicotine polacrilex, aluminum & magnesium hydroxide-simethicone, OLANZapine **OR** OLANZapine (ZyPREXA) 10 mg in sterile water 2 mL injection, benztropine mesylate, hydrOXYzine HCl, traZODone, LORazepam **OR** LORazepam **OR** LORazepam **OR** LORazepam **OR** LORazepam **OR** LORazepam **OR** LORazepam **OR** LORazepam   Discharge Meds:    Discharge Medication List as of 4/11/2025 11:32 AM             Details   lamoTRIgine (LAMICTAL) 25 MG tablet Take 1 tablet by mouth daily, Disp-30 tablet, R-0Normal      venlafaxine (EFFEXOR XR) 150 MG extended release capsule Take 1 capsule by mouth daily, Disp-30 capsule, R-0Normal                 Disposition - [facility]    Follow Up:  See Discharge Instructions     Total time with patient was 40 minutes and more than 50 % of that time was spent counseling the patient on their symptoms, treatment and expected goals.     Sarah Weaver - PMHNP-BC

## 2025-04-11 NOTE — BH NOTE
Bridge Appointment completed: Reviewed Discharge Instructions with patient.    Patient verbalizes understanding and agreement with the discharge plan using the teachback method.     Referral for Outpatient Tobacco Cessation Counseling, upon discharge (juan X if applicable and completed):    ( )  Hospital staff assisted patient to call Quit Line or faxed referral                                   during hospitalization                  ( )  Recognizing danger situations (included triggers and roadblocks), if not completed on admission                    ( )  Coping skills (new ways to manage stress, exercise, relaxation techniques, changing routine, distraction), if not completed on admission                                                           ( )  Basic information about quitting (benefits of quitting, techniques in how to quit, available resources, if not completed on admission  ( ) [facility]   ( X) Patient refused referral  ( X) Patient refused counseling  ( X) Patient refused smoking cessation medication upon discharge    Vaccinations (juan X if applicable and completed):  ( ) Patient states already received influenza vaccine elsewhere  ( ) Patient received influenza vaccine during this hospitalization  ( ) Patient refused influenza vaccine at this time  ( X) Not offered

## 2025-04-11 NOTE — BH NOTE
Behavioral Health [place]  Discharge Note    Pt discharged with followings belongings:   Dental Appliances: None  Vision - Corrective Lenses: None  Hearing Aid: None  Jewelry: None  Body Piercings Removed: N/A  Clothing: Pants, Shirt, Sweater, Jacket/Coat, Socks, Undergarments, Footwear, Pajamas, Shorts, Belt (10 pairs of socks, 2 pairs of underwear, 4 shirts, 2 pairs of shorts, 2 pairs of pants,)  Other Valuables: Personal Toiletries, Other (Comment), Money (Deodorant, body wash, body spray, hair brush, 3 shaving razors, chap stick, hand warmers, vape, $1.46 in change)   Valuables returned to patient. Patient educated on aftercare instructions: YES  Patient verbalize understanding of AVS:  YES.    Status EXAM upon discharge:  Mental Status and Behavioral Exam  Normal: No  Level of Assistance: Independent/Self  Facial Expression: Brightened  Affect: Congruent  Level of Consciousness: Alert  Frequency of Checks: 4 times per hour, close  Mood:Normal: No  Mood: Anxious  Motor Activity:Normal: Yes  Motor Activity: Decreased  Eye Contact: Good  Observed Behavior: Cooperative, Friendly  Sexual Misconduct History: Current - no  Preception: [place] to person, [place] to time, [place] to place, [place] to situation  Attention:Normal: Yes  Attention: Distractible  Thought Processes: Unremarkable  Thought Content:Normal: Yes  Thought Content: Preoccupations  Depression Symptoms: No problems reported or observed.  Anxiety Symptoms: Generalized  Yumiko Symptoms: No problems reported or observed.  Hallucinations: None  Delusions: No  Memory:Normal: Yes  Insight and Judgment: No  Insight and Judgment: Poor judgment, Poor insight    Tobacco Screening:  Practical Counseling, on admission, juan X, if applicable and completed (first 3 are required if patient doesn't refuse):            ( ) Recognizing danger situations (included triggers and roadblocks)                    ( ) Coping skills (new ways to manage stress,relaxation

## 2025-04-11 NOTE — TRANSITION OF CARE
Behavioral Health Transition Record    Patient Name: Balta Sarmiento  YOB: 1980   Medical Record Number: [id number]  Date of Admission: 4/10/2025  4:36 AM   Date of Discharge: 4/11/25    Attending Provider: Momo Abdul MD   Discharging Provider: Sarah Weaver APRN-CNP  To contact this individual call [phone] and ask the  to page.  If unavailable, ask to be transferred to Behavioral Health Provider on call.  A Behavioral Health Provider will be available on call 24/7 and during holidays.    Primary Care Provider: No primary care provider on file.    Allergies   Allergen Reactions    Shellfish-Derived Products Rash    Fish-Derived Products     Propoxyphene Hives       Reason for Admission: Balta Sarmiento is a 44 y.o. male who presents to [facility] ED with suicidal ideation.  Per ED provider notes:  \"history of mental illness and depression says he has had thoughts of killing himself this evening says that he would take an overdose of pills and drink alcohol he has been drinking alcohol and smoking pot he has a history of anxiety and depression.\"     Admission Diagnosis: Major depressive disorder [F32.9]  Major depressive disorder, recurrent [F33.9]    * No surgery found *    No results found for this visit on 04/10/25.    Immunizations administered during this encounter:   Immunization History   Administered Date(s) Administered    Hepatitis B vaccine 04/14/1994, 06/27/1994    TDaP, ADACEL (age 10y-64y), BOOSTRIX (age 10y+), IM, 0.5mL 04/29/2018     Influenza Vaccination Status: None of the above/Not documented/Unable to determine from medical record documentation    Screening for Metabolic Disorders for Patients on Antipsychotic Medications  (Data obtained from the EMR)    Estimated Body Mass Index  Body mass index is 29.43 kg/m².      Vital Signs/Blood Pressure  BP (!) 140/93   Pulse 83   Temp 98.7 °F (37.1 °C) (Oral)   Resp 18   Ht 1.803 m (5' 11\")   Wt 95.7 kg (211 lb)   SpO2 100%

## 2025-04-14 ENCOUNTER — FOLLOWUP TELEPHONE ENCOUNTER (OUTPATIENT)
Dept: PSYCHIATRY | Age: 45
End: 2025-04-14

## 2025-04-16 ENCOUNTER — HOSPITAL ENCOUNTER (EMERGENCY)
Age: 45
Discharge: HOME | End: 2025-04-16
Attending: EMERGENCY MEDICINE
Payer: COMMERCIAL

## 2025-04-16 VITALS
HEIGHT: 71 IN | WEIGHT: 208 LBS | HEART RATE: 102 BPM | TEMPERATURE: 97.3 F | BODY MASS INDEX: 29.12 KG/M2 | SYSTOLIC BLOOD PRESSURE: 164 MMHG | RESPIRATION RATE: 16 BRPM | OXYGEN SATURATION: 99 % | DIASTOLIC BLOOD PRESSURE: 100 MMHG

## 2025-04-16 DIAGNOSIS — K02.9 DENTAL CARIES: Primary | ICD-10-CM

## 2025-04-16 PROCEDURE — 99283 EMERGENCY DEPT VISIT LOW MDM: CPT

## 2025-04-16 PROCEDURE — 6370000000 HC RX 637 (ALT 250 FOR IP): Performed by: EMERGENCY MEDICINE

## 2025-04-16 RX ORDER — NAPROXEN 250 MG/1
500 TABLET ORAL ONCE
Status: COMPLETED | OUTPATIENT
Start: 2025-04-16 | End: 2025-04-16

## 2025-04-16 RX ORDER — CLINDAMYCIN HYDROCHLORIDE 300 MG/1
300 CAPSULE ORAL 4 TIMES DAILY
Qty: 40 CAPSULE | Refills: 0 | Status: SHIPPED | OUTPATIENT
Start: 2025-04-16 | End: 2025-04-26

## 2025-04-16 RX ORDER — CLINDAMYCIN HYDROCHLORIDE 300 MG/1
300 CAPSULE ORAL ONCE
Status: COMPLETED | OUTPATIENT
Start: 2025-04-16 | End: 2025-04-16

## 2025-04-16 RX ORDER — NAPROXEN 500 MG/1
500 TABLET ORAL 2 TIMES DAILY
Qty: 20 TABLET | Refills: 0 | Status: SHIPPED | OUTPATIENT
Start: 2025-04-16 | End: 2025-04-26

## 2025-04-16 RX ADMIN — NAPROXEN 500 MG: 250 TABLET ORAL at 18:58

## 2025-04-16 RX ADMIN — CLINDAMYCIN HYDROCHLORIDE 300 MG: 300 CAPSULE ORAL at 18:58

## 2025-04-16 ASSESSMENT — PAIN - FUNCTIONAL ASSESSMENT
PAIN_FUNCTIONAL_ASSESSMENT: NONE - DENIES PAIN
PAIN_FUNCTIONAL_ASSESSMENT: NONE - DENIES PAIN

## 2025-04-16 NOTE — DISCHARGE INSTR - COC
Continuity of Care Form    Patient Name: Balta Sarmiento   :  1980  MRN:  [id number]    Admit date:  2025  Discharge date:  ***    Code Status Order: Prior   Advance Directives:     Admitting Physician:  No admitting provider for patient encounter.  PCP: No primary care provider on file.    Discharging Nurse: ***  Discharging Hospital Unit/Room#:   Discharging Unit Phone Number: ***    Emergency Contact:   Extended Emergency Contact Information  Primary Emergency Contact: AQUILINO PIERCE  Home Phone: [phone]  Relation: Friend    Past Surgical History:  History reviewed. No pertinent surgical history.    Immunization History:   Immunization History   Administered Date(s) Administered    Hepatitis B vaccine 1994, 1994    TDaP, ADACEL (age 10y-64y), BOOSTRIX (age 10y+), IM, 0.5mL 2018       Active Problems:  Patient Active Problem List   Diagnosis Code    Depression with suicidal ideation F32.A, R45.851    Alcohol abuse F10.10    Suicidal ideation R45.851    Major depressive disorder, recurrent severe without psychotic features (HCC) F33.2    Cocaine use disorder F14.10    Cannabis use disorder F12.90    Alcohol withdrawal, uncomplicated (HCC) F10.930    Homelessness Z59.00    Alcohol use disorder F10.90    MDD (major depressive disorder), recurrent episode, moderate (HCC) F33.1    Atypical pneumonia J18.9    Acute hypoxic respiratory failure J96.01    Chronic hepatitis C virus infection (HCC) B18.2    Genital warts due to HPV (human papillomavirus) A63.0    Syncope R55    Major depressive disorder, recurrent F33.9       Isolation/Infection:   Isolation            No Isolation          Patient Infection Status    None to display              Nurse Assessment:  Last Vital Signs: BP (!) 164/100   Pulse (!) 102   Temp 97.3 °F (36.3 °C)   Resp 16   Ht 1.803 m (5' 11\")   Wt 94.3 kg (208 lb)   SpO2 99%   BMI 29.01 kg/m²     Last documented pain score (0-10 scale):    Last Weight:   Wt

## 2025-04-16 NOTE — ED PROVIDER NOTES
[facility] EMERGENCY DEPARTMENT  eMERGENCY dEPARTMENT eNCOUnter      Pt Name: Balta Sarmiento  MRN: [id number]  Birthdate 1980  Date of evaluation: 4/16/2025  Provider: Alexander Alamo MD  PCP: No primary care provider on file.      CHIEF COMPLAINT       Chief Complaint   Patient presents with    Dental Pain       HISTORY OFPRESENT ILLNESS   (Location/Symptom, Timing/Onset, Context/Setting, Quality, Duration, Modifying Factors,Severity)  Note limiting factors.     Balta Sarmiento is a 44 y.o. male presents with complaints of dental pain  he says that he was hoping to get some antibiotics it is in the left lower jaw area    Nursing Notes were all reviewed and agreed with or any disagreements were addressed  in the HPI.    REVIEW OF SYSTEMS    (2-9 systems for level 4, 10 or more for level 5)     Review of Systems    Positives and Pertinent negatives as per HPI.  Except as noted above in the ROS, all other systems were reviewed andnegative.       PASTMEDICAL HISTORY     Past Medical History:   Diagnosis Date    Anxiety     Chest pain 09/13/2024    Depression     Insomnia          SURGICAL HISTORY     History reviewed. No pertinent surgical history.      CURRENT MEDICATIONS       Previous Medications    LAMOTRIGINE (LAMICTAL) 25 MG TABLET    Take 1 tablet by mouth daily    VENLAFAXINE (EFFEXOR XR) 150 MG EXTENDED RELEASE CAPSULE    Take 1 capsule by mouth daily       ALLERGIES     Shellfish-derived products, Fish-derived products, and Propoxyphene    FAMILY HISTORY     History reviewed. No pertinent family history.       SOCIAL HISTORY       Social History     Socioeconomic History    Marital status: Single     Spouse name: None    Number of children: None    Years of education: None    Highest education level: None   Tobacco Use    Smoking status: Every Day     Current packs/day: 0.50     Types: Cigarettes    Smokeless tobacco: Never   Vaping Use    Vaping status: Former   Substance and Sexual Activity

## 2025-04-17 ENCOUNTER — HOSPITAL ENCOUNTER (EMERGENCY)
Age: 45
Discharge: HOME | End: 2025-04-17
Attending: STUDENT IN AN ORGANIZED HEALTH CARE EDUCATION/TRAINING PROGRAM
Payer: COMMERCIAL

## 2025-04-17 VITALS
DIASTOLIC BLOOD PRESSURE: 64 MMHG | SYSTOLIC BLOOD PRESSURE: 112 MMHG | TEMPERATURE: 97.8 F | OXYGEN SATURATION: 95 % | HEART RATE: 69 BPM | RESPIRATION RATE: 14 BRPM

## 2025-04-17 DIAGNOSIS — F10.920 ACUTE ALCOHOLIC INTOXICATION WITHOUT COMPLICATION: ICD-10-CM

## 2025-04-17 DIAGNOSIS — R45.851 SUICIDAL IDEATION: Primary | ICD-10-CM

## 2025-04-17 LAB
ALBUMIN SERPL-MCNC: 4.8 G/DL (ref 3.5–5.2)
ALBUMIN/GLOB SERPL: 1.6 {RATIO} (ref 1–2.5)
ALP SERPL-CCNC: 79 U/L (ref 40–129)
ALT SERPL-CCNC: 13 U/L (ref 10–50)
AMPHET UR QL SCN: NEGATIVE
ANION GAP SERPL CALCULATED.3IONS-SCNC: 14 MMOL/L (ref 9–16)
AST SERPL-CCNC: 24 U/L (ref 10–50)
BARBITURATES UR QL SCN: NEGATIVE
BASOPHILS # BLD: 0.12 K/UL (ref 0–0.2)
BASOPHILS NFR BLD: 1 % (ref 0–2)
BENZODIAZ UR QL: NEGATIVE
BILIRUB SERPL-MCNC: 0.5 MG/DL (ref 0–1.2)
BUN SERPL-MCNC: 9 MG/DL (ref 6–20)
CALCIUM SERPL-MCNC: 9.8 MG/DL (ref 8.6–10.4)
CANNABINOIDS UR QL SCN: NEGATIVE
CHLORIDE SERPL-SCNC: 102 MMOL/L (ref 98–107)
CO2 SERPL-SCNC: 24 MMOL/L (ref 20–31)
COCAINE UR QL SCN: NEGATIVE
CREAT SERPL-MCNC: 0.8 MG/DL (ref 0.7–1.2)
EOSINOPHIL # BLD: 0.56 K/UL (ref 0–0.44)
EOSINOPHILS RELATIVE PERCENT: 5 % (ref 1–4)
ERYTHROCYTE [DISTWIDTH] IN BLOOD BY AUTOMATED COUNT: 12.3 % (ref 11.8–14.4)
ETHANOL PERCENT: 0.12 %
ETHANOLAMINE SERPL-MCNC: 116 MG/DL (ref 0–0.08)
FENTANYL UR QL: NEGATIVE
GFR, ESTIMATED: >90 ML/MIN/1.73M2
GLUCOSE SERPL-MCNC: 85 MG/DL (ref 74–99)
HCT VFR BLD AUTO: 43.4 % (ref 40.7–50.3)
HGB BLD-MCNC: 14.5 G/DL (ref 13–17)
IMM GRANULOCYTES # BLD AUTO: 0.04 K/UL (ref 0–0.3)
IMM GRANULOCYTES NFR BLD: 0 %
LYMPHOCYTES NFR BLD: 3.4 K/UL (ref 1.1–3.7)
LYMPHOCYTES RELATIVE PERCENT: 30 % (ref 24–43)
MCH RBC QN AUTO: 30 PG (ref 25.2–33.5)
MCHC RBC AUTO-ENTMCNC: 33.4 G/DL (ref 28.4–34.8)
MCV RBC AUTO: 89.7 FL (ref 82.6–102.9)
METHADONE UR QL: NEGATIVE
MONOCYTES NFR BLD: 0.69 K/UL (ref 0.1–1.2)
MONOCYTES NFR BLD: 6 % (ref 3–12)
NEUTROPHILS NFR BLD: 58 % (ref 36–65)
NEUTS SEG NFR BLD: 6.47 K/UL (ref 1.5–8.1)
NRBC BLD-RTO: 0 PER 100 WBC
OPIATES UR QL SCN: NEGATIVE
OXYCODONE UR QL SCN: NEGATIVE
PCP UR QL SCN: NEGATIVE
PLATELET # BLD AUTO: 246 K/UL (ref 138–453)
PMV BLD AUTO: 10.5 FL (ref 8.1–13.5)
POTASSIUM SERPL-SCNC: 3.7 MMOL/L (ref 3.7–5.3)
PROT SERPL-MCNC: 7.8 G/DL (ref 6.6–8.7)
RBC # BLD AUTO: 4.84 M/UL (ref 4.21–5.77)
SODIUM SERPL-SCNC: 140 MMOL/L (ref 136–145)
TEST INFORMATION: NORMAL
WBC OTHER # BLD: 11.3 K/UL (ref 3.5–11.3)

## 2025-04-17 PROCEDURE — 99285 EMERGENCY DEPT VISIT HI MDM: CPT

## 2025-04-17 PROCEDURE — G0480 DRUG TEST DEF 1-7 CLASSES: HCPCS

## 2025-04-17 PROCEDURE — 85025 COMPLETE CBC W/AUTO DIFF WBC: CPT

## 2025-04-17 PROCEDURE — 80307 DRUG TEST PRSMV CHEM ANLYZR: CPT

## 2025-04-17 PROCEDURE — 80053 COMPREHEN METABOLIC PANEL: CPT

## 2025-04-17 ASSESSMENT — ENCOUNTER SYMPTOMS
ABDOMINAL PAIN: 0
VOMITING: 0
SHORTNESS OF BREATH: 0
COUGH: 0
NAUSEA: 0

## 2025-04-17 NOTE — ED PROVIDER NOTES
[facility] EMERGENCY DEPARTMENT  Emergency Department Encounter  Emergency Medicine Resident     Pt Name:Balta Sarmiento  MRN: [id number]  Birthdate 1980  Date of evaluation: 4/17/25  PCP:  No primary care provider on file.  Note Started: 1:38 AM EDT      CHIEF COMPLAINT       Chief Complaint   Patient presents with    Suicidal       HISTORY OF PRESENT ILLNESS  (Location/Symptom, Timing/Onset, Context/Setting, Quality, Duration, Modifying Factors, Severity.)      Balta Sarmiento is a 44 y.o. male who presents with suicidal ideation.  States he has been compliant with psychiatric medications including this morning.  States he drinks 3 beers and started thinking about past traumatic events in his life and how his family will not talk to him causing him to feel more depressed.  Reports feeling suicidal with plan to cut himself with his wrists with his shaving razor blade.  Reports history of attempted overdose of medications with alcohol in the past and a suicide attempt.  Recently discharged from [facility] for SI.  Reports smoking 3-5 cigarettes a day, denies drug use.    PAST MEDICAL / SURGICAL / SOCIAL / FAMILY HISTORY      has a past medical history of Anxiety, Chest pain, Depression, and Insomnia.       has no past surgical history on file.      Social History     Socioeconomic History    Marital status: Single     Spouse name: Not on file    Number of children: Not on file    Years of education: Not on file    Highest education level: Not on file   Occupational History    Not on file   Tobacco Use    Smoking status: Every Day     Current packs/day: 0.50     Types: Cigarettes    Smokeless tobacco: Never   Vaping Use    Vaping status: Former   Substance and Sexual Activity    Alcohol use: Yes     Comment: 4 beers and a fifth of liquor    Drug use: Yes     Types: Cocaine, Marijuana (Weed)    Sexual activity: Not on file   Other Topics Concern    Not on file   Social History Narrative    Not on

## 2025-04-17 NOTE — ED NOTES
..Transfer Center Handoff for Behavioral Health Transfers      Patient's Current Location: [facility] EMERGENCY DEPARTMENT     Chief Complaint   Patient presents with    Suicidal       Current or History of Violent Behavior: No    Currently in Restraints Now or During this Encounter: No  (Specify if Agitation or self harm is noted in ED?)  If yes, please describe behaviors requiring restraint:             Medical Clearance Documented and Verified in the Chart: Yes    Allergies   Allergen Reactions    Shellfish-Derived Products Rash    Fish-Derived Products     Propoxyphene Hives        Can Patient Tolerate Lying Flat: Yes    Able to Perform ADLs:  Yes  (Specify if able to ambulate or uses any mobility devices such as cane or walker)  Activity:    Level of Assistance:    Assistive Device:    Miscellaneous Devices:      LABS    CBC:   Lab Results   Component Value Date/Time    WBC 11.3 04/17/2025 01:38 AM    RBC 4.84 04/17/2025 01:38 AM    HGB 14.5 04/17/2025 01:38 AM    HCT 43.4 04/17/2025 01:38 AM    MCV 89.7 04/17/2025 01:38 AM    MCH 30.0 04/17/2025 01:38 AM    MCHC 33.4 04/17/2025 01:38 AM    RDW 12.3 04/17/2025 01:38 AM     04/17/2025 01:38 AM    MPV 10.5 04/17/2025 01:38 AM     CMP:   Lab Results   Component Value Date/Time     04/17/2025 01:38 AM    K 3.7 04/17/2025 01:38 AM    K 3.9 04/09/2025 09:28 PM     04/17/2025 01:38 AM    CO2 24 04/17/2025 01:38 AM    BUN 9 04/17/2025 01:38 AM    CREATININE 0.8 04/17/2025 01:38 AM    GFRAA >60 04/25/2022 12:55 AM    AGRATIO 1.5 04/09/2025 09:28 PM    LABGLOM >90 04/17/2025 01:38 AM    LABGLOM >90 04/25/2024 12:08 AM    GLUCOSE 85 04/17/2025 01:38 AM    CALCIUM 9.8 04/17/2025 01:38 AM    BILITOT 0.5 04/17/2025 01:38 AM    ALKPHOS 79 04/17/2025 01:38 AM    AST 24 04/17/2025 01:38 AM    ALT 13 04/17/2025 01:38 AM     Drug Panel:   Lab Results   Component Value Date/Time    OPIAU NEGATIVE 04/17/2025 01:43 AM    LABCOMM  09/13/2024 05:20 AM
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
## 2025-04-17 NOTE — ED PROVIDER NOTES
Care of Balta Sarmiento was assumed from previous attending and is being seen for Suicidal  .  The patient's initial evaluation and plan have been discussed with the prior provider who initially evaluated the patient.    Handoff taken on the following patient from prior Attending Physician:Dr. Estrella 3:54 AM EDT      Attestation    I was available and discussed any additional care issues that arose and coordinated the management plans with the resident(s) caring for the patient during my duty period. Any areas of disagreement with resident’s documentation of care or procedures are noted on the chart. I was personally present for the key portions of any/all procedures during my duty period. I have documented in the chart those procedures where I was not present during the key portions.      EMERGENCY DEPARTMENT COURSE / MEDICAL DECISION MAKING:       MEDICATIONS GIVEN:  No orders of the defined types were placed in this encounter.      LABS / RADIOLOGY:     Labs Reviewed   CBC WITH AUTO DIFFERENTIAL - Abnormal; Notable for the following components:       Result Value    Eosinophils % 5 (*)     Eosinophils Absolute 0.56 (*)     All other components within normal limits   ETHANOL - Abnormal; Notable for the following components:    Ethanol Lvl 116 (*)     Ethanol percent 0.116 (*)     All other components within normal limits   COMPREHENSIVE METABOLIC PANEL   URINE DRUG SCREEN       No results found.    RECENT VITALS:     Temp: 97.8 °F (36.6 °C),  Pulse: 69, Respirations: 14, BP: 112/64, SpO2: 95 %    This patient is a 44 y.o. Male with suicidal ideation, did consult with [facility] who felt patient could go to [facility].  Will get him transported at this time    OUTSTANDING TASKS / RECOMMENDATIONS:    Go to [facility]      Lisset Espinoza DO, DO  Attending Emergency Physician  [facility] EMERGENCY DEPARTMENT        Lisset Espinoza DO  04/17/25 0356
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
## 2025-04-17 NOTE — DISCHARGE INSTRUCTIONS
You have been evaluated in the Emergency Department at [facility] 4/17/2025.    Instructions & Next Steps:  -Findings: Negative urine drug screen, elevated ethanol, labs otherwise stable  -Medications: Continue to take your other home meds/previously prescribed medications unless directed otherwise.  -Follow-up:  Schedule an appointment with your primary care provider (PCP) or establish care with a provider if you do not have one.        When to Seek Immediate Medical Attention:  Return to the Emergency Department if you experience:    -Trouble breathing, wheezing, or feeling like your throat is closing  -Chest pain, heart racing, or fluttering  -Persistent nausea, vomiting, or diarrhea  -Vomiting blood or passing bloody or black stools  -Changes in mental status (confusion, drowsiness, loss of consciousness)  -Numbness, weakness, or tingling in your arms or legs  -Severe headache, vision changes, or loss of vision  -Loss of bowel or bladder control  -Hands, feet, or other body parts turning pale or blue  -Inability to urinate or urinating much less than usual    -Worsening or changing symptoms  -Inability to follow up with your PCP  -Any other urgent health concerns  -When in Doubt: If you feel worse or have new concerning symptoms, return to the ER immediately.    For questions about your care or further treatment, call the [facility] Emergency Department at [phone].       MENTAL HEALTH AND CRISIS CARE    [facility]    [address] [place] [address]  [phone]  [email or web address]
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
## 2025-04-17 NOTE — ED NOTES
[] [facility]    [] [facility]    [x]  [facility]    SUICIDE RISK ASSESSMENT      Y  N     [x] [] In the past two weeks have you had thoughts of hurting yourself in any way?    [x] [] In the past two weeks have you had thoughts that you would be better off dead?   [] [x] Have you made a suicide attempt in the past two months?   [x] [] Do you have a plan for hurting yourself or suicide?   [] [x] Presence of hallucinations/voices related to hurting himself or herself or someone else.    SUICIDE/SECURITY WATCH PRECAUTION CHECKLIST     Orders    [x]  Suicide/Security Watch Precautions initiated as checked below:   4/17/25 1:20 AM EDT 09/09    [x] Notified physician:  Estevan Estrella DO  4/17/25 1:20 AM EDT    [x] Orders obtained as appropriate:     [x] 1:1 Observer     [] Psych Consult     [] Psych Consult    Name:  Date:  Time:    [x] 1:1 Observer, Notified by:  Conrado Monique RN    Contact Nurse Supervisor    [x] Remove all personal clothes from room and place in snap/paper gown/pants.  Slipper only    [x] Remove all personal belongings from room and secured away from patient.    Documentation    [x] Initiate Suicide/Security Watch Precaution Flow Sheet    [x] Initiate individualized Care Plan/Problem    [x] Document why precautions initiated on flow sheet (Initiate Nursing Care Plan/Problem)    [x] 1:1 Observer in place; instructions provided.  Suicide precautions require observer be within arms length.    [x] Nurse-Observer Communication Hand-off initiated by RN, reviewed with Observer.  Subsequently used as Hand Off between Observers.    [x] Initiate every 15 minute observations per observer as delegated by the RN.    [x] Initiate RN assessment and documentation    Environmental Scan  Search Criteria and Process: OPTIONAL, see Search Policy    [x] Reason for search: Per policy     [x] Nursing in presence of second person to search patient    [x] Patient notified of reason for body assessment and

## 2025-04-17 NOTE — ED NOTES
The following labs were labeled with appropriate pt sticker and tubed to lab:     [x] Blue     [x] Lavender   [] on ice  [x] Green/yellow  [x] Green/black [] on ice  [] Grey  [] on ice  [] Yellow  [] Red  [] Pink  [] Type/ Screen  [] ABG  [] VBG    [] COVID-19 swab    [] Rapid  [] PCR  [] Flu swab  [] Peds Viral Panel     [x] Urine Sample  [] Fecal Sample  [] Pelvic Cultures  [] Blood Cultures  [] X 2  [] STREP Cultures

## 2025-04-17 NOTE — ED PROVIDER NOTES
[facility]     Emergency Department     Faculty Attestation    I performed a history and physical examination of the patient and discussed management with the resident. I have reviewed and agree with the resident’s findings including all diagnostic interpretations, and treatment plans as written at the time of my review. Any areas of disagreement are noted on the chart. I was personally present for the key portions of any procedures. I have documented in the chart those procedures where I was not present during the key portions. For Physician Assistant/ Nurse Practitioner cases/documentation I have personally evaluated this patient and have completed at least one if not all key elements of the E/M (history, physical exam, and MDM). Additional findings are as noted.    PtName: Balta Sarmiento  MRN: [id number]  Birthdate 1980  Date of evaluation: 4/17/25  Note Started: 1:31 AM EDT    Primary Care Physician: No primary care provider on file.    Brief HPI:  44-year-old male history of depression presents emergency department suicidal ideation.  The patient reports that he had 3 beers tonight.  He reports that he started to feel suicidal with thoughts to take pills or cut himself.  He admits to previous suicide attempt of taking pills.  Denies other drug use.  Denies homicidal ideation.    Pertinent Physical Exam Findings:  Vitals:    04/17/25 0055   BP:    Pulse:    Resp: 16   Temp: 97.5 °F (36.4 °C)   SpO2:    Appears depressed, sad, no acute distress, resting comfortably    Medical Decision Making: Patient is a 44 y.o. male presenting to the emergency department with suicidal ideation and alcohol intoxication. The chart was reviewed for pertinent history relating to the chief complaint.  The patient appears depressed however no acute distress, vitals are stable.  The patient is suicidal with a plan to commit suicide and previous suicide attempts.  Plan to obtain [facility] labs

## 2025-04-17 NOTE — ED NOTES
Pt arriving to ed via triage with co of depression and suicidal thoughts  Pt states has hx of depression and SI, recently has been building up for the past few days and has just ran out of psych meds  Pt admits to alcohol use tonight but no drug use  Pt denies auditory hallucinations, admits to some visual hallucinations. Pt stating he will sometime see another individual. Pt admits to having a plan of using a razor blade to cut his wrists   Pt recently seen in ED in [place] for dental pain and sent home on antibiotics. No new co from that   Denies chest pain and SOB     Pt changed out of all belongings. [employer] called and at bedside for change out and to lock up belongings  No other co at this time  Sitter at bedside

## 2025-04-26 ENCOUNTER — HOSPITAL ENCOUNTER (EMERGENCY)
Age: 45
Discharge: ANOTHER HOSPITAL | DRG: 751 | End: 2025-04-26
Attending: EMERGENCY MEDICINE
Payer: MEDICAID

## 2025-04-26 ENCOUNTER — APPOINTMENT (OUTPATIENT)
Dept: GENERAL RADIOLOGY | Age: 45
DRG: 751 | End: 2025-04-26
Payer: MEDICAID

## 2025-04-26 ENCOUNTER — HOSPITAL ENCOUNTER (INPATIENT)
Age: 45
LOS: 3 days | Discharge: OTHER INSTITUTION | DRG: 751 | End: 2025-04-29
Attending: PSYCHIATRY & NEUROLOGY | Admitting: PSYCHIATRY & NEUROLOGY
Payer: MEDICAID

## 2025-04-26 VITALS
WEIGHT: 218 LBS | BODY MASS INDEX: 30.52 KG/M2 | RESPIRATION RATE: 18 BRPM | DIASTOLIC BLOOD PRESSURE: 57 MMHG | OXYGEN SATURATION: 96 % | HEIGHT: 71 IN | TEMPERATURE: 97.5 F | SYSTOLIC BLOOD PRESSURE: 107 MMHG | HEART RATE: 74 BPM

## 2025-04-26 DIAGNOSIS — R91.1 PULMONARY NODULE: ICD-10-CM

## 2025-04-26 DIAGNOSIS — R45.851 DEPRESSION WITH SUICIDAL IDEATION: Primary | ICD-10-CM

## 2025-04-26 DIAGNOSIS — F32.A DEPRESSION WITH SUICIDAL IDEATION: Primary | ICD-10-CM

## 2025-04-26 LAB
ALBUMIN SERPL-MCNC: 4.5 G/DL (ref 3.5–5.2)
ALBUMIN/GLOB SERPL: 1.5 {RATIO} (ref 1–2.5)
ALP SERPL-CCNC: 84 U/L (ref 40–129)
ALT SERPL-CCNC: 17 U/L (ref 10–50)
AMPHET UR QL SCN: NEGATIVE
ANION GAP SERPL CALCULATED.3IONS-SCNC: 15 MMOL/L (ref 9–16)
APAP SERPL-MCNC: <5 UG/ML (ref 10–30)
AST SERPL-CCNC: 34 U/L (ref 10–50)
BARBITURATES UR QL SCN: NEGATIVE
BASOPHILS # BLD: 0.06 K/UL (ref 0–0.2)
BASOPHILS NFR BLD: 0 % (ref 0–2)
BENZODIAZ UR QL: NEGATIVE
BILIRUB SERPL-MCNC: 0.4 MG/DL (ref 0–1.2)
BILIRUB UR QL STRIP: NEGATIVE
BUN SERPL-MCNC: 13 MG/DL (ref 6–20)
CALCIUM SERPL-MCNC: 9.8 MG/DL (ref 8.6–10.4)
CANNABINOIDS UR QL SCN: POSITIVE
CHLORIDE SERPL-SCNC: 99 MMOL/L (ref 98–107)
CLARITY UR: CLEAR
CO2 SERPL-SCNC: 23 MMOL/L (ref 20–31)
COCAINE UR QL SCN: POSITIVE
COLOR UR: YELLOW
COMMENT: NORMAL
CREAT SERPL-MCNC: 0.8 MG/DL (ref 0.7–1.2)
EOSINOPHIL # BLD: 0.27 K/UL (ref 0–0.44)
EOSINOPHILS RELATIVE PERCENT: 2 % (ref 1–4)
ERYTHROCYTE [DISTWIDTH] IN BLOOD BY AUTOMATED COUNT: 12.8 % (ref 11.8–14.4)
ETHANOL PERCENT: <0.01 %
ETHANOLAMINE SERPL-MCNC: <10 MG/DL (ref 0–0.08)
FENTANYL UR QL: NEGATIVE
GFR, ESTIMATED: >90 ML/MIN/1.73M2
GLUCOSE SERPL-MCNC: 83 MG/DL (ref 74–99)
GLUCOSE UR STRIP-MCNC: NEGATIVE MG/DL
HCT VFR BLD AUTO: 43.8 % (ref 40.7–50.3)
HGB BLD-MCNC: 14.8 G/DL (ref 13–17)
HGB UR QL STRIP.AUTO: NEGATIVE
IMM GRANULOCYTES # BLD AUTO: 0.09 K/UL (ref 0–0.3)
IMM GRANULOCYTES NFR BLD: 1 %
KETONES UR STRIP-MCNC: NEGATIVE MG/DL
LEUKOCYTE ESTERASE UR QL STRIP: NEGATIVE
LIPASE SERPL-CCNC: 30 U/L (ref 13–60)
LYMPHOCYTES NFR BLD: 2.2 K/UL (ref 1.1–3.7)
LYMPHOCYTES RELATIVE PERCENT: 15 % (ref 24–43)
MCH RBC QN AUTO: 30.8 PG (ref 25.2–33.5)
MCHC RBC AUTO-ENTMCNC: 33.8 G/DL (ref 28.4–34.8)
MCV RBC AUTO: 91.1 FL (ref 82.6–102.9)
METHADONE UR QL: NEGATIVE
MONOCYTES NFR BLD: 0.83 K/UL (ref 0.1–1.2)
MONOCYTES NFR BLD: 6 % (ref 3–12)
NEUTROPHILS NFR BLD: 76 % (ref 36–65)
NEUTS SEG NFR BLD: 10.8 K/UL (ref 1.5–8.1)
NITRITE UR QL STRIP: NEGATIVE
NRBC BLD-RTO: 0 PER 100 WBC
OPIATES UR QL SCN: NEGATIVE
OXYCODONE UR QL SCN: NEGATIVE
PCP UR QL SCN: NEGATIVE
PH UR STRIP: 5.5 [PH] (ref 5–8)
PLATELET # BLD AUTO: 221 K/UL (ref 138–453)
PMV BLD AUTO: 10.6 FL (ref 8.1–13.5)
POTASSIUM SERPL-SCNC: 4.1 MMOL/L (ref 3.7–5.3)
PROT SERPL-MCNC: 7.6 G/DL (ref 6.6–8.7)
PROT UR STRIP-MCNC: NEGATIVE MG/DL
RBC # BLD AUTO: 4.81 M/UL (ref 4.21–5.77)
SALICYLATES SERPL-MCNC: <0.5 MG/DL (ref 0–10)
SODIUM SERPL-SCNC: 137 MMOL/L (ref 136–145)
SP GR UR STRIP: 1.02 (ref 1–1.03)
TEST INFORMATION: ABNORMAL
UROBILINOGEN UR STRIP-ACNC: NORMAL EU/DL (ref 0–1)
WBC OTHER # BLD: 14.3 K/UL (ref 3.5–11.3)

## 2025-04-26 PROCEDURE — 80307 DRUG TEST PRSMV CHEM ANLYZR: CPT

## 2025-04-26 PROCEDURE — 80143 DRUG ASSAY ACETAMINOPHEN: CPT

## 2025-04-26 PROCEDURE — 85025 COMPLETE CBC W/AUTO DIFF WBC: CPT

## 2025-04-26 PROCEDURE — 99223 1ST HOSP IP/OBS HIGH 75: CPT

## 2025-04-26 PROCEDURE — 83690 ASSAY OF LIPASE: CPT

## 2025-04-26 PROCEDURE — 71045 X-RAY EXAM CHEST 1 VIEW: CPT

## 2025-04-26 PROCEDURE — 93005 ELECTROCARDIOGRAM TRACING: CPT

## 2025-04-26 PROCEDURE — 6370000000 HC RX 637 (ALT 250 FOR IP): Performed by: PSYCHIATRY & NEUROLOGY

## 2025-04-26 PROCEDURE — 80053 COMPREHEN METABOLIC PANEL: CPT

## 2025-04-26 PROCEDURE — 81003 URINALYSIS AUTO W/O SCOPE: CPT

## 2025-04-26 PROCEDURE — 1240000000 HC EMOTIONAL WELLNESS R&B

## 2025-04-26 PROCEDURE — G0480 DRUG TEST DEF 1-7 CLASSES: HCPCS

## 2025-04-26 PROCEDURE — 80179 DRUG ASSAY SALICYLATE: CPT

## 2025-04-26 RX ORDER — POLYETHYLENE GLYCOL 3350 17 G/17G
17 POWDER, FOR SOLUTION ORAL DAILY PRN
Status: DISCONTINUED | OUTPATIENT
Start: 2025-04-26 | End: 2025-04-29 | Stop reason: HOSPADM

## 2025-04-26 RX ORDER — HYDROXYZINE HYDROCHLORIDE 50 MG/1
50 TABLET, FILM COATED ORAL 3 TIMES DAILY PRN
Status: DISCONTINUED | OUTPATIENT
Start: 2025-04-26 | End: 2025-04-29 | Stop reason: HOSPADM

## 2025-04-26 RX ORDER — HALOPERIDOL 5 MG/1
5 TABLET ORAL EVERY 4 HOURS PRN
Status: DISCONTINUED | OUTPATIENT
Start: 2025-04-26 | End: 2025-04-29 | Stop reason: HOSPADM

## 2025-04-26 RX ORDER — NICOTINE 21 MG/24HR
1 PATCH, TRANSDERMAL 24 HOURS TRANSDERMAL DAILY
Status: DISCONTINUED | OUTPATIENT
Start: 2025-04-26 | End: 2025-04-26

## 2025-04-26 RX ORDER — IBUPROFEN 400 MG/1
400 TABLET, FILM COATED ORAL EVERY 6 HOURS PRN
Status: DISCONTINUED | OUTPATIENT
Start: 2025-04-26 | End: 2025-04-29 | Stop reason: HOSPADM

## 2025-04-26 RX ORDER — DIPHENHYDRAMINE HYDROCHLORIDE 50 MG/ML
50 INJECTION, SOLUTION INTRAMUSCULAR; INTRAVENOUS EVERY 4 HOURS PRN
Status: DISCONTINUED | OUTPATIENT
Start: 2025-04-26 | End: 2025-04-29 | Stop reason: HOSPADM

## 2025-04-26 RX ORDER — LORAZEPAM 1 MG/1
2 TABLET ORAL EVERY 4 HOURS PRN
Status: DISCONTINUED | OUTPATIENT
Start: 2025-04-26 | End: 2025-04-29 | Stop reason: HOSPADM

## 2025-04-26 RX ORDER — POLYETHYLENE GLYCOL 3350 17 G
2 POWDER IN PACKET (EA) ORAL
Status: DISCONTINUED | OUTPATIENT
Start: 2025-04-26 | End: 2025-04-29 | Stop reason: HOSPADM

## 2025-04-26 RX ORDER — ACETAMINOPHEN 325 MG/1
650 TABLET ORAL EVERY 4 HOURS PRN
Status: DISCONTINUED | OUTPATIENT
Start: 2025-04-26 | End: 2025-04-29 | Stop reason: HOSPADM

## 2025-04-26 RX ORDER — LORAZEPAM 2 MG/ML
2 INJECTION INTRAMUSCULAR EVERY 4 HOURS PRN
Status: DISCONTINUED | OUTPATIENT
Start: 2025-04-26 | End: 2025-04-29 | Stop reason: HOSPADM

## 2025-04-26 RX ORDER — LAMOTRIGINE 25 MG/1
50 TABLET ORAL DAILY
Status: DISCONTINUED | OUTPATIENT
Start: 2025-04-26 | End: 2025-04-29 | Stop reason: HOSPADM

## 2025-04-26 RX ORDER — HALOPERIDOL 5 MG/ML
5 INJECTION INTRAMUSCULAR EVERY 4 HOURS PRN
Status: DISCONTINUED | OUTPATIENT
Start: 2025-04-26 | End: 2025-04-29 | Stop reason: HOSPADM

## 2025-04-26 RX ORDER — VENLAFAXINE HYDROCHLORIDE 150 MG/1
150 CAPSULE, EXTENDED RELEASE ORAL
Status: DISCONTINUED | OUTPATIENT
Start: 2025-04-27 | End: 2025-04-29 | Stop reason: HOSPADM

## 2025-04-26 RX ORDER — TRAZODONE HYDROCHLORIDE 50 MG/1
50 TABLET ORAL NIGHTLY PRN
Status: DISCONTINUED | OUTPATIENT
Start: 2025-04-26 | End: 2025-04-29 | Stop reason: HOSPADM

## 2025-04-26 RX ORDER — MAGNESIUM HYDROXIDE/ALUMINUM HYDROXICE/SIMETHICONE 120; 1200; 1200 MG/30ML; MG/30ML; MG/30ML
30 SUSPENSION ORAL EVERY 6 HOURS PRN
Status: DISCONTINUED | OUTPATIENT
Start: 2025-04-26 | End: 2025-04-29 | Stop reason: HOSPADM

## 2025-04-26 RX ADMIN — NICOTINE POLACRILEX 2 MG: 2 LOZENGE ORAL at 18:31

## 2025-04-26 RX ADMIN — ACETAMINOPHEN 650 MG: 325 TABLET, FILM COATED ORAL at 20:35

## 2025-04-26 RX ADMIN — NICOTINE POLACRILEX 2 MG: 2 LOZENGE ORAL at 15:55

## 2025-04-26 ASSESSMENT — LIFESTYLE VARIABLES
HOW OFTEN DO YOU HAVE A DRINK CONTAINING ALCOHOL: 2-4 TIMES A MONTH
HOW OFTEN DO YOU HAVE A DRINK CONTAINING ALCOHOL: 2-4 TIMES A MONTH
HOW MANY STANDARD DRINKS CONTAINING ALCOHOL DO YOU HAVE ON A TYPICAL DAY: 5 OR 6
HOW MANY STANDARD DRINKS CONTAINING ALCOHOL DO YOU HAVE ON A TYPICAL DAY: 5 OR 6

## 2025-04-26 ASSESSMENT — PAIN - FUNCTIONAL ASSESSMENT
PAIN_FUNCTIONAL_ASSESSMENT: ACTIVITIES ARE NOT PREVENTED
PAIN_FUNCTIONAL_ASSESSMENT: 0-10

## 2025-04-26 ASSESSMENT — PATIENT HEALTH QUESTIONNAIRE - PHQ9
1. LITTLE INTEREST OR PLEASURE IN DOING THINGS: NOT AT ALL
3. TROUBLE FALLING OR STAYING ASLEEP: NOT AT ALL
SUM OF ALL RESPONSES TO PHQ QUESTIONS 1-9: 3
10. IF YOU CHECKED OFF ANY PROBLEMS, HOW DIFFICULT HAVE THESE PROBLEMS MADE IT FOR YOU TO DO YOUR WORK, TAKE CARE OF THINGS AT HOME, OR GET ALONG WITH OTHER PEOPLE: SOMEWHAT DIFFICULT
SUM OF ALL RESPONSES TO PHQ QUESTIONS 1-9: 3
2. FEELING DOWN, DEPRESSED OR HOPELESS: MORE THAN HALF THE DAYS
SUM OF ALL RESPONSES TO PHQ QUESTIONS 1-9: 3
4. FEELING TIRED OR HAVING LITTLE ENERGY: NOT AT ALL
5. POOR APPETITE OR OVEREATING: NOT AT ALL
SUM OF ALL RESPONSES TO PHQ QUESTIONS 1-9: 3
6. FEELING BAD ABOUT YOURSELF - OR THAT YOU ARE A FAILURE OR HAVE LET YOURSELF OR YOUR FAMILY DOWN: SEVERAL DAYS
8. MOVING OR SPEAKING SO SLOWLY THAT OTHER PEOPLE COULD HAVE NOTICED. OR THE OPPOSITE, BEING SO FIGETY OR RESTLESS THAT YOU HAVE BEEN MOVING AROUND A LOT MORE THAN USUAL: NOT AT ALL
7. TROUBLE CONCENTRATING ON THINGS, SUCH AS READING THE NEWSPAPER OR WATCHING TELEVISION: NOT AT ALL
9. THOUGHTS THAT YOU WOULD BE BETTER OFF DEAD, OR OF HURTING YOURSELF: NOT AT ALL

## 2025-04-26 ASSESSMENT — SLEEP AND FATIGUE QUESTIONNAIRES
SLEEP PATTERN: NORMAL
DO YOU USE A SLEEP AID: NO
AVERAGE NUMBER OF SLEEP HOURS: 7
DO YOU HAVE DIFFICULTY SLEEPING: NO

## 2025-04-26 ASSESSMENT — PAIN SCALES - GENERAL
PAINLEVEL_OUTOF10: 5
PAINLEVEL_OUTOF10: 0

## 2025-04-26 ASSESSMENT — PAIN DESCRIPTION - DESCRIPTORS: DESCRIPTORS: ACHING;DISCOMFORT

## 2025-04-26 ASSESSMENT — PAIN DESCRIPTION - LOCATION: LOCATION: ANKLE

## 2025-04-26 ASSESSMENT — PAIN DESCRIPTION - ORIENTATION: ORIENTATION: LEFT

## 2025-04-26 NOTE — BH NOTE
Pt refuses to take scheduled Lamictal 50 mg oral due to only having water to drink instead of juice

## 2025-04-26 NOTE — BH NOTE
Pt is able to contract for safety. Provider was notified and suicide precautions were discontinued.

## 2025-04-26 NOTE — CARE COORDINATION
[facility] Biopsychosocial Assessment    Current Level of Psychosocial Functioning     Independent XX  Dependent    Minimal Assist     Psychosocial High Risk Factors (check all that apply)    Unable to obtain meds   Chronic illness/pain    Substance abuse XX  Lack of Family Support XX  Financial stress XX  Isolation   Inadequate Community Resources  Suicide attempt(s) XX  Not taking medications XX  Victim of crime   Developmental Delay  Unable to manage personal needs  XX  Age 65 or older   Homeless XX  No transportation XX  Readmission within 30 days XX  Unemployment  Traumatic Event    Psychiatric Advanced Directives: N/A    Family to Involve in Treatment: Does not identify    Sexual Orientation:  N/A    Patient Strengths: Seeking help    Patient Barriers: Lack of stable housing and income, Lack of insurance, Substance use    Opiate Education Provided:  Denies    [facility]/mental health history: [facility] 4/10-4/11/25. Not linked to [facility].    Plan of Care   medication management, group/individual therapies, family meetings, psycho -education, treatment team meetings to assist with stabilization    Initial Discharge Plan:  Interested in [facility] and [facility]    Clinical Summary:    Balta is a 45yo admitted to the [facility] for depression with suicidal ideation. At time of assessment, he denies suicidal and homicidal ideation and hallucinations. He states he feels fine.    Balta has inpatient treatment history and was last at [facility] 4/10-4/11/25. He is not linked to a [facility].  He denies opiate and endorses alcohol and cocaine use; Noted SIMON concerns; Provided SIMON treatment resources.  He endorses past and denies current legal concerns.  He endorses past and denies current abuse concerns.    Balta states that he left a rehab recently and relapsed on alcohol and cocaine. He states he also has been out of his mental health medications for 3 days. He states he is not interested in going to a 30-day treatment program

## 2025-04-26 NOTE — ED NOTES
Pt presents to ED with SI and plan to overdose on pills. Pt reports he does not have access to pills and is not compliant with his medications. Pt reports after going to [place], [place] he lost his pills.  Pt has previous attempts at suicide per charting. Pt reports feeling suicidal as baseline for him and states he has been feeling for about a week. Pt denies he did anything to harm himself before coming to ED. Pt denies hallucinations. Pt reported his last inpatient admission for BH was February or March. Charting reports pt was admitted on 4/09/25 for SI.  Pt was recently denied admission on 4/17/25 due to recent admission and pt not appearing to benefit from inpatient hospitalization per charting. Pt has previous diagnosis of Depression with suicidal ideation, Major depressive disorder, recurrent severe without psychotic features (HCC), Cocaine use disorder (HCC), Cannabis use disorder, Alcohol withdrawal, uncomplicated (HCC), Alcohol use disorder and homelessness.     Pt admits to cocaine and alcohol use earlier this morning. Pt reports recent AOD admissions.. 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     Pt denies current legal concerns and states he is his own Guardian. Per charting pt does not appear to benefit from inpatient admissions however does have past hx of attempt by overdose. Pt scored high on the C-SSRS screening. SW will provide information to provider.

## 2025-04-26 NOTE — ED PROVIDER NOTES
[facility] EMERGENCY DEPARTMENT  Emergency Department  Emergency Medicine Resident Sign-out     Care of Balta Sarmiento was assumed from Dr. Dash and is being seen for Suicidal  .  The patient's initial evaluation and plan have been discussed with the prior provider who initially evaluated the patient.     EMERGENCY DEPARTMENT COURSE / MEDICAL DECISION MAKING:       MEDICATIONS GIVEN:  No orders of the defined types were placed in this encounter.      LABS / RADIOLOGY:     Labs Reviewed   CBC WITH AUTO DIFFERENTIAL - Abnormal; Notable for the following components:       Result Value    WBC 14.3 (*)     Neutrophils % 76 (*)     Lymphocytes % 15 (*)     Immature Granulocytes % 1 (*)     Neutrophils Absolute 10.80 (*)     All other components within normal limits   URINE DRUG SCREEN - Abnormal; Notable for the following components:    Cocaine Metabolite, Urine POSITIVE (*)     Cannabinoid Scrn, Ur POSITIVE (*)     All other components within normal limits   ACETAMINOPHEN LEVEL - Abnormal; Notable for the following components:    Acetaminophen Level <5 (*)     All other components within normal limits   COMPREHENSIVE METABOLIC PANEL W/ REFLEX TO MG FOR LOW K   ETHANOL   LIPASE   SALICYLATE LEVEL   URINALYSIS WITH REFLEX TO CULTURE       No results found.    RECENT VITALS:     Temp: 97.5 °F (36.4 °C),  Pulse: 86, Respirations: 18, BP: 111/74, SpO2: 98 %      This patient is a 44 y.o. Male with SI with a plan (pills and alcohol). Just left [facility] yesterday. Presented this AM; feeling depressed. I admission in the last 2 weeks. Likely will not benefit from [facility].    Not taking his medications for past 3 days.     No reported drug usage in last 48 hours.    Social work has assessed.     ADT 20 order is in.     Isolated elevated white count at 14.3.  Chest x-ray and UA ordered.  Patient afebrile.    Lipase negative.  Ethanol negative.  CMP unremarkable.  Tylenol and salicylate levels negative.      ED Course    Follow-up on lab work.     FINAL IMPRESSION:     1. Depression with suicidal ideation        DISPOSITION:         DISPOSITION:  []  Discharge   [x]  Transfer -    []  Admission -     []  Against Medical Advice   []  El[facility]   FOLLOW-UP: No follow-up provider specified.   DISCHARGE MEDICATIONS: New Prescriptions    No medications on file          Nasir Faust DO  Emergency Medicine Resident  [facility]

## 2025-04-26 NOTE — ED NOTES
Pt was accepted at [facility]. Voluntary form faxed. EMTALA received. Day shift SW informed of hand off.

## 2025-04-26 NOTE — ED PROVIDER NOTES
[facility] EMERGENCY DEPARTMENT  Emergency Department Encounter  Emergency Medicine Resident     Pt Name:Balta Sarmiento  MRN: [id number]  Birthdate 1980  Date of evaluation: 4/26/25  PCP:  No primary care provider on file.  Note Started: 4:30 AM EDT      CHIEF COMPLAINT       Chief Complaint   Patient presents with    Suicidal       HISTORY OF PRESENT ILLNESS  (Location/Symptom, Timing/Onset, Context/Setting, Quality, Duration, Modifying Factors, Severity.)      Balta Sarmiento is a 44 y.o. male presenting to ED for    CC's: Suicidal    Patient endorses that he went kill himself with alcohol and pills which he attempted in 2014.  Patient has psychiatric history currently on Lamictal and Effexor but has not taken for 3 days.  Patient endorses that he just got out of alcohol and cocaine detox facility, [facility], yesterday in the morning.  Patient presenting requesting additional assistance for his psychiatric history.  Patient denies hearing or seeing any auditory or visual hallucinations.  Patient denies any alcohol, drug usage today since going to rehab.    Notable PMHx/PSXH: Alcohol use, depression with suicidal ideation cocaine use, anxiety, depression, insomnia    Notable Home Meds: Lamictal, venlafaxine    PAST MEDICAL / SURGICAL / SOCIAL / FAMILY HISTORY     Problem List: has Depression with suicidal ideation; Alcohol abuse; Suicidal ideation; Major depressive disorder, recurrent severe without psychotic features (HCC); Cocaine use disorder (HCC); Cannabis use disorder; Alcohol withdrawal, uncomplicated (HCC); Homelessness; Alcohol use disorder; MDD (major depressive disorder), recurrent episode, moderate (HCC); Atypical pneumonia; Acute hypoxic respiratory failure (HCC); Chronic hepatitis C virus infection (HCC); Genital warts due to HPV (human papillomavirus); Syncope; and Major depressive disorder, recurrent on their problem list.    Past Medical History:  has a past medical history of Anxiety,  Emergency Department Physician who either signs or Co-signs this chart in the absence of a cardiologist.    EMERGENCY DEPARTMENT COURSE:        ED Course as of 04/26/25 0656   Sat Apr 26, 2025   0426 Effexor and Lamictal 3 days ago -- Detox for cocaine and alcohol yesterday AM at [facility] [LEVY]   0526 Acetaminophen Level(!): <5 [LEVY]   0526 Lipase: 30 [LEVY]   0526 Salicyclic Acid: <0.5 [LEVY]   0526 Ethanol:    Ethanol Lvl <10   Ethanol percent <0.010  WNL [LEVY]   0526 Comprehensive Metabolic Panel w/ Reflex to MG:    Sodium 137   Potassium 4.1   Chloride 99   CARBON DIOXIDE 23   Anion Gap 15   Glucose 83   BUN,BUNPL 13   Creatinine 0.8   Est, Glom Filt Rate >90   Calcium 9.8   Total Protein 7.6   Albumin 4.5   Albumin/Globulin Ratio 1.5   Total Bilirubin 0.4   Alkaline Phosphatase 84   ALT 17   AST 34  Stable [LEVY]   0537 Cocaine Metabolite, Urine(!): POSITIVE [LEVY]   0537 Cannabinoid Scrn, Ur(!): POSITIVE [LEVY]   0557 ED T-20 order placed. [LEVY]   0559 White count with left shift of indeterminate origin.  Will order/add on urinalysis and chest x-ray for evaluation. [LEVY]   0610 EKG 12 Lead  EKG normal sinus rhythm, normal AL, QRS, QTc interval, no acute ST or T wave changes. [LEVY]   0612 Care of this patient has been formally transferred to [facility]. Please review their note for the ongoing evaluation, treatment decisions, and final disposition.      [LEVY]   0615 Chest x-ray reviewed by resident.  No pneumothorax.  No mediastinal shift.  No focal consolidation.  No infiltrates.  No obvious osseous abnormalities.  Cardiac silhouette normal.  Pending final read by radiology.   [AS]   0628 Leukocyte Esterase, Urine: NEGATIVE [AS]   0628 Nitrite, Urine: NEGATIVE  UA negative for UTI [AS]   0635 [facility] to accept patient for depression with suicide ideation and a plan.  [facility] requesting pink slip be sent with patient. [AS]   0647 No pink slip.  Patient was voluntary.  EMTALA form completed.  EMTALA form signed.  Given to

## 2025-04-26 NOTE — BH NOTE
[facility]  Admission Note     Admission Type:   Admission Type: Voluntary    Reason for admission:  Reason for Admission: Pt was having suicidal ideation to plan to overdose on pills. States he has poor support out in the community and moves around too much to get an outpatient provider set up. Pt reported currently using cocaine and stated he is a daily drinker. Pt was unsure if he was going to go through withdrawals. Denies having homicidal ideations or hallucinations. Pt reported having paranoid thoughts at times, but did not report what they were.      Addictive Behavior:   Addictive Behavior  In the Past 3 Months, Have You Felt or Has Someone Told You That You Have a Problem With  : None    Medical Problems:   Past Medical History:   Diagnosis Date    Anxiety     Chest pain 09/13/2024    Depression     Insomnia        Status EXAM:  Mental Status and Behavioral Exam  Normal: No  Level of Assistance: Independent/Self  Facial Expression: Brightened  Affect: Congruent  Level of Consciousness: Alert  Frequency of Checks: 4 times per hour, close  Mood:Normal: No  Mood: Anxious  Motor Activity:Normal: Yes  Eye Contact: Good  Observed Behavior: Cooperative  Sexual Misconduct History: Current - no  Preception: [place] to person, [place] to time, [place] to place, [place] to situation  Attention:Normal: Yes  Attention: Distractible  Thought Processes: Unremarkable  Thought Content:Normal: Yes  Thought Content: Preoccupations  Depression Symptoms: Feelings of hopelessess, Feelings of helplessness, Feelings of worthlessness  Anxiety Symptoms: Generalized  Yumiko Symptoms: No problems reported or observed.  Hallucinations: None  Delusions: No  Memory:Normal: Yes  Insight and Judgment: No  Insight and Judgment: Poor judgment, Poor insight    Tobacco Screening:  Practical Counseling, on admission, juan X, if applicable and completed (first 3 are required if patient doesn't refuse):            ( ) Recognizing

## 2025-04-26 NOTE — H&P
Department of Psychiatry  Attending Physician Psychiatric Assessment   Patient: Balta Sarmiento  MRN: [id number]  Reason for Admission to Psychiatric Unit:  Threat to self requiring 24 hour professional observation  A mental disorder causing major disability in social, interpersonal, occupational, and/or educational functioning that is leading to dangerous or life-threatening functioning, and that can only be addressed in an acute inpatient setting   Failure of outpatient psychiatry treatment so that the beneficiary requires 24 hour professional observation and care  Concerns about patient's safety in the community  Past Mental Health Diagnosis: a history of  Major Depression, Prior suicide attempt, and Alcohol and/or Drug Use Disorder  Triggering event or precipitating factor: Housing instability, Financial instability, Alcohol/Drug Relapse, Relationship Issues, and Psych Treatment Noncompliance  Length of time/duration of triggering event: Days  Legal Status: Voluntary    CHIEF COMPLAINT: Depression with suicidal ideation, plan to overdose on pills and alcohol    History obtained from: Patient, electronic medical record          HISTORY OF PRESENT ILLNESS:    Balta Sarmiento is a 44 y.o. male who has a past medical history of major depression, polysubstance abuse, suicide attempt, and hepatitis C. Patient presented to [place] ED endorsing suicidal ideation to overdose on alcohol and pills.  He reported being off his medications for the past 3 days.  He reports recent discharge from detox facility.  Admits to recent use of alcohol and cocaine.  He denies auditory or visual hallucinations.  He has a history of multiple psychiatric admissions to [facility].  Last admitted to [facility] March 2025.    Patient is seen today for initial assessment.  He agrees to conduct interview in sensory room.  He reports relapse on drugs and alcohol after recent discharge from inpatient detox facility.  Reports last use of cocaine and alcohol this

## 2025-04-26 NOTE — ED NOTES
[] [facility]    [] [facility]    [x]  [facility]    SUICIDE RISK ASSESSMENT      Y  N     [x] [] In the past two weeks have you had thoughts of hurting yourself in any way?    [x] [] In the past two weeks have you had thoughts that you would be better off dead?   [] [x] Have you made a suicide attempt in the past two months?   [x] [] Do you have a plan for hurting yourself or suicide?   [] [x] Presence of hallucinations/voices related to hurting himself or herself or someone else.    SUICIDE/SECURITY WATCH PRECAUTION CHECKLIST     Orders    [x]  Suicide/Security Watch Precautions initiated as checked below:   4/26/25 5:29 AM EDT 10/10    [x] Notified physician:  Gokul Natarajan MD  4/26/25 5:29 AM EDT    [x] Orders obtained as appropriate:     [x] 1:1 Observer     [] Psych Consult     [] Psych Consult    Name:  Date:  Time:    [x] 1:1 Observer, Notified by:  Sheri Bergman RN    Contact Nurse Supervisor    [x] Remove all personal clothes from room and place in snap/paper gown/pants.  Slipper only    [x] Remove all personal belongings from room and secured away from patient.    Documentation    [x] Initiate Suicide/Security Watch Precaution Flow Sheet    [x] Initiate individualized Care Plan/Problem    [x] Document why precautions initiated on flow sheet (Initiate Nursing Care Plan/Problem)    [x] 1:1 Observer in place; instructions provided.  Suicide precautions require observer be within arms length.    [x] Nurse-Observer Communication Hand-off initiated by RN, reviewed with Observer.  Subsequently used as Hand Off between Observers.    [x] Initiate every 15 minute observations per observer as delegated by the RN.    [x] Initiate RN assessment and documentation    Environmental Scan  Search Criteria and Process: OPTIONAL, see Search Policy    [] Reason for search:    [] Nursing in presence of second person to search patient    [] Patient notified of reason for body assessment and

## 2025-04-26 NOTE — ED NOTES
Report given to perla TONY. All questions answered.   [facility] transport eta of 2:15

## 2025-04-26 NOTE — BH NOTE
Patient given tobacco quitline number 21534373523 at this time, refusing to call at this time, states \" I just dont want to quit now\"- patient given information as to the dangers of long term tobacco use. Continue to reinforce the importance of tobacco cessation.

## 2025-04-26 NOTE — ED NOTES
..Transfer Center Handoff for Behavioral Health Transfers      Patient's Current Location: [facility] EMERGENCY DEPARTMENT     Chief Complaint   Patient presents with    Suicidal       Current or History of Violent Behavior: No    Currently in Restraints Now or During this Encounter: No  (Specify if Agitation or self harm is noted in ED?)  If yes, please describe behaviors requiring restraint:             Medical Clearance Documented and Verified in the Chart: No    Allergies   Allergen Reactions    Shellfish-Derived Products Rash    Fish-Derived Products     Propoxyphene Hives        Can Patient Tolerate Lying Flat: Yes    Able to Perform ADLs:  Yes  (Specify if able to ambulate or uses any mobility devices such as cane or walker)  Activity:    Level of Assistance:    Assistive Device:    Miscellaneous Devices:      LABS    CBC:   Lab Results   Component Value Date/Time    WBC 14.3 04/26/2025 04:38 AM    RBC 4.81 04/26/2025 04:38 AM    HGB 14.8 04/26/2025 04:38 AM    HCT 43.8 04/26/2025 04:38 AM    MCV 91.1 04/26/2025 04:38 AM    MCH 30.8 04/26/2025 04:38 AM    MCHC 33.8 04/26/2025 04:38 AM    RDW 12.8 04/26/2025 04:38 AM     04/26/2025 04:38 AM    MPV 10.6 04/26/2025 04:38 AM     CMP:   Lab Results   Component Value Date/Time     04/26/2025 04:38 AM    K 4.1 04/26/2025 04:38 AM    K 3.9 04/09/2025 09:28 PM    CL 99 04/26/2025 04:38 AM    CO2 23 04/26/2025 04:38 AM    BUN 13 04/26/2025 04:38 AM    CREATININE 0.8 04/26/2025 04:38 AM    GFRAA >60 04/25/2022 12:55 AM    AGRATIO 1.5 04/09/2025 09:28 PM    LABGLOM >90 04/26/2025 04:38 AM    LABGLOM >90 04/25/2024 12:08 AM    GLUCOSE 83 04/26/2025 04:38 AM    CALCIUM 9.8 04/26/2025 04:38 AM    BILITOT 0.4 04/26/2025 04:38 AM    ALKPHOS 84 04/26/2025 04:38 AM    AST 34 04/26/2025 04:38 AM    ALT 17 04/26/2025 04:38 AM     Drug Panel:   Lab Results   Component Value Date/Time    OPIAU NEGATIVE 04/26/2025 04:39 AM    LABCOMM  09/13/2024 05:20 AM

## 2025-04-26 NOTE — ED PROVIDER NOTES
[facility]     Emergency Department     Faculty Attestation    I performed a history and physical examination of the patient and discussed management with the resident. I reviewed the resident’s note and agree with the documented findings including all diagnostic interpretations and plan of care. Any areas of disagreement are noted on the chart. I was personally present for the key portions of any procedures. I have documented in the chart those procedures where I was not present during the key portions. I have reviewed the emergency nurses triage note. I agree with the chief complaint, past medical history, past surgical history, allergies, medications, social and family history as documented unless otherwise noted below. Documentation of the HPI, Physical Exam and Medical Decision Making performed by sonya is based on my personal performance of the HPI, PE and MDM. For Physician Assistant/ Nurse Practitioner cases/documentation I have personally evaluated this patient and have completed at least one if not all key elements of the E/M (history, physical exam, and MDM). Additional findings are as noted.    Primary Care Physician: No primary care provider on file.    Note Started: 5:07 AM EDT     VITAL SIGNS:   height is 1.803 m (5' 11\") and weight is 98.9 kg (218 lb). His oral temperature is 97.9 °F (36.6 °C). His blood pressure is 127/83 and his pulse is 107 (abnormal). His respiration is 18 and oxygen saturation is 98%.      Medical Decision Making  Suicidal ideation.  Plan to take pills to harm himself.  Did not take any medications prior to arrival.  No complaints otherwise.  Flat affect, depressed mood, answering questions appropriately no evidence of intoxication clinically cardiac borderline tachycardic, pulmonary clear bilaterally abdomen soft nontender nondistended.  Will obtain psychiatric screening labs per [facility] policy.  Medically stable for

## 2025-04-26 NOTE — ED TRIAGE NOTES
Pt to ed c/o depression and suicidal ideation. Per pt he has been feeling this way for a while. Pt states he has a plan to take pills to harm himself. Pt states he has attempted before, about 10 years ago. Per pt he uses cocaine and alcohol daily. Per pt he has not taken his meds in about 3 days. Pt states he is feeling depressed. Pt states he thinks he is hearing people that are not there.     Pt is alert and oriented x4. Ambulatory to room. Pt changed into paper scrubs, belongings [facility]. Will continue with plan of care.

## 2025-04-26 NOTE — ED PROVIDER NOTES
FACULTY SIGN-OUT  ADDENDUM     Care of this patient was assumed from previous attending physician. The patient's initial evaluation and plan have been discussed with the prior provider who initially evaluated the patient.      Note Started: 7:11 AM EDT    Attestation  I was available and discussed any additional care issues that arose and coordinated the management plans with the resident(s) caring for the patient during my duty period. Any areas of disagreement with resident's documentation of care or procedures are noted on the chart. I was personally present for the key portions of any/all procedures, during my duty period. I have documented in the chart those procedures where I was not present during the key portions.       ED COURSE      The patient was given the following medications:  No orders of the defined types were placed in this encounter.      RECENT VITALS:   Temp: 97.5 °F (36.4 °C), Pulse: 86, Respirations: 18, BP: 111/74    MEDICAL DECISION MAKING        Balta Sarmiento is a 44 y.o. male who presents to the Emergency Department with complaints of suicidal ideation with a plan to overdose on pills.  Patient has been deemed medically stable by the prior attending physician.  The patient has been accepted by the [facility] currently awaiting transportation.      Tomer Chowdhury MD, MD, F.A.C.E.P.  Attending Emergency Physician    (Please note that portions of this note were completed with a voice recognition program.  Efforts were made to edit the dictations but occasionally words are mis-transcribed.)          Tomer Chowdhury MD  04/26/25 2614

## 2025-04-27 LAB
EKG ATRIAL RATE: 92 BPM
EKG P AXIS: 34 DEGREES
EKG P-R INTERVAL: 176 MS
EKG Q-T INTERVAL: 356 MS
EKG QRS DURATION: 84 MS
EKG QTC CALCULATION (BAZETT): 440 MS
EKG R AXIS: 58 DEGREES
EKG T AXIS: 20 DEGREES
EKG VENTRICULAR RATE: 92 BPM

## 2025-04-27 PROCEDURE — 99232 SBSQ HOSP IP/OBS MODERATE 35: CPT | Performed by: PSYCHIATRY & NEUROLOGY

## 2025-04-27 PROCEDURE — 6370000000 HC RX 637 (ALT 250 FOR IP)

## 2025-04-27 PROCEDURE — 1240000000 HC EMOTIONAL WELLNESS R&B

## 2025-04-27 PROCEDURE — 93010 ELECTROCARDIOGRAM REPORT: CPT | Performed by: INTERNAL MEDICINE

## 2025-04-27 PROCEDURE — 6370000000 HC RX 637 (ALT 250 FOR IP): Performed by: PSYCHIATRY & NEUROLOGY

## 2025-04-27 RX ADMIN — VENLAFAXINE HYDROCHLORIDE 150 MG: 150 CAPSULE, EXTENDED RELEASE ORAL at 07:53

## 2025-04-27 RX ADMIN — NICOTINE POLACRILEX 2 MG: 2 LOZENGE ORAL at 18:45

## 2025-04-27 RX ADMIN — LAMOTRIGINE 50 MG: 25 TABLET ORAL at 07:53

## 2025-04-27 RX ADMIN — Medication 3 MG: at 22:12

## 2025-04-27 ASSESSMENT — PAIN SCALES - GENERAL: PAINLEVEL_OUTOF10: 0

## 2025-04-27 NOTE — PLAN OF CARE
Problem: Depression  Goal: Will be euthymic at discharge  Description: INTERVENTIONS:1. Administer medication as ordered2. Provide emotional support via 1:1 interaction with staff3. Encourage involvement in milieu/groups/activities4. Monitor for social isolation  Outcome: Progressing  Note: Out in the milieu. Irritable and unhappy with medications. Admits to fleeting suicidal thoughts. Safety checks maintained q15min and irregular rounding.     Problem: Self Harm/Suicidality  Goal: Will have no self-injury during hospital stay  Description: INTERVENTIONS:1.  Ensure constant observer at bedside with Q15M safety checks2.  Maintain a safe environment3.  Secure patient belongings4.  Ensure family/visitors adhere to safety recommendations5.  Ensure safety tray has been added to patient's diet order6.  Every shift and PRN: Re-assess suicidal risk via Frequent Screener  INTERVENTIONS:1.  Ensure constant observer at bedside with Q15M safety checks2.  Maintain a safe environment3.  Secure patient belongings4.  Ensure family/visitors adhere to safety recommendations5.  Ensure safety tray has been added to patient's diet order6.  Every shift and PRN: Re-assess suicidal risk via Frequent Screener  Outcome: Progressing  Note: Remains free from self harm at this time. Pt admits to having fleeting thoughts of self harm.  Agreeable to seeking out staff should feelings increase.  Able to identify positive coping skills and plan for safety.  Will cont to monitor q15 minutes for safety and provide support and reassurance as needed.

## 2025-04-27 NOTE — GROUP NOTE
Group Therapy Note     Date: 4/27/2025     Group Start Time: 0855  Group End Time: 0955  Group Topic: Focus Group     [facility]    Sunny Carson RN           Group Therapy Note     Attendees: 9/22        Patient's Goal:  To identify a short and long term call.      Notes:  Patient was encourage to participate in group and declined. Patient was offered 1:1 time with writer and declined.          Discipline Responsible: Registered Nurse        Signature:  SUNNY CARSON RN

## 2025-04-27 NOTE — GROUP NOTE
Group Therapy Note    Date: 4/26/2025    Group Start Time: 2005  Group End Time: 2045  Group Topic: Relaxation    [facility]    Jess Bean RN      Group Therapy Note    Attendees: 9/19    Patient's Goal:  To acquire coping skills by developing relaxation techniques    Notes:  Pt attended and actively participated in the Relaxation group this evening.    Status After Intervention:  Improved    Participation Level: Interactive    Participation Quality: Appropriate and Attentive    Speech:  normal    Thought Process/Content: Logical    Affective Functioning: Congruent    Mood: calm and attempting to relax    Level of consciousness:  Alert and Oriented x4    Response to Learning: Progressing to goal    Endings: None Reported    Modes of Intervention: Education, Support, and Exploration    Discipline Responsible: Registered Nurse        Signature:  Jess Bean, CHAVO

## 2025-04-27 NOTE — PLAN OF CARE
Problem: Self Harm/Suicidality  Goal: Will have no self-injury during hospital stay  Description: INTERVENTIONS:1.  Ensure constant observer at bedside with Q15M safety checks2.  Maintain a safe environment3.  Secure patient belongings4.  Ensure family/visitors adhere to safety recommendations5.  Ensure safety tray has been added to patient's diet order6.  Every shift and PRN: Re-assess suicidal risk via Frequent Screener  INTERVENTIONS:1.  Ensure constant observer at bedside with Q15M safety checks2.  Maintain a safe environment3.  Secure patient belongings4.  Ensure family/visitors adhere to safety recommendations5.  Ensure safety tray has been added to patient's diet order6.  Every shift and PRN: Re-assess suicidal risk via Frequent Screener  4/27/2025 0832 by Danuta Rogers, RN  Outcome: Progressing   No current thoughts of self harm/suicide  Problem: Depression  Goal: Will be euthymic at discharge  Description: INTERVENTIONS:1. Administer medication as ordered2. Provide emotional support via 1:1 interaction with staff3. Encourage involvement in milieu/groups/activities4. Monitor for social isolation  4/27/2025 0832 by Danuta Rogers, RN  Outcome: Progressing  5/10

## 2025-04-27 NOTE — GROUP NOTE
Group Therapy Note    Date: 4/27/2025    Group Start Time: 1400  Group End Time: 1445  Group Topic: Psychoeducation    [facility]    Viki De L aTorre CTRS        Group Therapy Note    Attendees: 9/21    Psych-Ed/Relapse Prevention Group Note        Date: April 27, 2025        Start Time: 2pm  End Time: 2:45pm      Number of Participants in Group & Unit Census:  9/21    Topic:  interpersonal skills, coping skills, self-expression     Goal of Group: To improve interpersonal skills and coping skills awareness through collaborating with peers and demonstrating self-expression.      Comments:     Patient did not participate in Psych-Ed/Relapse Prevention group, despite staff encouragement and explanation of benefits.  Patient remain seclusive to self.  Q15 minute safety checks maintained for patient safety and will continue to encourage patient to attend unit programming.        Signature:  ROSELYN Palomino

## 2025-04-28 PROCEDURE — 6370000000 HC RX 637 (ALT 250 FOR IP): Performed by: PSYCHIATRY & NEUROLOGY

## 2025-04-28 PROCEDURE — 1240000000 HC EMOTIONAL WELLNESS R&B

## 2025-04-28 PROCEDURE — 6370000000 HC RX 637 (ALT 250 FOR IP)

## 2025-04-28 PROCEDURE — APPSS30 APP SPLIT SHARED TIME 16-30 MINUTES

## 2025-04-28 PROCEDURE — 99232 SBSQ HOSP IP/OBS MODERATE 35: CPT | Performed by: PSYCHIATRY & NEUROLOGY

## 2025-04-28 PROCEDURE — 90833 PSYTX W PT W E/M 30 MIN: CPT | Performed by: PSYCHIATRY & NEUROLOGY

## 2025-04-28 RX ORDER — FOLIC ACID 1 MG/1
1 TABLET ORAL DAILY
Status: ON HOLD | COMMUNITY
End: 2025-04-28 | Stop reason: HOSPADM

## 2025-04-28 RX ORDER — LAMOTRIGINE 25 MG/1
50 TABLET ORAL DAILY
Qty: 60 TABLET | Refills: 0 | Status: SHIPPED | OUTPATIENT
Start: 2025-04-29

## 2025-04-28 RX ORDER — POLYETHYLENE GLYCOL 3350 17 G
2 POWDER IN PACKET (EA) ORAL
Qty: 100 EACH | Refills: 0 | Status: SHIPPED | OUTPATIENT
Start: 2025-04-28

## 2025-04-28 RX ORDER — VENLAFAXINE HYDROCHLORIDE 150 MG/1
150 CAPSULE, EXTENDED RELEASE ORAL
Qty: 30 CAPSULE | Refills: 0 | Status: SHIPPED | OUTPATIENT
Start: 2025-04-29

## 2025-04-28 RX ORDER — THIAMINE MONONITRATE (VIT B1) 100 MG
100 TABLET ORAL DAILY
Status: ON HOLD | COMMUNITY
End: 2025-04-28 | Stop reason: HOSPADM

## 2025-04-28 RX ORDER — M-VIT,TX,IRON,MINS/CALC/FOLIC 27MG-0.4MG
1 TABLET ORAL DAILY
Status: ON HOLD | COMMUNITY
End: 2025-04-28 | Stop reason: HOSPADM

## 2025-04-28 RX ADMIN — NICOTINE POLACRILEX 2 MG: 2 LOZENGE ORAL at 12:08

## 2025-04-28 RX ADMIN — LAMOTRIGINE 50 MG: 25 TABLET ORAL at 10:37

## 2025-04-28 RX ADMIN — Medication 3 MG: at 22:05

## 2025-04-28 RX ADMIN — VENLAFAXINE HYDROCHLORIDE 150 MG: 150 CAPSULE, EXTENDED RELEASE ORAL at 10:37

## 2025-04-28 RX ADMIN — ACETAMINOPHEN 650 MG: 325 TABLET, FILM COATED ORAL at 12:55

## 2025-04-28 RX ADMIN — NICOTINE POLACRILEX 2 MG: 2 LOZENGE ORAL at 19:23

## 2025-04-28 ASSESSMENT — LIFESTYLE VARIABLES
HOW OFTEN DO YOU HAVE A DRINK CONTAINING ALCOHOL: 2-4 TIMES A MONTH
HOW MANY STANDARD DRINKS CONTAINING ALCOHOL DO YOU HAVE ON A TYPICAL DAY: 5 OR 6

## 2025-04-28 ASSESSMENT — PAIN - FUNCTIONAL ASSESSMENT: PAIN_FUNCTIONAL_ASSESSMENT: ACTIVITIES ARE NOT PREVENTED

## 2025-04-28 ASSESSMENT — PAIN DESCRIPTION - ORIENTATION: ORIENTATION: MID

## 2025-04-28 ASSESSMENT — PAIN SCALES - GENERAL: PAINLEVEL_OUTOF10: 5

## 2025-04-28 ASSESSMENT — PAIN DESCRIPTION - DESCRIPTORS: DESCRIPTORS: ACHING

## 2025-04-28 NOTE — CARE COORDINATION
SOCIAL SERVICE NOTE  faxes patient clinical paperwork to [facility] on this date at [phone] with permission from patient

## 2025-04-28 NOTE — CARE COORDINATION
Writer spoke to Daniella with [facility]. Patient has been accepted to treatment and is to arrive at 1PM on 4/29. Medications to [facility]. MD notified.

## 2025-04-28 NOTE — GROUP NOTE
Group Therapy Note    Date: 4/28/2025    Group Start Time: 1015  Group End Time: 1045  Group Topic: Psychoeducation    [facility]    Vijay Zuniga        Group Therapy Note    Attendees: 7/23     Patient was offered group therapy today but declined to participate despite encouragement from staff. 1:1 was offered.  Signature:  Vijay Zuniga

## 2025-04-28 NOTE — GROUP NOTE
Group Therapy Note    Date: 4/28/2025    Group Start Time: 0900  Group End Time: 0945  Group Topic: Community Meeting    [facility]    Edwige Rose LPN        Group Therapy Note    Attendees: 6/23      patient refused to attend Goals group at 0900 after encouragement from staff.  1:1 talk time provided as alternative to group session       Signature:  Edwige Rose LPN

## 2025-04-28 NOTE — PLAN OF CARE
Problem: Depression  Goal: Will be euthymic at discharge  Description: INTERVENTIONS:1. Administer medication as ordered2. Provide emotional support via 1:1 interaction with staff3. Encourage involvement in milieu/groups/activities4. Monitor for social isolation  Outcome: Progressing  Note: Patient was out in the milieu, less irritable. Got melatonin for sleep. Denies suicidal ideation at this time. Safety checks maintained q15min and irregular rounding.      Problem: Self Harm/Suicidality  Goal: Will have no self-injury during hospital stay  Description: INTERVENTIONS:1.  Ensure constant observer at bedside with Q15M safety checks2.  Maintain a safe environment3.  Secure patient belongings4.  Ensure family/visitors adhere to safety recommendations5.  Ensure safety tray has been added to patient's diet order6.  Every shift and PRN: Re-assess suicidal risk via Frequent Screener  INTERVENTIONS:1.  Ensure constant observer at bedside with Q15M safety checks2.  Maintain a safe environment3.  Secure patient belongings4.  Ensure family/visitors adhere to safety recommendations5.  Ensure safety tray has been added to patient's diet order6.  Every shift and PRN: Re-assess suicidal risk via Frequent Screener  Outcome: Progressing  Note: Remains free from self harm at this time. Pt denies thoughts of self harm and is agreeable to seeking out should thoughts of self harm arise.  Safe environment maintained.  Q15 minute checks for safety cont per unit policy.  Will cont to monitor for safety and provides support and reassurance as needed.

## 2025-04-28 NOTE — GROUP NOTE
Group Therapy Note    Date: 4/27/2025    Group Start Time: 2030  Group End Time: 2100  Group Topic: Wrap-Up    [facility]    Group Therapy Note    Attendees: 13/23     Notes:  Patient declined to attend wrap-up group. 1:1 talk time offered as a alternative.    Signature:  Miguelangel Coleman RN

## 2025-04-28 NOTE — CARE COORDINATION
Discharge Arrangements:      Guardian notified: n/a    Discharge destination/address: [address] [place] [address]     Transported by: cab     Patient was accepting of referral.  Follow up appointment is scheduled for Arrive at 1PM for admission to [facility].    *SIMON resources were offered to patient throughout admission and at time of discharge. This list of [facility] SIMON providers was provided to patient:     [facility]  [address] [facility] [address]   [address] [facility] [address]  Phone: [phone]     Phone: [phone]    [facility]  [place]   [address] [facility] [address]   [address] [facility] [address]  Phone: [phone]     Phone: [phone]    [facility] [place] [place] [facility]  [address] [facility] [address]    [address] [facility] [facility] [address]  Phone: [phone]     Phone: [phone]    [facility]     [facility]   [address] [facility] [address]  [place] [address]      Phone: [phone]  Phone: [phone]        [facility]  [address] [facility] [facility] [address]   [address] [place] [address]  Phone: [phone]     Phone:  [phone]    [employer]      [facility]  [address] [facility] [address]   [address] [facility] [address]  Phone: [phone]     Phone: [phone]    [facility]  [address] [facility] [address]   [address] [facility] [address]  Phone: [phone]     Phone: [phone]    [facility]  [facility]  [address] [facility] [address] [address] [facility] [address]  Phone: [phone]     Phone: [phone]    [facility]     [facility]  [facility] [facility] [address] [place] [address] [address] [facility]

## 2025-04-28 NOTE — GROUP NOTE
Group Therapy Note    Date: 4/28/2025    Group Start Time: 1430  Group End Time: 1530  Group Topic: Cognitive Skills    [facility]    Elyse Anthony CTRS        Group Therapy Note    Attendees: 12/21     Topic: : To increase socialization, practice self expression, explore feelings and   positive change r/t self, resources, activities using creative expression/writing, and discussion.      Comments:   Patient did not participate in Cognitive Skills Group, at 14:30, despite staff encouragement and   explanation of benefits. Pt sat at a separate table, and as RT reviewed directions of group with peers, pt asked for a specific group only art product, that was not being used in this group. RT declined request and redirected pt to the topic of this group. Pt became petulant and stated \"Well I may as well go sit in my room\". Pt did not but did remain at table during group, coloring. Pt did not engage in group task or discussion but was seclusive to self .  Q15 minute safety checks maintained   for patient safety and will continue to encourage patient to attend unit programming.       Discipline Responsible: Psychoeducational Specialist   Signature: ROSELYN CRAWFORD

## 2025-04-28 NOTE — PROGRESS NOTES
Behavioral Services  Medicare Certification Upon Admission    I certify that this patient's inpatient psychiatric hospital admission is medically necessary for:    [x] (1) Treatment which could reasonably be expected to improve this patient's condition,       [x] (2) Or for diagnostic study;     AND     [x](2) The inpatient psychiatric services are provided while the individual is under the care of a physician and are included in the individualized plan of care.    Estimated length of stay/service 4-7 days    Plan for post-hospital care home with outpatient [facility] f/u    Electronically signed by RAPHAEL CASAS MD on 4/27/2025 at 6:48 AM      
Leisure assessment unable to be completed on 4/26/25. Leisure assessment will be completed on 4/27/25.  
Pharmacy Medication History Note      List of current medications patient is taking is complete.    Source of information: Superscripts dispense history    Changes made to medication list:      Medications flagged for provider review:  Clindamycin 300 mg  - course complete    Naproxen 500 mg - prescription      Medications added/doses adjusted:  Folic acid 1 mg  Thiamine 100 mg  Multivitamin   Melatonin 3 mg       Current Home Medication List at Time of Admission:  Prior to Admission medications    Medication Sig Start Date End Date Taking? Authorizing Provider   melatonin 3 MG TABS tablet Take 1 tablet by mouth nightly as needed   Yes Robert Schumacher MD   Multiple Vitamins-Minerals (THERAPEUTIC MULTIVITAMIN-MINERALS) tablet Take 1 tablet by mouth daily   Yes Robert Schumacher MD   folic acid (FOLVITE) 1 MG tablet Take 1 tablet by mouth daily   Yes ProviderRobert MD   vitamin B-1 (THIAMINE) 100 MG tablet Take 1 tablet by mouth daily   Yes ProviderRobert MD   naproxen (NAPROSYN) 500 MG tablet Take 1 tablet by mouth 2 times daily for 20 doses 25  Alexander Alamo MD   lamoTRIgine (LAMICTAL) 25 MG tablet Take 1 tablet by mouth daily 25   Sarah Weaver APRN - CNP   venlafaxine (EFFEXOR XR) 150 MG extended release capsule Take 1 capsule by mouth daily 25   Sarah Weaver APRN - CNP         Please let me know if you have any questions about this encounter. Thank you!    Electronically signed by Sloane Lutz RPH on 2025 at 10:09 AM     
RT ASSESSMENT TREATMENT GOALS    [x]Pt Goal:  Pt will identify 1-2 positive coping skills by time of discharge.    []Pt Goal:  Pt will identify 1-2 positive aspects of self by time of discharge.    []Pt Goal:  Pt will remain on task/topic for 15-30 minutes during group by time of discharge.    [x]Pt Goal:  Pt will identify 1-2 aspects of relapse prevention plan by time of discharge.    []Pt Goal:  Pt will join in conversation with peers 1-2 times per group by time of discharge.    []Pt Goal:  Pt will identify 1-2 new leisure interests by time of discharge.    []Pt Goal:  Pt will not voice any delusional content by time of discharge.   
04/26/2025 4.5  3.5 - 5.2 g/dL Final    Albumin/Globulin Ratio 04/26/2025 1.5  1.0 - 2.5 Final    Total Bilirubin 04/26/2025 0.4  0.0 - 1.2 mg/dL Final    Alkaline Phosphatase 04/26/2025 84  40 - 129 U/L Final    ALT 04/26/2025 17  10 - 50 U/L Final    AST 04/26/2025 34  10 - 50 U/L Final    Ventricular Rate 04/26/2025 92  BPM Final    Atrial Rate 04/26/2025 92  BPM Final    P-R Interval 04/26/2025 176  ms Final    QRS Duration 04/26/2025 84  ms Final    Q-T Interval 04/26/2025 356  ms Final    QTc Calculation (Bazett) 04/26/2025 440  ms Final    P Axis 04/26/2025 34  degrees Final    R Axis 04/26/2025 58  degrees Final    T Axis 04/26/2025 20  degrees Final    Amphetamine Screen, Ur 04/26/2025 NEGATIVE  NEGATIVE Final    Cutoff: 1000 ng/mL    Barbiturate Screen, Ur 04/26/2025 NEGATIVE  NEGATIVE Final    Cutoff: 200 ng/ml    Benzodiazepine Screen, Urine 04/26/2025 NEGATIVE  NEGATIVE Final    Cutoff: 200 ng/ml    Cocaine Metabolite, Urine 04/26/2025 POSITIVE (A)  NEGATIVE Final    Cutoff: 300 ng/ml    Methadone Screen, Urine 04/26/2025 NEGATIVE  NEGATIVE Final    Cutoff: 300 ng/ml    Opiates, Urine 04/26/2025 NEGATIVE  NEGATIVE Final    Cutoff: 300 ng/ml    Phencyclidine, Urine 04/26/2025 NEGATIVE  NEGATIVE Final    Cutoff: 25 ng/ml    Cannabinoid Scrn, Ur 04/26/2025 POSITIVE (A)  NEGATIVE Final    Cutoff: 50 ng/ml    Oxycodone Screen, Ur 04/26/2025 NEGATIVE  NEGATIVE Final    Cutoff: 100 ng/ml    Fentanyl, Ur 04/26/2025 NEGATIVE  NEGATIVE Final    Cutoff: 5 ng/ml    Test Information 04/26/2025 Assay provides rapid clinical screening only.  Presumptive positive results for legal purposes should be confirmed by another method.  To request confirmation, please call the lab within 7 days of sample submission.   Final    Ethanol Lvl 04/26/2025 <10  <10 mg/dL Final    Ethanol percent 04/26/2025 <0.010  <0.010 % Final    Acetaminophen Level 04/26/2025 <5 (L)  10 - 30 ug/mL Final    Lipase 04/26/2025 30  13 - 60 U/L Final

## 2025-04-28 NOTE — PLAN OF CARE
Problem: Self Harm/Suicidality  Goal: Will have no self-injury during hospital stay  Description: INTERVENTIONS:1.  Ensure constant observer at bedside with Q15M safety checks2.  Maintain a safe environment3.  Secure patient belongings4.  Ensure family/visitors adhere to safety recommendations5.  Ensure safety tray has been added to patient's diet order6.  Every shift and PRN: Re-assess suicidal risk via Frequent Screener  INTERVENTIONS:1.  Ensure constant observer at bedside with Q15M safety checks2.  Maintain a safe environment3.  Secure patient belongings4.  Ensure family/visitors adhere to safety recommendations5.  Ensure safety tray has been added to patient's diet order6.  Every shift and PRN: Re-assess suicidal risk via Frequent Screener  Outcome: Progressing  Note: Patient endorses general anxiety and worry regarding discharge tomorrow, fears it won't happen as planned despite staff's reassurance. He is attending groups, social selective, cooperative with staff, and pleasant. Patient has remained in behavorial control while on unit and has not required emergency medications in the last 24 hours. Patient denies any suicidal/homicidal ideation at this time. Will continue to monitor, assess, and provide a safe environment through Q15 minute safety checks.      Problem: Depression  Goal: Will be euthymic at discharge  Description: INTERVENTIONS:1. Administer medication as ordered2. Provide emotional support via 1:1 interaction with staff3. Encourage involvement in milieu/groups/activities4. Monitor for social isolation  Outcome: Progressing     Problem: Anxiety  Goal: Will report anxiety at manageable levels  Description: INTERVENTIONS:1. Administer medication as ordered2. Teach and rehearse alternative coping skills3. Provide emotional support with 1:1 interaction with staff  Outcome: Progressing

## 2025-04-28 NOTE — GROUP NOTE
Group Therapy Note     Date: 4/28/25     Group Start Time: 1330  Group End Time: 1400  Group Topic: Conversation Ball     [facility]    Attendees: 11/19      Patient appropriately participated in the group activity of using the Conversation Ball, appropriately socializing and sharing answers with the group. The purpose of this activity is to engage patients in conversation, encouraging them to social with peers.      Signature:  Mary Harkins LPN

## 2025-04-28 NOTE — GROUP NOTE
Group Therapy Note    Date: 4/28/2025    Group Start Time: 1100  Group End Time: 1145  Group Topic: Cognitive Skills    [facility]    Elyse Anthony CTRS        Group Therapy Note    Attendees: 6/22     Topic: : To increase socialization, practice problem solving skills, and visual and verbal   communication skills      Comments:   Patient did not participate in Cognitive Skills Group, at 11:00, despite staff encouragement and   explanation of benefits. Pt is intrusive with  at multiple times to get information sent to [place].  is aware of pt needs/wants and staff redirect pt to allow  to do her work . Pt did not attend group.    Q15 minute safety checks maintained for patient safety and will continue to encourage patient to attend unit programming.       Discipline Responsible: Psychoeducational Specialist   Signature: ROSELYN CRAWFORD

## 2025-04-28 NOTE — H&P
Daily Progress Note  4/28/2025    Patient Name: Balta Sarmiento    CHIEF COMPLAINT:  Depression with suicidal ideation, plan to overdose on pills and alcohol           SUBJECTIVE:    Patient is seen today for a follow up assessment.  He is found in the milieu.  He agrees to conduct interview privately in sensory room.  He describes his mood as \"okay\".  He endorses improvement of depression and suicidal ideation.  He is constructive and forward thinking this morning.  Reports placing phone calls to coordinate discharge to [place].  He presents as restless and anxious.  He reports adequate sleep overnight.  Denies disturbance of appetite.  He is compliant with taking scheduled psychotropic medications.  No adverse effects reported.  He has been without behavioral disturbances or need for emergency medications on the unit.  He is attending select groups and adheres to unit routine.  Medication management discharge planning per attending.    Appetite:  [x] Adequate/Unchanged  [] Increased  [] Decreased      Sleep:       [] Adequate/Unchanged  [x] Fair  [] Poor      Group Attendance on Unit:   [] Yes   [x] Selectively    [] No    Compliant with scheduled medications: [x] Yes  [] No    Received emergency medications in past 24 hrs: [] Yes   [x] No    Medication Side Effects: Denies         Mental Status Exam  Level of consciousness: Alert and awake   Appearance: Appropriate attire for setting, seated in chair, with fair  grooming and hygiene   Behavior/Motor: Approachable, engages with interviewer, no psychomotor abnormalities   Attitude toward examiner: Cooperative, attentive, good eye contact  Speech: Normal rate, volume, and tone  Mood: \"Okay\", anxious presentation  Affect: Congruent  Thought processes: linear, goal directed, and coherent   Thought content:  Denies homicidal ideation  Suicidal Ideation: Reports improvement in suicidal ideations, contracts for safety on the unit.   Delusions: No evidence of delusions.

## 2025-04-29 VITALS
BODY MASS INDEX: 30.49 KG/M2 | DIASTOLIC BLOOD PRESSURE: 82 MMHG | TEMPERATURE: 97.9 F | SYSTOLIC BLOOD PRESSURE: 122 MMHG | WEIGHT: 217.81 LBS | RESPIRATION RATE: 14 BRPM | HEIGHT: 71 IN | OXYGEN SATURATION: 99 % | HEART RATE: 72 BPM

## 2025-04-29 PROCEDURE — 6370000000 HC RX 637 (ALT 250 FOR IP)

## 2025-04-29 PROCEDURE — 99239 HOSP IP/OBS DSCHRG MGMT >30: CPT | Performed by: PSYCHIATRY & NEUROLOGY

## 2025-04-29 RX ADMIN — LAMOTRIGINE 50 MG: 25 TABLET ORAL at 07:32

## 2025-04-29 RX ADMIN — VENLAFAXINE HYDROCHLORIDE 150 MG: 150 CAPSULE, EXTENDED RELEASE ORAL at 07:32

## 2025-04-29 ASSESSMENT — PAIN SCALES - GENERAL: PAINLEVEL_OUTOF10: 0

## 2025-04-29 NOTE — BH NOTE
Patient given tobacco quitline number 38891264584 at this time, refusing to call at this time, states \" I just dont want to quit now\"- patient given information as to the dangers of long term tobacco use. Continue to reinforce the importance of tobacco cessation.

## 2025-04-29 NOTE — TRANSITION OF CARE
Behavioral Health Transition Record    Patient Name: Balta Sarmiento  YOB: 1980   Medical Record Number: [id number]  Date of Admission: 4/26/2025  2:28 PM   Date of Discharge: 4/29/2025      Attending Provider: Walt Rutledge MD   Discharging Provider: Walt Rutledge MD  To contact this individual call [phone] and ask the  to page.  If unavailable, ask to be transferred to Behavioral Health Provider on call.  A Behavioral Health Provider will be available on call 24/7 and during holidays.    Primary Care Provider: No primary care provider on file.    Allergies   Allergen Reactions    Shellfish-Derived Products Rash    Fish-Derived Products     Propoxyphene Hives       Reason for Admission: Patient: Balta Sarmiento  MRN: [id number]  Reason for Admission to Psychiatric Unit:  Threat to self requiring 24 hour professional observation  A mental disorder causing major disability in social, interpersonal, occupational, and/or educational functioning that is leading to dangerous or life-threatening functioning, and that can only be addressed in an acute inpatient setting   Failure of outpatient psychiatry treatment so that the beneficiary requires 24 hour professional observation and care  Concerns about patient's safety in the community  Past Mental Health Diagnosis: a history of  Major Depression, Prior suicide attempt, and Alcohol and/or Drug Use Disorder  Triggering event or precipitating factor: Housing instability, Financial instability, Alcohol/Drug Relapse, Relationship Issues, and Psych Treatment Noncompliance  Length of time/duration of triggering event: Days  Legal Status: Voluntary          Admission Diagnosis: Depression with suicidal ideation [F32.A, R45.851]    * No surgery found *    No results found for this visit on 04/26/25.    Immunizations administered during this encounter:   Immunization History   Administered Date(s) Administered    Hepatitis B vaccine 04/14/1994, 06/27/1994    TDaP,

## 2025-04-29 NOTE — PLAN OF CARE
Problem: Depression  Goal: Will be euthymic at discharge  Description: INTERVENTIONS:1. Administer medication as ordered2. Provide emotional support via 1:1 interaction with staff3. Encourage involvement in milieu/groups/activities4. Monitor for social isolation  4/29/2025 0038 by Tawana Matthews  Outcome: Progressing  Note: Out in the milieu, Denies suicidal thoughts at this time. States feeling ready for discharge for tomorrow. Compliant with all prescribed medications for this shift. Safety checks maintained q15min and irregular rounding.      Problem: Self Harm/Suicidality  Goal: Will have no self-injury during hospital stay  Description: INTERVENTIONS:1.  Ensure constant observer at bedside with Q15M safety checks2.  Maintain a safe environment3.  Secure patient belongings4.  Ensure family/visitors adhere to safety recommendations5.  Ensure safety tray has been added to patient's diet order6.  Every shift and PRN: Re-assess suicidal risk via Frequent Screener  INTERVENTIONS:1.  Ensure constant observer at bedside with Q15M safety checks2.  Maintain a safe environment3.  Secure patient belongings4.  Ensure family/visitors adhere to safety recommendations5.  Ensure safety tray has been added to patient's diet order6.  Every shift and PRN: Re-assess suicidal risk via Frequent Screener  4/29/2025 0038 by Tawana Matthews  Outcome: Progressing  Note: Remains free from self harm at this time. Pt denies thoughts of self harm and is agreeable to seeking out should thoughts of self harm arise.  Safe environment maintained.  Q15 minute checks for safety cont per unit policy.  Will cont to monitor for safety and provides support and reassurance as needed.

## 2025-04-29 NOTE — DISCHARGE INSTRUCTIONS
[phone]    [facility]      [facility]  [address] [place] [address]    [address] [facility] [address]  Phone: [phone]      Phone: [phone]    [facility]     [facility] Department of Psychiatry  [address] [facility] [address]   [address] [place], [place] [address]  Phone: [phone]      Phone: [phone]    [facility]       [facility]  [address] [facility] [address]     [address] [facility] [address]  Phone: [phone]      Phone: [phone]    [facility]   [address] [facility] [address]    [address] [facility] [address] [facility] [address]  Phone: [phone]      Phone: [phone] Ext: [phone]    [place] [address] [facility] [address] / [address] [facility] [address] / [address] [facility] [address]  Phone: [phone]    [facility]   [facility]  [address] [facility] [address] [facility] [address]  [address] [place] [address]  Phone: [phone]      Phone: [phone]    [facility]  [address] [facility] [address]    [address] [facility] [address]  Phone: [phone]      Phone: [phone]

## 2025-04-29 NOTE — BH NOTE
[facility]  Discharge Note    Pt discharged with followings belongings:   Dental Appliances: None  Vision - Corrective Lenses: None  Hearing Aid: None  Jewelry: Necklace  Body Piercings Removed: N/A  Clothing: Belt, Footwear, Pants, Shirt, Shorts, Socks, Undergarments  Other Valuables: Money, Lighter/Matches (Bag of tobacco)   Valuables sent home with pt or returned to patient. Patient educated on aftercare instructions: yes  Information faxed to [facility] by writer  at 8:43 AM .Patient verbalize understanding of AVS:  yes.    Status EXAM upon discharge:  Mental Status and Behavioral Exam  Normal: Yes  Level of Assistance: Independent/Self  Facial Expression: Brightened  Affect: Appropriate  Level of Consciousness: Alert  Frequency of Checks: 4 times per hour, close  Mood:Normal: Yes  Mood: Anxious  Motor Activity:Normal: Yes  Eye Contact: Fair  Observed Behavior: Cooperative  Sexual Misconduct History: Current - no  Preception: [place] to person, [place] to time, [place] to place, [place] to situation  Attention:Normal: Yes  Attention: Distractible  Thought Processes: Circumstantial  Thought Content:Normal: Yes  Thought Content: Preoccupations  Depression Symptoms: No problems reported or observed.  Anxiety Symptoms: No problems reported or observed.  Yumiko Symptoms: No problems reported or observed.  Hallucinations: None  Delusions: No  Memory:Normal: Yes  Insight and Judgment: Yes  Insight and Judgment: Poor judgment, Poor insight    Tobacco Screening:  Practical Counseling, on admission, juan X, if applicable and completed (first 3 are required if patient doesn't refuse):            ( ) Recognizing danger situations (included triggers and roadblocks)                    ( ) Coping skills (new ways to manage stress,relaxation techniques, changing routine, distraction)                                                           ( ) Basic information about quitting (benefits of quitting, techniques in how

## 2025-04-29 NOTE — GROUP NOTE
Group Therapy Note    Date: 4/29/2025    Group Start Time: 2030  Group End Time: 2100  Group Topic: Relaxation    [facility]    Tawana Matthews        Group Therapy Note    Attendees: 15       Patient's Goal:  relaxation/socialization    Notes:  participated and cooperative    Status After Intervention:  Improved    Participation Level: Active Listener and Interactive    Participation Quality: Appropriate, Attentive, and Supportive      Speech:  normal      Thought Process/Content: Logical  Linear      Affective Functioning: Congruent      Mood: cooperative      Level of consciousness:  Alert and Oriented x4      Response to Learning: Progressing to goal      Endings: None Reported    Modes of Intervention: Support, Socialization, and Activity      Discipline Responsible: Registered Nurse      Signature:  Tawana Matthews

## 2025-04-29 NOTE — CARE COORDINATION
Name: Balta Sarmiento    : 1980    Auth number: [id number]     Discharge Date: 25    Destination: [facility]    Discharge Medications:      Medication List        START taking these medications      nicotine polacrilex 2 MG lozenge  Commonly known as: COMMIT  Take 1 lozenge by mouth every hour as needed for Smoking cessation  Notes to patient: Nicotine replacement            CHANGE how you take these medications      lamoTRIgine 25 MG tablet  Commonly known as: LAMICTAL  Take 2 tablets by mouth daily  What changed: how much to take  Notes to patient: Mood stabilizer     melatonin 3 MG Tabs tablet  Take 1 tablet by mouth nightly as needed (insomnia)  What changed: reasons to take this  Notes to patient: Sleep aid     venlafaxine 150 MG extended release capsule  Commonly known as: EFFEXOR XR  Take 1 capsule by mouth daily (with breakfast)  What changed: when to take this  Notes to patient: depression            STOP taking these medications      clindamycin 300 MG capsule  Commonly known as: CLEOCIN     folic acid 1 MG tablet  Commonly known as: FOLVITE     naproxen 500 MG tablet  Commonly known as: Naprosyn     therapeutic multivitamin-minerals tablet     vitamin B-1 100 MG tablet  Commonly known as: THIAMINE               Where to Get Your Medications        These medications were sent to [facility] [address] - P [phone] - F [phone]  [address] [place] [address] [facility] [address]      Phone: [phone]   lamoTRIgine 25 MG tablet  melatonin 3 MG Tabs tablet  nicotine polacrilex 2 MG lozenge  venlafaxine 150 MG extended release capsule         Follow Up Appointment: [address] [address]  Phone: [phone]  Fax: [phone]  Go on 2025  Arrive at 1PM for admission to substance use treatment

## 2025-04-30 PROCEDURE — 99282 EMERGENCY DEPT VISIT SF MDM: CPT

## 2025-04-30 NOTE — DISCHARGE SUMMARY
Provider Discharge Summary     Patient ID:  Balta Sarmiento  [id number]  44 y.o.  1980    Admit date: 4/26/2025    Discharge date and time: 4/29/2025  9:49 PM     Admitting Physician: Aime Abarca MD     Discharge Physician: Walt Rutledge MD    Admission Diagnoses: Depression with suicidal ideation [F32.A, R45.851]    Discharge Diagnoses:      Major depressive disorder, recurrent severe without psychotic features (HCC)     Patient Active Problem List   Diagnosis Code    Depression with suicidal ideation F32.A, R45.851    Alcohol abuse F10.10    Suicidal ideation R45.851    Major depressive disorder, recurrent severe without psychotic features (HCC) F33.2    Cocaine use disorder (HCC) F14.10    Cannabis use disorder F12.90    Alcohol withdrawal, uncomplicated (HCC) F10.930    Homelessness Z59.00    Alcohol use disorder F10.90    MDD (major depressive disorder), recurrent episode, moderate (HCC) F33.1    Atypical pneumonia J18.9    Acute hypoxic respiratory failure (HCC) J96.01    Chronic hepatitis C virus infection (HCC) B18.2    Genital warts due to HPV (human papillomavirus) A63.0    Syncope R55    Major depressive disorder, recurrent F33.9        Admission Condition: poor    Discharged Condition: stable    Indication for Admission: threat to self    History of Present Illnes (present tense wording is of findings from admission exam and are not necessarily indicative of current findings):     Patient is seen today for a follow up assessment.  He is found in the milieu.  He agrees to conduct interview privately in sensory room.  He describes his mood as \"okay\".  He endorses improvement of depression and suicidal ideation.  He is constructive and forward thinking this morning.  Reports placing phone calls to coordinate discharge to [facility].  He presents as restless and anxious.  He reports adequate sleep overnight.  Denies disturbance of appetite.  He is compliant with taking scheduled psychotropic medications.  No

## 2025-05-01 ENCOUNTER — HOSPITAL ENCOUNTER (EMERGENCY)
Age: 45
Discharge: HOME | End: 2025-05-01
Attending: EMERGENCY MEDICINE
Payer: MEDICAID

## 2025-05-01 VITALS
RESPIRATION RATE: 16 BRPM | SYSTOLIC BLOOD PRESSURE: 122 MMHG | HEART RATE: 96 BPM | OXYGEN SATURATION: 100 % | TEMPERATURE: 98.4 F | DIASTOLIC BLOOD PRESSURE: 79 MMHG

## 2025-05-01 DIAGNOSIS — F19.10 SUBSTANCE ABUSE (HCC): Primary | ICD-10-CM

## 2025-05-01 ASSESSMENT — PAIN - FUNCTIONAL ASSESSMENT
PAIN_FUNCTIONAL_ASSESSMENT: NONE - DENIES PAIN
PAIN_FUNCTIONAL_ASSESSMENT: NONE - DENIES PAIN

## 2025-05-01 NOTE — ED PROVIDER NOTES
[facility]     Emergency Department     Faculty Attestation    I performed a history and physical examination of the patient and discussed management with the resident. I have reviewed and agree with the resident’s findings including all diagnostic interpretations, and treatment plans as written. Any areas of disagreement are noted on the chart. I was personally present for the key portions of any procedures. I have documented in the chart those procedures where I was not present during the key portions. I have reviewed the emergency nurses triage note. I agree with the chief complaint, past medical history, past surgical history, allergies, medications, social and family history as documented unless otherwise noted below. Documentation of the HPI, Physical Exam and Medical Decision Making performed by janethibfe is based on my personal performance of the HPI, PE and MDM. For Physician Assistant/ Nurse Practitioner cases/documentation I have personally evaluated this patient and have completed at least one if not all key elements of the E/M (history, physical exam, and MDM). Additional findings are as noted.    Note Started: 2:40 AM EDT     43 yo M request treatment for substance use, no injury ,no cp ,no vomit, no suicidal or homicidal ideation,   PE vss gcs 15 SARAH no cervical tenderness, or deformity, abdomen nontender, no distention, no rigidity  ---social work arranging out pt resource, vss,   EKG Interpretation    Interpreted by me      CRITICAL CARE: There was a high probability of clinically significant/life threatening deterioration in this patient's condition which required my urgent intervention.  Total critical care time was 0 minutes.  This excludes any time for separately reportable procedures.       Gabe Leiva DO  05/01/25 0250       Gabe Castillo DO  05/01/25 7725

## 2025-05-01 NOTE — DISCHARGE INSTRUCTIONS
You are provided with resources by social work.  Please follow-up with primary care provider.  Please return to the ED with any worsening symptoms or concerns.

## 2025-05-01 NOTE — ED NOTES
Social work met with patient who reported to be at the ER to detox. Patient reported to want to go to [facility]. Patient reported he wants to detox alcohol and cocaine. Patient reported to have drank a few pints of alcohol and used cocaine around 9:45pm. Patient reported he is currently homeless and trying [facility] get [facility]. Patient reported he is not suicidal, homicidal and is not having auditory or visual hallucinations.     Social work notified patient that [facility] didn't answer the phone. Social work provided shelter, counseling, inpatient and outpatient detox resources, [facility] and [facility] information.     Social work spoke with [facility] who reported patient could come and complete assessment.     Social work cabbed patient to [facility]. #[phone].

## 2025-05-01 NOTE — ED PROVIDER NOTES
Lack of Transportation (Medical): Yes     Lack of Transportation (Non-Medical): Yes   Physical Activity: Not on file   Stress: Not on file   Social Connections: Not on file   Intimate Partner Violence: Not on file   Housing Stability: High Risk (4/26/2025)    Housing Stability Vital Sign     Unable to Pay for Housing in the Last Year: Yes     Number of Times Moved in the Last Year: 5     Homeless in the Last Year: Yes       No family history on file.    Allergies:  Shellfish-derived products, Fish-derived products, and Propoxyphene    Home Medications:  Prior to Admission medications    Medication Sig Start Date End Date Taking? Authorizing Provider   lamoTRIgine (LAMICTAL) 25 MG tablet Take 2 tablets by mouth daily 4/29/25   Walt Rutledge MD   melatonin 3 MG TABS tablet Take 1 tablet by mouth nightly as needed (insomnia) 4/28/25   Walt Rutledge MD   nicotine polacrilex (COMMIT) 2 MG lozenge Take 1 lozenge by mouth every hour as needed for Smoking cessation 4/28/25   Walt Rutledge MD   venlafaxine (EFFEXOR XR) 150 MG extended release capsule Take 1 capsule by mouth daily (with breakfast) 4/29/25   Walt Rutledge MD         REVIEW OF SYSTEMS       Review of Systems  See HPI  PHYSICAL EXAM      INITIAL VITALS:   /79   Pulse 96   Temp 98.4 °F (36.9 °C)   Resp 16   SpO2 100%     Physical Exam  Constitutional:       General: He is not in acute distress.  HENT:      Head: Normocephalic and atraumatic.      Mouth/Throat:      Mouth: Mucous membranes are moist.      Pharynx: Oropharynx is clear.   Eyes:      Extraocular Movements: Extraocular movements intact.      Pupils: Pupils are equal, round, and reactive to light.      Comments: Injected sclera   Cardiovascular:      Rate and Rhythm: Normal rate and regular rhythm.   Pulmonary:      Effort: Pulmonary effort is normal. No respiratory distress.      Breath sounds: Normal breath sounds.   Abdominal:      Palpations: Abdomen is soft.       Tenderness: There is no abdominal tenderness. There is no guarding or rebound.   Musculoskeletal:         General: Normal range of motion.   Skin:     General: Skin is warm and dry.      Capillary Refill: Capillary refill takes less than 2 seconds.   Neurological:      Mental Status: He is alert.           DDX/DIAGNOSTIC RESULTS / EMERGENCY DEPARTMENT COURSE / MDM     Medical Decision Making    44-year-old male presenting to ED requesting help for substance abuse.  Reports last using cocaine and alcohol at 9:45 PM.  Denies any homicidal suicidal ideation.  Denying any physical complaints.  GCS 15, vital signs stable, patient no acute distress.  Benign physical exam.  Will have social work see and evaluate the patient and develop disposition plan.    ED Course as of 05/01/25 0320   Thu May 01, 2025   0320 Patient seen and examined by social work.  Provided with resources.  Stable for discharge.  Advised on return precautions and proper follow-up.  Provided with PCP contact information.  Stable for discharge. [GP]      ED Course User Index  [GP] Ryne Cruz DO           CONSULTS:  None        FINAL IMPRESSION      1. Substance abuse (HCC)          DISPOSITION / PLAN     DISPOSITION Decision To Discharge 05/01/2025 03:20:04 AM   DISPOSITION CONDITION Stable           PATIENT REFERRED TO:  [facility]  [address] [place] [id number] [phone]  Schedule an appointment as soon as possible for a visit         DISCHARGE MEDICATIONS:  New Prescriptions    No medications on file       Ryne Cruz DO  Emergency Medicine Resident    (Please note that portions of thisnote were completed with a voice recognition program.  Efforts were made to edit the dictations but occasionally words are mis-transcribed.)

## 2025-05-01 NOTE — ED TRIAGE NOTES
Patient presents to the ED via Triage. Patient states that he is trying to get to [facility] for detox but does not have the ability to get there. Patient states that he has used cocaine and ETOH today. Patient is in NAD, respirations are even and unlabored, bed in lowest position and call light with in reach. Writer will continue with plan of care.

## 2025-06-18 ENCOUNTER — APPOINTMENT (OUTPATIENT)
Dept: GENERAL RADIOLOGY | Age: 45
End: 2025-06-18
Payer: MEDICAID

## 2025-06-18 ENCOUNTER — HOSPITAL ENCOUNTER (EMERGENCY)
Age: 45
Discharge: NURSING, REHAB OR LONG-TERM CARE | End: 2025-06-19
Attending: EMERGENCY MEDICINE
Payer: MEDICAID

## 2025-06-18 DIAGNOSIS — F10.920 ACUTE ALCOHOLIC INTOXICATION WITHOUT COMPLICATION: Primary | ICD-10-CM

## 2025-06-18 DIAGNOSIS — E16.2 HYPOGLYCEMIA: ICD-10-CM

## 2025-06-18 DIAGNOSIS — Z76.89 ENCOUNTER FOR ASSESSMENT OF ALCOHOL AND DRUG USE: ICD-10-CM

## 2025-06-18 DIAGNOSIS — N17.9 AKI (ACUTE KIDNEY INJURY): ICD-10-CM

## 2025-06-18 LAB
AMPHET UR QL SCN: NEGATIVE
ANION GAP SERPL CALCULATED.3IONS-SCNC: 20 MMOL/L (ref 9–16)
APAP SERPL-MCNC: <5 UG/ML (ref 10–30)
BARBITURATES UR QL SCN: NEGATIVE
BASOPHILS # BLD: 0.12 K/UL (ref 0–0.2)
BASOPHILS NFR BLD: 1 % (ref 0–2)
BENZODIAZ UR QL: NEGATIVE
BUN SERPL-MCNC: 19 MG/DL (ref 6–20)
CALCIUM SERPL-MCNC: 10 MG/DL (ref 8.6–10.4)
CANNABINOIDS UR QL SCN: NEGATIVE
CHLORIDE SERPL-SCNC: 99 MMOL/L (ref 98–107)
CO2 SERPL-SCNC: 18 MMOL/L (ref 20–31)
COCAINE UR QL SCN: POSITIVE
CREAT SERPL-MCNC: 1.5 MG/DL (ref 0.7–1.2)
EOSINOPHIL # BLD: <0.03 K/UL (ref 0–0.44)
EOSINOPHILS RELATIVE PERCENT: 0 % (ref 1–4)
ERYTHROCYTE [DISTWIDTH] IN BLOOD BY AUTOMATED COUNT: 12.2 % (ref 11.8–14.4)
ETHANOL PERCENT: 0.1 %
ETHANOLAMINE SERPL-MCNC: 103 MG/DL (ref 0–0.08)
FENTANYL UR QL: NEGATIVE
GFR, ESTIMATED: 59 ML/MIN/1.73M2
GLUCOSE SERPL-MCNC: 82 MG/DL (ref 74–99)
HCT VFR BLD AUTO: 46.8 % (ref 40.7–50.3)
HGB BLD-MCNC: 16 G/DL (ref 13–17)
IMM GRANULOCYTES # BLD AUTO: 0.17 K/UL (ref 0–0.3)
IMM GRANULOCYTES NFR BLD: 1 %
LYMPHOCYTES NFR BLD: 2.35 K/UL (ref 1.1–3.7)
LYMPHOCYTES RELATIVE PERCENT: 10 % (ref 24–43)
MCH RBC QN AUTO: 31.1 PG (ref 25.2–33.5)
MCHC RBC AUTO-ENTMCNC: 34.2 G/DL (ref 28.4–34.8)
MCV RBC AUTO: 91.1 FL (ref 82.6–102.9)
METHADONE UR QL: NEGATIVE
MONOCYTES NFR BLD: 1.25 K/UL (ref 0.1–1.2)
MONOCYTES NFR BLD: 5 % (ref 3–12)
NEUTROPHILS NFR BLD: 83 % (ref 36–65)
NEUTS SEG NFR BLD: 19.18 K/UL (ref 1.5–8.1)
NRBC BLD-RTO: 0 PER 100 WBC
OPIATES UR QL SCN: NEGATIVE
OXYCODONE UR QL SCN: NEGATIVE
PCP UR QL SCN: NEGATIVE
PLATELET # BLD AUTO: 293 K/UL (ref 138–453)
PMV BLD AUTO: 10 FL (ref 8.1–13.5)
POTASSIUM SERPL-SCNC: 4.4 MMOL/L (ref 3.7–5.3)
RBC # BLD AUTO: 5.14 M/UL (ref 4.21–5.77)
SALICYLATES SERPL-MCNC: <0.5 MG/DL (ref 0–10)
SODIUM SERPL-SCNC: 137 MMOL/L (ref 136–145)
TEST INFORMATION: ABNORMAL
TROPONIN I SERPL HS-MCNC: 11 NG/L (ref 0–22)
TROPONIN I SERPL HS-MCNC: 8 NG/L (ref 0–22)
WBC OTHER # BLD: 23.1 K/UL (ref 3.5–11.3)

## 2025-06-18 PROCEDURE — 83874 ASSAY OF MYOGLOBIN: CPT

## 2025-06-18 PROCEDURE — 2580000003 HC RX 258

## 2025-06-18 PROCEDURE — 80048 BASIC METABOLIC PNL TOTAL CA: CPT

## 2025-06-18 PROCEDURE — 82550 ASSAY OF CK (CPK): CPT

## 2025-06-18 PROCEDURE — 80307 DRUG TEST PRSMV CHEM ANLYZR: CPT

## 2025-06-18 PROCEDURE — 85025 COMPLETE CBC W/AUTO DIFF WBC: CPT

## 2025-06-18 PROCEDURE — 96374 THER/PROPH/DIAG INJ IV PUSH: CPT | Performed by: EMERGENCY MEDICINE

## 2025-06-18 PROCEDURE — 80179 DRUG ASSAY SALICYLATE: CPT

## 2025-06-18 PROCEDURE — 93005 ELECTROCARDIOGRAM TRACING: CPT

## 2025-06-18 PROCEDURE — G0480 DRUG TEST DEF 1-7 CLASSES: HCPCS

## 2025-06-18 PROCEDURE — 80143 DRUG ASSAY ACETAMINOPHEN: CPT

## 2025-06-18 PROCEDURE — 99285 EMERGENCY DEPT VISIT HI MDM: CPT | Performed by: EMERGENCY MEDICINE

## 2025-06-18 PROCEDURE — 71045 X-RAY EXAM CHEST 1 VIEW: CPT

## 2025-06-18 PROCEDURE — 84484 ASSAY OF TROPONIN QUANT: CPT

## 2025-06-18 PROCEDURE — 6360000002 HC RX W HCPCS

## 2025-06-18 PROCEDURE — 96361 HYDRATE IV INFUSION ADD-ON: CPT | Performed by: EMERGENCY MEDICINE

## 2025-06-18 RX ORDER — 0.9 % SODIUM CHLORIDE 0.9 %
1000 INTRAVENOUS SOLUTION INTRAVENOUS ONCE
Status: COMPLETED | OUTPATIENT
Start: 2025-06-18 | End: 2025-06-19

## 2025-06-18 RX ORDER — ONDANSETRON 2 MG/ML
4 INJECTION INTRAMUSCULAR; INTRAVENOUS ONCE
Status: COMPLETED | OUTPATIENT
Start: 2025-06-18 | End: 2025-06-18

## 2025-06-18 RX ADMIN — ONDANSETRON 4 MG: 2 INJECTION INTRAMUSCULAR; INTRAVENOUS at 21:52

## 2025-06-18 RX ADMIN — SODIUM CHLORIDE 1000 ML: 9 INJECTION, SOLUTION INTRAVENOUS at 22:42

## 2025-06-18 ASSESSMENT — ENCOUNTER SYMPTOMS
VOMITING: 0
DIARRHEA: 0
ABDOMINAL PAIN: 0
SHORTNESS OF BREATH: 0
COUGH: 0
SORE THROAT: 0
NAUSEA: 0

## 2025-06-18 ASSESSMENT — LIFESTYLE VARIABLES
HOW OFTEN DO YOU HAVE A DRINK CONTAINING ALCOHOL: 2-3 TIMES A WEEK
HOW MANY STANDARD DRINKS CONTAINING ALCOHOL DO YOU HAVE ON A TYPICAL DAY: 3 OR 4

## 2025-06-18 ASSESSMENT — HEART SCORE: ECG: NORMAL

## 2025-06-18 ASSESSMENT — PAIN SCALES - GENERAL: PAINLEVEL_OUTOF10: 3

## 2025-06-19 VITALS
DIASTOLIC BLOOD PRESSURE: 90 MMHG | SYSTOLIC BLOOD PRESSURE: 116 MMHG | HEART RATE: 94 BPM | TEMPERATURE: 98.1 F | RESPIRATION RATE: 18 BRPM | OXYGEN SATURATION: 93 %

## 2025-06-19 LAB
ANION GAP SERPL CALCULATED.3IONS-SCNC: 16 MMOL/L (ref 9–16)
BUN SERPL-MCNC: 15 MG/DL (ref 6–20)
CALCIUM SERPL-MCNC: 7.1 MG/DL (ref 8.6–10.4)
CHLORIDE SERPL-SCNC: 110 MMOL/L (ref 98–107)
CK SERPL-CCNC: 367 U/L (ref 39–308)
CO2 SERPL-SCNC: 14 MMOL/L (ref 20–31)
CREAT SERPL-MCNC: 1 MG/DL (ref 0.7–1.2)
GFR, ESTIMATED: >90 ML/MIN/1.73M2
GLUCOSE BLD-MCNC: 98 MG/DL (ref 75–110)
GLUCOSE SERPL-MCNC: 64 MG/DL (ref 74–99)
MYOGLOBIN SERPL-MCNC: 376 NG/ML (ref 28–72)
POTASSIUM SERPL-SCNC: 3.5 MMOL/L (ref 3.7–5.3)
SODIUM SERPL-SCNC: 140 MMOL/L (ref 136–145)

## 2025-06-19 PROCEDURE — 82947 ASSAY GLUCOSE BLOOD QUANT: CPT

## 2025-06-19 PROCEDURE — 80048 BASIC METABOLIC PNL TOTAL CA: CPT

## 2025-06-19 PROCEDURE — 96361 HYDRATE IV INFUSION ADD-ON: CPT | Performed by: EMERGENCY MEDICINE

## 2025-06-19 NOTE — ED NOTES
Pt arrived via triage for c/o addiction problem  Pt initially stated he had SI  Denies  to writer and Resident  States he  wants help for addiction to alcohol and cocaine  States he relapsed today and has been drinking liquor all day  States he's been doing cocaine since 3 pm  Buy 1/2 a gram q 30 mins  Tachy upon arrival  States cp  Denies sob  RR even and unlabored   Call light in reach

## 2025-06-19 NOTE — ED PROVIDER NOTES
[facility] EMERGENCY DEPARTMENT  Emergency Department Encounter  Emergency Medicine Resident     Pt Name:Balta Sarmiento  MRN: [id number]  Birthdate 1980  Date of evaluation: 6/18/25  PCP:  No primary care provider on file.  Note Started: 9:25 PM EDT      CHIEF COMPLAINT       Chief Complaint   Patient presents with    Addiction Problem       HISTORY OF PRESENT ILLNESS  (Location/Symptom, Timing/Onset, Context/Setting, Quality, Duration, Modifying Factors, Severity.)      Balta Sarmiento is a 44 y.o. male with history of anxiety, depression, and substance abuse who presents seeking help for his substance abuse.  Patient states he got back to [facility] this morning.  He had been clean for about a month and a half and relapsed today.  Patient states that he turned to drugs and alcohol to deal with his problems.  He started drinking alcohol and using cocaine this morning.  Patient admits to drinking 3 pints of vodka and a couple beers as well as using cocaine.  His last use of alcohol and cocaine was around 3 to 4 PM.  Patient states he has had tremoring from withdrawing from alcohol in the past but denies any withdrawal seizures.  He denies any suicidal or homicidal ideation.  He denies any auditory or visual hallucinations.  Patient is on disulfiram, Effexor, and Lamictal which she has been taking.  Patient is wanting to go to [facility].  Patient states he did start having some right sided chest pain when walking here.  Chest pain is nonradiating.  He denies any nausea, vomiting, or diaphoresis.  He denies any abdominal pain.  No diarrhea.  No cardiac history that he is aware of.    PAST MEDICAL / SURGICAL / SOCIAL / FAMILY HISTORY      has a past medical history of Anxiety, Chest pain, Depression, and Insomnia.     has no past surgical history on file.    Social History     Socioeconomic History    Marital status: Single     Spouse name: Not on file    Number of children: Not on file    Years of education: Not on  file    Highest education level: Not on file   Occupational History    Not on file   Tobacco Use    Smoking status: Every Day     Current packs/day: 0.50     Types: Cigarettes    Smokeless tobacco: Never   Vaping Use    Vaping status: Former   Substance and Sexual Activity    Alcohol use: Yes     Comment: 4 beers and a fifth of liquor    Drug use: Yes     Types: Cocaine, Marijuana (Weed)    Sexual activity: Not on file   Other Topics Concern    Not on file   Social History Narrative    Not on file     Social Drivers of Health     Financial Resource Strain: Not on file   Food Insecurity: Food Insecurity Present (4/26/2025)    Hunger Vital Sign     Worried About Running Out of Food in the Last Year: Often true     Ran Out of Food in the Last Year: Often true   Transportation Needs: Unmet Transportation Needs (4/26/2025)    PRAPARE - Transportation     Lack of Transportation (Medical): Yes     Lack of Transportation (Non-Medical): Yes   Physical Activity: Not on file   Stress: Not on file   Social Connections: Not on file   Intimate Partner Violence: Not on file   Housing Stability: High Risk (4/26/2025)    Housing Stability Vital Sign     Unable to Pay for Housing in the Last Year: Yes     Number of Times Moved in the Last Year: 5     Homeless in the Last Year: Yes       History reviewed. No pertinent family history.    Allergies:  Shellfish-derived products, Fish-derived products, and Propoxyphene    Home Medications:  Prior to Admission medications    Medication Sig Start Date End Date Taking? Authorizing Provider   lamoTRIgine (LAMICTAL) 25 MG tablet Take 2 tablets by mouth daily 4/29/25   Walt Rutledge MD   melatonin 3 MG TABS tablet Take 1 tablet by mouth nightly as needed (insomnia) 4/28/25   Walt Rutledge MD   nicotine polacrilex (COMMIT) 2 MG lozenge Take 1 lozenge by mouth every hour as needed for Smoking cessation 4/28/25   Walt Rutledge MD   venlafaxine (EFFEXOR XR) 150 MG extended release

## 2025-06-19 NOTE — ED NOTES
Social work spoke with Suzette at [facility] who reported patient is accepted.    Social work booked patient a cab to [facility]. #[phone].

## 2025-06-19 NOTE — ED NOTES
Social work met with patient who reported he is at the ER due to being addicted to alcohol and cocaine. Patient reported the only place that really helps him is [facility] and that is where he would like to go back too. Patient reported he is ready to be done using and [facility] is the only service that is beneficial for him. Patient reported [facility] is not beneficial to him because they will release him after a few days and then he will use. Patient reported a need to go to [facility] and to need to get and stay sober.     Social work provided patient paperwork for inpatient and outpatient detox facilities. Patient reported the other facilities do not help him and he will end up using if he goes to any of those facilities.     Social work called [facility] and was asked to fax paperwork to be reviewed for acceptance.

## 2025-06-19 NOTE — DISCHARGE INSTRUCTIONS
You were seen in the emergency department as you were seeking help with drug and alcohol abuse.    Your heart rate was elevated on arrival.  Other vital signs were stable.    EKG showed no concerning changes.    Lab work did show that your kidney numbers were elevated, likely due to dehydration.  This improved with IV fluids.    Your blood sugar was also low.  This improved with something to eat.    Your white blood cell count was elevated, this may be due to dehydration.  Low suspicion for any infection as you have no other complaints at this time.    Social work met with you at bedside.  You have been accepted to [facility].  You are medically stable for discharge to [facility] for help with your drug and alcohol abuse.    Please follow-up with a primary care provider.    Please return to the emergency department if your chest pain worsens or if you develop any shortness of breath, nausea, vomiting, sweating, or any other concerning symptoms.

## 2025-06-19 NOTE — ED PROVIDER NOTES
[facility]  Emergency Department  Faculty Attestation     I performed a history and physical examination of the patient and discussed management with the resident. I reviewed the resident’s note and agree with the documented findings and plan of care. Any areas of disagreement are noted on the chart. I was personally present for the key portions of any procedures. I have documented in the chart those procedures where I was not present during the key portions. I have reviewed the emergency nurses triage note. I agree with the chief complaint, past medical history, past surgical history, allergies, medications, social and family history as documented unless otherwise noted below.    For Physician Assistant/ Nurse Practitioner cases/documentation I have personally evaluated this patient and have completed at least one if not all key elements of the E/M (history, physical exam, and MDM). Additional findings are as noted.    Preliminary note started at 10:15 PM EDT    Primary Care Physician:  No primary care provider on file.    Screenings:  [unfilled]    CHIEF COMPLAINT       Chief Complaint   Patient presents with    Addiction Problem       RECENT VITALS:   BP (!) 116/91   Pulse (!) 131   Temp 98.1 °F (36.7 °C)   Resp 18   SpO2 96%     LABS:  Labs Reviewed   CBC WITH AUTO DIFFERENTIAL - Abnormal; Notable for the following components:       Result Value    WBC 23.1 (*)     Neutrophils % 83 (*)     Lymphocytes % 10 (*)     Eosinophils % 0 (*)     Immature Granulocytes % 1 (*)     Neutrophils Absolute 19.18 (*)     Monocytes Absolute 1.25 (*)     All other components within normal limits   BASIC METABOLIC PANEL   TROPONIN   URINE DRUG SCREEN   ETHANOL   ACETAMINOPHEN LEVEL   SALICYLATE LEVEL       Radiology  XR CHEST PORTABLE   Final Result   1. No acute process.                CRITICAL CARE: There was a high probability of clinically significant/life threatening deterioration

## 2025-06-19 NOTE — ED PROVIDER NOTES
[facility]   Emergency Department  Emergency Medicine Attending Sign-out   Note started: 2:21 AM EDT    Care of Balta Sarmiento was assumed from previous attending Dr. Goyal at 2 AM and is being seen for Addiction Problem  .  The patient's initial evaluation and plan have been discussed with the prior provider who initially evaluated the patient.     Attestation  I was available and discussed any additional care issues that arose and coordinated the management plans with the resident(s) caring for the patient during my duty period. Any areas of disagreement with resident's documentation of care or procedures are noted on the chart. I was personally present for the key portions of any/all procedures, during my duty period. I have documented in the chart those procedures where I was not present during the key portions.     BRIEF PATIENT SUMMARY/MDM COURSE PER INITIAL PROVIDER:   RECENT VITALS:     Temp: 98.1 °F (36.7 °C),  Pulse: 94, Respirations: 18, BP: 134/86, SpO2: 95 %    This patient is a 44 y.o. Male with alcohol abuse.  Wanting to go to [facility].  Awaiting acceptance    DIAGNOSTICS/MEDICATIONS:     MEDICATIONS GIVEN:  ED Medication Orders (From admission, onward)      Start Ordered     Status Ordering Provider    06/18/25 2245 06/18/25 2230  sodium chloride 0.9 % bolus 1,000 mL  ONCE         Last MAR action: Stopped - by TRINO DELEON on 06/19/25 at 0116 ALMAS PULLIAM    06/18/25 2145 06/18/25 2135  ondansetron (ZOFRAN) injection 4 mg  ONCE         Last MAR action: Given - by YONY DARCI on 06/18/25 at 2152 ALMAS PULLIAM            LABS    Labs Reviewed   CBC WITH AUTO DIFFERENTIAL - Abnormal; Notable for the following components:       Result Value    WBC 23.1 (*)     Neutrophils % 83 (*)     Lymphocytes % 10 (*)     Eosinophils % 0 (*)     Immature Granulocytes % 1 (*)     Neutrophils Absolute 19.18 (*)     Monocytes Absolute 1.25 (*)     All other components within normal limits

## 2025-06-20 LAB
EKG ATRIAL RATE: 127 BPM
EKG P AXIS: 34 DEGREES
EKG P-R INTERVAL: 156 MS
EKG Q-T INTERVAL: 308 MS
EKG QRS DURATION: 84 MS
EKG QTC CALCULATION (BAZETT): 447 MS
EKG R AXIS: -23 DEGREES
EKG T AXIS: 52 DEGREES
EKG VENTRICULAR RATE: 127 BPM

## 2025-06-24 ENCOUNTER — HOSPITAL ENCOUNTER (EMERGENCY)
Age: 45
Discharge: ANOTHER HOSPITAL | DRG: 751 | End: 2025-06-25
Attending: STUDENT IN AN ORGANIZED HEALTH CARE EDUCATION/TRAINING PROGRAM
Payer: MEDICAID

## 2025-06-24 DIAGNOSIS — R45.851 SUICIDAL IDEATION: Primary | ICD-10-CM

## 2025-06-24 PROCEDURE — 99285 EMERGENCY DEPT VISIT HI MDM: CPT

## 2025-06-24 RX ORDER — 0.9 % SODIUM CHLORIDE 0.9 %
1000 INTRAVENOUS SOLUTION INTRAVENOUS ONCE
Status: COMPLETED | OUTPATIENT
Start: 2025-06-25 | End: 2025-06-25

## 2025-06-25 ENCOUNTER — HOSPITAL ENCOUNTER (INPATIENT)
Age: 45
LOS: 2 days | Discharge: HOME | DRG: 751 | End: 2025-06-27
Attending: PSYCHIATRY & NEUROLOGY | Admitting: PSYCHIATRY & NEUROLOGY
Payer: MEDICAID

## 2025-06-25 VITALS
RESPIRATION RATE: 16 BRPM | DIASTOLIC BLOOD PRESSURE: 68 MMHG | TEMPERATURE: 98.1 F | OXYGEN SATURATION: 96 % | HEART RATE: 87 BPM | SYSTOLIC BLOOD PRESSURE: 110 MMHG

## 2025-06-25 LAB
ALBUMIN SERPL-MCNC: 4.9 G/DL (ref 3.5–5.2)
ALBUMIN/GLOB SERPL: 1.6 {RATIO} (ref 1–2.5)
ALP SERPL-CCNC: 87 U/L (ref 40–129)
ALT SERPL-CCNC: 13 U/L (ref 10–50)
AMPHET UR QL SCN: NEGATIVE
ANION GAP SERPL CALCULATED.3IONS-SCNC: 16 MMOL/L (ref 9–16)
APAP SERPL-MCNC: <5 UG/ML (ref 10–30)
AST SERPL-CCNC: 20 U/L (ref 10–50)
BARBITURATES UR QL SCN: NEGATIVE
BASOPHILS # BLD: 0.08 K/UL (ref 0–0.2)
BASOPHILS NFR BLD: 1 % (ref 0–2)
BENZODIAZ UR QL: NEGATIVE
BILIRUB SERPL-MCNC: 0.4 MG/DL (ref 0–1.2)
BUN SERPL-MCNC: 16 MG/DL (ref 6–20)
CALCIUM SERPL-MCNC: 10 MG/DL (ref 8.6–10.4)
CANNABINOIDS UR QL SCN: POSITIVE
CHLORIDE SERPL-SCNC: 103 MMOL/L (ref 98–107)
CO2 SERPL-SCNC: 22 MMOL/L (ref 20–31)
COCAINE UR QL SCN: NEGATIVE
CREAT SERPL-MCNC: 1.2 MG/DL (ref 0.7–1.2)
EOSINOPHIL # BLD: 0.28 K/UL (ref 0–0.44)
EOSINOPHILS RELATIVE PERCENT: 2 % (ref 1–4)
ERYTHROCYTE [DISTWIDTH] IN BLOOD BY AUTOMATED COUNT: 12 % (ref 11.8–14.4)
ETHANOL PERCENT: <0.01 %
ETHANOLAMINE SERPL-MCNC: <10 MG/DL (ref 0–0.08)
FENTANYL UR QL: NEGATIVE
GFR, ESTIMATED: 76 ML/MIN/1.73M2
GLUCOSE SERPL-MCNC: 96 MG/DL (ref 74–99)
HCT VFR BLD AUTO: 45.2 % (ref 40.7–50.3)
HGB BLD-MCNC: 15.5 G/DL (ref 13–17)
IMM GRANULOCYTES # BLD AUTO: 0.07 K/UL (ref 0–0.3)
IMM GRANULOCYTES NFR BLD: 1 %
LYMPHOCYTES NFR BLD: 2.74 K/UL (ref 1.1–3.7)
LYMPHOCYTES RELATIVE PERCENT: 21 % (ref 24–43)
MCH RBC QN AUTO: 31.3 PG (ref 25.2–33.5)
MCHC RBC AUTO-ENTMCNC: 34.3 G/DL (ref 28.4–34.8)
MCV RBC AUTO: 91.1 FL (ref 82.6–102.9)
METHADONE UR QL: NEGATIVE
MONOCYTES NFR BLD: 0.76 K/UL (ref 0.1–1.2)
MONOCYTES NFR BLD: 6 % (ref 3–12)
NEUTROPHILS NFR BLD: 69 % (ref 36–65)
NEUTS SEG NFR BLD: 8.95 K/UL (ref 1.5–8.1)
NRBC BLD-RTO: 0 PER 100 WBC
OPIATES UR QL SCN: NEGATIVE
OXYCODONE UR QL SCN: NEGATIVE
PCP UR QL SCN: NEGATIVE
PLATELET # BLD AUTO: 260 K/UL (ref 138–453)
PMV BLD AUTO: 10.2 FL (ref 8.1–13.5)
POTASSIUM SERPL-SCNC: 4.2 MMOL/L (ref 3.7–5.3)
PROT SERPL-MCNC: 8 G/DL (ref 6.6–8.7)
RBC # BLD AUTO: 4.96 M/UL (ref 4.21–5.77)
SALICYLATES SERPL-MCNC: <0.5 MG/DL (ref 0–10)
SODIUM SERPL-SCNC: 141 MMOL/L (ref 136–145)
TEST INFORMATION: ABNORMAL
WBC OTHER # BLD: 12.9 K/UL (ref 3.5–11.3)

## 2025-06-25 PROCEDURE — 85025 COMPLETE CBC W/AUTO DIFF WBC: CPT

## 2025-06-25 PROCEDURE — 6370000000 HC RX 637 (ALT 250 FOR IP): Performed by: EMERGENCY MEDICINE

## 2025-06-25 PROCEDURE — 6360000002 HC RX W HCPCS: Performed by: EMERGENCY MEDICINE

## 2025-06-25 PROCEDURE — 80307 DRUG TEST PRSMV CHEM ANLYZR: CPT

## 2025-06-25 PROCEDURE — 96374 THER/PROPH/DIAG INJ IV PUSH: CPT

## 2025-06-25 PROCEDURE — G0480 DRUG TEST DEF 1-7 CLASSES: HCPCS

## 2025-06-25 PROCEDURE — 80179 DRUG ASSAY SALICYLATE: CPT

## 2025-06-25 PROCEDURE — 99222 1ST HOSP IP/OBS MODERATE 55: CPT

## 2025-06-25 PROCEDURE — 99223 1ST HOSP IP/OBS HIGH 75: CPT | Performed by: PSYCHIATRY & NEUROLOGY

## 2025-06-25 PROCEDURE — 80143 DRUG ASSAY ACETAMINOPHEN: CPT

## 2025-06-25 PROCEDURE — 1240000000 HC EMOTIONAL WELLNESS R&B

## 2025-06-25 PROCEDURE — APPSS60 APP SPLIT SHARED TIME 46-60 MINUTES

## 2025-06-25 PROCEDURE — 2580000003 HC RX 258: Performed by: EMERGENCY MEDICINE

## 2025-06-25 PROCEDURE — 80053 COMPREHEN METABOLIC PANEL: CPT

## 2025-06-25 RX ORDER — IBUPROFEN 400 MG/1
400 TABLET, FILM COATED ORAL EVERY 6 HOURS PRN
Status: DISCONTINUED | OUTPATIENT
Start: 2025-06-25 | End: 2025-06-27 | Stop reason: HOSPADM

## 2025-06-25 RX ORDER — HYDROXYZINE HYDROCHLORIDE 50 MG/1
50 TABLET, FILM COATED ORAL 3 TIMES DAILY PRN
Status: DISCONTINUED | OUTPATIENT
Start: 2025-06-25 | End: 2025-06-27 | Stop reason: HOSPADM

## 2025-06-25 RX ORDER — ONDANSETRON 2 MG/ML
4 INJECTION INTRAMUSCULAR; INTRAVENOUS ONCE
Status: COMPLETED | OUTPATIENT
Start: 2025-06-25 | End: 2025-06-25

## 2025-06-25 RX ORDER — MAGNESIUM HYDROXIDE/ALUMINUM HYDROXICE/SIMETHICONE 120; 1200; 1200 MG/30ML; MG/30ML; MG/30ML
30 SUSPENSION ORAL EVERY 6 HOURS PRN
Status: DISCONTINUED | OUTPATIENT
Start: 2025-06-25 | End: 2025-06-27 | Stop reason: HOSPADM

## 2025-06-25 RX ORDER — ACETAMINOPHEN 500 MG
1000 TABLET ORAL ONCE
Status: COMPLETED | OUTPATIENT
Start: 2025-06-25 | End: 2025-06-25

## 2025-06-25 RX ORDER — NICOTINE 21 MG/24HR
1 PATCH, TRANSDERMAL 24 HOURS TRANSDERMAL DAILY
Status: DISCONTINUED | OUTPATIENT
Start: 2025-06-25 | End: 2025-06-25

## 2025-06-25 RX ORDER — POLYETHYLENE GLYCOL 3350 17 G/17G
17 POWDER, FOR SOLUTION ORAL DAILY PRN
Status: DISCONTINUED | OUTPATIENT
Start: 2025-06-25 | End: 2025-06-27 | Stop reason: HOSPADM

## 2025-06-25 RX ORDER — VENLAFAXINE HYDROCHLORIDE 150 MG/1
150 CAPSULE, EXTENDED RELEASE ORAL
Status: DISCONTINUED | OUTPATIENT
Start: 2025-06-26 | End: 2025-06-27 | Stop reason: HOSPADM

## 2025-06-25 RX ORDER — POLYETHYLENE GLYCOL 3350 17 G
2 POWDER IN PACKET (EA) ORAL
Status: DISCONTINUED | OUTPATIENT
Start: 2025-06-25 | End: 2025-06-27 | Stop reason: HOSPADM

## 2025-06-25 RX ORDER — TRAZODONE HYDROCHLORIDE 50 MG/1
50 TABLET ORAL NIGHTLY PRN
Status: DISCONTINUED | OUTPATIENT
Start: 2025-06-25 | End: 2025-06-27 | Stop reason: HOSPADM

## 2025-06-25 RX ORDER — ACETAMINOPHEN 325 MG/1
650 TABLET ORAL EVERY 4 HOURS PRN
Status: DISCONTINUED | OUTPATIENT
Start: 2025-06-25 | End: 2025-06-27 | Stop reason: HOSPADM

## 2025-06-25 RX ADMIN — ACETAMINOPHEN 1000 MG: 500 TABLET ORAL at 00:32

## 2025-06-25 RX ADMIN — ONDANSETRON 4 MG: 2 INJECTION, SOLUTION INTRAMUSCULAR; INTRAVENOUS at 00:04

## 2025-06-25 RX ADMIN — SODIUM CHLORIDE 1000 ML: 0.9 INJECTION, SOLUTION INTRAVENOUS at 00:04

## 2025-06-25 ASSESSMENT — PATIENT HEALTH QUESTIONNAIRE - PHQ9
SUM OF ALL RESPONSES TO PHQ QUESTIONS 1-9: 0
1. LITTLE INTEREST OR PLEASURE IN DOING THINGS: NOT AT ALL
2. FEELING DOWN, DEPRESSED OR HOPELESS: NOT AT ALL
SUM OF ALL RESPONSES TO PHQ QUESTIONS 1-9: 0

## 2025-06-25 ASSESSMENT — ENCOUNTER SYMPTOMS
COUGH: 0
VOMITING: 0
SHORTNESS OF BREATH: 0
NAUSEA: 0
ABDOMINAL PAIN: 0

## 2025-06-25 ASSESSMENT — LIFESTYLE VARIABLES
HOW MANY STANDARD DRINKS CONTAINING ALCOHOL DO YOU HAVE ON A TYPICAL DAY: 3 OR 4
HOW MANY STANDARD DRINKS CONTAINING ALCOHOL DO YOU HAVE ON A TYPICAL DAY: 3 OR 4
HOW OFTEN DO YOU HAVE A DRINK CONTAINING ALCOHOL: 2-3 TIMES A WEEK
HOW OFTEN DO YOU HAVE A DRINK CONTAINING ALCOHOL: 2-3 TIMES A WEEK

## 2025-06-25 ASSESSMENT — SLEEP AND FATIGUE QUESTIONNAIRES
AVERAGE NUMBER OF SLEEP HOURS: 4
SLEEP PATTERN: DISTURBED/INTERRUPTED SLEEP;INSOMNIA
DO YOU USE A SLEEP AID: YES
DO YOU HAVE DIFFICULTY SLEEPING: YES

## 2025-06-25 NOTE — ED PROVIDER NOTES
[facility] EMERGENCY DEPARTMENT  Emergency Department Encounter  Emergency Medicine Resident     Pt Name:Balta Sarmiento  MRN: [id number]  Birthdate 1980  Date of evaluation: 6/24/25  PCP:  No primary care provider on file.  Note Started: 11:51 PM EDT      CHIEF COMPLAINT       Chief Complaint   Patient presents with    Suicidal    Addiction Problem     alcohol       HISTORY OF PRESENT ILLNESS  (Location/Symptom, Timing/Onset, Context/Setting, Quality, Duration, Modifying Factors, Severity.)      Balta Sarmiento is a 44 y.o. male who presents with suicidal ideation.  Patient states that he was drinking all day today started at 830 with a beer and has been having \"beer after beer since then\".  Patient has a history of hepatitis C and is also a daily drinker.  Reports he has \"borderline cirrhosis\".  He states that today, he took a handful of pills and was trying to overdose on them and his friend took them from him and flushed them down the toilet which made him mad.  He has been out in the heat all day.  He does have a history of PTSD as well as anxiety and depression on medications that he has been off of for the last 3 days because he forgot to pack them when he left [place].    PAST MEDICAL / SURGICAL / SOCIAL / FAMILY HISTORY      has a past medical history of Anxiety, Chest pain, Depression, and Insomnia.     has no past surgical history on file.    Social History     Socioeconomic History    Marital status: Single     Spouse name: Not on file    Number of children: Not on file    Years of education: Not on file    Highest education level: Not on file   Occupational History    Not on file   Tobacco Use    Smoking status: Every Day     Current packs/day: 0.50     Types: Cigarettes    Smokeless tobacco: Never   Vaping Use    Vaping status: Former   Substance and Sexual Activity    Alcohol use: Yes     Comment: 4 beers and a fifth of liquor    Drug use: Yes     Types: Cocaine, Marijuana (Weed)    Sexual  Pulse 87   Temp 98.1 °F (36.7 °C)   Resp 16   SpO2 96%     Physical Exam  Constitutional:       General: He is not in acute distress.     Appearance: Normal appearance. He is not toxic-appearing.   HENT:      Head: Normocephalic and atraumatic.      Nose: No congestion or rhinorrhea.      Mouth/Throat:      Mouth: Mucous membranes are moist.   Eyes:      Conjunctiva/sclera: Conjunctivae normal.      Pupils: Pupils are equal, round, and reactive to light.   Cardiovascular:      Rate and Rhythm: Normal rate and regular rhythm.      Pulses: Normal pulses.      Heart sounds: No murmur heard.  Pulmonary:      Effort: Pulmonary effort is normal. No respiratory distress.      Breath sounds: Normal breath sounds. No wheezing.   Abdominal:      Palpations: Abdomen is soft.      Tenderness: There is no abdominal tenderness. There is no guarding or rebound.   Musculoskeletal:      Right lower leg: No edema.      Left lower leg: No edema.   Skin:     General: Skin is warm and dry.   Neurological:      Mental Status: He is alert and oriented to person, place, and time.   Psychiatric:         Mood and Affect: Mood normal.         Behavior: Behavior normal.         Judgment: Judgment normal.         DDX/DIAGNOSTIC RESULTS / EMERGENCY DEPARTMENT COURSE / MDM     Medical Decision Making  44-year-old male with history of anxiety and depression who presents with suicidal ideation.  On arrival, he is slightly tachycardic, otherwise vital signs are stable.  He states he has been drinking alcohol all day today and has had no water.  Will obtain psychiatric screening labs, give the patient a liter of IV fluids, reassess at sober time    Amount and/or Complexity of Data Reviewed  Labs: ordered. Decision-making details documented in ED Course.    Risk  OTC drugs.  Prescription drug management.      EMERGENCY DEPARTMENT COURSE:    ED Course as of 06/25/25 0700   Wed Jun 25, 2025   0027 WBC(!): 12.9 [BL]   0027 Hemoglobin Quant: 15.5 [BL]

## 2025-06-25 NOTE — BH NOTE
Patient given tobacco quitline number 68614441161 at this time, refusing to call at this time, states \" I just dont want to quit now\"- patient given information as to the dangers of long term tobacco use. Continue to reinforce the importance of tobacco cessation.

## 2025-06-25 NOTE — ED NOTES
[facility]  [facility]   All questions answered

## 2025-06-25 NOTE — H&P
[facility] Internal Medicine  Nickolas Parker MD; Tyler Snider MD,, Martita Hicks MD, Dr Sharon Torres MD   ; Danuta Rojas MD    [facility] Internal Medicine   IN-PATIENT SERVICE   [facility]     HISTORY AND PHYSICAL EXAMINATION            Date:   6/25/2025  Patient name:  Balta Sarmiento  Date of admission:  6/25/2025  3:28 AM  MRN:   [id number]  Account:  [id number]  YOB: 1980  PCP:    No primary care provider on file.  Room:   [address] [place] [address]  Code Status:    Full Code      Chief Complaint:     Suicidal /Ac Psychosis    History Obtained From:     Patient/EMR/bedside RN     History of Present Illness:     44-year-old male initially presented to [facility] after having suicidal ideation while drinking.  Patient is a daily drinker.  History of hepatitis C.    Past Medical History:     Past Medical History:   Diagnosis Date    Anxiety     Chest pain 09/13/2024    Depression     Insomnia         Past Surgical History:     No past surgical history on file.     Medications Prior to Admission:     Prior to Admission medications    Medication Sig Start Date End Date Taking? Authorizing Provider   lamoTRIgine (LAMICTAL) 25 MG tablet Take 2 tablets by mouth daily 4/29/25   Walt Rutledge MD   melatonin 3 MG TABS tablet Take 1 tablet by mouth nightly as needed (insomnia) 4/28/25   Walt Rutledge MD   nicotine polacrilex (COMMIT) 2 MG lozenge Take 1 lozenge by mouth every hour as needed for Smoking cessation 4/28/25   Walt Rutledge MD   venlafaxine (EFFEXOR XR) 150 MG extended release capsule Take 1 capsule by mouth daily (with breakfast) 4/29/25   Walt Rutledge MD        Allergies:     Shellfish-derived products, Fish-derived products, and Propoxyphene    Social History:     Tobacco:    reports that he has been smoking cigarettes. He has never used smokeless tobacco.  Alcohol:      reports current alcohol use.  Drug Use:  reports current drug use. Drugs: Cocaine

## 2025-06-25 NOTE — GROUP NOTE
Group Therapy Note    Date: 6/25/2025    Group Start Time: 1100  Group End Time: 1145  Group Topic: Cognitive Skills    [facility]    Elyse Anthony CTRS        Group Therapy Note    Attendees: 7/18       Topic: To increase social interaction , practice decision making, impulse control, and communication skills        Comments:   Patient did not participate in Cognitive Skills, at 11:00, despite staff encouragement and explanation of benefits. Pt was seclusive to self and room.     Q15 minute safety checks maintained for patient safety and will continue to encourage patient to attend unit programming.      Discipline Responsible: Psychoeducational Specialist   Signature: ROSELYN CRAWFORD

## 2025-06-25 NOTE — ED NOTES
..Transfer Center Handoff for Behavioral Health Transfers      Patient's Current Location: [facility] EMERGENCY DEPARTMENT     Chief Complaint   Patient presents with    Suicidal    Addiction Problem     alcohol       Current or History of Violent Behavior: No    Currently in Restraints Now or During this Encounter: No  (Specify if Agitation or self harm is noted in ED?)  If yes, please describe behaviors requiring restraint:             Medical Clearance Documented and Verified in the Chart: No    Allergies   Allergen Reactions    Shellfish-Derived Products Rash    Fish-Derived Products     Propoxyphene Hives        Can Patient Tolerate Lying Flat: Yes    Able to Perform ADLs:  Yes  (Specify if able to ambulate or uses any mobility devices such as cane or walker)  Activity:    Level of Assistance:    Assistive Device:    Miscellaneous Devices:      LABS    CBC:   Lab Results   Component Value Date/Time    WBC 12.9 06/25/2025 12:04 AM    RBC 4.96 06/25/2025 12:04 AM    HGB 15.5 06/25/2025 12:04 AM    HCT 45.2 06/25/2025 12:04 AM    MCV 91.1 06/25/2025 12:04 AM    MCH 31.3 06/25/2025 12:04 AM    MCHC 34.3 06/25/2025 12:04 AM    RDW 12.0 06/25/2025 12:04 AM     06/25/2025 12:04 AM    MPV 10.2 06/25/2025 12:04 AM     CMP:   Lab Results   Component Value Date/Time     06/25/2025 12:04 AM    K 4.2 06/25/2025 12:04 AM    K 3.9 04/09/2025 09:28 PM     06/25/2025 12:04 AM    CO2 22 06/25/2025 12:04 AM    BUN 16 06/25/2025 12:04 AM    CREATININE 1.2 06/25/2025 12:04 AM    GFRAA >60 04/25/2022 12:55 AM    AGRATIO 1.5 04/09/2025 09:28 PM    LABGLOM 76 06/25/2025 12:04 AM    LABGLOM >90 04/25/2024 12:08 AM    GLUCOSE 96 06/25/2025 12:04 AM    CALCIUM 10.0 06/25/2025 12:04 AM    BILITOT 0.4 06/25/2025 12:04 AM    ALKPHOS 87 06/25/2025 12:04 AM    AST 20 06/25/2025 12:04 AM    ALT 13 06/25/2025 12:04 AM     Drug Panel:   Lab Results   Component Value Date/Time    OPIAU NEGATIVE 06/25/2025 12:04 AM

## 2025-06-25 NOTE — H&P
Department of Psychiatry  Attending Physician Psychiatric Assessment   Patient: Balta Sarmiento  MRN: [id number]  Reason for Admission to Psychiatric Unit:  Threat to self requiring 24 hour professional observation  A mental disorder causing major disability in social, interpersonal, occupational, and/or educational functioning that is leading to dangerous or life-threatening functioning, and that can only be addressed in an acute inpatient setting   A mental disorder that causes an inability to maintain adequate nurtrition or self-care, and family/community support cannot provide reliable, essential care, so that the patient cannont function at a less intensive level of care during evaluation and treatment   Concerns about patient's safety in the community  Past Mental Health Diagnosis: a history of  Major Depression, Prior suicide attempt, and Alcohol and/or Drug Use Disorder  Triggering event or precipitating factor: Housing instability, Financial instability, Alcohol/Drug Relapse, and Psych Treatment Noncompliance  Length of time/duration of triggering event: Days  Legal Status: Voluntary    CHIEF COMPLAINT:  Depression with suicidal ideation     History obtained from: Patient, electronic medical record          HISTORY OF PRESENT ILLNESS:    Balta Sarmiento is a 44 y.o. male who has a past medical history of depression, polysubstance use, and chronic hepatitis C. Patient presented to the ED for suicidal ideation. Per ED documentation, patient reported that he was drinking \"all day\" prior to ED arrival. While drinking, patient grabbed a \"handful\" of pills with the intention to overdose and kill himself. However, his friend removed them from his hands and flushed them down the toilet. He has been off of his psychiatric medications for approximately 3 days. He was recently released from [facility] approximately 6 days ago. Patient was last admitted to [facility] in April 2025.     Patient was seen for initial evaluation today. He was  ideation,  hallucinations, delusions, paranoia  Cognition:  Oriented to self, location, time, situation  Concentration: Clinically adequate  Memory: Intact  Insight &Judgment: Poor         DSM-5 Diagnosis    Principal Problem: Depression with suicidal ideation      Psychosocial and Contextual factors:  Financial X  Occupational X  Relationship   Legal   Living situation X  Educational     Past Medical History:   Diagnosis Date    Anxiety     Chest pain 09/13/2024    Depression     Insomnia         PATIENT HANDOFF:  Home medications reviewed.   Medication management per attending  Monitor need and frequency of PRN medications.    CONSULT:  [x] Yes [] No  Internal medicine for medical management/medical H&P      Risk Management: close watch per standard protocol      Psychotherapy: participation in milieu and group and individual sessions with Attending Physician,  and Physician Assistant/CNP      Estimated length of stay:  2-14 days      GENERAL PATIENT/FAMILY EDUCATION  Patient will understand basic signs and symptoms, patient will understand benefits/risks and potential side effects from proposed medications, and patient will understand their role in recovery.  Family is not active in patient's care.   Patient assets that may be helpful during treatment include: Intent to participate and engage in treatment, sufficient fund of knowledge and intellect to understand and utilize treatments.      Goals:    1) Remission of suicidal ideation.  2) Stabilization of symptoms prior to discharge.  3) Establish efficacy and tolerability of medications.       Behavioral Services  Medicare Certification     Admission Day 1  I certify that this patient's inpatient psychiatric hospital admission is medically necessary for:    x (1) treatment which could reasonably be expected to improve this patient's condition, or    x (2) diagnostic study or its equivalent.     --CRISTOPHER Elkins CNP on 6/25/2025 at 11:09 AM    An

## 2025-06-25 NOTE — ED PROVIDER NOTES
[facility] EMERGENCY DEPARTMENT  Emergency Department  Faculty Attestation       I performed a history and physical examination of the patient and discussed management with the resident. I reviewed the resident’s note and agree with the documented findings including all diagnostic interpretations and plan of care. Any areas of disagreement are noted on the chart. I was personally present for the key portions of any procedures. I have documented in the chart those procedures where I was not present during the key portions. I have reviewed the emergency nurses triage note. I agree with the chief complaint, past medical history, past surgical history, allergies, medications, social and family history as documented unless otherwise noted below. Documentation of the HPI, Physical Exam and Medical Decision Making performed by sonya is based on my personal performance of the HPI, PE and MDM. For Physician Assistant/ Nurse Practitioner cases/documentation I have personally evaluated this patient and have completed at least one if not all key elements of the E/M (history, physical exam, and MDM). Additional findings are as noted.    Pertinent Comments     Primary Care Physician: No primary care provider on file.    History: This is a 44 y.o. male who presents to the Emergency Department with complaint of    Chief Complaint   Patient presents with    Suicidal    Addiction Problem     alcohol         Physical:    ED Triage Vitals [06/24/25 2327]   BP Systolic BP Percentile Diastolic BP Percentile Temp Temp src Pulse Respirations SpO2   (!) 135/96 -- -- 97.7 °F (36.5 °C) -- (!) 118 18 96 %      Height Weight         -- --              RADIOLOGY:  No results found.    MDM/Plan:   Patient presents suicidal ideation.  States that he was planning on taking a bunch of pills earlier today but a friend stopped him.  States he still feeling suicidal.  States that he has been drinking alcohol most of the day today.  He is

## 2025-06-25 NOTE — BH NOTE
Provider notified of suicide BPA and patient's current mental status. Provider declined 1:1 and ordered patient monitoring every 15 minutes and random intervals.

## 2025-06-25 NOTE — GROUP NOTE
Group Therapy Note    Date: 6/25/2025    Group Start Time: 1000  Group End Time: 1030  Group Topic: Psychoeducation    [facility]    Vijay Zuniga        Group Therapy Note    Attendees: 5/19     Patient was offered group therapy today but declined to participate despite encouragement from staff. 1:1 was offered.    Signature:  Vijay Zuniga

## 2025-06-25 NOTE — ED NOTES
Pt arriving to ed 02 via triage with co of suicidal ideation with plan to harm self  Pt stated he had been drinking all day, his depression got worse and he felt suicidal and wanted to harm self by attempting to overdose on pills. Pt stated he was with a friend who flushed the pills down a toilet before he was able to do so   Admits to drinking several beers throughout the day   Co of nausea at this time  Denies any other drug use today  MPD notified for change out, pt changed out of all belongings and into hospital paper scrubs  Dr. Mueller at bedside to assess  Sitter at bedside

## 2025-06-25 NOTE — BH NOTE
[facility]  Admission Note     Admission Type:   Admission Type: Voluntary    Reason for admission:  Reason for Admission: Voluntary patient from [facility] following an interrupted suicide attempt. He is a daily drinker, known to this facility, experiencing increased depression and anxiety that drove him to attempt to overdose on a handful of pills. A friend intervened and flushed the medications down the toilet. Patient does not reveal the trigger for these events.He is helpless, hopeless, depressed and anxious. He denies any hallucinations. Patient is hoping to go to a detox facilty upon discharge.      Addictive Behavior:   Addictive Behavior  In the Past 3 Months, Have You Felt or Has Someone Told You That You Have a Problem With  : None    Medical Problems:   Past Medical History:   Diagnosis Date    Anxiety     Chest pain 09/13/2024    Depression     Insomnia        Status EXAM:  Mental Status and Behavioral Exam  Normal: No  Level of Assistance: Independent/Self  Facial Expression: Avoids Gaze  Affect: Blunt  Level of Consciousness: Alert  Frequency of Checks: 4 times per hour, close  Mood:Normal: No  Mood: Depressed, Anxious, Helpless, Sad  Motor Activity:Normal: No  Motor Activity: Decreased  Eye Contact: Poor  Observed Behavior: Preoccupied, Guarded, Cooperative, Withdrawn  Sexual Misconduct History: Current - no  Preception: [place] to person, [place] to time, [place] to place, [place] to situation  Attention:Normal: No  Attention: Distractible, Unable to concentrate  Thought Processes: Blocking  Thought Content:Normal: No  Thought Content: Preoccupations, Poverty of content  Depression Symptoms: Feelings of worthlessness, Impaired concentration, Isolative, Loss of interest, Sleep disturbance, Feelings of hopelessess, Feelings of helplessness  Anxiety Symptoms: Generalized  [place] Symptoms: No problems reported or observed.  Hallucinations: None  Delusions: No  Memory:Normal: No  Memory:

## 2025-06-25 NOTE — CARE COORDINATION
[facility] Biopsychosocial Assessment     Current Level of Psychosocial Functioning      Independent X  Dependent    Minimal Assist      Psychosocial High Risk Factors (check all that apply)     Unable to obtain meds   Chronic illness/pain    Substance abuse X Marijuana Alcohol   Lack of Family Support X  Financial stress X  Isolation   Inadequate Community Resources  Suicide attempt(s) X  Not taking medications X  Victim of crime   Developmental Delay  Unable to manage personal needs  X  Age 65 or older   Homeless X  No transportation X  Readmission within 30 days   Unemployment  Traumatic Event     Psychiatric Advanced Directives: none reported      Family to Involve in Treatment: Lack of family support      Sexual Orientation:  N/A     Patient Strengths: insurance, motivated for treatment      Patient Barriers: Lack of stable housing and income, substance use, presenting on admission with a suicidal ideation with a plan to overdose on pills.      Opiate Education Provided:  N/A Patient denies any current Heroin use/abuse. Patient reports Alcohol use and his Ethanol level upon admission .103 upon admission. Patient's drug screen upon admission was positive for Cannabis.      CMHC/mental health history not linked, non-compliant with treatment and with medications, Last [facility] hospitalization was from 4/26-4/29/2025, was sent to [facility], was recently at [facility], homeless, substance use, declined AOD resource folder   Plan of Care   medication management, group/individual therapies, family meetings, psycho -education, treatment team meetings to assist with stabilization     Initial Discharge Plan:  To Be Determined      Clinical Summary:    Balta is a 45yo male  admitted to the [facility] for depression with suicidal ideation.Patient is reporting a plan to overdose on medications. Patient reports that he took a handful of pills to attempt to overdose on them and reports that his friend took the pills away from him and

## 2025-06-25 NOTE — ED NOTES
DX: depression with SI  Class:  LOS: 5 days  LOC:   Accepting MD: Dr Dasilva  Accepting Location: [facility]      Faxed voluntary to [facility]  Faxed transport papers to [facility]

## 2025-06-25 NOTE — ED NOTES
..Provisional Diagnosis:  Depression with reported SI       Psychosocial and Contextual Factors:   Homeless       C-SSRS Summary:    Patient: X   Family:  Agency:    Present Suicidal Behavior:    Verbal: Yes     Attempt: Reports Yes   Past Suicidal Behavior:    Verbal: Yes     Attempt: Yes     Self-Injurious/Self-Mutilation: Not discussed      Protective Factors:  Willing to go inpatient       Risk Factors:  Homelessness       Clinical Summary:  Pt reports to ED with SI. Pt states he has been suicidal for the last three days. Pt states he attempted to take a handful of his medications but his friends stopped him before he could. Pt reports compliancy with his medications but says he does not feel like they work. Pt was recently released from [facility] six days ago. Pt reported he was drinking heavily before coming to ED. Pt had a negative ethanol level. Pt has hx of cocaine use, alcohol abuse, major depressive disorder, and SI.  Pt is homeless. Pt at baseline reports suicidal ideation and has frequent ED visits and Inpatient AOD admissions at various facilities per reprot.  Per charting there has been a concern of pt malingering in the past. Pt reported he was to get established with [employer] and stated he did not do that.     Pt states he is his own Guardian.   Pt denies current legal concerns.       Level of Care Disposition:  SW provided information to Dr. Mueller.

## 2025-06-25 NOTE — BH NOTE
Patient given tobacco quitline number 56815007318 ,patient given information as to the dangers of long term tobacco use. Continue to reinforce the importance of tobacco cessation.  Patient not willing to quit at this time.

## 2025-06-26 VITALS
SYSTOLIC BLOOD PRESSURE: 139 MMHG | HEIGHT: 71 IN | TEMPERATURE: 98 F | RESPIRATION RATE: 15 BRPM | HEART RATE: 69 BPM | WEIGHT: 215 LBS | BODY MASS INDEX: 30.1 KG/M2 | OXYGEN SATURATION: 99 % | DIASTOLIC BLOOD PRESSURE: 88 MMHG

## 2025-06-26 PROCEDURE — 99232 SBSQ HOSP IP/OBS MODERATE 35: CPT | Performed by: PSYCHIATRY & NEUROLOGY

## 2025-06-26 PROCEDURE — 6370000000 HC RX 637 (ALT 250 FOR IP): Performed by: PSYCHIATRY & NEUROLOGY

## 2025-06-26 PROCEDURE — APPSS30 APP SPLIT SHARED TIME 16-30 MINUTES

## 2025-06-26 PROCEDURE — 99231 SBSQ HOSP IP/OBS SF/LOW 25: CPT

## 2025-06-26 PROCEDURE — 1240000000 HC EMOTIONAL WELLNESS R&B

## 2025-06-26 PROCEDURE — 90833 PSYTX W PT W E/M 30 MIN: CPT | Performed by: PSYCHIATRY & NEUROLOGY

## 2025-06-26 RX ADMIN — NICOTINE POLACRILEX 2 MG: 2 LOZENGE ORAL at 19:33

## 2025-06-26 RX ADMIN — VENLAFAXINE HYDROCHLORIDE 150 MG: 150 CAPSULE, EXTENDED RELEASE ORAL at 08:18

## 2025-06-26 RX ADMIN — Medication 3 MG: at 21:43

## 2025-06-26 NOTE — GROUP NOTE
Group Therapy Note    Date: 6/26/2025    Group Start Time: 1430  Group End Time: 1530  Group Topic: Cognitive Skills    [facility]    Elyse Anthony CTRS        Group Therapy Note    Attendees: 9/14     Topic: To increase social interaction , practice self expression, and explore positive thinking boosters   and busters in daily life.        Comments:   Patient did not participate in Cognitive Skills, at 14:30, despite staff encouragement and explanation of benefits. Pt was seclusive to self and room.     Q15 minute safety checks maintained for patient safety and will continue to encourage patient to attend unit programming.      Discipline Responsible: Psychoeducational Specialist   Signature: ROSELYN CRAWFORD

## 2025-06-26 NOTE — GROUP NOTE
Group Therapy Note    Date: 6/26/2025    Group Start Time: 1000  Group End Time: 1030  Group Topic: Psychoeducation    [facility]    Vijay Zuniga        Group Therapy Note    Attendees: 7/20   Patient was offered group therapy today but declined to participate despite encouragement from staff. 1:1 was offered.    Signature:  Vijay Zuniga

## 2025-06-26 NOTE — PLAN OF CARE
Problem: Self Harm/Suicidality  Goal: Will have no self-injury during hospital stay  Description: INTERVENTIONS:  1.  Ensure constant observer at bedside with Q15M safety checks  2.  Maintain a safe environment  3.  Secure patient belongings  4.  Ensure family/visitors adhere to safety recommendations  5.  Ensure safety tray has been added to patient's diet order  6.  Every shift and PRN: Re-assess suicidal risk via Frequent Screener    6/26/2025 1653 by Maryellen Hunt LPN  Outcome: Progressing  Flowsheets (Taken 6/26/2025 1653)  Will have no self-injury during hospital stay:   Every shift and PRN: Re-assess suicidal risk via Frequent Screener   Maintain a safe environment   Secure patient belongings  Note: Patient denies any thoughts of suicide or self harm, depression and anxiety. Patient is irritable and focused on discharge. Patient showered this shift and comes out for short intervals to watch tv. Patient is isolative to self. Patient has been compliant with meds and behavior is controlled.  Safe environment maintained. Q15 minute checks for safety continued per unit policy. Will continue to monitor for safety and provide support and reassurance as needed.  6/26/2025 0320 by Tawana Matthews  Outcome: Progressing  Note: Remains free from self harm at this time. Pt admits to having fleeting thoughts of self harm.  Agreeable to seeking out staff should feelings increase.  Able to identify positive coping skills and plan for safety.  Will cont to monitor q15 minutes for safety and provide support and reassurance as needed.

## 2025-06-26 NOTE — GROUP NOTE
Group Therapy Note    Date: 6/26/2025    Group Start Time: 1100  Group End Time: 1150  Group Topic: Relaxation    [facility]    Elyse Anthony CTRS        Group Therapy Note    Attendees: 9/18     Topic: To increase social interaction , practice relaxation through creative expression, music and   discussion, and communication skills        Comments:   Patient did not participate in Cognitive Skills, at 11:00, despite staff encouragement and explanation of benefits. Pt was seclusive to self and room.     Q15 minute safety checks maintained for patient safety and will continue to encourage patient to attend unit programming.      Discipline Responsible: Psychoeducational Specialist   Signature: ROSELYN CRAWFORD

## 2025-06-26 NOTE — PLAN OF CARE
Problem: Self Harm/Suicidality  Goal: Will have no self-injury during hospital stay  Description: INTERVENTIONS:  1.  Ensure constant observer at bedside with Q15M safety checks  2.  Maintain a safe environment  3.  Secure patient belongings  4.  Ensure family/visitors adhere to safety recommendations  5.  Ensure safety tray has been added to patient's diet order  6.  Every shift and PRN: Re-assess suicidal risk via Frequent Screener    6/26/2025 0320 by Tawana Matthews  Outcome: Progressing  Note: Remains free from self harm at this time. Pt admits to having fleeting thoughts of self harm.  Agreeable to seeking out staff should feelings increase.  Able to identify positive coping skills and plan for safety.  Will cont to monitor q15 minutes for safety and provide support and reassurance as needed.       Problem: Depression  Goal: Will be euthymic at discharge  Description: INTERVENTIONS:  1. Administer medication as ordered  2. Provide emotional support via 1:1 interaction with staff  3. Encourage involvement in milieu/groups/activities  4. Monitor for social isolation  6/26/2025 0320 by Tawana Matthews  Outcome: Progressing  Note: Isolative to room. Flat affect. Slept all shift. Admits to fleeting suicidal thoughts but agreeable to staying safe while in the hospital. No scheduled night medications. Safety checks maintained q15min and irregular rounding.

## 2025-06-27 PROCEDURE — 6370000000 HC RX 637 (ALT 250 FOR IP): Performed by: PSYCHIATRY & NEUROLOGY

## 2025-06-27 PROCEDURE — 99239 HOSP IP/OBS DSCHRG MGMT >30: CPT | Performed by: PSYCHIATRY & NEUROLOGY

## 2025-06-27 RX ORDER — VENLAFAXINE HYDROCHLORIDE 150 MG/1
150 CAPSULE, EXTENDED RELEASE ORAL
Qty: 30 CAPSULE | Refills: 3 | Status: SHIPPED | OUTPATIENT
Start: 2025-06-28

## 2025-06-27 RX ORDER — TRAZODONE HYDROCHLORIDE 50 MG/1
50 TABLET ORAL NIGHTLY PRN
Qty: 30 TABLET | Refills: 0 | Status: SHIPPED | OUTPATIENT
Start: 2025-06-27

## 2025-06-27 RX ADMIN — VENLAFAXINE HYDROCHLORIDE 150 MG: 150 CAPSULE, EXTENDED RELEASE ORAL at 08:07

## 2025-06-27 RX ADMIN — NICOTINE POLACRILEX 2 MG: 2 LOZENGE ORAL at 07:02

## 2025-06-27 ASSESSMENT — LIFESTYLE VARIABLES
HOW MANY STANDARD DRINKS CONTAINING ALCOHOL DO YOU HAVE ON A TYPICAL DAY: 3 OR 4
HOW OFTEN DO YOU HAVE A DRINK CONTAINING ALCOHOL: 2-3 TIMES A WEEK

## 2025-06-27 NOTE — DISCHARGE INSTR - DIET

## 2025-06-27 NOTE — CARE COORDINATION
Discharge Arrangements:  N/A    Guardian notified: n/a    Discharge destination/address: [facility]  [address] [place] [address]    Transported by:  [employer] cab     Patient was not accepting of referral.  Follow up appointment is scheduled for     [facility]   [address] [place] [address]  [phone]  fax [phone]   Please attend a walk-in mental health appointment at [facility] Monday-Friday from 8:00am-4:00pm to establish services with [facility].   *SIMON resources were offered to patient throughout admission and at time of discharge. This list of [facility] SIMON providers was provided to patient:     [facility]  [address] [facility] [address]   [address] [facility] [address]  Phone: [phone]     Phone: [phone]    [facility]   [address] [facility] [address]   [address] [facility] [address]  Phone: [phone]     Phone: [phone]    [facility]    [facility]  [address] [facility] [address]    [address] [facility] [facility] [address]  Phone: [phone]     Phone: [phone]    [facility]     [facility]   [address] [facility] [address]  [place] [address]      Phone: [phone]  Phone: [phone]        [facility]  [address] [facility] [facility] [address]   [address] [place] [address]  Phone: [phone]     Phone:  [phone]    [employer]      [facility]  [address] [facility] [address]   [address] [facility] [address]  Phone: [phone]     Phone: [phone]    [facility]  [address] [facility] [address]   [address] [facility] [address]  Phone: [phone]     Phone: [phone]    [facility]  Empowered for [facility]

## 2025-06-27 NOTE — DISCHARGE SUMMARY
of cocaine and marijuana use. UDS positive for cannabis upon admission. Per chart review, he has a significant history of alcohol use. He has had multiple failed attempts of sobriety and was recently released from [facility].      Unknown if he is currently linked. He has not been compliant with his psychiatric medications, having not taken them over the last 3 days. He has a history of multiple suicide attempts and psychiatric hospitalizations.      At this time, patient is not able to contract for safety in the community and is a threat to self due to suicidal ideation with plan. Patient requires inpatient hospitalization for safety and stabilization via medication management, therapeutic groups, and milieu.      PAST MEDICAL/PSYCHIATRIC HISTORY:   Past Medical History:   Diagnosis Date    Anxiety     Chest pain 09/13/2024    Depression     Insomnia        FAMILY/SOCIAL HISTORY:  No family history on file.  Social History     Socioeconomic History    Marital status: Single     Spouse name: Not on file    Number of children: Not on file    Years of education: Not on file    Highest education level: Not on file   Occupational History    Not on file   Tobacco Use    Smoking status: Every Day     Current packs/day: 0.50     Types: Cigarettes    Smokeless tobacco: Never   Vaping Use    Vaping status: Former   Substance and Sexual Activity    Alcohol use: Yes     Comment: 4 beers and a fifth of liquor    Drug use: Yes     Types: Cocaine, Marijuana (Weed)    Sexual activity: Not on file   Other Topics Concern    Not on file   Social History Narrative    Not on file     Social Drivers of Health     Financial Resource Strain: Not on file   Food Insecurity: Food Insecurity Present (6/25/2025)    Hunger Vital Sign     Worried About Running Out of Food in the Last Year: Sometimes true     Ran Out of Food in the Last Year: Sometimes true   Transportation Needs: Unmet Transportation Needs (6/25/2025)    PRAPARE - Transportation  feel better with this combination of treatment.  Significant progress in the symptoms since admission.    Mood is improved  The patient denies AVH or paranoid thoughts  The patient denies any hopelessness or worthlessness  No active SI/HI  Appetite:  [x] Normal  [] Increased  [] Decreased    Sleep:       [x] Normal  [] Fair       [] Poor            Energy:    [x] Normal  [] Increased  [] Decreased     SI [] Present  [x] Absent  HI  []Present  [x] Absent   Aggression:  [] yes  [] no  Patient is [x] able  [] unable to CONTRACT FOR SAFETY   Medication side effects(SE):  [x] None(Psych. Meds.) [] Other      Mental Status Examination on discharge:    Level of consciousness:  within normal limits   Appearance:  well-appearing  Behavior/Motor:  no abnormalities noted  Attitude toward examiner:  attentive and good eye contact  Speech:  spontaneous, normal rate and normal volume   Mood: euthymic  Affect:  mood congruent  Thought processes:  coherent   Thought content:  Suicidal Ideation:  denies suicidal ideation  Delusions:  no evidence of delusions  Perceptual Disturbance:  denies any perceptual disturbance  Cognition:  oriented to person, place, and time   Concentration intact  Memory intact  Insight good   Judgement fair   Fund of Knowledge adequate      ASSESSMENT:  Patient symptoms are:  [x] Well controlled  [x] Improving  [] Worsening  [] No change      Diagnosis:  Principal Problem:    Depression with suicidal ideation  Resolved Problems:    * No resolved hospital problems. *      LABS:    Recent Labs     06/25/25  0004   WBC 12.9*   HGB 15.5        Recent Labs     06/25/25  0004      K 4.2      CO2 22   BUN 16   CREATININE 1.2   GLUCOSE 96     Recent Labs     06/25/25  0004   BILITOT 0.4   ALKPHOS 87   AST 20   ALT 13     Lab Results   Component Value Date/Time    BARBSCNU NEGATIVE 06/25/2025 12:04 AM    LABBENZ NEGATIVE 06/25/2025 12:04 AM    LABMETH NEGATIVE 06/25/2025 12:04 AM    ETOH <10

## 2025-06-27 NOTE — PROGRESS NOTES
Behavioral Services  Medicare Certification Upon Admission    I certify that this patient's inpatient psychiatric hospital admission is medically necessary for:    [x] (1) Treatment which could reasonably be expected to improve this patient's condition,       [x] (2) Or for diagnostic study;     AND     [x](2) The inpatient psychiatric services are provided while the individual is under the care of a physician and are included in the individualized plan of care.    Estimated length of stay/service 2-9 days    Plan for post-hospital care -outpatient care    Electronically signed by HOUSTON POSADA MD on 6/25/2025 at 9:19 AM      
  Daily Progress Note  6/26/2025    Patient Name: Balta Sarmiento    CHIEF COMPLAINT:  Depression with suicidal ideation          SUBJECTIVE:    Patient is seen today for a follow up assessment. He has been compliant with scheduled medications and behaviorally in control. He restarted Effexor 150 mg this morning. He has not required emergency medications for agitation in the past 24 hours. He is resting in bed upon approach. He is alert and oriented x4. Thought process linear and speech normal. He tells writer that he is \"good.\" He reports improvement in depression but continues to feel some hopelessness and helplessness. He reports improvement in suicidal ideation. He denies homicidal ideation and hallucinations. He is not observed responding to internal stimuli. He denies issues with sleep or appetite. He denies side effects from the Effexor. While he is showing some signs of improvement, he is not yet able to contract for safety in the community and does not demonstrate sustained stability. He requires continued inpatient hospitalization for safety and stabilization.     Appetite:  [x] Adequate/Unchanged  [] Increased  [] Decreased      Sleep:       [] Adequate/Unchanged  [x] Fair  [] Poor      Group Attendance on Unit:   [] Yes   [] Selectively    [x] No    Compliant with scheduled medications: [x] Yes  [] No    Received emergency medications in past 24 hrs: [] Yes   [x] No    Medication Side Effects: Denies         Mental Status Exam  Level of consciousness: Alert and awake   Appearance: Appropriate attire for setting, resting in bed, with poor grooming and hygiene   Behavior/Motor: Approachable, minimally engages with interviewer, no psychomotor abnormalities   Attitude toward examiner: Cooperative, attentive, good eye contact  Speech: spontaneous, normal rate, and normal volume   Mood:  \"Good\"  Affect: Flat  Thought processes: linear, goal directed, and coherent   Thought content: Denies homicidal 
Called patient left  for patient to call back to reschedule there appointment with Los Luu  on 5/27 /20    When patient calls back Dr Los Cassidy  has these dates available,    on [place] , 4/6/20,  4/20/20, 5/18/20.  on  [facility] , 5/19/20.  
No [id number] Effexor Xr 150mg and Trazodone 50mg both too soon per insurance pt has at home Rxs on file at [facility] [phone] [address] did not call back to let Out[facility] Pharmacy know if pt has meds at home or if we needed to request voucher due to being too soon to fill. 2:18pm anju called unit to see if Isabel had gotten an answer Elizabeth said pt leaving in a cab so hopefully he has medications at home 6/27/25 2:21pm anju  
RT ASSESSMENT TREATMENT GOALS    [x]Pt Goal:  Pt will identify 1-2 positive coping skills by time of discharge.    []Pt Goal:  Pt will identify 1-2 positive aspects of self by time of discharge.    []Pt Goal:  Pt will remain on task/topic for 15-30 minutes during group by time of discharge.    [x]Pt Goal:  Pt will identify 1-2 aspects of relapse prevention plan by time of discharge.    []Pt Goal:  Pt will join in conversation with peers 1-2 times per group by time of discharge.    [x]Pt Goal:  Pt will identify 1-2 new leisure interests by time of discharge.    []Pt Goal:  Pt will not voice any delusional content by time of discharge.    
t called to r/s BMP'd apt to 5/18 @ 11:00 w/Dr. Cassidy @ [facility]  
normal, no new focal motor or sensory deficits, moving all extremities spontaneously.   Skin: No gross lesions, rashes, bruising or bleeding on exposed skin area  Extremities:  peripheral pulses palpable, no pedal edema or calf pain with palpation    Investigations:      Laboratory Testing:  No results found for this or any previous visit (from the past 24 hours).      Imaging/Diagonstics:  XR CHEST PORTABLE  Result Date: 6/18/2025  1. No acute process.       Assessment :      Hospital Problems           Last Modified POA    * (Principal) Depression with suicidal ideation 6/25/2025 Yes       Plan:     Admitted to inpatient psych with depression with suicidal ideation.  Alcohol abuse.  Advised cessation.  Monitor for withdrawals, thiamine, folic acid and multivitamins.  Cannabinoid abuse.  Advised cessation.  Leukocytosis, likely reactive.  Repeat CBC in few days.  Chronic hepatitis C.  LFTs WNL.  Follow-up GI as outpatient  Labs and medications reviewed.     DVT prophylaxis,  Pt mobile   Full code status       Consultations:   IP CONSULT TO INTERNAL MEDICINE      Cinthya Torres MD  6/26/2025  6:35 PM    Copy sent to Dr. Mesa primary care provider on file.    Please note that this chart was generated using voice recognition Dragon dictation software.  Although every effort was made to ensure the accuracy of this automated transcription, some errors in transcription may have occurred.

## 2025-06-27 NOTE — BH NOTE
[facility]  Discharge Note     Pt belongings: Retrieved from room/safe, reviewed and packed to take with.   Dental Appliances: None  Vision - Corrective Lenses: None  Hearing Aid: None  Jewelry: None  Body Piercings Removed: No  Clothing: Undergarments, Socks, Footwear, Pants, Shirt  Other Valuables: Money, Wallet, Cigarettes, Lighter/Matches, Other (Comment) (vape)       Patient discharged to private residence via insurance cab. Instructed on discharge instructions, pt verbalizes understanding and signs AVS. Pt in control at time of discharge. Pt ambulates to [facility] main entrance  with psych staff x2. Rx sent to [facility], patient has home supply. No complaints voiced at this time.        Status EXAM upon discharge:  Mental Status and Behavioral Exam  Normal: No  Level of Assistance: Independent/Self  Facial Expression: Brightened  Affect: Blunt  Level of Consciousness: Alert  Frequency of Checks: 4 times per hour, close  Mood:Normal: Yes  Mood: Labile  Motor Activity:Normal: Yes  Motor Activity: Decreased  Eye Contact: Good  Observed Behavior: Aggressive  Sexual Misconduct History: Current - no  Preception: Metter to person, Metter to time, Metter to place  Attention:Normal: Yes  Attention: Unable to concentrate  Thought Processes: Blocking  Thought Content:Normal: Yes  Thought Content: Preoccupations  Depression Symptoms: No problems reported or observed.  Anxiety Symptoms: No problems reported or observed.  Yumiko Symptoms: No problems reported or observed.  Hallucinations: None  Delusions: No  Memory:Normal: Yes  Memory: Poor recent, Poor remote  Insight and Judgment: No  Insight and Judgment: Unrealistic, Poor judgment    Isabel Santillan LPN

## 2025-06-27 NOTE — CARE COORDINATION
Name: Balta Sarmiento    : 1980    Auth number: [id number]     Discharge Date: 2025    Destination: home    *If you have any specific discharge questions, please contact the assigned /discharge planner: Vijay [phone]      Discharge Medications:      Medication List        START taking these medications      traZODone 50 MG tablet  Commonly known as: DESYREL  Take 1 tablet by mouth nightly as needed for Sleep  Notes to patient: Sleep Aid            CONTINUE taking these medications      melatonin 3 MG Tabs tablet  Take 1 tablet by mouth nightly as needed (insomnia)  Notes to patient: Sleep aid     nicotine polacrilex 2 MG lozenge  Commonly known as: COMMIT  Take 1 lozenge by mouth every hour as needed for Smoking cessation  Notes to patient: Smoking cessation     venlafaxine 150 MG extended release capsule  Commonly known as: EFFEXOR XR  Take 1 capsule by mouth daily (with breakfast)  Start taking on: 2025  Notes to patient: Mood             STOP taking these medications      lamoTRIgine 25 MG tablet  Commonly known as: LAMICTAL               Where to Get Your Medications        These medications were sent to [id number] - [address] -  [phone] - F [phone]  [address] [place] [address]      Phone: [phone]   traZODone 50 MG tablet  venlafaxine 150 MG extended release capsule         Follow Up Appointment: [place] [address]  [phone]  fax [phone]  Follow up on 2025  Please attend a walk-in mental health appointment at [facility] Monday-Friday from 8:00am-4:00pm to establish services with [facility].

## 2025-06-27 NOTE — GROUP NOTE
Group Therapy Note    Date: 6/27/2025    Group Start Time: 1000  Group End Time: 1030  Group Topic: Psychoeducation    [facility]    Vijay Zuniga        Group Therapy Note    Attendees: 4/14     Patient was offered group therapy today but declined to participate despite encouragement from staff. 1:1 was offered.    Signature:  Vijay Zuniga

## 2025-06-27 NOTE — DISCHARGE INSTRUCTIONS
Information:  Medications:   Medication summary provided   I understand that I should take only the medications on my list.     -why and when I need to take each medicine.     -which side effects to watch for.     -that I should carry my medication information at all times in case of     Emergency situations.    I will take all of my medicines to follow up appointments.     -check with my physician or pharmacist before taking any new    Medication, over the counter product or drink alcohol.    -Ask about food, drug or dietary supplement interactions.    -discard old lists and update records with medication providers.    Notify Physician:  Notify physician if you notice:   Always call 911 if you feel your life is in danger  In case of an emergency call 911 immediately!  If 911 is not available call your local emergency medical system for help    Behavioral Health Follow Up:  Original Referral Source:[facility]  Discharge Diagnosis: Depression with suicidal ideation [F32.A, R45.851]  Recommendations for Level of Care: take medication as ordered, keep follow up appt  Patient status at discharge: alert/oriented  [facility] hospital  was: [facility]  [email or web address]    -faxed by: staff   -date: 6/27/25   -time: 9:12a  Prescriptions: escribed to [facility] pharmacy and sent with patient.    Smoking: Quit Smoking.   Call the [employer]'s smoking quitline at 4-301-53O-QUIT  Know the signs of a heart attack   If you have any of the following symptoms call 911 immediately, do not wait more    Than five minutes.    1. Pressure, fullness and/ or squeezing in the center of the chest spreading to    The jaw, neck or shoulder.    2. Chest discomfort with light headedness, fainting, sweating, nausea or    Shortness of breath.   3. Upper abdominal pressure or discomfort.   4. Lower chest pain, back pain, unusual fatigue, shortness of breath, nausea   Or dizziness.     General

## 2025-06-27 NOTE — BH NOTE
Patient given tobacco quitline number 12102159216 at this time, refusing to call at this time, states \" I just dont want to quit now\"- patient given information as to the dangers of long term tobacco use. Continue to reinforce the importance of tobacco cessation.

## 2025-06-27 NOTE — PLAN OF CARE
Problem: Self Harm/Suicidality  Goal: Will have no self-injury during hospital stay  Description: INTERVENTIONS:  1.  Ensure constant observer at bedside with Q15M safety checks  2.  Maintain a safe environment  3.  Secure patient belongings  4.  Ensure family/visitors adhere to safety recommendations  5.  Ensure safety tray has been added to patient's diet order  6.  Every shift and PRN: Re-assess suicidal risk via Frequent Screener    6/27/2025 0046 by Beth Hawkins RN  Outcome: Adequate for Discharge  6/26/2025 1653 by Maryellen Hunt LPN  Outcome: Progressing  Flowsheets (Taken 6/26/2025 1653)  Will have no self-injury during hospital stay:   Every shift and PRN: Re-assess suicidal risk via Frequent Screener   Maintain a safe environment   Secure patient belongings  Note: Patient denies any thoughts of suicide or self harm, depression and anxiety. Patient is irritable and focused on discharge. Patient showered this shift and comes out for short intervals to watch tv. Patient is isolative to self. Patient has been compliant with meds and behavior is controlled.  Safe environment maintained. Q15 minute checks for safety continued per unit policy. Will continue to monitor for safety and provide support and reassurance as needed.     Problem: Depression  Goal: Will be euthymic at discharge  Description: INTERVENTIONS:  1. Administer medication as ordered  2. Provide emotional support via 1:1 interaction with staff  3. Encourage involvement in milieu/groups/activities  4. Monitor for social isolation  Outcome: Adequate for Discharge     Problem: Anxiety  Goal: Will report anxiety at manageable levels  Description: INTERVENTIONS:  1. Administer medication as ordered  2. Teach and rehearse alternative coping skills  3. Provide emotional support with 1:1 interaction with staff  Outcome: Adequate for Discharge     Problem: Drug Abuse/Detox  Goal: Will have no detox symptoms and will verbalize plan for changing

## 2025-06-27 NOTE — TRANSITION OF CARE
Behavioral Health Transition Record    Patient Name: Balta Sarmiento  YOB: 1980   Medical Record Number: [id number]  Date of Admission: 6/25/2025  3:28 AM   Date of Discharge: 6/27/25    Attending Provider: Aime Abarca MD   Discharging Provider: Aime Abarca MD  To contact this individual call [phone] and ask the  to page.  If unavailable, ask to be transferred to Behavioral Health Provider on call.  A Behavioral Health Provider will be available on call 24/7 and during holidays.    Primary Care Provider: No primary care provider on file.    Allergies   Allergen Reactions    Shellfish-Derived Products Rash    Fish-Derived Products     Propoxyphene Hives       Reason for Admission: to Psychiatric Unit:  Threat to self requiring 24 hour professional observation  A mental disorder causing major disability in social, interpersonal, occupational, and/or educational functioning that is leading to dangerous or life-threatening functioning, and that can only be addressed in an acute inpatient setting   A mental disorder that causes an inability to maintain adequate nurtrition or self-care, and family/community support cannot provide reliable, essential care, so that the patient cannont function at a less intensive level of care during evaluation and treatment   Concerns about patient's safety in the community  Past Mental Health Diagnosis: a history of  Major Depression, Prior suicide attempt, and Alcohol and/or Drug Use Disorder  Triggering event or precipitating factor: Housing instability, Financial instability, Alcohol/Drug Relapse, and Psych Treatment Noncompliance  Length of time/duration of triggering event: Days  Legal Status: Voluntary    Admission Diagnosis: Depression with suicidal ideation [F32.A, R45.851]    * No surgery found *    No results found for this visit on 06/25/25.    Immunizations administered during this encounter:   Immunization History   Administered Date(s) Administered

## 2025-07-03 NOTE — ED PROVIDER NOTES
(*)     Neutrophils Absolute 8.95 (*)     All other components within normal limits   ACETAMINOPHEN LEVEL - Abnormal; Notable for the following components:    Acetaminophen Level <5 (*)     All other components within normal limits   URINE DRUG SCREEN - Abnormal; Notable for the following components:    Cannabinoid Scrn, Ur POSITIVE (*)     All other components within normal limits   COMPREHENSIVE METABOLIC PANEL   ETHANOL   SALICYLATE LEVEL       RADIOLOGY  No results found.    OUTSTANDING TASKS / ADDITIONAL COMMENTS   Suicidal transfer psych facility    Leann Gustafson MD  Emergency Medicine Attending  [facility]        Leann Gustafson MD  07/03/25 4710

## 2025-07-10 ENCOUNTER — HOSPITAL ENCOUNTER (INPATIENT)
Age: 45
LOS: 4 days | Discharge: OTHER INSTITUTION | DRG: 750 | End: 2025-07-14
Attending: PSYCHIATRY & NEUROLOGY | Admitting: PSYCHIATRY & NEUROLOGY
Payer: MEDICAID

## 2025-07-10 ENCOUNTER — HOSPITAL ENCOUNTER (EMERGENCY)
Age: 45
Discharge: NURSING, REHAB OR LONG-TERM CARE | DRG: 750 | End: 2025-07-10
Attending: EMERGENCY MEDICINE
Payer: MEDICAID

## 2025-07-10 VITALS
HEART RATE: 75 BPM | OXYGEN SATURATION: 98 % | TEMPERATURE: 97.9 F | RESPIRATION RATE: 16 BRPM | DIASTOLIC BLOOD PRESSURE: 69 MMHG | SYSTOLIC BLOOD PRESSURE: 104 MMHG

## 2025-07-10 DIAGNOSIS — R45.851 SUICIDAL IDEATION: Primary | ICD-10-CM

## 2025-07-10 LAB
ALBUMIN SERPL-MCNC: 4.5 G/DL (ref 3.5–5.2)
ALBUMIN/GLOB SERPL: 1.4 {RATIO} (ref 1–2.5)
ALP SERPL-CCNC: 76 U/L (ref 40–129)
ALT SERPL-CCNC: 12 U/L (ref 10–50)
AMPHET UR QL SCN: NEGATIVE
ANION GAP SERPL CALCULATED.3IONS-SCNC: 12 MMOL/L (ref 9–16)
APAP SERPL-MCNC: <5 UG/ML (ref 10–30)
AST SERPL-CCNC: 27 U/L (ref 10–50)
BARBITURATES UR QL SCN: NEGATIVE
BASOPHILS # BLD: 0.07 K/UL (ref 0–0.2)
BASOPHILS NFR BLD: 1 % (ref 0–2)
BENZODIAZ UR QL: NEGATIVE
BILIRUB SERPL-MCNC: 0.4 MG/DL (ref 0–1.2)
BUN SERPL-MCNC: 12 MG/DL (ref 6–20)
CALCIUM SERPL-MCNC: 9.2 MG/DL (ref 8.6–10.4)
CANNABINOIDS UR QL SCN: POSITIVE
CHLORIDE SERPL-SCNC: 102 MMOL/L (ref 98–107)
CO2 SERPL-SCNC: 23 MMOL/L (ref 20–31)
COCAINE UR QL SCN: POSITIVE
CREAT SERPL-MCNC: 0.9 MG/DL (ref 0.7–1.2)
EOSINOPHIL # BLD: 0.09 K/UL (ref 0–0.44)
EOSINOPHILS RELATIVE PERCENT: 1 % (ref 1–4)
ERYTHROCYTE [DISTWIDTH] IN BLOOD BY AUTOMATED COUNT: 12.2 % (ref 11.8–14.4)
ETHANOL PERCENT: 0.04 %
ETHANOLAMINE SERPL-MCNC: 42 MG/DL (ref 0–0.08)
FENTANYL UR QL: NEGATIVE
GFR, ESTIMATED: >90 ML/MIN/1.73M2
GLUCOSE SERPL-MCNC: 84 MG/DL (ref 74–99)
HCT VFR BLD AUTO: 41 % (ref 40.7–50.3)
HGB BLD-MCNC: 14.3 G/DL (ref 13–17)
IMM GRANULOCYTES # BLD AUTO: 0.04 K/UL (ref 0–0.3)
IMM GRANULOCYTES NFR BLD: 0 %
LYMPHOCYTES NFR BLD: 2.51 K/UL (ref 1.1–3.7)
LYMPHOCYTES RELATIVE PERCENT: 23 % (ref 24–43)
MCH RBC QN AUTO: 31.4 PG (ref 25.2–33.5)
MCHC RBC AUTO-ENTMCNC: 34.9 G/DL (ref 28.4–34.8)
MCV RBC AUTO: 90.1 FL (ref 82.6–102.9)
METHADONE UR QL: NEGATIVE
MONOCYTES NFR BLD: 0.66 K/UL (ref 0.1–1.2)
MONOCYTES NFR BLD: 6 % (ref 3–12)
NEUTROPHILS NFR BLD: 69 % (ref 36–65)
NEUTS SEG NFR BLD: 7.79 K/UL (ref 1.5–8.1)
NRBC BLD-RTO: 0 PER 100 WBC
OPIATES UR QL SCN: NEGATIVE
OXYCODONE UR QL SCN: NEGATIVE
PCP UR QL SCN: NEGATIVE
PLATELET # BLD AUTO: 259 K/UL (ref 138–453)
PMV BLD AUTO: 9.9 FL (ref 8.1–13.5)
POTASSIUM SERPL-SCNC: 4.2 MMOL/L (ref 3.7–5.3)
PROT SERPL-MCNC: 7.7 G/DL (ref 6.6–8.7)
RBC # BLD AUTO: 4.55 M/UL (ref 4.21–5.77)
SALICYLATES SERPL-MCNC: <0.5 MG/DL (ref 0–10)
SODIUM SERPL-SCNC: 137 MMOL/L (ref 136–145)
TEST INFORMATION: ABNORMAL
WBC OTHER # BLD: 11.2 K/UL (ref 3.5–11.3)

## 2025-07-10 PROCEDURE — G0480 DRUG TEST DEF 1-7 CLASSES: HCPCS

## 2025-07-10 PROCEDURE — 80307 DRUG TEST PRSMV CHEM ANLYZR: CPT

## 2025-07-10 PROCEDURE — 80143 DRUG ASSAY ACETAMINOPHEN: CPT

## 2025-07-10 PROCEDURE — 80179 DRUG ASSAY SALICYLATE: CPT

## 2025-07-10 PROCEDURE — 85025 COMPLETE CBC W/AUTO DIFF WBC: CPT

## 2025-07-10 PROCEDURE — 1240000000 HC EMOTIONAL WELLNESS R&B

## 2025-07-10 PROCEDURE — APPSS60 APP SPLIT SHARED TIME 46-60 MINUTES: Performed by: NURSE PRACTITIONER

## 2025-07-10 PROCEDURE — 99285 EMERGENCY DEPT VISIT HI MDM: CPT

## 2025-07-10 PROCEDURE — 99222 1ST HOSP IP/OBS MODERATE 55: CPT | Performed by: INTERNAL MEDICINE

## 2025-07-10 PROCEDURE — 80053 COMPREHEN METABOLIC PANEL: CPT

## 2025-07-10 PROCEDURE — 99222 1ST HOSP IP/OBS MODERATE 55: CPT | Performed by: PSYCHIATRY & NEUROLOGY

## 2025-07-10 RX ORDER — MAGNESIUM HYDROXIDE/ALUMINUM HYDROXICE/SIMETHICONE 120; 1200; 1200 MG/30ML; MG/30ML; MG/30ML
30 SUSPENSION ORAL EVERY 6 HOURS PRN
Status: DISCONTINUED | OUTPATIENT
Start: 2025-07-10 | End: 2025-07-14 | Stop reason: HOSPADM

## 2025-07-10 RX ORDER — ACETAMINOPHEN 325 MG/1
650 TABLET ORAL EVERY 4 HOURS PRN
Status: DISCONTINUED | OUTPATIENT
Start: 2025-07-10 | End: 2025-07-14 | Stop reason: HOSPADM

## 2025-07-10 RX ORDER — HYDROXYZINE HYDROCHLORIDE 50 MG/1
50 TABLET, FILM COATED ORAL 3 TIMES DAILY PRN
Status: DISCONTINUED | OUTPATIENT
Start: 2025-07-10 | End: 2025-07-14 | Stop reason: HOSPADM

## 2025-07-10 RX ORDER — HALOPERIDOL 5 MG/1
5 TABLET ORAL EVERY 6 HOURS PRN
Status: DISCONTINUED | OUTPATIENT
Start: 2025-07-10 | End: 2025-07-14 | Stop reason: HOSPADM

## 2025-07-10 RX ORDER — TRAZODONE HYDROCHLORIDE 50 MG/1
50 TABLET ORAL NIGHTLY PRN
Status: DISCONTINUED | OUTPATIENT
Start: 2025-07-10 | End: 2025-07-14 | Stop reason: HOSPADM

## 2025-07-10 RX ORDER — RISPERIDONE 0.5 MG/1
0.5 TABLET ORAL NIGHTLY
Status: DISCONTINUED | OUTPATIENT
Start: 2025-07-10 | End: 2025-07-12

## 2025-07-10 RX ORDER — IBUPROFEN 400 MG/1
400 TABLET, FILM COATED ORAL EVERY 6 HOURS PRN
Status: DISCONTINUED | OUTPATIENT
Start: 2025-07-10 | End: 2025-07-14 | Stop reason: HOSPADM

## 2025-07-10 RX ORDER — DIPHENHYDRAMINE HYDROCHLORIDE 50 MG/ML
50 INJECTION, SOLUTION INTRAMUSCULAR; INTRAVENOUS EVERY 6 HOURS PRN
Status: DISCONTINUED | OUTPATIENT
Start: 2025-07-10 | End: 2025-07-14 | Stop reason: HOSPADM

## 2025-07-10 RX ORDER — POLYETHYLENE GLYCOL 3350 17 G/17G
17 POWDER, FOR SOLUTION ORAL DAILY PRN
Status: DISCONTINUED | OUTPATIENT
Start: 2025-07-10 | End: 2025-07-14 | Stop reason: HOSPADM

## 2025-07-10 RX ORDER — HALOPERIDOL 5 MG/ML
5 INJECTION INTRAMUSCULAR EVERY 6 HOURS PRN
Status: DISCONTINUED | OUTPATIENT
Start: 2025-07-10 | End: 2025-07-14 | Stop reason: HOSPADM

## 2025-07-10 RX ORDER — NICOTINE 21 MG/24HR
1 PATCH, TRANSDERMAL 24 HOURS TRANSDERMAL DAILY
Status: DISCONTINUED | OUTPATIENT
Start: 2025-07-10 | End: 2025-07-11

## 2025-07-10 ASSESSMENT — PATIENT HEALTH QUESTIONNAIRE - PHQ9
SUM OF ALL RESPONSES TO PHQ QUESTIONS 1-9: 0
1. LITTLE INTEREST OR PLEASURE IN DOING THINGS: NOT AT ALL
2. FEELING DOWN, DEPRESSED OR HOPELESS: NOT AT ALL

## 2025-07-10 ASSESSMENT — SLEEP AND FATIGUE QUESTIONNAIRES
AVERAGE NUMBER OF SLEEP HOURS: 4
DO YOU USE A SLEEP AID: YES
DO YOU HAVE DIFFICULTY SLEEPING: YES
SLEEP PATTERN: DISTURBED/INTERRUPTED SLEEP;INSOMNIA

## 2025-07-10 NOTE — ED NOTES
Report given to Tawana Robert. Charles. All questions answered.  No change in patient status  Continues to rest quietly

## 2025-07-10 NOTE — H&P
[facility] Internal Medicine  Nickolas Parker MD; Tyler Snider MD,, Martita Hicks MD, Dr Sharon Torres MD   ; Danuta Rojas MD    [facility] Internal Medicine   IN-PATIENT SERVICE   [facility]     HISTORY AND PHYSICAL EXAMINATION            Date:   7/10/2025  Patient name:  Balta Sarmiento  Date of admission:  7/10/2025  9:35 AM  MRN:   [id number]  Account:  [id number]  YOB: 1980  PCP:    No primary care provider on file.  Room:   [address] [place] [address]  Code Status:    Full Code      Chief Complaint:     Suicidal /Ac Psychosis    History Obtained From:     Patient/EMR/bedside RN     History of Present Illness:     Patient is 44-year-old male past medical history of anxiety depression, polysubstance has been admitted at [facility] for further management of depression and suicidal ideation.  Currently denies any chest pain short of breath  Past Medical History:     Past Medical History:   Diagnosis Date    Anxiety     Chest pain 09/13/2024    Depression     Insomnia         Past Surgical History:     No past surgical history on file.     Medications Prior to Admission:     Prior to Admission medications    Medication Sig Start Date End Date Taking? Authorizing Provider   venlafaxine (EFFEXOR XR) 150 MG extended release capsule Take 1 capsule by mouth daily (with breakfast)  Patient not taking: Reported on 7/10/2025 6/28/25   Aime Abarca MD   traZODone (DESYREL) 50 MG tablet Take 1 tablet by mouth nightly as needed for Sleep  Patient not taking: Reported on 7/10/2025 6/27/25   Aime Abarca MD   melatonin 3 MG TABS tablet Take 1 tablet by mouth nightly as needed (insomnia)  Patient not taking: Reported on 7/10/2025 4/28/25   Walt Rutledge MD   nicotine polacrilex (COMMIT) 2 MG lozenge Take 1 lozenge by mouth every hour as needed for Smoking cessation  Patient not taking: Reported on 7/10/2025 4/28/25   Walt Rutledge MD        Allergies:     Shellfish-derived products,

## 2025-07-10 NOTE — ED NOTES
Report given to Cinthia COLEMAN RN. All questions answered.  No change in patient status  Continues to rest quietly

## 2025-07-10 NOTE — ED PROVIDER NOTES
[facility] EMERGENCY DEPARTMENT  Emergency Department Encounter  Emergency Medicine Resident     Pt Name:Balta Sarmiento  MRN: [id number]  Birthdate 1980  Date of evaluation: 7/10/25  PCP:  No primary care provider on file.  Note Started: 4:32 AM EDT      CHIEF COMPLAINT       Chief Complaint   Patient presents with    Agitation    Suicidal     Planning on taking \"bunch of pills\"       HISTORY OF PRESENT ILLNESS  (Location/Symptom, Timing/Onset, Context/Setting, Quality, Duration, Modifying Factors, Severity.)      Balta Sarmiento is a 44 y.o. male who presents with suicidal ideation, reports he has not been on his medications for couple of months.  Patient also reports agitation, feels like he is going to snap on people, no homicidal ideation.  Patient does have a plan to to take a bunch of pills like he has in the past.  Patient endorses alcohol use 6 hours ago, drink 2 pints, is a daily drinker, never had withdrawal seizures however does get shaky when he does not drink.  Patient did cocaine about 5 hours ago.  Patient denies any physical complaints at this time, no shortness of breath, chest pain, abdominal pain.    PAST MEDICAL / SURGICAL / SOCIAL / FAMILY HISTORY      has a past medical history of Anxiety, Chest pain, Depression, and Insomnia.     has no past surgical history on file.    Social History     Socioeconomic History    Marital status: Single     Spouse name: Not on file    Number of children: Not on file    Years of education: Not on file    Highest education level: Not on file   Occupational History    Not on file   Tobacco Use    Smoking status: Every Day     Current packs/day: 0.50     Types: Cigarettes    Smokeless tobacco: Never   Vaping Use    Vaping status: Former   Substance and Sexual Activity    Alcohol use: Yes     Comment: 4 beers and a fifth of liquor    Drug use: Yes     Types: Cocaine, Marijuana (Weed)    Sexual activity: Not on file   Other Topics Concern    Not on file

## 2025-07-10 NOTE — ED NOTES
Social work collected Emtala.     Social work obtained voluntatary form.    Social work faxed voluntary form to [facility].    Social work faxed transportation form to [facility].

## 2025-07-10 NOTE — BH NOTE
Behavioral Health [facility]  Admission Note     Admission Type:   Admission Type: Voluntary    Reason for admission:  Reason for Admission: Patient reported suicidal ideation with a plan to OD on pills. He is homeless and uses crack, marijuana and alcohol daily.      Addictive Behavior:   Addictive Behavior  In the Past 3 Months, Have You Felt or Has Someone Told You That You Have a Problem With  : None    Medical Problems:   Past Medical History:   Diagnosis Date    Anxiety     Chest pain 09/13/2024    Depression     Insomnia        Status EXAM:  Mental Status and Behavioral Exam  Normal: No  Level of Assistance: Independent/Self  Facial Expression: Brightened  Affect: Blunt  Level of Consciousness: Alert  Frequency of Checks: 4 times per hour, close  Mood:Normal: Yes  Mood: Labile  Motor Activity:Normal: Yes  Motor Activity: Decreased  Eye Contact: Good  Observed Behavior: Aggressive  Sexual Misconduct History: Current - no  Preception: Wilmer to person, Wilmer to time, Wilmer to place, Wilmer to situation  Attention:Normal: No  Attention: Unable to concentrate  Thought Processes: Blocking  Thought Content:Normal: Yes  Thought Content: Preoccupations  Depression Symptoms: No problems reported or observed.  Anxiety Symptoms: No problems reported or observed.  Yumiko Symptoms: No problems reported or observed.  Hallucinations: None  Delusions: No  Memory:Normal: Yes  Memory: Poor recent, Poor remote  Insight and Judgment: No  Insight and Judgment: Unrealistic, Poor judgment    Tobacco Screening:  Practical Counseling, on admission, juan X, if applicable and completed (first 3 are required if patient doesn't refuse):            ( ) Recognizing danger situations (included triggers and roadblocks)                    ( ) Coping skills (new ways to manage stress,relaxation techniques, changing routine, distraction)                                                           ( ) Basic information about quitting (benefits of

## 2025-07-10 NOTE — ED NOTES
[] [facility]    [] [facility]    [x]  [facility]    SUICIDE RISK ASSESSMENT      Y  N     [] [x] In the past two weeks have you had thoughts of hurting yourself in any way?    [] [x] In the past two weeks have you had thoughts that you would be better off dead?   [] [x] Have you made a suicide attempt in the past two months?   [x] [] Do you have a plan for hurting yourself or suicide?   [x] [] Presence of hallucinations/voices related to hurting himself or herself or someone else.    SUICIDE/SECURITY WATCH PRECAUTION CHECKLIST     Orders    [x]  Suicide/Security Watch Precautions initiated as checked below:   7/10/25 4:09 AM EDT 09/09    [x] Notified physician:  Leann Gustafson MD  7/10/25 4:09 AM EDT    [x] Orders obtained as appropriate:     [x] 1:1 Observer     [] Psych Consult     [] Psych Consult    Name:  Date:  Time:    [x] 1:1 Observer, Notified by:  Mahesh Valero RN    Contact Nurse Supervisor    [x] Remove all personal clothes from room and place in snap/paper gown/pants.  Slipper only    [x] Remove all personal belongings from room and secured away from patient.    Documentation    [x] Initiate Suicide/Security Watch Precaution Flow Sheet    [x] Initiate individualized Care Plan/Problem    [x] Document why precautions initiated on flow sheet (Initiate Nursing Care Plan/Problem)    [x] 1:1 Observer in place; instructions provided.  Suicide precautions require observer be within arms length.    [x] Nurse-Observer Communication Hand-off initiated by RN, reviewed with Observer.  Subsequently used as Hand Off between Observers.    [x] Initiate every 15 minute observations per observer as delegated by the RN.    [x] Initiate RN assessment and documentation    Environmental Scan  Search Criteria and Process: OPTIONAL, see Search Policy    [] Reason for search:    [] Nursing in presence of second person to search patient    [] Patient notified of reason for body assessment and

## 2025-07-10 NOTE — GROUP NOTE
Group Therapy Note    Date: 7/10/2025    Group Start Time: 1430  Group End Time: 1520  Group Topic: Cognitive Skills    [facility]    Elyse Anthony CTRS        Group Therapy Note    Attendees: 7/11     Patient's Goal:  To increase social interaction, practice decision making and concentration skills.      Notes: Pt was social with RT and peers at start of Cognitive Skills group but then chose to leave and   did not participate in task.Pt was pleasant and in behavioral control.     Q15 minute safety checks maintained for patient safety and will continue to encourage patient to   attend unit programming.         Discipline Responsible: Psychoeducational Specialist   Signature: ROSELYN CRAWFORD

## 2025-07-10 NOTE — BH NOTE
Mr. Sarmiento was admitted to the [facility] unit from [place]. He arrived via stretcher, dressed in paper scrubs. He was irritable and semi cooperative with admission assessments.

## 2025-07-10 NOTE — ED PROVIDER NOTES
[facility]  FACULTY HANDOFF       Handoff taken on the following patient from prior Attending Physician:  Pt Name: Balta Sarmiento  PCP:  No primary care provider on file.    Attestation  I was available and discussed any additional care issues that arose and coordinated the management plans with the resident(s) caring for the patient during my duty period. Any areas of disagreement with resident's documentation of care or procedures are noted on the chart. I was personally present for the key portions of any/all procedures during my duty period. I have documented in the chart those procedures where I was not present during the key portions.             (Please note that portions of this note were completed with a voice recognition program.  Efforts were made to edit the dictations but occasionally words are mis-transcribed.)    Aaron Beach MD, MD,   Attending Emergency Physician        Jose Beach MD  07/10/25 4449

## 2025-07-10 NOTE — GROUP NOTE
Group Therapy Note    Date: 7/10/2025    Group Start Time: 1100  Group End Time: 1155  Group Topic: Relaxation    [facility]    Elyse [facility]        Group Therapy Note    Attendees: 8/15     Topic: To increase social interaction, practice relaxation through art work and music choices,   and discussion.          Comments:   Patient did not participate in Relaxation Group at 11:00, despite staff encouragement and explanation   of benefits. Pt was seclusive to self and room.     Q15 minute safety checks maintained for patient safety and will continue to encourage patient to   attend unit programming.         Discipline Responsible: Psychoeducational Specialist   Signature: ROSELYN CRAWFORD

## 2025-07-10 NOTE — ED PROVIDER NOTES
[facility]   Emergency Department  Faculty Attestation       I performed a history and physical examination of the patient and discussed management with the resident. I reviewed the resident’s note and agree with the documented findings including all diagnostic interpretations and plan of care. Any areas of disagreement are noted on the chart. I was personally present for the key portions of any procedures. I have documented in the chart those procedures where I was not present during the key portions. I have reviewed the emergency nurses triage note. I agree with the chief complaint, past medical history, past surgical history, allergies, medications, social and family history as documented unless otherwise noted below.  For Physician Assistant/ Nurse Practitioner cases/documentation I have personally evaluated this patient and have completed at least one if not all key elements of the E/M (history, physical exam, and MDM). Additional findings are as noted.    Patient Name: Balta Sarmiento  MRN: [id number]  : 1980  Primary Care Physician: No primary care provider on file.    Date of evaluationa: 7/10/2025   Note Started: 4:05 AM EDT    Pertinent Comments     Chief Complaint:   Chief Complaint   Patient presents with    Agitation    Suicidal     Planning on taking \"bunch of pills\"        Initial vitals: (If not listed, please see nursing documentation)  ED Triage Vitals [07/10/25 0354]   BP Systolic BP Percentile Diastolic BP Percentile Temp Temp src Pulse Respirations SpO2   126/81 -- -- 98.6 °F (37 °C) -- (!) 106 16 93 %      Height Weight         -- --              HPI/PE/Impression:  This is a 44 y.o. male who presents to the Emergency Department with suicidal patient is to overdose on pills.  Has been also feeling more irritated recently.  Denies any chest pain abdominal pain or shortness of breath.  Does admit to alcohol and cocaine use.  On exam awake alert answering questions.  Heart

## 2025-07-10 NOTE — ED NOTES
..Transfer Center Handoff for Behavioral Health Transfers      Patient's Current Location: [facility] EMERGENCY DEPARTMENT     Chief Complaint   Patient presents with    Agitation    Suicidal     Planning on taking \"bunch of pills\"       Current or History of Violent Behavior: Yes    Currently in Restraints Now or During this Encounter: No  (Specify if Agitation or self harm is noted in ED?)  If yes, please describe behaviors requiring restraint:             Medical Clearance Documented and Verified in the Chart: Yes    Allergies   Allergen Reactions    Shellfish-Derived Products Rash    Fish-Derived Products     Propoxyphene Hives        Can Patient Tolerate Lying Flat: Yes    Able to Perform ADLs:  Yes  (Specify if able to ambulate or uses any mobility devices such as cane or walker)  Activity:    Level of Assistance:    Assistive Device:    Miscellaneous Devices:      LABS    CBC:   Lab Results   Component Value Date/Time    WBC 11.2 07/10/2025 04:02 AM    RBC 4.55 07/10/2025 04:02 AM    HGB 14.3 07/10/2025 04:02 AM    HCT 41.0 07/10/2025 04:02 AM    MCV 90.1 07/10/2025 04:02 AM    MCH 31.4 07/10/2025 04:02 AM    MCHC 34.9 07/10/2025 04:02 AM    RDW 12.2 07/10/2025 04:02 AM     07/10/2025 04:02 AM    MPV 9.9 07/10/2025 04:02 AM     CMP:   Lab Results   Component Value Date/Time     06/25/2025 12:04 AM    K 4.2 06/25/2025 12:04 AM    K 3.9 04/09/2025 09:28 PM     06/25/2025 12:04 AM    CO2 22 06/25/2025 12:04 AM    BUN 16 06/25/2025 12:04 AM    CREATININE 1.2 06/25/2025 12:04 AM    GFRAA >60 04/25/2022 12:55 AM    AGRATIO 1.5 04/09/2025 09:28 PM    LABGLOM 76 06/25/2025 12:04 AM    LABGLOM >90 04/25/2024 12:08 AM    GLUCOSE 96 06/25/2025 12:04 AM    CALCIUM 10.0 06/25/2025 12:04 AM    BILITOT 0.4 06/25/2025 12:04 AM    ALKPHOS 87 06/25/2025 12:04 AM    AST 20 06/25/2025 12:04 AM    ALT 13 06/25/2025 12:04 AM     Drug Panel:   Lab Results   Component Value Date/Time    OPIAU NEGATIVE

## 2025-07-10 NOTE — ED NOTES
Patient is 44/M came in from triage as walk-in having suicidal thoughts by using \"bunch of pills\" to end his life. Patient also states his been snappy to people and agitated often lately. Patient verbalizes his been off of his pysch medication for months. Patient also states he just got back from town after he visited his cousin for a week via bus. Patient denies HI today. Denies Chest pain and or SOB. Dr. Gustafson and Dr. De La Torre at bedside for consult and evaluation. 1:1 sitter at bedside. Plan of care on going.

## 2025-07-10 NOTE — H&P
Department of Psychiatry  Attending Physician Psychiatric Assessment   Patient: Balta Sarmiento  MRN: [id number]  Reason for Admission to Psychiatric Unit:  Threat to self requiring 24 hour professional observation  A mental disorder causing major disability in social, interpersonal, occupational, and/or educational functioning that is leading to dangerous or life-threatening functioning, and that can only be addressed in an acute inpatient setting   A mental disorder that causes an inability to maintain adequate nurtrition or self-care, and family/community support cannot provide reliable, essential care, so that the patient cannont function at a less intensive level of care during evaluation and treatment   Concerns about patient's safety in the community  Past Mental Health Diagnosis: a history of  Major Depression, Prior suicide attempt, and Alcohol and/or Drug Use Disorder  Triggering event or precipitating factor: Housing instability, Financial instability, Alcohol/Drug Relapse, and Psych Treatment Noncompliance  Length of time/duration of triggering event: Days  Legal Status: Voluntary    CHIEF COMPLAINT:  Depression with suicidal ideation     History obtained from: Patient, electronic medical record          HISTORY OF PRESENT ILLNESS:    Balta Sarmiento is a 44 y.o. male who has a past medical history of depression, polysubstance use, and chronic hepatitis C. Patient presented to the ED for suicidal ideation with a plan to overdose.  Patient had expressed feeling more irritable than usual.  He has been consuming alcohol, approximately 2 pints daily and using cocaine.  Patient does not feel safe from self and is admitted to [facility] on a voluntary basis.    Patient is known to [facility] and this is his fifth admission to a [facility] psychiatric facility in the last 6 months.  He was most recently discharged from [facility] on 6/27 with a follow-up appointment at [facility] and he was encouraged to seek substance use treatment placement

## 2025-07-10 NOTE — BH NOTE
Patient given tobacco quitline number 65215505254 at this time, refusing to call at this time, states \" I just dont want to quit now\"- patient given information as to the dangers of long term tobacco use. Continue to reinforce the importance of tobacco cessation.

## 2025-07-10 NOTE — ED NOTES
Social work met with patient who reported to be at the ER because he is suicidal with a plan to take pills. Patient reported he is all out of his medications, but he has ways to get pills. Patient reported he is very anxious, he's paranoid, snapping at people, threatening people specifically his boss. Patient reported he is not homicidal and he has no plan to hurt his boss he only threatens them. Patient reported to hear voices on and off calling his name and when he looks around no one is there. Patient reported no visual hallucinations. Patient reported to be off his medication and not doing well. Patient reported to want to go to [place] to get help and get his medication re-evaluated.     Patient reported he has been off his medication for about a month now. Patient reported he was prescribed medication when leaving [place], but he left it and no longer has it. Patient reported a desire to have his medications assessed. Patient reported he is not in services, [facility] and [facility]. Patient he was in [place] before going to [place] for 2 days and then went to [facility] detox and then [place] to live with his cousin and came back in town today due to things not working out with his cousin. Patient reported he is currently homeless. Patient reported a history of cocaine, alcohol and THC use. Patient reported he last used cocaine around 11pm; alcohol around 5 or 6pm and marijuana about a week ago.     Patient reported he wants to go to [place]. Patient reported he messed up last time he was at [place] because he didn't go to group and he should have so he could talk. Patient reported if he was admitted he would go to groups.

## 2025-07-10 NOTE — ED PROVIDER NOTES
[facility] EMERGENCY DEPARTMENT  Emergency Department  Emergency Medicine Resident Turn-Over     Note Started: 7:20 AM EDT    Care of Balta Sarmiento was assumed from Dr. De La Torre and is being seen for Agitation and Suicidal (Planning on taking \"bunch of pills\")  .  The patient's initial evaluation and plan have been discussed with the prior provider who initially evaluated the patient.     EMERGENCY DEPARTMENT COURSE / MEDICAL DECISION MAKING:       MEDICATIONS GIVEN:  No orders of the defined types were placed in this encounter.      LABS / RADIOLOGY:     Labs Reviewed   CBC WITH AUTO DIFFERENTIAL - Abnormal; Notable for the following components:       Result Value    MCHC 34.9 (*)     Neutrophils % 69 (*)     Lymphocytes % 23 (*)     All other components within normal limits   ETHANOL - Abnormal; Notable for the following components:    Ethanol Lvl 42 (*)     Ethanol percent 0.042 (*)     All other components within normal limits   URINE DRUG SCREEN - Abnormal; Notable for the following components:    Cocaine Metabolite, Urine POSITIVE (*)     Cannabinoid Scrn, Ur POSITIVE (*)     All other components within normal limits   ACETAMINOPHEN LEVEL - Abnormal; Notable for the following components:    Acetaminophen Level <5 (*)     All other components within normal limits   COMPREHENSIVE METABOLIC PANEL   SALICYLATE LEVEL       XR CHEST PORTABLE  Result Date: 6/18/2025  EXAM: 1 VIEW XRAY OF THE CHEST 06/18/2025 09:44:04 PM COMPARISON: 04/26/2025 CLINICAL HISTORY: Chest pain. FINDINGS: LUNGS AND PLEURA: No focal pulmonary opacity. No pulmonary edema. No pleural effusion. No pneumothorax. HEART AND MEDIASTINUM: No acute abnormality of the cardiac and mediastinal silhouettes. BONES AND SOFT TISSUES: No acute osseous abnormality.     1. No acute process.       RECENT VITALS:     Temp: 97.9 °F (36.6 °C),  Pulse: 75, Respirations: 16, BP: 104/69, SpO2: 98 %    This patient is a 44 y.o. Male with     Hx of SI,

## 2025-07-11 PROCEDURE — 90833 PSYTX W PT W E/M 30 MIN: CPT | Performed by: PSYCHIATRY & NEUROLOGY

## 2025-07-11 PROCEDURE — 6370000000 HC RX 637 (ALT 250 FOR IP): Performed by: PSYCHIATRY & NEUROLOGY

## 2025-07-11 PROCEDURE — 99232 SBSQ HOSP IP/OBS MODERATE 35: CPT | Performed by: INTERNAL MEDICINE

## 2025-07-11 PROCEDURE — GZ56ZZZ INDIVIDUAL PSYCHOTHERAPY, SUPPORTIVE: ICD-10-PCS

## 2025-07-11 PROCEDURE — 1240000000 HC EMOTIONAL WELLNESS R&B

## 2025-07-11 PROCEDURE — 99232 SBSQ HOSP IP/OBS MODERATE 35: CPT | Performed by: PSYCHIATRY & NEUROLOGY

## 2025-07-11 RX ORDER — POLYETHYLENE GLYCOL 3350 17 G
2 POWDER IN PACKET (EA) ORAL
Status: DISCONTINUED | OUTPATIENT
Start: 2025-07-11 | End: 2025-07-14 | Stop reason: HOSPADM

## 2025-07-11 RX ADMIN — NICOTINE POLACRILEX 2 MG: 2 LOZENGE ORAL at 18:34

## 2025-07-11 RX ADMIN — NICOTINE POLACRILEX 2 MG: 2 LOZENGE ORAL at 12:53

## 2025-07-11 NOTE — GROUP NOTE
Group Therapy Note    Date: 7/10/2025    Group Start Time: 2045  Group End Time: 2110  Group Topic: Relaxation    [facility]    Jess Bean RN      Group Therapy Note    Attendees: 5/11      Patient's Goal:  To acquire coping skills by developing relaxation techniques    Notes:  Pt attended and actively participated in the Relaxation group this evening.    Status After Intervention:  Improved    Participation Level: Interactive    Participation Quality: Appropriate and Attentive    Speech:  normal    Thought Process/Content: Logical    Affective Functioning: Congruent    Mood: calm and attempting to relax    Level of consciousness:  Alert and Oriented x4    Response to Learning: Progressing to goal    Endings: None Reported    Modes of Intervention: Education, Support, and Exploration    Discipline Responsible: Registered Nurse        Signature:  Jess Bean RN

## 2025-07-11 NOTE — GROUP NOTE
Group Therapy Note    Date: 7/11/2025    Group Start Time: 0900  Group End Time: 0930  Group Topic: Nursing    [facility]    Elizabeth Infante LPN        Group Therapy Note    Attendees: 6/12       Patient was offered group therapy today but declined to participate despite encouragement from staff. 1:1 was offered.           Signature:  Elizabeth Infante LPN

## 2025-07-11 NOTE — GROUP NOTE
Group Therapy Note    Date: 7/11/2025    Group Start Time: 1330  Group End Time: 1410  Group Topic: Cognitive Skills    [facility]    Elyse Anthony CTRS        Group Therapy Note    Attendees: 5/10     Topic: :  To increase social interaction, practice decision making and concentration skills.        Comments:   Patient did not participate in Cognitive Skills Group at 13:30, despite staff encouragement and explanation   of benefits. Pt was seclusive to self and room.     Q15 minute safety checks maintained for patient safety and will continue to encourage patient to   attend unit programming.         Discipline Responsible: Psychoeducational Specialist   Signature: ROSELYN CRAWFORD

## 2025-07-12 PROCEDURE — 1240000000 HC EMOTIONAL WELLNESS R&B

## 2025-07-12 PROCEDURE — 90833 PSYTX W PT W E/M 30 MIN: CPT | Performed by: PSYCHIATRY & NEUROLOGY

## 2025-07-12 PROCEDURE — 99232 SBSQ HOSP IP/OBS MODERATE 35: CPT | Performed by: PSYCHIATRY & NEUROLOGY

## 2025-07-12 PROCEDURE — 99232 SBSQ HOSP IP/OBS MODERATE 35: CPT | Performed by: INTERNAL MEDICINE

## 2025-07-12 NOTE — GROUP NOTE
Group Therapy Note    Date: 7/12/2025    Group Start Time: 1030  Group End Time: 1100  Group Topic: Psychoeducation    [facility]    Vijay Zuniga        Group Therapy Note    Attendees: 1/12     Patient was offered group therapy today but declined to participate despite encouragement from staff. 1:1 was offered.    Signature:  Vijay Zuniga

## 2025-07-12 NOTE — GROUP NOTE
Group Therapy Note    Date: 7/11/2025    Group Start Time: 2015  Group End Time: 2035  Group Topic: Relaxation    [facility]    Jess Bean RN      Group Therapy Note    Attendees: 5/10      Patient's Goal:  To acquire coping skills by developing relaxation techniques    Notes:  Pt attended and actively participated in the Relaxation group this evening.    Status After Intervention:  Improved    Participation Level: Interactive    Participation Quality: Appropriate and Attentive    Speech:  normal    Thought Process/Content: Logical    Affective Functioning: Congruent    Mood: calm and attempting to relax    Level of consciousness:  Alert and Oriented x4    Response to Learning: Progressing to goal    Endings: None Reported    Modes of Intervention: Education, Support, and Exploration    Discipline Responsible: Registered Nurse        Signature:  Jess Bean RN

## 2025-07-13 VITALS
RESPIRATION RATE: 14 BRPM | WEIGHT: 220 LBS | SYSTOLIC BLOOD PRESSURE: 113 MMHG | HEIGHT: 71 IN | TEMPERATURE: 97.1 F | HEART RATE: 79 BPM | BODY MASS INDEX: 30.8 KG/M2 | OXYGEN SATURATION: 98 % | DIASTOLIC BLOOD PRESSURE: 74 MMHG

## 2025-07-13 PROCEDURE — 99232 SBSQ HOSP IP/OBS MODERATE 35: CPT | Performed by: PSYCHIATRY & NEUROLOGY

## 2025-07-13 PROCEDURE — 1240000000 HC EMOTIONAL WELLNESS R&B

## 2025-07-13 PROCEDURE — 90833 PSYTX W PT W E/M 30 MIN: CPT | Performed by: PSYCHIATRY & NEUROLOGY

## 2025-07-13 PROCEDURE — 6370000000 HC RX 637 (ALT 250 FOR IP): Performed by: PSYCHIATRY & NEUROLOGY

## 2025-07-13 RX ADMIN — NICOTINE POLACRILEX 2 MG: 2 LOZENGE ORAL at 14:27

## 2025-07-13 NOTE — GROUP NOTE
Group Therapy Note    Date: 7/13/2025    Group Start Time: 1030  Group End Time: 1100  Group Topic: Psychoeducation    [facility]    Vijay Zuniga        Group Therapy Note    Attendees: 4/14     Patient was offered group therapy today but declined to participate despite encouragement from staff. 1:1 was offered.    Signature:  Vijay Zuniga

## 2025-07-13 NOTE — PLAN OF CARE
Problem: Depression  Goal: Will be euthymic at discharge  Description: INTERVENTIONS:  1. Administer medication as ordered  2. Provide emotional support via 1:1 interaction with staff  3. Encourage involvement in milieu/groups/activities  4. Monitor for social isolation  7/13/2025 0128 by Guillermina Adames RN  Outcome: Not Progressing  Note: Pt not euthymic at discharge, planned d/c in AM     Problem: Self Harm/Suicidality  Goal: Will have no self-injury during hospital stay  Description: INTERVENTIONS:  1.  Ensure constant observer at bedside with Q15M safety checks  2.  Maintain a safe environment  3.  Secure patient belongings  4.  Ensure family/visitors adhere to safety recommendations  5.  Ensure safety tray has been added to patient's diet order  6.  Every shift and PRN: Re-assess suicidal risk via Frequent Screener    7/13/2025 0128 by Guillermina Adames, RN  Outcome: Progressing  Note: Pt denies suicidal thoughts and remains free from self-injury at this time. Q 15 min safety checks maintained.     Problem: Depression  Goal: Will be euthymic at discharge  Description: INTERVENTIONS:  1. Administer medication as ordered  2. Provide emotional support via 1:1 interaction with staff  3. Encourage involvement in milieu/groups/activities  4. Monitor for social isolation  7/13/2025 0128 by Guillermina Adames RN  Outcome: Not Progressing  Note: Pt not euthymic at discharge, planned d/c in AM  7/12/2025 1651 by Lorna Milan, CHAVO  Note: Mr. Sarmiento denies feeling depressed and anxious at this time. He has remained in the day area for most of the afternoon watching tv and is selectively social with peers. He's friendly with staff and approaches staff with needs.      
  Problem: Self Harm/Suicidality  Goal: Will have no self-injury during hospital stay  Description: INTERVENTIONS:  1.  Ensure constant observer at bedside with Q15M safety checks  2.  Maintain a safe environment  3.  Secure patient belongings  4.  Ensure family/visitors adhere to safety recommendations  5.  Ensure safety tray has been added to patient's diet order  6.  Every shift and PRN: Re-assess suicidal risk via Frequent Screener    7/10/2025 2156 by Luca Mullen, RN  Outcome: Progressing  Patient has made no attempt to harm self at this time.        Problem: Depression  Goal: Will be euthymic at discharge  Description: INTERVENTIONS:  1. Administer medication as ordered  2. Provide emotional support via 1:1 interaction with staff  3. Encourage involvement in milieu/groups/activities  4. Monitor for social isolation  7/10/2025 2156 by Luca Mullen, RN  Outcome: Progressing  Patient is irritable, refusing all medications and refuses to participate in assessment. Safety plan reviewed with patient, agrees to approach staff when feeling upset.  15 minute and random checks maintained for safety.  No violent or escalating behaviors noted during this shift. Patient is currently calm, controlled and isolative to self..      Problem: Drug Abuse/Detox  Goal: Will have no detox symptoms and will verbalize plan for changing drug-related behavior  Description: INTERVENTIONS:  1. Administer medication as ordered  2. Monitor physical status  3. Provide emotional support with 1:1 interaction with staff  4. Encourage  recovery focused treatment   7/10/2025 2156 by Luca Mullen, RN  Outcome: Progressing  Patient denies withdrawal symptoms at this time.      
  Problem: Self Harm/Suicidality  Goal: Will have no self-injury during hospital stay  Description: INTERVENTIONS:  1.  Ensure constant observer at bedside with Q15M safety checks  2.  Maintain a safe environment  3.  Secure patient belongings  4.  Ensure family/visitors adhere to safety recommendations  5.  Ensure safety tray has been added to patient's diet order  6.  Every shift and PRN: Re-assess suicidal risk via Frequent Screener    7/11/2025 0937 by Chelsea Mancuso, RN  Outcome: Progressing  Note: Patient refused to answer assessment questions at this time. Labile and irritable. No medications prescribed at this time.   7/10/2025 2156 by Luca Mullen, RN  Outcome: Progressing     Problem: Depression  Goal: Will be euthymic at discharge  Description: INTERVENTIONS:  1. Administer medication as ordered  2. Provide emotional support via 1:1 interaction with staff  3. Encourage involvement in milieu/groups/activities  4. Monitor for social isolation  7/11/2025 0937 by Chelsea Mancuso, RN  Outcome: Progressing  Note: Patient refused to answer assessment questions at this time. Is isolative to room and self and aloof with peers. Non-interactive at times but easily irritable.   7/10/2025 2156 by Luca Mullen, RN  Outcome: Progressing     Problem: Drug Abuse/Detox  Goal: Will have no detox symptoms and will verbalize plan for changing drug-related behavior  Description: INTERVENTIONS:  1. Administer medication as ordered  2. Monitor physical status  3. Provide emotional support with 1:1 interaction with staff  4. Encourage  recovery focused treatment   7/11/2025 0937 by Chelsea Mancuso, RN  Outcome: Progressing  Note: Patient refused to answer any assessment questions at this time. Isolative, irritable, guarded, and staying in room majority of the shift.   7/10/2025 2156 by Luca Mullen, RN  Outcome: Progressing     
  Problem: Self Harm/Suicidality  Goal: Will have no self-injury during hospital stay  Description: INTERVENTIONS:  1.  Ensure constant observer at bedside with Q15M safety checks  2.  Maintain a safe environment  3.  Secure patient belongings  4.  Ensure family/visitors adhere to safety recommendations  5.  Ensure safety tray has been added to patient's diet order  6.  Every shift and PRN: Re-assess suicidal risk via Frequent Screener    7/11/2025 2056 by Luca Mullen, RN  Outcome: Progressing  Patient has made no attempt to harm self at this time.      Problem: Depression  Goal: Will be euthymic at discharge  Description: INTERVENTIONS:  1. Administer medication as ordered  2. Provide emotional support via 1:1 interaction with staff  3. Encourage involvement in milieu/groups/activities  4. Monitor for social isolation  7/11/2025 2056 by Luca Mullen, RN  Outcome: Progressing  Patient is social in the dayroom. He is somewhat depressed, anxious and unmotivated. He is hoping to go to [facility] to handle his addiction issues before he can deal with his mental health. Patient denies suicidal ideation, homicidal ideation and hallucinations at this time.   Safety plan reviewed with patient, agrees to approach staff when feeling upset.  15 minute and random checks maintained for safety.  No violent or escalating behaviors noted during this shift. Patient is currently calm, controlled and medication-compliant.      Problem: Drug Abuse/Detox  Goal: Will have no detox symptoms and will verbalize plan for changing drug-related behavior  Description: INTERVENTIONS:  1. Administer medication as ordered  2. Monitor physical status  3. Provide emotional support with 1:1 interaction with staff  4. Encourage  recovery focused treatment   7/11/2025 2056 by Luca Mullen, RN  Outcome: Progressing  Patient denies withdrawal symptoms at this time.      
  Problem: Self Harm/Suicidality  Goal: Will have no self-injury during hospital stay  Description: INTERVENTIONS:  1.  Ensure constant observer at bedside with Q15M safety checks  2.  Maintain a safe environment  3.  Secure patient belongings  4.  Ensure family/visitors adhere to safety recommendations  5.  Ensure safety tray has been added to patient's diet order  6.  Every shift and PRN: Re-assess suicidal risk via Frequent Screener    Note: Mr. Sarmiento denies suicidal ideation and thoughts of harm to self and others     Problem: Depression  Goal: Will be euthymic at discharge  Description: INTERVENTIONS:  1. Administer medication as ordered  2. Provide emotional support via 1:1 interaction with staff  3. Encourage involvement in milieu/groups/activities  4. Monitor for social isolation  Note: Mr. Sarmiento denies feeling depressed and anxious at this time. He has remained in the day area for most of the afternoon watching tv and is selectively social with peers. He's friendly with staff and approaches staff with needs.      Problem: Drug Abuse/Detox  Goal: Will have no detox symptoms and will verbalize plan for changing drug-related behavior  Description: INTERVENTIONS:  1. Administer medication as ordered  2. Monitor physical status  3. Provide emotional support with 1:1 interaction with staff  4. Encourage  recovery focused treatment   Note: Mr. Sarmiento denies indication of withdraw symptoms     
  Problem: Self Harm/Suicidality  Goal: Will have no self-injury during hospital stay  Description: INTERVENTIONS:  1.  Ensure constant observer at bedside with Q15M safety checks  2.  Maintain a safe environment  3.  Secure patient belongings  4.  Ensure family/visitors adhere to safety recommendations  5.  Ensure safety tray has been added to patient's diet order  6.  Every shift and PRN: Re-assess suicidal risk via Frequent Screener    Note: Mr. Sarmiento denies suicidal ideation and thoughts of harm to self and others.      Problem: Depression  Goal: Will be euthymic at discharge  Description: INTERVENTIONS:  1. Administer medication as ordered  2. Provide emotional support via 1:1 interaction with staff  3. Encourage involvement in milieu/groups/activities  4. Monitor for social isolation  Note: Mr. Sarmiento denies feeling depressed at this time. He has spent most of the shift in the day area watching tv, playing card games, coloring and socializing with peers in the day area. He is pleasant with peers and staff.      Problem: Drug Abuse/Detox  Goal: Will have no detox symptoms and will verbalize plan for changing drug-related behavior  Description: INTERVENTIONS:  1. Administer medication as ordered  2. Monitor physical status  3. Provide emotional support with 1:1 interaction with staff  4. Encourage  recovery focused treatment   Note: No signs or symptoms of withdraw observed or reported during this shift.      
[facility]  Initial Interdisciplinary Treatment Plan Note      Original treatment plan Date & Time: 7/10/2025   1245    Admission Type:       Reason for admission:        Estimated Length of Stay:  5-7days  Estimated Discharge Date: To be determined by physician.    PATIENT STRENGTHS:  Patient Strengths:   Patient Strengths and Limitations:   Addictive Behavior:    Medical Problems:  Past Medical History:   Diagnosis Date    Anxiety     Chest pain 09/13/2024    Depression     Insomnia      Status EXAM:     EDUCATION:   Learner Progress Toward Treatment Goals: Will review group plans and strategies for care.    Method: Group therapy, Medication compliance, Individualized assessments and Care planning.    Outcome: Needs Reinforcement    PATIENT GOALS: To be discussed with patient within 72 hours    PLAN/TREATMENT RECOMMENDATIONS:     Continue group therapy , Medications compliance, Goal setting, Individualized assessments and Care planning, continue to monitor patient on unit.      SHORT-TERM GOALS:   Time frame for Short-Term Goals: 5-7 days    LONG-TERM GOALS:  Time frame for Long-Term Goals: 6 months  Members Present in Team Meeting: See Signature Sheet    Millie Elizabeth RN    
observed.  Hallucinations: None  Delusions: No  Memory:Normal: No  Memory: Poor recent, Poor remote  Insight and Judgment: No  Insight and Judgment: Poor judgment, Poor insight, Unmotivated    Daily Assessment Last Entry:   Daily Sleep (WDL): Within Defined Limits            Daily Nutrition (WDL): Within Defined Limits  Level of Assistance: Independent/Self    Patient Monitoring:  Frequency of Checks: 4 times per hour, close    Psychiatric Symptoms:   Depression Symptoms  Depression Symptoms: Feelings of helplessness  Anxiety Symptoms  Anxiety Symptoms: Generalized  Yumiko Symptoms  Yumiko Symptoms: No problems reported or observed.          Suicide Risk CSSR-S:  1) Within the past month, have you wished you were dead or wished you could go to sleep and not wake up? : Yes  2) Have you actually had any thoughts of killing yourself? : Yes  3) Have you been thinking about how you might kill yourself? : Yes  5) Have you started to work out or worked out the details of how to kill yourself? Do you intend to carry out this plan? : No  6) Have you ever done anything, started to do anything, or prepared to do anything to end your life?: No  Change in Result no Change in Plan of care no      EDUCATION:   EDUCATION:   Learner Progress Toward Treatment Goals: Reviewed results and recommendations of this team, Reviewed group plan and strategies, Reviewed signs, symptoms and risk of self harm and violent behavior, Reviewed goals and plan of care    Method:small group, individual verbal education    Outcome:verbalized by patient, but needs reinforcement to obtain goals    PATIENT GOALS:  Short term:Per psychoeducational specialist, patient declined treatment team to identify goals  Long term:    PLAN/TREATMENT RECOMMENDATIONS UPDATE: continue with group therapies, increased socialization, continue planning for after discharge goals, continue with medication compliance    SHORT-TERM GOALS UPDATE:   Time frame for Short-Term Goals:

## 2025-07-13 NOTE — PROGRESS NOTES
Behavioral Services  Medicare Certification Upon Admission    I certify that this patient's inpatient psychiatric hospital admission is medically necessary for:    [x] (1) Treatment which could reasonably be expected to improve this patient's condition,       [x] (2) Or for diagnostic study;     AND     [x](2) The inpatient psychiatric services are provided while the individual is under the care of a physician and are included in the individualized plan of care.    Estimated length of stay/service 5    Plan for post-hospital care home    Electronically signed by Terrie Dasilva MD on 7/10/2025 at 4:53 PM      
    [facility] Internal Medicine  Nickolas Parker MD; Tyler Snider MD,, Martita Hicks MD, Dr Sharon Torres MD   ; Danuta Rojas MD    [facility] Internal Medicine   IN-PATIENT SERVICE   [facility]     HISTORY AND PHYSICAL EXAMINATION            Date:   7/12/2025  Patient name:  Balta Sarmiento  Date of admission:  7/10/2025  9:35 AM  MRN:   [id number]  Account:  [id number]  YOB: 1980  PCP:    No primary care provider on file.  Room:   [address] [place] [address]  Code Status:    Full Code      Chief Complaint:     Suicidal /Ac Psychosis    History Obtained From:     Patient/EMR/bedside RN     History of Present Illness:     Patient is 44-year-old male past medical history of anxiety depression, polysubstance has been admitted at [facility] for further management of depression and suicidal ideation.  Currently denies any chest pain short of breath  Past Medical History:     Past Medical History:   Diagnosis Date    Anxiety     Chest pain 09/13/2024    Depression     Insomnia         Past Surgical History:     No past surgical history on file.     Medications Prior to Admission:     Prior to Admission medications    Medication Sig Start Date End Date Taking? Authorizing Provider   venlafaxine (EFFEXOR XR) 150 MG extended release capsule Take 1 capsule by mouth daily (with breakfast)  Patient not taking: Reported on 7/10/2025 6/28/25   Aime Abarca MD   traZODone (DESYREL) 50 MG tablet Take 1 tablet by mouth nightly as needed for Sleep  Patient not taking: Reported on 7/10/2025 6/27/25   Aime Abarca MD   melatonin 3 MG TABS tablet Take 1 tablet by mouth nightly as needed (insomnia)  Patient not taking: Reported on 7/10/2025 4/28/25   Walt Rutledge MD   nicotine polacrilex (COMMIT) 2 MG lozenge Take 1 lozenge by mouth every hour as needed for Smoking cessation  Patient not taking: Reported on 7/10/2025 4/28/25   Walt Rutledge MD        Allergies:     Shellfish-derived products, 
Daily Progress Note  Walt Rutledge MD  7/11/2025  CHIEF COMPLAINT:  depression    Reviewed patient's current plan of care and vital signs with nursing staff.  Sleep:  several hours last night  Attending groups: No:     SUBJECTIVE:    Patient expresses insight to substance use and impact on mood.  Refused risperdal because \"I didn't know what it was for\".  Reviewed note from Dr. Dasilva with patient who then states he will try medications tonight.  More hopefull today.  Working towards aod placement.  Better mood. Better focus.  Denies si today.  Spent time in supportive psychotherapy.  Reports fair appetite    Mental Status Exam  Level of consciousness:  Within normal limits  Appearance: Hospital attire, seated in chair, with good grooming and hygiene   Behavior/Motor: No abnormalities noted  Attitude toward examiner:  Cooperative, attentive, good eye contact  Speech:  spontaneous, normal rate, normal volume and well articulated  Mood:  \"better\"  Affect: congruent  Thought processes:  linear, goal directed and coherent  Thought content:  denies homicidal ideation  Suicidal Ideation: improving suicidal ideation  Delusions:  no evidence of delusions  Perceptual Disturbance:  denies any perceptual disturbance  Cognition:  Oriented to self, location, time, and situation  Memory: age appropriate  Insight & Judgement: improving  Medication side effects:  denies       Data   height is 1.803 m (5' 11\") and weight is 99.8 kg (220 lb). His temporal temperature is 97.1 °F (36.2 °C). His blood pressure is 106/79 and his pulse is 84. His respiration is 16 and oxygen saturation is 96%.   Labs:   Admission on 07/10/2025, Discharged on 07/10/2025   Component Date Value Ref Range Status    Sodium 07/10/2025 137  136 - 145 mmol/L Final    Potassium 07/10/2025 4.2  3.7 - 5.3 mmol/L Final    Chloride 07/10/2025 102  98 - 107 mmol/L Final    CO2 07/10/2025 23  20 - 31 mmol/L Final    Anion Gap 07/10/2025 12  9 - 16 mmol/L Final    
Daily Progress Note  Walt Rutledge MD  7/12/2025  CHIEF COMPLAINT:  depression    Reviewed patient's current plan of care and vital signs with nursing staff.  Sleep:  several hours last night  Attending groups: No:     SUBJECTIVE:    Patient expresses insight to substance use and impact on mood.  Continues to refuse medications, states he wants to focus on sobriety.  More hopefull today.  Working towards aod placement.  Better mood. Better focus.  Denies si today.  Spent time in supportive psychotherapy.  Reports fair appetite    Mental Status Exam  Level of consciousness:  Within normal limits  Appearance: Hospital attire, seated in chair, with good grooming and hygiene   Behavior/Motor: No abnormalities noted  Attitude toward examiner:  Cooperative, attentive, good eye contact  Speech:  spontaneous, normal rate, normal volume and well articulated  Mood:  \"better\"  Affect: congruent  Thought processes:  linear, goal directed and coherent  Thought content:  denies homicidal ideation  Suicidal Ideation: improving suicidal ideation  Delusions:  no evidence of delusions  Perceptual Disturbance:  denies any perceptual disturbance  Cognition:  Oriented to self, location, time, and situation  Memory: age appropriate  Insight & Judgement: improving  Medication side effects:  denies       Data   height is 1.803 m (5' 11\") and weight is 99.8 kg (220 lb). His temporal temperature is 97.3 °F (36.3 °C). His blood pressure is 127/60 and his pulse is 69. His respiration is 16 and oxygen saturation is 96%.   Labs:   No visits with results within 2 Day(s) from this visit.   Latest known visit with results is:   Admission on 07/10/2025, Discharged on 07/10/2025   Component Date Value Ref Range Status    Sodium 07/10/2025 137  136 - 145 mmol/L Final    Potassium 07/10/2025 4.2  3.7 - 5.3 mmol/L Final    Chloride 07/10/2025 102  98 - 107 mmol/L Final    CO2 07/10/2025 23  20 - 31 mmol/L Final    Anion Gap 07/10/2025 12  9 - 16 mmol/L 
Pharmacy Medication History Note      List of current medications patient is taking is complete.    [place] [facility] [place].  No change to medication list from AVS.  On AVS from 6/27/25 patient told to stop taking lamotragine and continue the medications listed below. Patient unable to get [id number] due to his insurance saying too soon to fill at that visit. Patient reports to social work he has been out of medications since he left [facility]. Patient did fill 30 days of lamotrigine and venlafaxine on 6/13/25 but reports taking none of his medicine and that he is out.     Current Home Medication List at Time of Admission:  Prior to Admission medications    Medication Sig   venlafaxine (EFFEXOR XR) 150 MG extended release capsule Take 1 capsule by mouth daily (with breakfast)  Patient not taking: Reported on 7/10/2025   traZODone (DESYREL) 50 MG tablet Take 1 tablet by mouth nightly as needed for Sleep  Patient not taking: Reported on 7/10/2025   melatonin 3 MG TABS tablet Take 1 tablet by mouth nightly as needed (insomnia)  Patient not taking: Reported on 7/10/2025   nicotine polacrilex (COMMIT) 2 MG lozenge Take 1 lozenge by mouth every hour as needed for Smoking cessation  Patient not taking: Reported on 7/10/2025       Please let me know if you have any questions about this encounter. Thank you!    Electronically signed by Ramsey Dasilva RPH on 7/10/2025 at 11:11 AM    
RT ASSESSMENT TREATMENT GOALS    [x]Pt Goal:  Pt will identify 1-2 positive coping skills by time of discharge.    []Pt Goal:  Pt will identify 1-2 positive aspects of self by time of discharge.    []Pt Goal:  Pt will remain on task/topic for 15-30 minutes during group by time of discharge.    [x]Pt Goal:  Pt will identify 1-2 aspects of relapse prevention plan by time of discharge.    [x]Pt Goal:  Pt will join in conversation with peers 1-2 times per group by time of discharge.    []Pt Goal:  Pt will identify 1-2 new leisure interests by time of discharge.    []Pt Goal:  Pt will not voice any delusional content by time of discharge.   
entry, clear to ausculation, no wheezing, rales or rhonchi, normal effort  Cardiovascular: normal rate, regular rhythm, no murmur, gallop, rub.  Abdomen: Soft, nontender, nondistended, normal bowel sounds, no hepatomegaly or splenomegaly  Neurologic: CN II-XII intact , DTR normal, no new focal motor or sensory deficits, moving all extremities spontaneously.   Skin: No gross lesions, rashes, bruising or bleeding on exposed skin area  Extremities:  peripheral pulses palpable, no pedal edema or calf pain with palpation    Investigations:      Laboratory Testing:  No results found for this or any previous visit (from the past 24 hours).      Imaging/Diagonstics:  No results found.    Assessment :      Hospital Problems           Last Modified POA    * (Principal) Depression with suicidal ideation 7/10/2025 Yes       Plan:     Admitted to inpatient psych with depression suicidal ideation  Alcohol abuse watch for withdrawal  Cocaine abuse currently denies any chest pain shortness of breath, strongly recommended to quit next  Labs and medications reviewed.     DVT prophylaxis,  Pt mobile   Full code status       Consultations:   IP CONSULT TO INTERNAL MEDICINE      Martita Hicks MD  7/11/2025  2:28 PM    Copy sent to Dr. Mesa primary care provider on file.    Please note that this chart was generated using voice recognition Dragon dictation software.  Although every effort was made to ensure the accuracy of this automated transcription, some errors in transcription may have occurred.   
MG/5ML suspension 30 mL, 30 mL, Oral, Q6H PRN  hydrOXYzine HCl (ATARAX) tablet 50 mg, 50 mg, Oral, TID PRN  ibuprofen (ADVIL;MOTRIN) tablet 400 mg, 400 mg, Oral, Q6H PRN  traZODone (DESYREL) tablet 50 mg, 50 mg, Oral, Nightly PRN  polyethylene glycol (GLYCOLAX) packet 17 g, 17 g, Oral, Daily PRN  haloperidol (HALDOL) tablet 5 mg, 5 mg, Oral, Q6H PRN  haloperidol lactate (HALDOL) injection 5 mg, 5 mg, IntraMUSCular, Q6H PRN **AND** diphenhydrAMINE (BENADRYL) injection 50 mg, 50 mg, IntraMUSCular, Q6H PRN    ASSESSMENT  Depression with suicidal ideation     PLAN  Patient s symptoms   are improving  No Medication Changes Today  Attempt to develop insight  Psycho-education conducted.  Supportive Therapy conducted.  Probable discharge is monday  Follow-up daily    Spent 17 minutes or more with the patient in  Supportive psychotherapy.      Electronically signed by RAPHAEL CASAS MD on 7/13/2025 at 7:06 PM        **This report has been created using voice recognition software. It may contain minor errors which are inherent in voice recognition technology.**

## 2025-07-13 NOTE — BH NOTE
Patient did not participate in Community Meeting/goals group, despite staff encouragement and explanation of benefits.  Patient remain seclusive to self.  Q15 minute safety checks maintained for patient safety and will continue to encourage patient to attend unit programming.

## 2025-07-14 PROCEDURE — 99239 HOSP IP/OBS DSCHRG MGMT >30: CPT | Performed by: PSYCHIATRY & NEUROLOGY

## 2025-07-14 NOTE — DISCHARGE SUMMARY
Provider Discharge Summary     Patient ID:  Balta Sarmiento  [id number]  44 y.o.  1980    Admit date: 7/10/2025    Discharge date and time: 7/14/2025  1:59 PM     Admitting Physician: Walt Rutledge MD     Discharge Physician: Walt Rutledge MD    Admission Diagnoses: Depression with suicidal ideation [F32.A, R45.851]    Discharge Diagnoses:      Depression with suicidal ideation     Patient Active Problem List   Diagnosis Code    Depression with suicidal ideation F32.A, R45.851    Alcohol abuse F10.10    Suicidal ideation R45.851    Major depressive disorder, recurrent severe without psychotic features (HCC) F33.2    Cocaine use disorder (HCC) F14.10    Cannabis use disorder F12.90    Alcohol withdrawal, uncomplicated (HCC) F10.930    Homelessness Z59.00    Alcohol use disorder F10.90    MDD (major depressive disorder), recurrent episode, moderate (HCC) F33.1    Atypical pneumonia J18.9    Acute hypoxic respiratory failure (HCC) J96.01    Chronic hepatitis C virus infection (HCC) B18.2    Genital warts due to HPV (human papillomavirus) A63.0    Syncope R55    Major depressive disorder, recurrent F33.9        Admission Condition: poor    Discharged Condition: stable    Indication for Admission: threat to self    History of Present Illnes (present tense wording is of findings from admission exam and are not necessarily indicative of current findings):   Balta Sarmiento is a 44 y.o. male who has a past medical history of depression, polysubstance use, and chronic hepatitis C. Patient presented to the ED for suicidal ideation with a plan to overdose.  Patient had expressed feeling more irritable than usual.  He has been consuming alcohol, approximately 2 pints daily and using cocaine.  Patient does not feel safe from self and is admitted to [facility] on a voluntary basis.     Patient is known to [facility] and this is his fifth admission to a [facility] psychiatric facility in the last 6 months.  He was most recently discharged from

## 2025-07-14 NOTE — GROUP NOTE
Group Therapy Note    Date: 7/14/2025    Group Start Time: 1100  Group End Time: 1150  Group Topic: Cognitive Skills    [facility]    Elyse Anthony [facility]        Group Therapy Note    Attendees: 5/15     Topic: :  To increase social interaction, practice strategic thinking, and concentration skills.        Comments:   Patient did not participate in Cognitive Skills Group at 11:00, despite staff encouragement and explanation   of benefits. Pt was seclusive to self and room.     Q15 minute safety checks maintained for patient safety and will continue to encourage patient to   attend unit programming.         Discipline Responsible: Psychoeducational Specialist   Signature: ROSELYN CRAWFORD

## 2025-07-14 NOTE — CARE COORDINATION
Discharge Arrangements:  Go to [facility]- Inpatient    Guardian notified: n/a    Discharge destination/address: [facility], [address] [facility] [address]     Transported by:  Cab    Follow up appointment is scheduled for  [facility], [address] [place] [address], You are accepted to [facility] for mental health and substance use treatment today, 7/14 anytime after 11:30am       Patient was accepting of referral.    *SIMON resources were offered to patient throughout admission and at time of discharge. This list of [facility] SIMON providers was provided to patient:     [facility]  [address] [facility] [address]   [address] [facility] [address]  Phone: [phone]     Phone: [phone]    [facility]   [address] [facility] [address]   [address] [facility] [address]  Phone: [phone]     Phone: [phone]    [facility]    [facility]  [address] [facility] [address]    [address] [facility] [facility] [address]  Phone: [phone]     Phone: [phone]    [facility]     [facility]   [address] [facility] [address]  [place] [address]      Phone: [phone]  Phone: [phone]        [facility]  [address] [facility] [address]   [address] [place] [address]  Phone: [phone]     Phone:  [phone]    [facility]      [facility]  [address] [facility] [address]   [address] [facility] [address]  Phone: [phone]     Phone: [phone]    [facility]  [address] [facility] [address]   [address] [facility] [address]  Phone: [phone]     Phone: [phone]    [facility]  [facility] [place] [facility]  [address] [facility] [address] [address] [facility] [address] [facility] [address]  Phone: 
Name: Balta Sarmiento    : 1980    Auth number: [id number]     Discharge Date: 2025    Destination: [facility]    *If you have any specific discharge questions, please contact the assigned /discharge planner: Loni ([phone])       Discharge Medications:      Medication List        STOP taking these medications      melatonin 3 MG Tabs tablet     nicotine polacrilex 2 MG lozenge  Commonly known as: COMMIT     traZODone 50 MG tablet  Commonly known as: DESYREL     venlafaxine 150 MG extended release capsule  Commonly known as: EFFEXOR XR              Follow Up Appointment: [facility]  [address] [place] [address]  P: [phone]  F: [phone]  Go today  You are accepted to [facility] for mental health and substance use treatment today,  anytime after 11:30am      
Per patient request, Social Work faxed clinicals to [facility]  
Per patient request, Social Work re-faxed clinicals to [facility] (F: [phone] AND F:[phone])  
Social Work called Corine at [facility] regarding referral sent on 7/11; Corine states that patient is accepted TODAY 7/14.  Pharmacy: [facility] in [place]    
[facility] at last discharge. He reports prior to that in June 2025 he was in inpatient treatment at [facility]. Patient reports that he recently went to [place] by bus to live with his cousin, and reports that it did not work out living with his cousin.  Patient reports Alcohol use and his Ethanol level upon admission .042.  upon admission. Patient's drug screen upon admission was positive for Cannabis and Cocaine. Patient reports being off of his Psychiatric medications. He also reports that he did not follow up with any outpatient treatment at last hospitalizations.     Patient also reports agitation, feels like he is going to snap on people, no homicidal ideation.

## 2025-07-14 NOTE — BH NOTE
Patient given tobacco quitline number 97547463313 at this time, refusing to call at this time, states \" I just dont want to quit now\"- patient given information as to the dangers of long term tobacco use. Continue to reinforce the importance of tobacco cessation.

## 2025-07-14 NOTE — BH NOTE
[facility]  Discharge Note    Pt discharged with followings belongings:   Dental Appliances: None  Vision - Corrective Lenses: None  Hearing Aid: None  Jewelry: None  Body Piercings Removed: No  Clothing: Undergarments, Socks, Footwear, Shirt, Shorts  Other Valuables: Wallet, Lighter/Matches, Other (Comment) ([facility] ID)   Valuables sent home with patient or returned to patient. Patient educated on aftercare instructions: Yes  Information faxed to [facility] by Lorna TONY  at 11:57 AM .Patient verbalize understanding of AVS:  Yes.    Status EXAM upon discharge: Alert and oriented x 4  Mental Status and Behavioral Exam  Normal: No  Level of Assistance: Independent/Self  Facial Expression: Flat  Affect: Blunt  Level of Consciousness: Alert  Frequency of Checks: 4 times per hour, close  Mood:Normal: No  Mood: Depressed, Anxious, Suspicious  Motor Activity:Normal: No  Motor Activity: Decreased  Eye Contact: Good  Observed Behavior: Guarded, Cooperative  Sexual Misconduct History: Current - no  Preception: [place] to person, [place] to time, [place] to place  Attention:Normal: No  Attention: Distractible  Thought Processes: Circumstantial  Thought Content:Normal: No  Thought Content: Preoccupations  Depression Symptoms: Feelings of worthlessness, Feelings of hopelessess, Feelings of helplessness  Anxiety Symptoms: Generalized  Yumiko Symptoms: No problems reported or observed.  Hallucinations: None  Delusions: No  Memory:Normal: No  Memory: Poor remote, Poor recent  Insight and Judgment: No  Insight and Judgment: Poor judgment    Tobacco Screening:  Practical Counseling, on admission, juan X, if applicable and completed (first 3 are required if patient doesn't refuse):            ( ) Recognizing danger situations (included triggers and roadblocks)                    ( ) Coping skills (new ways to manage stress,relaxation techniques, changing routine, distraction)

## 2025-07-14 NOTE — TRANSITION OF CARE
Behavioral Health Transition Record    Patient Name: Balta Sarmiento  YOB: 1980   Medical Record Number: [id number]  Date of Admission: 7/10/2025  9:35 AM   Date of Discharge: 7/14/2025    Attending Provider: Walt Rutledge MD   Discharging Provider: Dr. Rutledge  To contact this individual call [phone] and ask the  to page.  If unavailable, ask to be transferred to Behavioral Health Provider on call.  A Behavioral Health Provider will be available on call 24/7 and during holidays.    Primary Care Provider: No primary care provider on file.    Allergies   Allergen Reactions    Shellfish-Derived Products Rash    Fish-Derived Products     Propoxyphene Hives       Reason for Admission: Patient: Balta Sarmiento  MRN: [id number]  Reason for Admission to Psychiatric Unit:  Threat to self requiring 24 hour professional observation  A mental disorder causing major disability in social, interpersonal, occupational, and/or educational functioning that is leading to dangerous or life-threatening functioning, and that can only be addressed in an acute inpatient setting   A mental disorder that causes an inability to maintain adequate nurtrition or self-care, and family/community support cannot provide reliable, essential care, so that the patient cannont function at a less intensive level of care during evaluation and treatment   Concerns about patient's safety in the community  Past Mental Health Diagnosis: a history of  Major Depression, Prior suicide attempt, and Alcohol and/or Drug Use Disorder  Triggering event or precipitating factor: Housing instability, Financial instability, Alcohol/Drug Relapse, and Psych Treatment Noncompliance  Length of time/duration of triggering event: Days  Legal Status: Voluntary     CHIEF COMPLAINT:  Depression with suicidal ideation     Admission Diagnosis: Depression with suicidal ideation [F32.A, R45.851]    * No surgery found *    No results found for this visit on

## 2025-07-14 NOTE — DISCHARGE INSTRUCTIONS
[address] [facility] [address]  Phone: [phone]      Phone: [phone]    [facility]      [facility]  [address] [place] [address]    [address] [facility] [address]  Phone: [phone]      Phone: [phone]    [facility]     [facility] Department of Psychiatry  [address] [facility] [address]   [address] [place], [place] [address]  Phone: [phone]      Phone: [phone]    [facility]       [facility]  [address] [address]     [address] [facility] [address]  Phone: [phone]      Phone: [phone]    [facility]   [address] [address]    [address] [facility] [address]  Phone: [phone]      Phone: [phone] Ext: [phone]    [place] [address] [facility] [address] / [address] [facility] [address] / [address] [facility] [address]  Phone: [phone]    [facility] and Mental Health   [facility]  [address] [facility] [address]  [address] [place] [address]  Phone: [phone]      Phone: [phone]    [facility]  [address] [facility] [address]    [address] [facility] [address]  Phone: [phone]      Phone: [phone]

## 2025-07-24 ENCOUNTER — HOSPITAL ENCOUNTER (EMERGENCY)
Age: 45
Discharge: LEFT BEFORE DISCHARGE | End: 2025-07-24
Payer: MEDICAID

## 2025-07-24 VITALS
DIASTOLIC BLOOD PRESSURE: 104 MMHG | OXYGEN SATURATION: 96 % | TEMPERATURE: 99.3 F | RESPIRATION RATE: 16 BRPM | HEART RATE: 100 BPM | SYSTOLIC BLOOD PRESSURE: 135 MMHG

## 2025-07-24 DIAGNOSIS — K04.7 DENTAL ABSCESS: Primary | ICD-10-CM

## 2025-07-24 DIAGNOSIS — Z59.00 HOMELESS: ICD-10-CM

## 2025-07-24 PROCEDURE — 99284 EMERGENCY DEPT VISIT MOD MDM: CPT

## 2025-07-24 PROCEDURE — 96372 THER/PROPH/DIAG INJ SC/IM: CPT

## 2025-07-24 PROCEDURE — 6370000000 HC RX 637 (ALT 250 FOR IP): Performed by: NURSE PRACTITIONER

## 2025-07-24 PROCEDURE — 6360000002 HC RX W HCPCS: Performed by: NURSE PRACTITIONER

## 2025-07-24 RX ORDER — LIDOCAINE HYDROCHLORIDE 20 MG/ML
15 SOLUTION OROPHARYNGEAL ONCE
Status: COMPLETED | OUTPATIENT
Start: 2025-07-24 | End: 2025-07-24

## 2025-07-24 RX ORDER — DEXAMETHASONE SODIUM PHOSPHATE 10 MG/ML
10 INJECTION, SOLUTION INTRAMUSCULAR; INTRAVENOUS ONCE
Status: COMPLETED | OUTPATIENT
Start: 2025-07-24 | End: 2025-07-24

## 2025-07-24 RX ORDER — IBUPROFEN 800 MG/1
800 TABLET, FILM COATED ORAL ONCE
Status: COMPLETED | OUTPATIENT
Start: 2025-07-24 | End: 2025-07-24

## 2025-07-24 RX ADMIN — IBUPROFEN 800 MG: 800 TABLET, FILM COATED ORAL at 18:30

## 2025-07-24 RX ADMIN — AMOXICILLIN AND CLAVULANATE POTASSIUM 1 TABLET: 875; 125 TABLET, FILM COATED ORAL at 18:29

## 2025-07-24 RX ADMIN — DEXAMETHASONE SODIUM PHOSPHATE 10 MG: 10 INJECTION, SOLUTION INTRAMUSCULAR; INTRAVENOUS at 18:29

## 2025-07-24 RX ADMIN — LIDOCAINE HYDROCHLORIDE 15 ML: 20 SOLUTION ORAL at 18:29

## 2025-07-24 SDOH — ECONOMIC STABILITY - HOUSING INSECURITY: HOMELESSNESS UNSPECIFIED: Z59.00

## 2025-07-24 ASSESSMENT — PAIN SCALES - GENERAL: PAINLEVEL_OUTOF10: 10

## 2025-07-24 NOTE — ED PROVIDER NOTES
Results are listed below.     LABS:  No results found for this visit on 07/24/25.    RADIOLOGY:  Interpreted by Radiologist.  No orders to display       During Your Evaluation in the Emergency Department Balat Sarmiento  received the following medication(s):    Medications   amoxicillin-clavulanate (AUGMENTIN) 875-125 MG per tablet 1 tablet (1 tablet Oral Given 7/24/25 1829)   ibuprofen (ADVIL;MOTRIN) tablet 800 mg (800 mg Oral Given 7/24/25 1830)   lidocaine viscous hcl (XYLOCAINE) 2 % solution 15 mL (15 mLs Mouth/Throat Given 7/24/25 1829)   dexAMETHasone (PF) (DECADRON) IntraMUSCular 10 mg (10 mg IntraMUSCular Given 7/24/25 1829)             Medical Decision Making:  presents with a complaint of dental pain. The patient states this pain has been gradual in onset, persistent, moderate in severity and worse today which is what prompted the visit. Pain has not been relieved with any OTC medications.  Patient denies any unilateral facial swelling. Patient is able to handle their own secretions and drink fluids without difficulty. Patient denies any fever. The patient also denies any history of dental trauma. Denies difficulty breathing or swallowing. The location of the pain and appears to be isolated over tooth number widespread dental decay but more prominent in the left upper molar from teeth 11 through 13.  Patient presents emergency department with complaints of dental pain.  Patient just got a drug rehab and is wanting to also get some back home in [place].  Patient states that he does have a dentist appointment in [place] on Monday.  He states worsening dental pain over the last day or 2.  Patient also requesting to be sent home via cab to [place].  Patient otherwise reports taking over-the-counter meds for dental pain.  Not currently on any antibiotics.  No no chest pain no shortness of breath no airway compromise...  Differential includes Ruba's versus dental abscess versus dental decay will provide patient with

## 2025-07-24 NOTE — DISCHARGE INSTRUCTIONS
[employer] Hotline 286 786-9063 or 1-832.514.2745 or 211   24 hour crisis line for the following [facility]   [email or web address]    [facility] [facility] LINE: 1-163.414.3462  Monday through Friday 4pm to 8pm and Saturday 1pm-3pm   You can call during these times to talk with a peer support person as needed       Homeless Shelters   [facility] [place] [address] [place] [address] [phone]    [facility] [place]   [address] [place] [address]  Free hot meals Mondays and Tuesday 4PM-6PM sit down dinner at 5PM  Free hot showers Wednesday 1:15-3:45PM    [facility] - Women and Children only   [phone]   [facility]   [address] [place], [place] [id number] [phone]    [facility] (women only)  [address], [place] [id number] [phone]    [facility] (men only)  [address] [place], [place] [address] [phone] // [phone]  [facility]   [phone]    [facility] [place] ([facility])  Women - [phone] ([address] [place] [address])  Men - [phone] ext. [phone] ([address] [place] [address])  Family - [phone] ext. [phone] ([address] [place] [address])    [facility] ([place], [place])  [address] [place] [address] (limited a two day stay if non-resident)  [phone]    [facility] ([place], [place])  [address] [place] [address]  [phone]

## 2025-07-24 NOTE — ED NOTES
consulted due to homelessness.  Per triage note patient reported needing a ride back to [facility].     will provide patient [employer] transportation services number ([phone] or [phone]) to set up and arrange transportation.

## 2025-07-25 ENCOUNTER — HOSPITAL ENCOUNTER (EMERGENCY)
Age: 45
Discharge: HOME | End: 2025-07-25
Attending: STUDENT IN AN ORGANIZED HEALTH CARE EDUCATION/TRAINING PROGRAM
Payer: MEDICAID

## 2025-07-25 VITALS
DIASTOLIC BLOOD PRESSURE: 81 MMHG | TEMPERATURE: 98.2 F | HEART RATE: 96 BPM | RESPIRATION RATE: 14 BRPM | SYSTOLIC BLOOD PRESSURE: 125 MMHG | OXYGEN SATURATION: 99 %

## 2025-07-25 DIAGNOSIS — F14.90 COCAINE USE: Primary | ICD-10-CM

## 2025-07-25 PROCEDURE — 99285 EMERGENCY DEPT VISIT HI MDM: CPT

## 2025-07-25 RX ORDER — CALCIUM GLUCONATE 20 MG/ML
INJECTION, SOLUTION INTRAVENOUS
Status: DISCONTINUED
Start: 2025-07-25 | End: 2025-07-25

## 2025-07-25 ASSESSMENT — ENCOUNTER SYMPTOMS
SHORTNESS OF BREATH: 0
NAUSEA: 0

## 2025-07-26 NOTE — ED PROVIDER NOTES
[facility]     Emergency Department     Faculty Attestation    I performed a history and physical examination of the patient and discussed management with the resident. I have reviewed and agree with the resident’s findings including all diagnostic interpretations, and treatment plans as written at the time of my review. Any areas of disagreement are noted on the chart. I was personally present for the key portions of any procedures. I have documented in the chart those procedures where I was not present during the key portions. For Physician Assistant/ Nurse Practitioner cases/documentation I have personally evaluated this patient and have completed at least one if not all key elements of the E/M (history, physical exam, and MDM). Additional findings are as noted.    PtName: Balta Sarmiento  MRN: [id number]  Birthdate 1980  Date of evaluation: 7/25/25  Note Started: 10:38 PM EDT    Primary Care Physician: No primary care provider on file.    Brief HPI: SI, passive, no active plan, several prior visits for the same and just [facility]'d from [facility] for the same.  involved in planning.       Pertinent Physical Exam Findings:  Vitals:    07/25/25 2230   BP: 125/81   Pulse: 96   Resp: 14   Temp: 98.2 °F (36.8 °C)   SpO2: 99%        Physical exam findings:        Medical Decision Making: Patient is a 44 y.o. male presenting to the emergency department with Passive SI. The chart was reviewed for pertinent history relating to the chief complaint.  The patient appears Well.     ED Course:  discussed and we planned for outpatient treatment.    All results, including labs (if ordered), imaging (if ordered), and EKGs (if ordered) were independently interpreted by me.  See radiologist report for additional details on imaging studies.      (Please note that portions of this note were completed with a voice recognition program.  Efforts were made to edit the

## 2025-07-26 NOTE — DISCHARGE INSTRUCTIONS
You were evaluated in the ER for your substance use. At this time, you denied active suicidal ideation with a plan. You were seen by social work who has provided outpatient resources for mental health and rehabilitation. Please call in the morning or follow up with [facility] for reevaluation.

## 2025-07-26 NOTE — ED NOTES
[facility] called for change out. Pt changed into paper scrubs. Pt's personal belongings given to [facility].

## 2025-07-26 NOTE — ED NOTES
[] [facility]    [] [facility]    [x]  [facility]    SUICIDE RISK ASSESSMENT      Y  N     [x] [] In the past two weeks have you had thoughts of hurting yourself in any way?    [x] [] In the past two weeks have you had thoughts that you would be better off dead?   [] [x] Have you made a suicide attempt in the past two months?   [] [x] Do you have a plan for hurting yourself or suicide?   [] [x] Presence of hallucinations/voices related to hurting himself or herself or someone else.    SUICIDE/SECURITY WATCH PRECAUTION CHECKLIST     Orders    [x]  Suicide/Security Watch Precautions initiated as checked below:   7/25/25 10:48 PM EDT 01/01    [x] Notified physician:  Pratik Mason MD  7/25/25 10:48 PM EDT    [x] Orders obtained as appropriate:     [x] 1:1 Observer     [] Psych Consult     [] Psych Consult    Name:  Date:  Time:    [x] 1:1 Observer, Notified by:  Jame Philip RN    Contact Nurse Supervisor    [x] Remove all personal clothes from room and place in snap/paper gown/pants.  Slipper only    [x] Remove all personal belongings from room and secured away from patient.    Documentation    [x] Initiate Suicide/Security Watch Precaution Flow Sheet    [x] Initiate individualized Care Plan/Problem    [x] Document why precautions initiated on flow sheet (Initiate Nursing Care Plan/Problem)    [x] 1:1 Observer in place; instructions provided.  Suicide precautions require observer be within arms length.    [x] Nurse-Observer Communication Hand-off initiated by RN, reviewed with Observer.  Subsequently used as Hand Off between Observers.    [x] Initiate every 15 minute observations per observer as delegated by the RN.    [x] Initiate RN assessment and documentation    Environmental Scan  Search Criteria and Process: OPTIONAL, see Search Policy    [x] Reason for search: SI    [] Nursing in presence of second person to search patient    [x] Patient notified of reason for body assessment and  suicidal ideation/behavior  [x] Depression  [] Suicide attempt      [] Low self-esteem  [] Hallucinations      [] Feeling of Hopelessness  [x] Substance abuse or withdrawal    [] Dysfunctional family  [] Major traumatic event, eg., divorce, etc   [] Excessive stress/anxiety    7/25/25    Expected Outcomes    Patient will:   [x] Patient will remain safe for the duration of their stay   [x] Patient's environment will be safe, eg. Free of potential suicide weapons   [x] Verbalize Recovery from suicidal episode and improvement in self-worth   [x] Discuss feeling that precipitated suicide attempt/thoughts/behavior   [x] Will describe available resources for crisis prevention and management   [x] Will verbalize positive coping skills     Nursing Intervention   [x] Assessment and Observations hourly   [x] Suicide Precautions implemented with patient, should be 1:1 observation   [x] Document observation [id number] and RN assessment hourly   [] Consult physician for:    [] Psychiatric consult    [] Pharmacological therapy    [] Other:    [x] Patient search completed by security   [x] Initiated appropriate safety protocols by removing from the patient's environment anything that could be used to inflict self injury, eg. Order safe tray, snap gown, etc   [x] Maintain open, warm, caring, non-judgmental attitude/manner towards patient   [] Discuss advantages and disadvantages of existing coping methods/skills   [x] Assist and educate patient with identifying present strengths and coping skills   [x] Keep patient informed regarding plan of care and provide clear concise explanations.  Provide the patient/family education information as well as telephone numbers and other information about crisis centers, hot lines, and counselors.    Discharge Planning:   [] Referral  [] Groups [] Health agencies  [] Other:

## 2025-07-26 NOTE — ED NOTES
[facility]/delusions. Patient was provided AOD resources. Patient is now presenting to [facility] stating he is here for placement to get back on his medications and thought this would be the best way to go somewhere ([facility]). Patient denied SI/HI. Patient requested substance abuse treatment. Patient stated he went to [facility] where he was denied admission and told to come back tomorrow at 8am for an appt. Patient stated he is homeless and [facility] and patient discussed options.    Patient was provided mental health and AOD resources. Patient requested phone number for [facility], provided.    Patient is at baseline. Patient is able to contract for safety. Patient is aware of area resources. Safety plan completed.

## 2025-07-26 NOTE — ED PROVIDER NOTES
[facility] EMERGENCY DEPARTMENT  Emergency Department Encounter  Emergency Medicine Resident     Pt Name:Balta Sarmiento  MRN: [id number]  Birthdate 1980  Date of evaluation: 7/25/25  PCP:  No primary care provider on file.  Note Started: 10:39 PM EDT      CHIEF COMPLAINT       Chief Complaint   Patient presents with    Suicidal       HISTORY OF PRESENT ILLNESS  (Location/Symptom, Timing/Onset, Context/Setting, Quality, Duration, Modifying Factors, Severity.)      Balta Sarmiento is a 44 y.o. male who presents to the ED with c/o suicidal ideation without a plan.  Patient states he has been having recent struggles with his mental health after he was taken off of his psychiatric medications approximately 3 weeks ago.  Today, he is endorsing suicidal ideation which began around 1900 today.  He was seen at [facility] at 1918 and was ultimately discharged after denying SI/HI, AH/VH to the  there.  Patient does admit to cocaine usage, last was at 1500 today.  He is requesting help getting into another rehab facility.  Patient denies any fever, chest pain, shortness of breath, nausea/vomiting.    PAST MEDICAL / SURGICAL / SOCIAL / FAMILY HISTORY      has a past medical history of Anxiety, Chest pain, Depression, and Insomnia.     has no past surgical history on file.    Social History     Socioeconomic History    Marital status: Single     Spouse name: Not on file    Number of children: Not on file    Years of education: Not on file    Highest education level: Not on file   Occupational History    Not on file   Tobacco Use    Smoking status: Every Day     Current packs/day: 0.50     Types: Cigarettes    Smokeless tobacco: Never   Vaping Use    Vaping status: Former   Substance and Sexual Activity    Alcohol use: Yes     Comment: 4 beers and a fifth of liquor    Drug use: Yes     Types: Cocaine, Marijuana (Weed)    Sexual activity: Not on file   Other Topics Concern    Not on file   Social History

## 2025-07-26 NOTE — ED NOTES
Pt arrives to ED 01 ambulatory via triage for SI.  Pt states he feels overwhelmed and more depressed recently, states he \"I feel like I want to take a sharp object and jab it into my neck.\"  Pt denies any suicidal attempts, denies any homicidal ideations, denies any plans to take his own life.  Pt denies any visual or auditory hallucinations.  Pt states he has been off his mental health meds for approx 3 wks.  Pt reports cocaine use, last used around 1500 today.  Pt reports hx of bipolar disorder and depression.  1:1 observation initiated w/ staff member.  Pt A&O x4, RR even and unlabored, Will continue plan of care.